# Patient Record
Sex: MALE | Race: WHITE | Employment: OTHER | URBAN - METROPOLITAN AREA
[De-identification: names, ages, dates, MRNs, and addresses within clinical notes are randomized per-mention and may not be internally consistent; named-entity substitution may affect disease eponyms.]

---

## 2017-01-10 ENCOUNTER — TELEPHONE (OUTPATIENT)
Dept: INTERNAL MEDICINE CLINIC | Age: 77
End: 2017-01-10

## 2017-01-10 NOTE — TELEPHONE ENCOUNTER
Spoke with pt who states the xarelto is not agreeing with him. He states it is causing him to have a headache behind the ears that he never had before. He also states the Cialis does not work as well while taking this medication too and the cost for this medication is a $400.00 co-pay. He would like to go back to Warfarin.

## 2017-01-11 ENCOUNTER — TELEPHONE (OUTPATIENT)
Dept: INTERNAL MEDICINE CLINIC | Age: 77
End: 2017-01-11

## 2017-01-11 NOTE — TELEPHONE ENCOUNTER
At last visit he told me medication was affordable. Needs to look on his formulary for alternative meds (Pradaxa, etc). Also need more info on h/a as he did not mention this also at his last visit. I have my reservations about switching back.   He has been very difficult to control his INR while on coumadin which is why the transition to these newer medications

## 2017-01-11 NOTE — TELEPHONE ENCOUNTER
Spoke with pt and asked should he take xarelto or warfarin. He states the xarelto is causing him headaches.

## 2017-01-11 NOTE — TELEPHONE ENCOUNTER
Patient walked in with questions about switching back to Warfarin. Reports he is getting headaches and never did on the Warfarin. Says the Xarelto is now $400, was $35. Advised to schedule appointment with Dr. Frankie Boyer to review and to check his formulary with his insurance company to see what medications are covered. Scheduling appointment now.

## 2017-01-13 RX ORDER — HYDROCHLOROTHIAZIDE 25 MG/1
TABLET ORAL
Qty: 90 TAB | Refills: 3 | Status: SHIPPED | OUTPATIENT
Start: 2017-01-13 | End: 2018-01-05 | Stop reason: SDUPTHER

## 2017-01-16 ENCOUNTER — OFFICE VISIT (OUTPATIENT)
Dept: INTERNAL MEDICINE CLINIC | Age: 77
End: 2017-01-16

## 2017-01-16 VITALS
SYSTOLIC BLOOD PRESSURE: 124 MMHG | BODY MASS INDEX: 32.11 KG/M2 | DIASTOLIC BLOOD PRESSURE: 76 MMHG | HEART RATE: 64 BPM | TEMPERATURE: 98.1 F | RESPIRATION RATE: 16 BRPM | WEIGHT: 216.8 LBS | HEIGHT: 69 IN | OXYGEN SATURATION: 98 %

## 2017-01-16 DIAGNOSIS — I48.20 CHRONIC ATRIAL FIBRILLATION (HCC): Primary | ICD-10-CM

## 2017-01-16 DIAGNOSIS — N52.9 ERECTILE DYSFUNCTION, UNSPECIFIED ERECTILE DYSFUNCTION TYPE: ICD-10-CM

## 2017-01-16 DIAGNOSIS — Z79.01 CHRONIC ANTICOAGULATION: ICD-10-CM

## 2017-01-16 DIAGNOSIS — E66.9 OBESITY (BMI 30.0-34.9): ICD-10-CM

## 2017-01-16 RX ORDER — TADALAFIL 20 MG/1
20 TABLET ORAL AS NEEDED
Qty: 6 TAB | Refills: 5 | Status: SHIPPED | OUTPATIENT
Start: 2017-01-16 | End: 2017-03-03 | Stop reason: ALTCHOICE

## 2017-01-16 NOTE — PROGRESS NOTES
Rosalba Crespo is a 67 y.o. male who was seen in clinic today (1/16/2017). Assessment & Plan:  Shree Argueta was seen today for other. Diagnoses and all orders for this visit:    Chronic atrial fibrillation ([address]- stable, see below    Chronic anticoagulation- reviewed CHADS2 score and rational for treatment. Reviewed options & may concerns about warfarin/coumadin. Reviewed cost of new anticoagulants. Not sure about h/a he is reporting short lived & easily relieved w/ meds. Will monitor for now. If cost becomes an issue will need to have GF RTC w/ patient for repeat vitamin K diet testing as pt is not able to follow this on his own. Erectile dysfunction, unspecified erectile dysfunction type- fluctuating control, not sure why it is worse, could not see any interaction w/ meds, favor mental issue. Reviewed med options, he would like to stick w/ current meds. Reviewed dosing options. -     tadalafil (CIALIS) 20 mg tablet; Take 1 Tab by mouth as needed. Obesity (BMI 30.0-34.9)- worsening, discussed the patient's above normal BMI with him. The follow up plan is as follows: I have counseled this patient on diet and exercise regimens       I spent 15 minutes with the patient and >50% of the time was spent counseling on options for anticoagulation & rational for not using coumadin. We also reviewed his concerns about sex and ED, while reviewing med options & red flags. Follow-up Disposition:  Return if symptoms worsen or fail to improve.       ----------------------------------------------------------------------    Subjective:  Anticoagulation  Patient here for followup of chronic anticoagulation due to Atrial Fibrillation. Duration: Time; 3 months to 1 year: life long. Bleeding Signs/Symptoms: None. Thromboembolic Signs/Symptoms: None. Recent falls:  None. Following a consistent vitamin K diet: no. INR's have been: poorly controlled.   Reviewed his last 4 yrs of INR's, in the last 20 readings only 7 at goal and 4 of the high's have been > 4. Switched to Xarelto at last visit. However, cost went up and having some low grade headache, behind his eyes, once a week. These last for minutes, relieved w/ 2 Tylenol. Urology Review  He is here today because of ED. He reports his symptoms are poorly controlled. His symptoms include: inability to maintain an erection. Prior to the last few weeks was having no issues w/ the medication. He is on the following medications: Cialis. He denies any medication side effects. Prior to Admission medications    Medication Sig Start Date End Date Taking? Authorizing Provider   hydroCHLOROthiazide (HYDRODIURIL) 25 mg tablet TAKE 1 TABLET EVERY DAY 1/13/17  Yes Peace Ribeiro MD   enalapril (VASOTEC) 20 mg tablet TAKE 1 TABLET TWICE DAILY 12/22/16  Yes Peace Ribeiro MD   rivaroxaban (XARELTO) 20 mg tab tablet Take 1 Tab by mouth daily (with dinner). 12/19/16  Yes Peace Ribeiro MD   pravastatin (PRAVACHOL) 10 mg tablet TAKE 1 TABLET EVERY NIGHT 11/28/16  Yes Peace Ribeiro MD   potassium chloride (K-DUR, KLOR-CON) 10 mEq tablet TAKE 1 TABLET THREE TIMES DAILY 10/4/16  Yes Peace Ribeiro MD   amLODIPine (NORVASC) 5 mg tablet Take 1 Tab by mouth daily. 10/3/16  Yes Anthony Beach MD   tadalafil (CIALIS) 20 mg tablet Take 1 Tab by mouth as needed. 10/3/16  Yes Anthony Beach MD   metoprolol succinate (TOPROL-XL) 25 mg XL tablet TAKE 1 TABLET EVERY DAY 8/24/16  Yes Peace Ribeiro MD   terazosin (HYTRIN) 10 mg capsule TAKE 1 CAPSULE EVERY NIGHT 7/14/16  Yes Peace Ribeiro MD   etodolac (LODINE) 400 mg tablet Take 400 mg by mouth two (2) times daily (with meals). 10/19/15  Yes Xochitl Hendrickson MD   acetaminophen (TYLENOL) 325 mg tablet Take  by mouth every four (4) hours as needed for Pain. Yes Historical Provider   ARGININE, L-ARGININE, PO Take 1 tablet by mouth.  L Arginine/L Citrulline 4000mg/1000mg qd   Yes Historical Provider   FOLIC ACID PO Take 286 mcg by mouth daily. Yes Historical Provider   thioctic acid (ALPHA LIPOIC ACID) 50 mg tab Take 1 Tab by mouth daily. Yes Historical Provider   fiber Cap Take 2 Caps by mouth two (2) times a day. Yes Historical Provider   MULTIVITAMIN WITH MINERALS (MULTI-VIT 55 PLUS PO) Take 1 Tab by mouth daily. 7/8/11  Yes Historical Provider          Allergies   Allergen Reactions    Pcn [Penicillins] Unknown (comments)     As a child    Percocet [Oxycodone-Acetaminophen] Swelling     Leg swelling           Review of Systems   HENT: Negative for nosebleeds. Respiratory: Negative for hemoptysis. Gastrointestinal: Negative for blood in stool and melena. Genitourinary: Negative for hematuria. Endo/Heme/Allergies: Does not bruise/bleed easily. Objective:   Physical Exam- deferred      Visit Vitals    /76    Pulse 64    Temp 98.1 °F (36.7 °C) (Oral)    Resp 16    Ht 5' 8.8\" (1.748 m)    Wt 216 lb 12.8 oz (98.3 kg)    SpO2 98%    BMI 32.2 kg/m2         Disclaimer:  Advised him to call back or return to office if symptoms worsen/change/persist.  Discussed expected course/resolution/complications of diagnosis in detail with patient. Medication risks/benefits/costs/interactions/alternatives discussed with patient. He was given an after visit summary which includes diagnoses, current medications, & vitals. He expressed understanding with the diagnosis and plan.         Zachary Tam MD

## 2017-01-16 NOTE — PATIENT INSTRUCTIONS
Ã¯Â»Â¿  Anticoagulants: After Your Visit  Your Care Instructions  Your doctor prescribed an anticoagulant medicine. Anticoagulants, often called blood thinners, prevent new blood clots from forming and keep existing clots from getting larger. They do not actually thin the blood, but they make the blood take longer to clot. This lowers the risk of a blood clot moving to the lungs (pulmonary embolism) or moving to the brain and causing a stroke. Blood thinners come in two forms. Heparin is given by shot, either under your skin or through a needle in your vein, and starts working right away. Warfarin (Coumadin) comes in pill form and takes longer to work. Your doctor may have you begin taking both forms at the same time. As soon as the pills start to work, you will stop the shots but continue to take the pills. If you have a blood clot in your leg, you may need to take warfarin for several months. People who have heart conditions such as atrial fibrillation often need to take it for the rest of their lives. The right dose of this medicine is different for each person. You will need regular blood tests to see if your dose is correct. Follow-up care is a key part of your treatment and safety. Be sure to make and go to all appointments, and call your doctor if you are having problems. Itâs also a good idea to know your test results and keep a list of the medicines you take. How do you take blood thinners? · Take your medicines exactly as prescribed. Call your doctor if you think you are having a problem with your medicine. · Call your doctor if you are not sure what to do if you missed a dose of blood thinner. ¨ Your doctor can tell you exactly what to do so you do not take too much or too little blood thinner. Then you will be as safe as possible. · Some general rules for what to do if you miss a dose:  ¨ If you remember it in the same day, take the missed dose. Then go back to your regular schedule.   ¨ If it is the next day, or almost time to take the next dose, do not take the missed dose. Do not double the dose to make up for the missed one. At your next regularly scheduled time, take your normal dose. ¨ If you miss your dose for 2 or more days, call your doctor. · To help you stay on schedule, use a calendar to remind you when to take your blood thinner. When you take the medicine, note it on the calendar. · If you are going to give yourself shots, your doctor will give you instructions for how to safely inject the medicine. Follow the directions carefully. · Do not take any vitamins, over-the-counter medicines, or herbal products without talking to your doctor first.  · Avoid contact sports and other activities that could lead to injury. Make your home safe and take other measures to reduce your risk of falling. Always wear a seat belt while in a car. · Do not suddenly change your intake of vitamin Kârich foods, such as broccoli, cabbage, asparagus, lettuce, and spinach. This will help blood thinners work evenly from day to day. · Limit alcohol to 2 drinks a day for men and 1 drink a day for women. Alcohol may interfere with blood thinners. It also increases your risk of falls, which can cause bruising and bleeding. · Tell your dentist, pharmacist, and other health professionals that you take blood thinners. Wear medical alert jewelry that says you take blood thinners. You can buy this at most drugstores. When should you call for help? Call 911 anytime you think you may need emergency care. For example, call if:  · You cough up blood. · You vomit blood or what looks like coffee grounds. · You pass maroon or very bloody stools. Call your doctor now or seek immediate medical care if:  · You have new bruises or blood spots under your skin. · You have a nosebleed. · Your gums bleed when you brush your teeth. · You have blood in your urine.   · Your stools are black and tarlike or have streaks of blood.  · You have vaginal bleeding when you are not having your period, or heavy period bleeding. Watch closely for changes in your health, and be sure to contact your doctor if:  · You have questions about your medicine. Where can you learn more? Go to CoWare.be  Enter O883 in the search box to learn more about \"Anticoagulants: After Your Visit. \"   Â© 5235-6666 Healthwise, Incorporated. Care instructions adapted under license by [employer] (which disclaims liability or warranty for this information). This care instruction is for use with your licensed healthcare professional. If you have questions about a medical condition or this instruction, always ask your healthcare professional. [place] [address] any warranty or liability for your use of this information.   Content Version: 6.1.43971; Last Revised: July 1, 2009

## 2017-01-16 NOTE — MR AVS SNAPSHOT
Visit Information Date & Time Provider Department Dept. Phone Encounter #  
 1/16/2017  7:45 AM Tay Ocampo, [address] [facility] Internal Medicine [phone] [id number] Follow-up Instructions Return if symptoms worsen or fail to improve. Your Appointments 2/22/2017  8:30 AM  
SURGERY with MD Prashant Ron [address]057 [address] [facility]) Appt Note: BCC Lt lateral cheek Dr. Blayne Garcia [facility] [address] [place] [place] [address]  
[facility] [address] [place] [address]  
  
    
 3/3/2017  8:30 AM  
PHYSICAL with Tay Ocampo MD  
[facility] Internal Medicine [address]) Appt Note: cpe  
 [address]  [address] [facility] [address]  
[address] [facility]  
  
    
 4/3/2017  8:40 AM  
ESTABLISHED PATIENT with Shalonda Garrido MD  
[facility] ([address]) Appt Note: 6 month follow up  
 [address]  
[address] [facility],[address] [address] Upcoming Health Maintenance Date Due  
 GLAUCOMA SCREENING Q2Y 9/1/2016 COLONOSCOPY 5/10/2017 MEDICARE YEARLY EXAM 1/19/2017 DTaP/Tdap/Td series (2 - Td) 7/14/2025 Allergies as of 1/16/2017  Review Complete On: 1/16/2017 By: Tay Ocampo MD  
  
 Severity Noted Reaction Type Reactions Pcn [Penicillins]  03/27/2011    Unknown (comments) As a child Percocet [Oxycodone-acetaminophen]  03/27/2011    Swelling Leg swelling Current Immunizations  Reviewed on 1/16/2017 Name Date Influenza High Dose Vaccine PF 10/7/2016, 10/12/2015, 10/6/2014 Influenza Vaccine 10/1/2013 Influenza Vaccine Split 10/15/2012, 10/20/2011 Pneumococcal Conjugate (PCV-13) 7/14/2015 Pneumococcal Polysaccharide (PPSV-23) 10/7/2016 Pneumococcal Vaccine (Pcv) 1/1/2005 Tdap 7/14/2015 Zoster Vaccine, Live 1/1/2011 Reviewed by Jordyn Silva RN on 1/16/2017 at  7:51 AM  
You Were Diagnosed With   
  
 Codes Comments Chronic atrial fibrillation (HCC)    -  Primary ICD-10-CM: Q47.5 ICD-9-CM: 427.31 Chronic anticoagulation     ICD-10-CM: Z79.01 
ICD-9-CM: V58.61 Erectile dysfunction, unspecified erectile dysfunction type     ICD-10-CM: N52.9 ICD-9-CM: 607.84 Obesity (BMI 30.0-34.9)     ICD-10-CM: Y52.8 ICD-9-CM: 278.00 Vitals BP Pulse Temp Resp Height(growth percentile) Weight(growth percentile) 124/76 64 98.1 °F (36.7 °C) (Oral) 16 5' 8.8\" (1.748 m) 216 lb 12.8 oz (98.3 kg) SpO2 BMI Smoking Status 98% 32.2 kg/m2 Never Smoker Vitals History BMI and BSA Data Body Mass Index Body Surface Area  
 32.2 kg/m 2 2.18 m 2 Preferred Pharmacy Pharmacy Name Phone [facility] [address] [place] [address], [address] [place] [address] [phone] Your Updated Medication List  
  
   
This list is accurate as of: 1/16/17  8:14 AM.  Always use your most recent med list. amLODIPine 5 mg tablet Commonly known as:  [place]udd[place]eston Andrea Take 1 Tab by mouth daily. ARGININE (L-ARGININE) PO Take 1 tablet by mouth. L Arginine/L Citrulline 4000mg/1000mg qd  
  
 enalapril 20 mg tablet Commonly known as:  VASOTEC  
TAKE 1 TABLET TWICE DAILY  
  
 etodolac 400 mg tablet Commonly known as:  LODINE Take 400 mg by mouth two (2) times daily (with meals). fiber Cap Take 2 Caps by mouth two (2) times a day. FOLIC ACID PO Take 400 mcg by mouth daily. hydroCHLOROthiazide 25 mg tablet Commonly known as:  HYDRODIURIL  
TAKE 1 TABLET EVERY DAY  
  
 metoprolol succinate 25 mg XL tablet Commonly known as:  TOPROL-XL  
TAKE 1 TABLET EVERY DAY  
  
 MULTI-VIT 55 PLUS PO Take 1 Tab by mouth daily. potassium chloride 10 mEq tablet Commonly known as:  K-DUR, KLOR-CON  
TAKE 1 TABLET THREE TIMES DAILY pravastatin 10 mg tablet Commonly known as:  PRAVACHOL  
TAKE 1 TABLET EVERY NIGHT  
  
 rivaroxaban 20 mg Tab tablet Commonly known as:  Isai Capes Take 1 Tab by mouth daily (with dinner). tadalafil 20 mg tablet Commonly known as:  CIALIS Take 1 Tab by mouth as needed. terazosin 10 mg capsule Commonly known as:  HYTRIN  
TAKE 1 CAPSULE EVERY NIGHT  
  
 thioctic acid 50 mg Tab Generic drug:  Alpha Lipoic Acid Take 1 Tab by mouth daily. TYLENOL 325 mg tablet Generic drug:  acetaminophen Take  by mouth every four (4) hours as needed for Pain. Prescriptions Sent to Pharmacy Refills  
 rivaroxaban (XARELTO) 20 mg tab tablet 5 Sig: Take 1 Tab by mouth daily (with dinner). Class: Normal  
 Pharmacy: [facility] [address], [address] Trixie Riedel of [address] Ph #: [phone] Route: Oral  
  
Follow-up Instructions Return if symptoms worsen or fail to improve. Patient Instructions Ã¯Â»Â Anticoagulants: After Your Visit Your Care Instructions Your doctor prescribed an anticoagulant medicine. Anticoagulants, often called blood thinners, prevent new blood clots from forming and keep existing clots from getting larger. They do not actually thin the blood, but they make the blood take longer to clot. This lowers the risk of a blood clot moving to the lungs (pulmonary embolism) or moving to the brain and causing a stroke. Blood thinners come in two forms. Heparin is given by shot, either under your skin or through a needle in your vein, and starts working right away. Warfarin (Coumadin) comes in pill form and takes longer to work. Your doctor may have you begin taking both forms at the same time. As soon as the pills start to work, you will stop the shots but continue to take the pills. If you have a blood clot in your leg, you may need to take warfarin for several months. People who have heart conditions such as atrial fibrillation often need to take it for the rest of their lives. The right dose of this medicine is different for each person. You will need regular blood tests to see if your dose is correct. Follow-up care is a key part of your treatment and safety. Be sure to make and go to all appointments, and call your doctor if you are having problems. Itâs also a good idea to know your test results and keep a list of the medicines you take. How do you take blood thinners? · Take your medicines exactly as prescribed. Call your doctor if you think you are having a problem with your medicine. · Call your doctor if you are not sure what to do if you missed a dose of blood thinner. ¨ Your doctor can tell you exactly what to do so you do not take too much or too little blood thinner. Then you will be as safe as possible. · Some general rules for what to do if you miss a dose: ¨ If you remember it in the same day, take the missed dose. Then go back to your regular schedule. ¨ If it is the next day, or almost time to take the next dose, do not take the missed dose. Do not double the dose to make up for the missed one. At your next regularly scheduled time, take your normal dose. ¨ If you miss your dose for 2 or more days, call your doctor. · To help you stay on schedule, use a calendar to remind you when to take your blood thinner. When you take the medicine, note it on the calendar. · If you are going to give yourself shots, your doctor will give you instructions for how to safely inject the medicine. Follow the directions carefully. · Do not take any vitamins, over-the-counter medicines, or herbal products without talking to your doctor first. 
· Avoid contact sports and other activities that could lead to injury.  Make your home safe and take other measures to reduce your risk of falling. Always wear a seat belt while in a car. · Do not suddenly change your intake of vitamin Kârich foods, such as broccoli, cabbage, asparagus, lettuce, and spinach. This will help blood thinners work evenly from day to day. · Limit alcohol to 2 drinks a day for men and 1 drink a day for women. Alcohol may interfere with blood thinners. It also increases your risk of falls, which can cause bruising and bleeding. · Tell your dentist, pharmacist, and other health professionals that you take blood thinners. Wear medical alert jewelry that says you take blood thinners. You can buy this at most [employer]. When should you call for help? Call 911 anytime you think you may need emergency care. For example, call if: 
· You cough up blood. · You vomit blood or what looks like coffee grounds. · You pass maroon or very bloody stools. Call your doctor now or seek immediate medical care if: 
· You have new bruises or blood spots under your skin. · You have a nosebleed. · Your gums bleed when you brush your teeth. · You have blood in your urine. · Your stools are black and tarlike or have streaks of blood. · You have vaginal bleeding when you are not having your period, or heavy period bleeding. Watch closely for changes in your health, and be sure to contact your doctor if: 
· You have questions about your medicine. Where can you learn more? Go to Noomeo.be Enter L215 in the search box to learn more about \"Anticoagulants: After Your Visit. \" Â© 6981-1525 Healthwise, Incorporated. Care instructions adapted under license by Marisol Higgins (which disclaims liability or warranty for this information). This care instruction is for use with your licensed healthcare professional. If you have questions about a medical condition or this instruction, always ask your healthcare professional. [address] any warranty or liability for your use of this information. Content Version: 8.2.75955; Last Revised: July 1, 2009 Introducing [facility]! Denny Kelsey introduces Mercury Puzzle patient portal. Now you can access parts of your medical record, email your doctor's office, and request medication refills online. 1. In your internet browser, go to [email or web address] 2. Click on the First Time User? Click Here link in the Sign In box. You will see the New Member Sign Up page. 3. Enter your Mercury Puzzle Access Code exactly as it appears below. You will not need to use this code after youve completed the sign-up process. If you do not sign up before the expiration date, you must request a new code. · [employer] Puzzle Access Code: [email or web address] Expires: 4/16/2017  8:14 AM 
 
4. Enter the last four digits of your Social Security Number (xxxx) and Date of Birth (mm/dd/yyyy) as indicated and click Submit. You will be taken to the next sign-up page. 5. Create a Mercury Puzzle ID. This will be your Mercury Puzzle login ID and cannot be changed, so think of one that is secure and easy to remember. 6. Create a [employer] Puzzle password. You can change your password at any time. 7. Enter your Password Reset Question and Answer. This can be used at a later time if you forget your password. 8. Enter your e-mail address. You will receive e-mail notification when new information is available in [address] 9. Click Sign Up. You can now view and download portions of your medical record. 10. Click the Download Summary menu link to download a portable copy of your medical information. If you have questions, please visit the Frequently Asked Questions section of the Mercury Puzzle website. Remember, [employer] Puzzle is NOT to be used for urgent needs. For medical emergencies, dial 911. Now available from your iPhone and Android! Please provide this summary of care documentation to your next provider. Your primary care clinician is listed as Rosa Elena So.  If you have any questions after today's visit, please call [phone].

## 2017-02-01 ENCOUNTER — TELEPHONE (OUTPATIENT)
Dept: DERMATOLOGY | Facility: AMBULATORY SURGERY CENTER | Age: 77
End: 2017-02-01

## 2017-02-01 NOTE — TELEPHONE ENCOUNTER
Patient Appointment Date:   02/22/17  8:30am      Macy Germain, 67 y.o., male  Is calling for their Mohs Pre-Op Assessment    does not have Hepatitis C or HIV   does confirm site   does ID site. Allergies: Allergies   Allergen Reactions    Pcn [Penicillins] Unknown (comments)     As a child    Percocet [Oxycodone-Acetaminophen] Swelling     Leg swelling         does not have a Pacemaker  does have a Defibrillator    does need antibiotics  If yes, antibiotic pt states he is getting it from his PCP  is not taking NSAIDs    is not taking aspirin    is not taking garlic  is not taking ginkgo  is not taking Ginseng  is not taking fish oils  is not taking vit E    does take a blood thinner (Xarelto)    is not taking Coumadin/Warfarin         Pre operative assessment questions asked to patient. Patient has a general understanding of the procedure, and has been versed that there will be local anesthesia used in the procedure and that He will be ok to drive themselves to and from the appointment.

## 2017-02-09 RX ORDER — TERAZOSIN 10 MG/1
CAPSULE ORAL
Qty: 90 CAP | Refills: 1 | Status: SHIPPED | OUTPATIENT
Start: 2017-02-09 | End: 2017-06-27 | Stop reason: SDUPTHER

## 2017-02-21 ENCOUNTER — OFFICE VISIT (OUTPATIENT)
Dept: DERMATOLOGY | Facility: AMBULATORY SURGERY CENTER | Age: 77
End: 2017-02-21

## 2017-02-21 VITALS
HEIGHT: 68 IN | WEIGHT: 190 LBS | HEART RATE: 59 BPM | TEMPERATURE: 97.7 F | OXYGEN SATURATION: 99 % | DIASTOLIC BLOOD PRESSURE: 80 MMHG | SYSTOLIC BLOOD PRESSURE: 136 MMHG | BODY MASS INDEX: 28.79 KG/M2 | RESPIRATION RATE: 18 BRPM

## 2017-02-21 DIAGNOSIS — C44.319 BASAL CELL CARCINOMA OF LEFT LATERAL CHEEK: Primary | ICD-10-CM

## 2017-02-21 RX ORDER — LIDOCAINE HYDROCHLORIDE AND EPINEPHRINE 10; 10 MG/ML; UG/ML
3 INJECTION, SOLUTION INFILTRATION; PERINEURAL ONCE
Qty: 1 VIAL | Refills: 0
Start: 2017-02-21 | End: 2017-02-21

## 2017-02-21 RX ORDER — BUPIVACAINE HYDROCHLORIDE AND EPINEPHRINE 2.5; 5 MG/ML; UG/ML
INJECTION, SOLUTION INFILTRATION; PERINEURAL
Qty: 1.5 ML | Refills: 0
Start: 2017-02-21 | End: 2017-02-24 | Stop reason: ALTCHOICE

## 2017-02-21 NOTE — PROGRESS NOTES
Pre-op: Patient present today for the evaluation of basal cell carcinoma of the left lateral cheek. Procedure explained with full understanding. Vitals:    02/21/17 0828   BP: 136/80   Pulse: (!) 59   Resp: 18   Temp: 97.7 °F (36.5 °C)   TempSrc: Oral   SpO2: 99%   Weight: 86.2 kg (190 lb)   Height: 5' 8\" (1.727 m)     preoperatively, will continue to monitor. Post-op: Written and verbal post-op wound care instructions given to patient with full understanding of care. Surgical wound bandaged with Vaseline, Telfa, 2x2 gauze, and coverall tape. All questions and concerns addressed. Vitals stable postoperatively.

## 2017-02-21 NOTE — MR AVS SNAPSHOT
Visit Information Date & Time Provider Department Dept. Phone Encounter #  
 2/21/2017  9:00 AM MD Prashant Bryant [id number] [phone] [id number] Your Appointments 2/21/2017  9:00 AM  
SURGERY with MD Prashant Bryant 8057 [facility]) Appt Note: BCC Lt lateral cheek Dr. Zakiya Lock; [facility] Lt lateral cheek Dr. Zakiya Lock [facility] [address] [facility] [address]  
[address] [facility] [address] [facility] [facility] [address]  
  
    
 3/3/2017  8:30 AM  
PHYSICAL with Thea Felipe MD  
[facility]) Appt Note: cpe  
 [address] [facility] [address]  
[address] [place]  
  
    
 4/3/2017  8:40 AM  
ESTABLISHED PATIENT with Jaylan Wong MD  
[facility] ([facility]) Appt Note: 6 month follow up  
 [phone] [place]  
[address] [facility] [id number] Upcoming Health Maintenance Date Due  
 GLAUCOMA SCREENING Q2Y 9/1/2016 MEDICARE YEARLY EXAM 1/19/2017 COLONOSCOPY 5/10/2017 DTaP/Tdap/Td series (2 - Td) 7/14/2025 Allergies as of 2/21/2017  Review Complete On: 2/21/2017 By: Kayleigh Clarke LPN Severity Noted Reaction Type Reactions Pcn [Penicillins]  03/27/2011    Unknown (comments) As a child Percocet [Oxycodone-acetaminophen]  03/27/2011    Swelling Leg swelling Current Immunizations  Reviewed on 1/16/2017 Name Date Influenza High Dose Vaccine PF 10/7/2016, 10/12/2015, 10/6/2014 Influenza Vaccine 10/1/2013 Influenza Vaccine Split 10/15/2012, 10/20/2011 Pneumococcal Conjugate (PCV-13) 7/14/2015 Pneumococcal Polysaccharide (PPSV-23) 10/7/2016 Pneumococcal Vaccine (Pcv) 1/1/2005 Tdap 7/14/2015 Zoster Vaccine, Live 1/1/2011 Not reviewed this visit You Were Diagnosed With   
  
 Codes Comments Basal cell carcinoma of left lateral cheek    -  Primary ICD-10-CM: C44.319 ICD-9-CM: 173.31 Vitals BP Pulse Temp Resp Height(growth percentile) Weight(growth percentile) 136/80 (BP 1 Location: Left arm, BP Patient Position: Sitting) (!) 59 97.7 °F (36.5 °C) (Oral) 18 5' 8\" (1.727 m) 190 lb (86.2 kg) SpO2 BMI Smoking Status 99% 28.89 kg/m2 Never Smoker BMI and BSA Data Body Mass Index Body Surface Area  
 28.89 kg/m 2 2.03 m 2 Preferred Pharmacy Pharmacy Name Phone [facility]  [address] [phone] Your Updated Medication List  
  
   
This list is accurate as of: 2/21/17  8:54 AM.  Always use your most recent med list. amLODIPine 5 mg tablet Commonly known as:  Arellano Mullet Take 1 Tab by mouth daily. ARGININE (L-ARGININE) PO Take 1 tablet by mouth. L Arginine/L Citrulline 4000mg/1000mg qd  
  
 enalapril 20 mg tablet Commonly known as:  VASOTEC  
TAKE 1 TABLET TWICE DAILY  
  
 etodolac 400 mg tablet Commonly known as:  LODINE Take 400 mg by mouth two (2) times daily (with meals). fiber Cap Take 2 Caps by mouth two (2) times a day. FOLIC ACID PO Take 400 mcg by mouth daily. hydroCHLOROthiazide 25 mg tablet Commonly known as:  HYDRODIURIL  
TAKE 1 TABLET EVERY DAY  
  
 metoprolol succinate 25 mg XL tablet Commonly known as:  TOPROL-XL  
TAKE 1 TABLET EVERY DAY  
  
 MULTI-VIT 55 PLUS PO Take 1 Tab by mouth daily. potassium chloride 10 mEq tablet Commonly known as:  K-DUR, KLOR-CON  
TAKE 1 TABLET THREE TIMES DAILY pravastatin 10 mg tablet Commonly known as:  PRAVACHOL  
TAKE 1 TABLET EVERY NIGHT  
  
 rivaroxaban 20 mg Tab tablet Commonly known as:  Caralyn Karen Take 1 Tab by mouth daily (with dinner). tadalafil 20 mg tablet Commonly known as:  CIALIS Take 1 Tab by mouth as needed. terazosin 10 mg capsule Commonly known as:  HYTRIN  
TAKE 1 CAPSULE EVERY NIGHT  
  
 thioctic acid 50 mg Tab Generic drug:  Alpha Lipoic Acid Take 1 Tab by mouth daily. TYLENOL 325 mg tablet Generic drug:  acetaminophen Take  by mouth every four (4) hours as needed for Pain. Patient Instructions WOUND CARE INSTRUCTIONS 1. Keep the dressing clean and dry and do not remove for 48 hours. 2. Then change the dressing once a day as follows: 
a. Wash hands before and after each dressing change. b. Remove dressing and wash site gently with mild soap and water, rinse, and pat dry. 
c. Apply an ointment (Bacitracin, Polysporin, Neosporin, Petroleum jelly or Aquaphor). d. Apply a non-stick (Telfa) dressing or Band-Aid to cover the wound. Remove pressure bandage Thursday, then wash gently and apply Vaseline and a band-aid to site daily for 1 week. 3. Watch for: BLEEDING: A small amount of drainage may occur. If bleeding occurs, elevate and rest the surgery site. Apply gauze and steady pressure for 15 minutes. If bleeding continues, call this office. INFECTION: Signs of infection include increased redness, pain, warmth, drainage of pus, and fever. If this occurs, call this office. 4. Special Instructions (follow any that are checked): ·  You have stitches that need to be removed in 0 days ·  Avoid bending at the waist and heavy lifting for two days. ·  Sleep with your head elevated for the next two nights. ·  Rest the surgery site and keep it elevated as much as possible for two days. ·  You may apply an ice-pack for 10-15 minutes every waking hour for the rest of the day. ·  Eat a soft diet and avoid hot food and hot drinks for the rest of the day. ·  Other instructions: Follow up as needed Take Tylenol for pain as needed. Once the site is healed with no remaining bandages or open areas, protect your surgical site and scar from the sun, as this area will be more sensitive. Use a broad spectrum sunscreen SPF 30 or higher daily, and a chemical free product (one containing zinc oxide or titanium dioxide) is a good choice if the area is sensitive. You may begin to gently massage the surgical site in 2-3 weeks, rubbing in a circular motion along the scar. This can help reduce swelling and thickness of a scar. A scar cream may be used beginnning 1 month after the surgery. If you have any questions or concerns, please call our office Monday through Friday at [phone]. Introducing [facility]! Daphne Lopez introduces [employer] patient portal. Now you can access parts of your medical record, email your doctor's office, and request medication refills online. 1. In your internet browser, go to [email or web address] 2. Click on the First Time User? Click Here link in the Sign In box. You will see the New Member Sign Up page. 3. Enter your [employer] Access Code exactly as it appears below. You will not need to use this code after youve completed the sign-up process. If you do not sign up before the expiration date, you must request a new code. · [employer] Access Code: [email or web address] Expires: 4/16/2017  8:14 AM 
 
4. Enter the last four digits of your Social Security Number (xxxx) and Date of Birth (mm/dd/yyyy) as indicated and click Submit. You will be taken to the next sign-up page. 5. Create a [employer] ID. This will be your [employer] login ID and cannot be changed, so think of one that is secure and easy to remember. 6. Create a [employer] password. You can change your password at any time. 7. Enter your Password Reset Question and Answer. This can be used at a later time if you forget your password. 8. Enter your e-mail address.  You will receive e-mail notification when new information is available in [employer]. 9. Click Sign Up. You can now view and download portions of your medical record. 10. Click the Download Summary menu link to download a portable copy of your medical information. If you have questions, please visit the Frequently Asked Questions section of the [employer] website. Remember, [employer] is NOT to be used for urgent needs. For medical emergencies, dial 911. Now available from your iPhone and Android! Please provide this summary of care documentation to your next provider. Your primary care clinician is listed as Chelo Yoon. If you have any questions after today's visit, please call [phone].

## 2017-02-21 NOTE — PATIENT INSTRUCTIONS
WOUND CARE INSTRUCTIONS    1. Keep the dressing clean and dry and do not remove for 48 hours. 2. Then change the dressing once a day as follows:  a. Wash hands before and after each dressing change. b. Remove dressing and wash site gently with mild soap and water, rinse, and pat dry.  c. Apply an ointment (Bacitracin, Polysporin, Neosporin, Petroleum jelly or Aquaphor). d. Apply a non-stick (Telfa) dressing or Band-Aid to cover the wound. Remove pressure bandage Thursday, then wash gently and apply Vaseline and a band-aid to site daily for 1 week. 3. Watch for:  BLEEDING: A small amount of drainage may occur. If bleeding occurs, elevate and rest the surgery site. Apply gauze and steady pressure for 15 minutes. If bleeding continues, call this office. INFECTION: Signs of infection include increased redness, pain, warmth, drainage of pus, and fever. If this occurs, call this office. 4. Special Instructions (follow any that are checked):  · [] You have stitches that need to be removed in 0 days  · [x] Avoid bending at the waist and heavy lifting for two days. · [x] Sleep with your head elevated for the next two nights. · [x] Rest the surgery site and keep it elevated as much as possible for two days. · [x] You may apply an ice-pack for 10-15 minutes every waking hour for the rest of the day. · [] Eat a soft diet and avoid hot food and hot drinks for the rest of the day. · [] Other instructions: Follow up as needed  Take Tylenol for pain as needed. Once the site is healed with no remaining bandages or open areas, protect your surgical site and scar from the sun, as this area will be more sensitive. Use a broad spectrum sunscreen SPF 30 or higher daily, and a chemical free product (one containing zinc oxide or titanium dioxide) is a good choice if the area is sensitive. You may begin to gently massage the surgical site in 2-3 weeks, rubbing in a circular motion along the scar.  This can help reduce swelling and thickness of a scar. A scar cream may be used beginnning 1 month after the surgery. If you have any questions or concerns, please call our office Monday through Friday at [phone].

## 2017-02-21 NOTE — PROGRESS NOTES
This note is written by Altamease Mcardle, as dictated by Ulisses Moore. Dahlia Goldmann, MD.    CC: Basal cell carcinoma on the left lateral cheek    History of present illness:     Dio Barragan is a 67 y.o. male referred by Dr. Patrice Meraz. He has a biopsy-proven basal cell carcinoma on the left lateral cheek. This is a new basal cell carcinoma present for approximately 1 year described as a non-healing patch, non-resolving with Neosporin, no pain, bleeding, or itching and with no prior surgical treatment. Biopsy confirmed the diagnosis of basal cell carcinoma, and I reviewed the written pathology report. He is feeling well and in his usual state of health today. He has no pain, no current illnesses, no other skin concerns. His allergies, medications, medical, and social history are reviewed by me today. He has a history of numerous non-melanoma skin cancers and is familiar with Mohs surgery. He has a defibrillator and is taking Xarelto. Exam:     He is an awake, alert, and oriented 67 y.o. male who appears well and in no distress. There is no preauricular, submandibular, or cervical lymphadenopathy. I examined his face. He has a 12 x 9 mm shiny firm papule on his left lateral cheek. He confirms location. He has other well-healed surgical scars on his face, no evidence of lesion recurrence. Assessment/plan:    1. Basal cell carcinoma, left lateral cheek. I discussed the diagnosis of basal cell carcinoma and summarized the pathology report. Mohs surgery is indicated by site and size. The procedure was discussed, verbal and written consent were obtained. I performed the procedure. One stage was required to reach a tumor free plane. The surgical defect was managed with complex repair. There were no complications. He will follow up as needed as the site heals. Indications, risks, and options were discussed with Rick Patten preoperatively.  Risks including, but not limited to: pain, bleeding, infection, tumor recurrence, scarring and damage to motor and/or sensory nerves, were discussed. Deepak Eddy chose Mohs surgery. Deepak Eddy was an acceptable surgery candidate. Deepak Eddy was placed in the appropriate position on the operating table in the Mohs surgery procedure room. The area was prepped and draped in the standard manner. Gentian violet was used to outline the clinical margins of the tumor. Local anesthesia was then obtained. The grossly visible tumor was then removed, an underlying layer was excised and mapped according to the Mohs technique, and the individual specimens examined microscopically. The process was repeated until microscopic examination of the tissue specimens confirmed a tumor-free plane. Hemostasis was obtained with electrosurgery and pressure. The wound was covered between stages with moist saline gauze. Wound care instructions (written and verbal) and a follow up appointment were given to Deepak Eddy before discharge. Deepak Eddy was discharged in good condition. The wound management options of second intent healing, layered closure, local flap, and/or full thickness skin graft were discussed. Deepak Eddy understands the aims, risks, alternatives, and possible complications and elects to proceed with a complex layered closure. Wound margins were made vertical, edges undermined in the muscular plane, standing cones corrected at both poles followed by layered closure. The wound was closed with buried 5-0 polysorb suture in the muscle and deep subcutis to reduce width of the wound, a second layer in the dermis to reduce tension on the skin edges, and skin edges were approximated with 6-0 fast gut suture. The final closure length was 45 mm. The wound was bandaged with Vaseline, Telfa, gauze and Coverroll. Wound care instructions (written and verbal) and a follow up appointment were given to Deepak Eddy before discharge.  Deepak Swannon was discharged in good condition. 2. History of nonmelanoma skin cancers. I discussed the diagnosis and recommend routine examinations with Dr. Luis Turk for surveillance. The documentation recorded by the scribe accurately reflects the service I personally performed and the decisions made by me. [facility]   OFFICE PROCEDURE PROGRESS NOTE     Chart reviewed for the following:     Smamie Knutson MD, have reviewed the History, Physical and updated the Allergic reactions for [address] performed immediately prior to start of procedure:     Sammie Reynaga. Rhonda Knutson MD, have performed the following reviews on Macy Jury prior to the start of the procedure:     * Patient was identified by name and date of birth   * Agreement on procedure being performed was verified   * Risks and Benefits explained to the patient   * Procedure site verified and marked as necessary   * Patient was positioned for comfort   * Consent was signed and verified     Time: 8:45 AM  Date of procedure: 2/21/2017  Procedure performed by: Latrice Curtis.  Rhonda Knutson MD   Provider assisted by: LPN   Patient assisted by: self   How tolerated by patient: tolerated the procedure well with no complications   Comments: none

## 2017-02-24 RX ORDER — METOPROLOL SUCCINATE 25 MG/1
TABLET, EXTENDED RELEASE ORAL
Qty: 90 TAB | Refills: 1 | Status: SHIPPED | OUTPATIENT
Start: 2017-02-24 | End: 2017-06-30 | Stop reason: SDUPTHER

## 2017-02-28 RX ORDER — PRAVASTATIN SODIUM 10 MG/1
TABLET ORAL
Qty: 90 TAB | Refills: 1 | Status: SHIPPED | OUTPATIENT
Start: 2017-02-28 | End: 2017-11-13 | Stop reason: SDUPTHER

## 2017-03-03 ENCOUNTER — TELEPHONE (OUTPATIENT)
Dept: INTERNAL MEDICINE CLINIC | Age: 77
End: 2017-03-03

## 2017-03-03 ENCOUNTER — OFFICE VISIT (OUTPATIENT)
Dept: INTERNAL MEDICINE CLINIC | Age: 77
End: 2017-03-03

## 2017-03-03 VITALS
RESPIRATION RATE: 16 BRPM | SYSTOLIC BLOOD PRESSURE: 126 MMHG | TEMPERATURE: 97.7 F | OXYGEN SATURATION: 98 % | HEIGHT: 69 IN | DIASTOLIC BLOOD PRESSURE: 78 MMHG | HEART RATE: 66 BPM | WEIGHT: 215 LBS | BODY MASS INDEX: 31.84 KG/M2

## 2017-03-03 DIAGNOSIS — Z13.5 GLAUCOMA SCREENING: ICD-10-CM

## 2017-03-03 DIAGNOSIS — Z79.01 CHRONIC ANTICOAGULATION: ICD-10-CM

## 2017-03-03 DIAGNOSIS — Z13.39 SCREENING FOR ALCOHOLISM: ICD-10-CM

## 2017-03-03 DIAGNOSIS — Z13.31 SCREENING FOR DEPRESSION: ICD-10-CM

## 2017-03-03 DIAGNOSIS — Z71.89 ACP (ADVANCE CARE PLANNING): ICD-10-CM

## 2017-03-03 DIAGNOSIS — L03.032 PARONYCHIA OF GREAT TOE, LEFT: ICD-10-CM

## 2017-03-03 DIAGNOSIS — I48.0 PAROXYSMAL ATRIAL FIBRILLATION (HCC): ICD-10-CM

## 2017-03-03 DIAGNOSIS — I25.10 CORONARY ARTERY DISEASE INVOLVING NATIVE CORONARY ARTERY OF NATIVE HEART WITHOUT ANGINA PECTORIS: ICD-10-CM

## 2017-03-03 DIAGNOSIS — N52.9 ERECTILE DYSFUNCTION, UNSPECIFIED ERECTILE DYSFUNCTION TYPE: ICD-10-CM

## 2017-03-03 DIAGNOSIS — Z00.00 MEDICARE ANNUAL WELLNESS VISIT, SUBSEQUENT: Primary | ICD-10-CM

## 2017-03-03 RX ORDER — CEPHALEXIN 500 MG/1
500 CAPSULE ORAL 3 TIMES DAILY
Qty: 21 CAP | Refills: 0 | Status: SHIPPED | OUTPATIENT
Start: 2017-03-03 | End: 2017-03-10

## 2017-03-03 RX ORDER — SILDENAFIL 100 MG/1
100 TABLET, FILM COATED ORAL AS NEEDED
Qty: 2 TAB | Refills: 0 | Status: SHIPPED | COMMUNITY
Start: 2017-03-03 | End: 2017-03-21 | Stop reason: SDUPTHER

## 2017-03-03 NOTE — TELEPHONE ENCOUNTER
Called pt and notified him that he can take the abx this is something that has been prescribed before. Pt verbalized understanding.

## 2017-03-03 NOTE — TELEPHONE ENCOUNTER
Pt called and stated he was reminded by his pharmacist that his allergies show he is allergic to penicillin. Is it ok for pt to take cephalexin. Pt was asked does he recall the reaction he had to penicillin as a child pt stated he cannot remember.

## 2017-03-03 NOTE — ACP (ADVANCE CARE PLANNING)
Advance Care Planning (ACP) Provider Note - Comprehensive     Date of ACP Conversation: 03/03/17  Persons included in Conversation:  patient    Authorized Decision Maker (if patient is incapable of making informed decisions): This person is:  Healthcare Agent/Medical Power of  under Advance Directive        General ACP for ALL Patients with Decision Making Capacity:  Importance of advance care planning, including choosing a healthcare agent to communicate patient's healthcare decisions if patient lost the ability to make decisions, such as after a sudden illness or accident  Understanding of the healthcare agent role was assessed and information provided  Opportunity offered to explore how cultural, Denominational, spiritual, or personal beliefs would affect decisions for future care     For Serious or Chronic Illness:  His medical problems were reviewed with them. Understanding of medical condition    Understanding of CPR, goals and expected outcomes, benefits and burdens discussed. Information on CPR success rates provided (e.g. for CPR in hospital, survival to d/c at two weeks is 22%, for chronically ill or elderly/frail survival is less than 3%); Individual asked to communicate understanding of information in his/her own words. Interventions Provided:  Reviewed existing Advance Directive   Recommended communicating the plan and making copies for the healthcare agent, personal physician, and others as appropriate (e.g., health system)  Recommended review of completed ACP document annually or upon change in health status      He has an advanced directive - a copy has been provided & still reflects him wishes. Reviewed DNR/DNI and patient is not interested.

## 2017-03-03 NOTE — PATIENT INSTRUCTIONS
Medicare Wellness Visit, Male    The best way to live healthy is to have a healthy lifestyle by eating a well-balanced diet, exercising regularly, limiting alcohol and stopping smoking. Regular physical exams and screening tests are another way to keep healthy. Preventive exams provided by your health care provider can find health problems before they become diseases or illnesses. Preventive services including immunizations, screening tests, monitoring and exams can help you take care of your own health. All people over age 72 should have a pneumovax  and and a prevnar shot to prevent pneumonia. These are once in a lifetime unless you and your provider decide differently. All people over 65 should have a yearly flu shot and a tetanus vaccine every 10 years. Screening for diabetes mellitus with a blood sugar test should be done every year. Glaucoma is a disease of the eye due to increased ocular pressure that can lead to blindness and it should be done every year by an eye professional.    Cardiovascular screening tests that check for elevated lipids (fatty part of blood) which can lead to heart disease and strokes should be done every 5 years. Colorectal screening that evaluates for blood or polyps in your colon should be done yearly as a stool test or every five years as a flexible sigmoidoscope or every 10 years as a colonoscopy up to age 76. Men up to age 76 may need a screening blood test for prostate cancer at certain intervals, depending on their personal and family history. This decision is between the patient and his provider. If you have been a smoker or had family history of abdominal aortic aneurysms, you and your provider may decide to schedule an ultrasound test of your aorta. Hepatitis C screening is also recommended for anyone born between [birth year] through [address]. A shingles vaccine is also recommended once in a lifetime after age 61.     Your Medicare Wellness Exam is recommended annually. Here is a list of your current Health Maintenance items with a due date:  Health Maintenance Due   Topic Date Due    Glaucoma Screening   09/01/2016    Annual Well Visit  01/19/2017            Advance Directives: Care Instructions  Your Care Instructions  An advance directive is a legal way to state your wishes at the end of your life. It tells your family and your doctor what to do if you can no longer say what you want. There are two main types of advance directives. You can change them any time that your wishes change. · A living will tells your family and your doctor your wishes about life support and other treatment. · A medical power of  lets you name a person to make treatment decisions for you when you can't speak for yourself. This person is called a health care agent. If you do not have an advance directive, decisions about your medical care may be made by a doctor or a  who doesn't know you. It may help to think of an advance directive as a gift to the people who care for you. If you have one, they won't have to make tough decisions by themselves. Follow-up care is a key part of your treatment and safety. Be sure to make and go to all appointments, and call your doctor if you are having problems. It's also a good idea to know your test results and keep a list of the medicines you take. How can you care for yourself at home? · Discuss your wishes with your loved ones and your doctor. This way, there are no surprises. · Many states have a unique form. Or you might use a universal form that has been approved by many states. This kind of form can sometimes be completed and stored online. Your electronic copy will then be available wherever you have a connection to the Internet. In most cases, doctors will respect your wishes even if you have a form from a different state. · You don't need a  to do an advance directive.  But you may want to get legal advice. · Think about these questions when you prepare an advance directive:  ¨ Who do you want to make decisions about your medical care if you are not able to? Many people choose a family member, close friend, or doctor. ¨ Do you know enough about life support methods that might be used? If not, talk to your doctor so you understand. ¨ What are you most afraid of that might happen? You might be afraid of having pain, losing your independence, or being kept alive by machines. ¨ Where would you prefer to die? Choices include your home, a hospital, or a nursing home. ¨ Would you like to have information about hospice care to support you and your family? ¨ Do you want to donate organs when you die? ¨ Do you want certain Caodaism practices performed before you die? If so, put your wishes in the advance directive. · Read your advance directive every year, and make changes as needed. When should you call for help? Be sure to contact your doctor if you have any questions. Where can you learn more? Go to http://marquis-thomas.info/. Enter R264 in the search box to learn more about \"Advance Directives: Care Instructions. \"  Current as of: February 24, 2016  Content Version: 11.1  © 2977-6497 [employer]. Care instructions adapted under license by [employer] (which disclaims liability or warranty for this information). If you have questions about a medical condition or this instruction, always ask your healthcare professional. [place] [address] any warranty or liability for your use of this information.

## 2017-03-03 NOTE — PROGRESS NOTES
Angela Kramer is a 67 y.o. male who was seen in clinic today (3/3/2017). Patient was seen with Dr Sanchez Granger (R3 at [facility]). Assessment & Plan:   Royer Markham was seen today for complete physical.  Diagnoses and all orders for this visit:    Medicare annual wellness visit, subsequent    ACP (advance care planning)    Screening for alcoholism    Screening for depression  -     Depression Screen Annual    Body mass index 32.0-32.9, adult- stable, needs improvement, I have reviewed/discussed the above normal BMI with the patient. I have recommended the following interventions: encourage exercise and lifestyle education regarding diet. Paroxysmal atrial fibrillation (HCC)- stable, due to med changes will check labs, tolerating meds at this time, cost is not an issue at this time, will cont, reviewed no diet changes  -     CBC W/O DIFF    Coronary artery disease involving native coronary artery of native heart without angina pectoris- BP is well controlled, lipids are well controlled. Continue taking: current medications pending review of labs. Exclusions: none   -     METABOLIC PANEL, COMPREHENSIVE  -     LIPID PANEL    Erectile dysfunction, unspecified erectile dysfunction type  -     sildenafil citrate (VIAGRA) 100 mg tablet; Take 1 Tab by mouth as needed. Paronychia of great toe, left- this is a new problem, differential dx reviewed with the patient, due to drainage & odor will treat as if infected. Will treat with meds below. To podiatry if no improvement. Red flags were reviewed with the patient to RTC or notify me, expected time course for resolution reviewed. -     cephALEXin (KEFLEX) 500 mg capsule; Take 1 Cap by mouth three (3) times daily for 7 days.     Glaucoma screening  -     REFERRAL TO OPHTHALMOLOGY         Follow-up Disposition:  Return in about 6 months (around 9/3/2017).        ------------------------------------------------------------------------------------------    Subjective: Annual Wellness Visit- Subsequent Visit    End of Life Planning: This was discussed with him today and he has an advanced directive - a copy has been provided. Reviewed DNR/DNI and patient is not interested. Depression Screen:   PHQ 2 / 9, over the last two weeks 3/3/2017   Little interest or pleasure in doing things Not at all   Feeling down, depressed or hopeless Not at all   Total Score PHQ 2 0       Fall Risk:   Fall Risk Assessment, last 12 mths 3/3/2017   Able to walk? Yes   Fall in past 12 months? No       Abuse Screen:  Abuse Screening Questionnaire 3/3/2017   Do you ever feel afraid of your partner? N   Are you in a relationship with someone who physically or mentally threatens you? N   Is it safe for you to go home? Y         Alcohol Risk Factor Screening: On any occasion during the past 3 months, have you had more than 4 drinks containing alcohol? No  Do you average more than 14 drinks per week? No    Hearing Loss:  denies any hearing loss    Activities of Daily Living:  Self-care. Requires assistance with: no ADLs    Adult Nutrition Screen:  No risk factors noted. Health Maintenance  Daily Aspirin: off ASA due to Xarelto  AAA Screening: n/a (IPPE only)  Glaucoma Screening: No: records requested      Immunizations:     Influenza: up to date. Tetanus: up to date. Shingles: up to date. Pneumonia: up to date. Cancer screening:    Prostate: reviewed guidelines, n/a. Colon: up to date.         Patient Care Team:  Ladonna Steen MD as PCP - General (Internal Medicine)  Adrián Arizmendi MD (Orthopedic Surgery)  Trina Murray MD (Dermatology)  Yoselin Hill MD (Urology)  Hayley Barros MD (Neurology)  Missy Mcghee MD as Consulting Provider (Cardiology)  Manasa Hassan MD as Consulting Provider (Orthopedic Surgery)  Cameron Del Toro RN as Nurse Navigator (Internal Medicine)  Nilsa Naylor MD (Cardiology)       The following sections were reviewed & updated as appropriate: PMH, PSH, FH, and SH. 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 Authorizing Provider   pravastatin (PRAVACHOL) 10 mg tablet TAKE 1 TABLET EVERY NIGHT 2/28/17  Yes Tiara Medina MD   metoprolol succinate (TOPROL-XL) 25 mg XL tablet TAKE 1 TABLET EVERY DAY 2/24/17  Yes Tiara Medina MD   terazosin (HYTRIN) 10 mg capsule TAKE 1 CAPSULE EVERY NIGHT 2/9/17  Yes Tiara Medina MD   rivaroxaban (XARELTO) 20 mg tab tablet Take 1 Tab by mouth daily (with dinner). 1/16/17  Yes Tiara Medina MD   tadalafil (CIALIS) 20 mg tablet Take 1 Tab by mouth as needed. 1/16/17  Yes Tiara Medina MD   hydroCHLOROthiazide (HYDRODIURIL) 25 mg tablet TAKE 1 TABLET EVERY DAY 1/13/17  Yes Tiara Medina MD   enalapril (VASOTEC) 20 mg tablet TAKE 1 TABLET TWICE DAILY 12/22/16  Yes Tiara Medina MD   potassium chloride (K-DUR, KLOR-CON) 10 mEq tablet TAKE 1 TABLET THREE TIMES DAILY 10/4/16  Yes Tiara Medina MD   amLODIPine (NORVASC) 5 mg tablet Take 1 Tab by mouth daily. 10/3/16  Yes Rafita Basurto MD   etodolac (LODINE) 400 mg tablet Take 400 mg by mouth two (2) times daily (with meals). 10/19/15  Yes Mirlande Robertson MD   acetaminophen (TYLENOL) 325 mg tablet Take  by mouth every four (4) hours as needed for Pain.    Yes Historical Provider   ARGININE, L-ARGININE, PO Take 1 tablet by mouth. L Arginine/L Citrulline 4000mg/1000mg qd   Yes Historical Provider   FOLIC ACID PO Take 742 mcg by mouth daily. Yes Historical Provider   thioctic acid (ALPHA LIPOIC ACID) 50 mg tab Take 1 Tab by mouth daily. Yes Historical Provider   fiber Cap Take 2 Caps by mouth two (2) times a day. Yes Historical Provider   MULTIVITAMIN WITH MINERALS (MULTI-VIT 55 PLUS PO) Take 1 Tab by mouth daily. 7/8/11  Yes Historical Provider          Allergies   Allergen Reactions    Pcn [Penicillins] Unknown (comments)     As a child    Percocet [Oxycodone-Acetaminophen] Swelling     Leg swelling           Review of Systems   Constitutional: Negative for chills and fever. Respiratory: Negative for cough and shortness of breath. Cardiovascular: Negative for chest pain and palpitations. Gastrointestinal: Negative for abdominal pain, blood in stool, constipation, diarrhea, heartburn, nausea and vomiting. Genitourinary:        He denies: nocturia, urinary hesitancy, urinary frequency, double voiding. Reports trouble getting an erection or maintaining an erection. Reports trouble with AM erections. Musculoskeletal: Negative for joint pain and myalgias. L 1st toe, manicurist trimmed his nail, has been soaking in epson salt, some swelling, + odor, minimal swelling, rare discharge   Skin:        Hair plugs present   Neurological: Negative for tingling, focal weakness and headaches. Endo/Heme/Allergies: Does not bruise/bleed easily. Psychiatric/Behavioral: Negative for depression. The patient is not nervous/anxious and does not have insomnia. Objective:   Physical Exam   Constitutional: He appears well-developed. No distress.    obese   HENT:   Right Ear: Tympanic membrane, external ear and ear canal normal.   Left Ear: Tympanic membrane, external ear and ear canal normal.   Nose: Nose normal.   Mouth/Throat: Uvula is midline, oropharynx is clear and moist and mucous membranes are normal. No posterior oropharyngeal erythema. Eyes: Conjunctivae and lids are normal. No scleral icterus. Neck: Neck supple. Carotid bruit is not present. No thyromegaly present. Cardiovascular: Regular rhythm and normal heart sounds. No murmur heard. Pulses:       Dorsalis pedis pulses are 2+ on the right side, and 2+ on the left side. Posterior tibial pulses are 2+ on the right side, and 2+ on the left side. Pulmonary/Chest: Effort normal and breath sounds normal. He has no wheezes. He has no rales. Abdominal: Soft. Bowel sounds are normal. He exhibits no mass. There is no hepatosplenomegaly. There is no tenderness. Musculoskeletal: He exhibits no edema. Cervical back: Normal.        Thoracic back: He exhibits no bony tenderness. Lumbar back: Normal.   Lymphadenopathy:     He has no cervical adenopathy. Neurological: He has normal strength. No cranial nerve deficit or sensory deficit. Skin: No rash noted. Left 1st toe nail- lateral aspect is edematous, some slight clear drainage, + odor, + pink discoloration around the area, not painful to palpation    Psychiatric: He has a normal mood and affect. His behavior is normal.          Visit Vitals    /78    Pulse 66    Temp 97.7 °F (36.5 °C) (Oral)    Resp 16    Ht 5' 8.5\" (1.74 m)    Wt 215 lb (97.5 kg)    SpO2 98%    BMI 32.22 kg/m2          Advised him to call back or return to office if symptoms worsen/change/persist.  Discussed expected course/resolution/complications of diagnosis in detail with patient. Medication risks/benefits/costs/interactions/alternatives discussed with patient. He was given an after visit summary which includes diagnoses, current medications, & vitals. He expressed understanding with the diagnosis and plan.         Kendal Jimenez MD

## 2017-03-03 NOTE — MR AVS SNAPSHOT
Visit Information Date & Time Provider Department Dept. Phone Encounter #  
 3/3/2017  8:30 AM Quintin Cheney, [facility] [phone] [id number] Follow-up Instructions Return in about 6 months (around 9/3/2017). Your Appointments 4/3/2017  8:40 AM  
ESTABLISHED PATIENT with Thierno Elizabeth MD  
[facility] ([facility]) Appt Note: 6 month follow up  
 [address]  
[address] [address] Upcoming Health Maintenance Date Due  
 GLAUCOMA SCREENING Q2Y 9/1/2016 MEDICARE YEARLY EXAM 1/19/2017 COLONOSCOPY 5/10/2022 DTaP/Tdap/Td series (2 - Td) 7/14/2025 Allergies as of 3/3/2017  Review Complete On: 3/3/2017 By: Quintin Cheney MD  
  
 Severity Noted Reaction Type Reactions Pcn [Penicillins]  03/27/2011    Unknown (comments) As a child Percocet [Oxycodone-acetaminophen]  03/27/2011    Swelling Leg swelling Current Immunizations  Reviewed on 3/3/2017 Name Date Influenza High Dose Vaccine PF 10/7/2016, 10/12/2015, 10/6/2014 Influenza Vaccine 10/1/2013 Influenza Vaccine Split 10/15/2012, 10/20/2011 Pneumococcal Conjugate (PCV-13) 7/14/2015 Pneumococcal Polysaccharide (PPSV-23) 10/7/2016 Pneumococcal Vaccine (Pcv) 1/1/2005 Tdap 7/14/2015 Zoster Vaccine, Live 1/1/2011 Reviewed by Raul Sun RN on 3/3/2017 at  8:11 AM  
You Were Diagnosed With   
  
 Codes Comments Medicare annual wellness visit, subsequent    -  Primary ICD-10-CM: Z00.00 ICD-9-CM: V70.0 ACP (advance care planning)     ICD-10-CM: Z71.89 ICD-9-CM: V65.49 Screening for alcoholism     ICD-10-CM: Z13.89 ICD-9-CM: V79.1 Screening for depression     ICD-10-CM: Z13.89 ICD-9-CM: V79.0  Body mass index 32.0-32.9, adult     ICD-10-CM: Z68.32 
ICD-9-CM: V85.32   
 Chronic anticoagulation     ICD-10-CM: Z79.01 
ICD-9-CM: V58.61 Paroxysmal atrial fibrillation (HCC)     ICD-10-CM: I48.0 ICD-9-CM: 427.31 Coronary artery disease involving native coronary artery of native heart without angina pectoris     ICD-10-CM: I25.10 ICD-9-CM: 414.01 Erectile dysfunction, unspecified erectile dysfunction type     ICD-10-CM: N52.9 ICD-9-CM: 607.84 Paronychia of great toe, left     ICD-10-CM: L16.084 
ICD-9-CM: 681.11 Glaucoma screening     ICD-10-CM: Z13.5 ICD-9-CM: V80.1 Vitals BP  
  
  
  
  
  
 126/78 Vitals History BMI and BSA Data Body Mass Index Body Surface Area  
 32.22 kg/m 2 2.17 m 2 Preferred Pharmacy Pharmacy Name Phone [facility] [address] [place] [address], [address] [place] [address] [phone] Your Updated Medication List  
  
   
This list is accurate as of: 3/3/17  9:08 AM.  Always use your most recent med list. amLODIPine 5 mg tablet Commonly known as:  Jessica Croon Take 1 Tab by mouth daily. ARGININE (L-ARGININE) PO Take 1 tablet by mouth. L Arginine/L Citrulline 4000mg/1000mg qd  
  
 cephALEXin 500 mg capsule Commonly known as:  Baljinder Frohlich Take 1 Cap by mouth three (3) times daily for 7 days. enalapril 20 mg tablet Commonly known as:  VASOTEC  
TAKE 1 TABLET TWICE DAILY  
  
 etodolac 400 mg tablet Commonly known as:  LODINE Take 400 mg by mouth two (2) times daily (with meals). fiber Cap Take 2 Caps by mouth two (2) times a day. FOLIC ACID PO Take 400 mcg by mouth daily. hydroCHLOROthiazide 25 mg tablet Commonly known as:  HYDRODIURIL  
TAKE 1 TABLET EVERY DAY  
  
 metoprolol succinate 25 mg XL tablet Commonly known as:  TOPROL-XL  
TAKE 1 TABLET EVERY DAY  
  
 MULTI-VIT 55 PLUS PO Take 1 Tab by mouth daily. potassium chloride 10 mEq tablet Commonly known as:  K-DUR, KLOR-CON  
 TAKE 1 TABLET THREE TIMES DAILY pravastatin 10 mg tablet Commonly known as:  PRAVACHOL  
TAKE 1 TABLET EVERY NIGHT  
  
 rivaroxaban 20 mg Tab tablet Commonly known as:  Marcine Aliza Take 1 Tab by mouth daily (with dinner). sildenafil citrate 100 mg tablet Commonly known as:  VIAGRA Take 1 Tab by mouth as needed. terazosin 10 mg capsule Commonly known as:  HYTRIN  
TAKE 1 CAPSULE EVERY NIGHT  
  
 thioctic acid 50 mg Tab Generic drug:  Alpha Lipoic Acid Take 1 Tab by mouth daily. TYLENOL 325 mg tablet Generic drug:  acetaminophen Take  by mouth every four (4) hours as needed for Pain. Prescriptions Sent to Pharmacy Refills  
 cephALEXin (KEFLEX) 500 mg capsule 0 Sig: Take 1 Cap by mouth three (3) times daily for 7 days. Class: Normal  
 Pharmacy: [facility], [address] [place] [facility] [address] Ph #: [phone] Route: Oral  
  
We Performed the Following CBC W/O DIFF [89109 CPT(R)] [address] [[id number] HCPCS] LIPID PANEL [84474 CPT(R)] METABOLIC PANEL, COMPREHENSIVE [04134 CPT(R)] REFERRAL TO OPHTHALMOLOGY [REF57 Custom] Comments:  
 Please evaluate patient for eye exam.  
  
Follow-up Instructions Return in about 6 months (around 9/3/2017). Referral Information Referral ID Referred By Referred To  
  
 [id number] DYLAN BOOKER MD   
   [address] [address], [address] Phone: [phone] Fax: [phone] Visits Status Start Date End Date 1 New Request 3/3/17 3/3/18 If your referral has a status of pending review or denied, additional information will be sent to support the outcome of this decision. Patient Instructions Medicare Wellness Visit, Male The best way to live healthy is to have a healthy lifestyle by eating a well-balanced diet, exercising regularly, limiting alcohol and stopping smoking. Regular physical exams and screening tests are another way to keep healthy. Preventive exams provided by your health care provider can find health problems before they become diseases or illnesses. Preventive services including immunizations, screening tests, monitoring and exams can help you take care of your own health. All people over age 72 should have a pneumovax  and and a prevnar shot to prevent pneumonia. These are once in a lifetime unless you and your provider decide differently. All people over 65 should have a yearly flu shot and a tetanus vaccine every 10 years. Screening for diabetes mellitus with a blood sugar test should be done every year. Glaucoma is a disease of the eye due to increased ocular pressure that can lead to blindness and it should be done every year by an eye professional. 
 
Cardiovascular screening tests that check for elevated lipids (fatty part of blood) which can lead to heart disease and strokes should be done every 5 years. Colorectal screening that evaluates for blood or polyps in your colon should be done yearly as a stool test or every five years as a flexible sigmoidoscope or every 10 years as a colonoscopy up to age 76. Men up to age 76 may need a screening blood test for prostate cancer at certain intervals, depending on their personal and family history. This decision is between the patient and his provider. If you have been a smoker or had family history of abdominal aortic aneurysms, you and your provider may decide to schedule an ultrasound test of your aorta. Hepatitis C screening is also recommended for anyone born between [birth year] through [address]. A shingles vaccine is also recommended once in a lifetime after age 61. Your Medicare Wellness Exam is recommended annually. Here is a list of your current Health Maintenance items with a due date: Health Maintenance Due Topic Date Due  Glaucoma Screening   09/01/2016 Ezio Murray Annual Well Visit  01/19/2017 Advance Directives: Care Instructions Your Care Instructions An advance directive is a legal way to state your wishes at the end of your life. It tells your family and your doctor what to do if you can no longer say what you want. There are two main types of advance directives. You can change them any time that your wishes change. · A living will tells your family and your doctor your wishes about life support and other treatment. · A medical power of  lets you name a person to make treatment decisions for you when you can't speak for yourself. This person is called a health care agent. If you do not have an advance directive, decisions about your medical care may be made by a doctor or a  who doesn't know you. It may help to think of an advance directive as a gift to the people who care for you. If you have one, they won't have to make tough decisions by themselves. Follow-up care is a key part of your treatment and safety. Be sure to make and go to all appointments, and call your doctor if you are having problems. It's also a good idea to know your test results and keep a list of the medicines you take. How can you care for yourself at home? · Discuss your wishes with your loved ones and your doctor. This way, there are no surprises. · Many states have a unique form. Or you might use a universal form that has been approved by many states. This kind of form can sometimes be completed and stored online. Your electronic copy will then be available wherever you have a connection to the Internet. In most cases, doctors will respect your wishes even if you have a form from a different state. · You don't need a  to do an advance directive. But you may want to get legal advice. · Think about these questions when you prepare an advance directive: ¨ Who do you want to make decisions about your medical care if you are not able to? Many people choose a family member, close friend, or doctor. ¨ Do you know enough about life support methods that might be used? If not, talk to your doctor so you understand. ¨ What are you most afraid of that might happen? You might be afraid of having pain, losing your independence, or being kept alive by machines. ¨ Where would you prefer to die? Choices include your home, a hospital, or a nursing home. ¨ Would you like to have information about hospice care to support you and your family? ¨ Do you want to donate organs when you die? ¨ Do you want certain Mandaeism practices performed before you die? If so, put your wishes in the advance directive. · Read your advance directive every year, and make changes as needed. When should you call for help? Be sure to contact your doctor if you have any questions. Where can you learn more? Go to http://marquis-thomas.info/. Enter R264 in the search box to learn more about \"Advance Directives: Care Instructions. \" Current as of: February 24, 2016 Content Version: 11.1 © 2927-0924 [email or web address]. Care instructions adapted under license by [employer] (which disclaims liability or warranty for this information). If you have questions about a medical condition or this instruction, always ask your healthcare professional. [address] any warranty or liability for your use of this information. Introducing [facility]! Sunshine Farnsworth introduces [employer] patient portal. Now you can access parts of your medical record, email your doctor's office, and request medication refills online. 1. In your internet browser, go to [email or web address] 2. Click on the First Time User? Click Here link in the Sign In box. You will see the New Member Sign Up page. 3. Enter your ShareDesk Access Code exactly as it appears below. You will not need to use this code after youve completed the sign-up process. If you do not sign up before the expiration date, you must request a new code. · Share[employer]k Access Code: [email or web address] Expires: 4/16/2017  8:14 AM 
 
4. Enter the last four digits of your Social Security Number (xxxx) and Date of Birth (mm/dd/yyyy) as indicated and click Submit. You will be taken to the next sign-up page. 5. Create a ShareDesk ID. This will be your ShareDesk login ID and cannot be changed, so think of one that is secure and easy to remember. 6. Create a ShareDesk password. You can change your password at any time. 7. Enter your Password Reset Question and Answer. This can be used at a later time if you forget your password. 8. Enter your e-mail address. You will receive e-mail notification when new information is available in [address]. 9. Click Sign Up. You can now view and download portions of your medical record. 10. Click the Download Summary menu link to download a portable copy of your medical information. If you have questions, please visit the Frequently Asked Questions section of the [employer]k website. Remember, ShareDesk is NOT to be used for urgent needs. For medical emergencies, dial 911. Now available from your iPhone and Android! Please provide this summary of care documentation to your next provider. Your primary care clinician is listed as Orville Bosworth. If you have any questions after today's visit, please call [phone].

## 2017-03-20 ENCOUNTER — TELEPHONE (OUTPATIENT)
Dept: INTERNAL MEDICINE CLINIC | Age: 77
End: 2017-03-20

## 2017-03-20 NOTE — TELEPHONE ENCOUNTER
Pt stated is having toe pain from an ingrown toe nail pt wanted a refill on an abx. Informed pt he has no recent abx on his medication list. Pt is scheduled to see Dr Audra Swanson for ingrown toenail.

## 2017-03-21 ENCOUNTER — OFFICE VISIT (OUTPATIENT)
Dept: INTERNAL MEDICINE CLINIC | Age: 77
End: 2017-03-21

## 2017-03-21 VITALS
HEART RATE: 60 BPM | BODY MASS INDEX: 32.14 KG/M2 | RESPIRATION RATE: 16 BRPM | TEMPERATURE: 97.9 F | WEIGHT: 217 LBS | HEIGHT: 69 IN | OXYGEN SATURATION: 98 % | DIASTOLIC BLOOD PRESSURE: 82 MMHG | SYSTOLIC BLOOD PRESSURE: 130 MMHG

## 2017-03-21 DIAGNOSIS — L60.0 INGROWN NAIL OF GREAT TOE OF LEFT FOOT: Primary | ICD-10-CM

## 2017-03-21 DIAGNOSIS — N52.9 ERECTILE DYSFUNCTION, UNSPECIFIED ERECTILE DYSFUNCTION TYPE: ICD-10-CM

## 2017-03-21 RX ORDER — SILDENAFIL 100 MG/1
100 TABLET, FILM COATED ORAL AS NEEDED
Qty: 2 TAB | Refills: 0 | Status: SHIPPED | COMMUNITY
Start: 2017-03-21 | End: 2017-09-05

## 2017-03-21 NOTE — PROGRESS NOTES
Dio Barragan is a 67 y.o. male who was seen in clinic today (3/21/2017). Assessment & Plan:  Ruby More was seen today for nail problem. Diagnoses and all orders for this visit:    Ingrown nail of great toe of left foot- improved slightly, no signs of infection only inflammation, will cont w/ soaking, no indication for another round of abx, needs to see podiatry to have nail clipped  -     REFERRAL TO PODIATRY    Erectile dysfunction, unspecified erectile dysfunction type- samples provided  -     sildenafil citrate (VIAGRA) 100 mg tablet; Take 1 Tab by mouth as needed. Follow-up Disposition:  Return if symptoms worsen or fail to improve.       ----------------------------------------------------------------------    Subjective:  Dermatology Review  He is here to talk about nail pain. He was seen 2 weeks ago and reports it is only slightly improved. Location: Left 1st toe. Symptoms include erythema and intermittent pain. Treatment to date has included Epson salts & abx (Keflex). Prior to Admission medications    Medication Sig Start Date End Date Taking? Authorizing Provider   sildenafil citrate (VIAGRA) 100 mg tablet Take 1 Tab by mouth as needed. 3/3/17  Yes Watson Rodriguez MD   pravastatin (PRAVACHOL) 10 mg tablet TAKE 1 TABLET EVERY NIGHT 2/28/17  Yes Watson Rodriguez MD   metoprolol succinate (TOPROL-XL) 25 mg XL tablet TAKE 1 TABLET EVERY DAY 2/24/17  Yes Watson Rodriguez MD   terazosin (HYTRIN) 10 mg capsule TAKE 1 CAPSULE EVERY NIGHT 2/9/17  Yes Watson Rodriguez MD   rivaroxaban (XARELTO) 20 mg tab tablet Take 1 Tab by mouth daily (with dinner).  1/16/17  Yes Watson Rodriguez MD   hydroCHLOROthiazide (HYDRODIURIL) 25 mg tablet TAKE 1 TABLET EVERY DAY 1/13/17  Yes Watson Rodriguez MD   enalapril (VASOTEC) 20 mg tablet TAKE 1 TABLET TWICE DAILY 12/22/16  Yes Watson Rodriguez MD   potassium chloride (K-DUR, KLOR-CON) 10 mEq tablet TAKE 1 TABLET THREE TIMES DAILY 10/4/16  Yes Thea Felipe MD   amLODIPine (NORVASC) 5 mg tablet Take 1 Tab by mouth daily. 10/3/16  Yes Jaylan Wong MD   acetaminophen (TYLENOL) 325 mg tablet Take  by mouth every four (4) hours as needed for Pain. Yes Historical Provider   ARGININE, L-ARGININE, PO Take 1 tablet by mouth. L Arginine/L Citrulline 4000mg/1000mg qd   Yes Historical Provider   FOLIC ACID PO Take 053 mcg by mouth daily. Yes Historical Provider   thioctic acid (ALPHA LIPOIC ACID) 50 mg tab Take 1 Tab by mouth daily. Yes Historical Provider   fiber Cap Take 2 Caps by mouth two (2) times a day. Yes Historical Provider   MULTIVITAMIN WITH MINERALS (MULTI-VIT 55 PLUS PO) Take 1 Tab by mouth daily. 7/8/11  Yes Historical Provider   etodolac (LODINE) 400 mg tablet Take 400 mg by mouth two (2) times daily (with meals). 10/19/15   Carol Nash MD          Allergies   Allergen Reactions    Pcn [Penicillins] Unknown (comments)     As a child    Percocet [Oxycodone-Acetaminophen] Swelling     Leg swelling           Review of Systems   Constitutional: Negative for chills and fever. Respiratory: Negative for cough and shortness of breath. Cardiovascular: Negative for chest pain. Gastrointestinal: Negative for abdominal pain, nausea and vomiting. Objective:   Physical Exam   Constitutional: No distress. Skin:   Left 1st toe nail- lateral aspect is slightly pink, no edema, no odor, no discharge. Hypertrophy skin around the nail edge. Ingrown toe nail. Visit Vitals    /82    Pulse 60    Temp 97.9 °F (36.6 °C) (Oral)    Resp 16    Ht 5' 8.5\" (1.74 m)    Wt 217 lb (98.4 kg)    SpO2 98%    BMI 32.51 kg/m2         Disclaimer:  Advised him to call back or return to office if symptoms worsen/change/persist.  Discussed expected course/resolution/complications of diagnosis in detail with patient. Medication risks/benefits/costs/interactions/alternatives discussed with patient.   He was given an after visit summary which includes diagnoses, current medications, & vitals. He expressed understanding with the diagnosis and plan.         Rosa Elena Taylor MD

## 2017-03-21 NOTE — MR AVS SNAPSHOT
Visit Information Date & Time Provider Department Dept. Phone Encounter #  
 3/21/2017  8:15 AM Bhargav Liriano, [facility] Internal Medicine [phone] [id number] Follow-up Instructions Return if symptoms worsen or fail to improve. Your Appointments 4/3/2017  8:40 AM  
ESTABLISHED PATIENT with Sunday MD Hiral  
[facility] ([facility]) Appt Note: 6 month follow up  
 [address]  
[address] [facility] [id number] Upcoming Health Maintenance Date Due  
 GLAUCOMA SCREENING Q2Y 9/1/2016 MEDICARE YEARLY EXAM 3/4/2018 COLONOSCOPY 5/10/2022 DTaP/Tdap/Td series (2 - Td) 7/14/2025 Allergies as of 3/21/2017  Review Complete On: 3/21/2017 By: Bhargav Liriano MD  
  
 Severity Noted Reaction Type Reactions Pcn [Penicillins]  03/27/2011    Unknown (comments) As a child Percocet [Oxycodone-acetaminophen]  03/27/2011    Swelling Leg swelling Current Immunizations  Reviewed on 3/21/2017 Name Date Influenza High Dose Vaccine PF 10/7/2016, 10/12/2015, 10/6/2014 Influenza Vaccine 10/1/2013 Influenza Vaccine Split 10/15/2012, 10/20/2011 Pneumococcal Conjugate (PCV-13) 7/14/2015 Pneumococcal Polysaccharide (PPSV-23) 10/7/2016 Pneumococcal Vaccine (Pcv) 1/1/2005 Tdap 7/14/2015 Zoster Vaccine, Live 1/1/2011 Reviewed by Ashlyn Main RN on 3/21/2017 at  8:02 AM  
You Were Diagnosed With   
  
 Codes Comments Ingrown nail of great toe of left foot    -  Primary ICD-10-CM: L60.0 ICD-9-CM: 703.0 Vitals BP Pulse Temp Resp Height(growth percentile) Weight(growth percentile) 130/82 60 97.9 °F (36.6 °C) (Oral) 16 5' 8.5\" (1.74 m) 217 lb (98.4 kg) SpO2 BMI Smoking Status 98% 32.51 kg/m2 Never Smoker BMI and BSA Data Body Mass Index Body Surface Area 32.51 kg/m 2 2.18 m 2 Preferred Pharmacy Pharmacy Name Phone [facility], [facility] [phone] Your Updated Medication List  
  
   
This list is accurate as of: 3/21/17  8:21 AM.  Always use your most recent med list. amLODIPine 5 mg tablet Commonly known as:  Emir Salle Take 1 Tab by mouth daily. ARGININE (L-ARGININE) PO Take 1 tablet by mouth. L Arginine/L Citrulline 4000mg/1000mg qd  
  
 enalapril 20 mg tablet Commonly known as:  VASOTEC  
TAKE 1 TABLET TWICE DAILY  
  
 etodolac 400 mg tablet Commonly known as:  LODINE Take 400 mg by mouth two (2) times daily (with meals). fiber Cap Take 2 Caps by mouth two (2) times a day. FOLIC ACID PO Take 400 mcg by mouth daily. hydroCHLOROthiazide 25 mg tablet Commonly known as:  HYDRODIURIL  
TAKE 1 TABLET EVERY DAY  
  
 metoprolol succinate 25 mg XL tablet Commonly known as:  TOPROL-XL  
TAKE 1 TABLET EVERY DAY  
  
 MULTI-VIT 55 PLUS PO Take 1 Tab by mouth daily. potassium chloride 10 mEq tablet Commonly known as:  K-DUR, KLOR-CON  
TAKE 1 TABLET THREE TIMES DAILY pravastatin 10 mg tablet Commonly known as:  PRAVACHOL  
TAKE 1 TABLET EVERY NIGHT  
  
 rivaroxaban 20 mg Tab tablet Commonly known as:  Yo Panda Take 1 Tab by mouth daily (with dinner). sildenafil citrate 100 mg tablet Commonly known as:  VIAGRA Take 1 Tab by mouth as needed. terazosin 10 mg capsule Commonly known as:  HYTRIN  
TAKE 1 CAPSULE EVERY NIGHT  
  
 thioctic acid 50 mg Tab Generic drug:  Alpha Lipoic Acid Take 1 Tab by mouth daily. TYLENOL 325 mg tablet Generic drug:  acetaminophen Take  by mouth every four (4) hours as needed for Pain. We Performed the Following REFERRAL TO PODIATRY [REF90 Custom] Follow-up Instructions Return if symptoms worsen or fail to improve. Referral Information Referral ID Referred By Referred To  
  
 [id number] DAE, [facility]   
   [address] Visits Status Start Date End Date 1 New Request 3/21/17 3/21/18 If your referral has a status of pending review or denied, additional information will be sent to support the outcome of this decision. Introducing [facility]! [facility] introduces [employer]ia patient portal. Now you can access parts of your medical record, email your doctor's office, and request medication refills online. 1. In your internet browser, go to [email or web address] 2. Click on the First Time User? Click Here link in the Sign In box. You will see the New Member Sign Up page. 3. Enter your Cant[employer]ia Access Code exactly as it appears below. You will not need to use this code after youve completed the sign-up process. If you do not sign up before the expiration date, you must request a new code. · Cant[employer]ia Access Code: [email or web address] Expires: 4/16/2017  9:14 AM 
 
4. Enter the last four digits of your Social Security Number (xxxx) and Date of Birth (mm/dd/yyyy) as indicated and click Submit. You will be taken to the next sign-up page. 5. Create a Cantargia ID. This will be your Cant[employer]ia login ID and cannot be changed, so think of one that is secure and easy to remember. 6. Create a Cant[employer]ia password. You can change your password at any time. 7. Enter your Password Reset Question and Answer. This can be used at a later time if you forget your password. 8. Enter your e-mail address. You will receive e-mail notification when new information is available in [address] 9. Click Sign Up. You can now view and download portions of your medical record. 10. Click the Download Summary menu link to download a portable copy of your medical information. If you have questions, please visit the Frequently Asked Questions section of the [employer] Loading[employer] website. Remember, M87 is NOT to be used for urgent needs. For medical emergencies, dial 911. Now available from your iPhone and Android! Please provide this summary of care documentation to your next provider. Your primary care clinician is listed as Kendal Jimenez. If you have any questions after today's visit, please call [phone].

## 2017-03-22 LAB
ALBUMIN SERPL-MCNC: 4 G/DL (ref 3.5–4.8)
ALBUMIN/GLOB SERPL: 1.7 {RATIO} (ref 1.2–2.2)
ALP SERPL-CCNC: 89 IU/L (ref 39–117)
ALT SERPL-CCNC: 10 IU/L (ref 0–44)
AST SERPL-CCNC: 15 IU/L (ref 0–40)
BILIRUB SERPL-MCNC: 0.6 MG/DL (ref 0–1.2)
BUN SERPL-MCNC: 14 MG/DL (ref 8–27)
BUN/CREAT SERPL: 16 (ref 10–22)
CALCIUM SERPL-MCNC: 9 MG/DL (ref 8.6–10.2)
CHLORIDE SERPL-SCNC: 104 MMOL/L (ref 96–106)
CHOLEST SERPL-MCNC: 104 MG/DL (ref 100–199)
CO2 SERPL-SCNC: 25 MMOL/L (ref 18–29)
CREAT SERPL-MCNC: 0.86 MG/DL (ref 0.76–1.27)
ERYTHROCYTE [DISTWIDTH] IN BLOOD BY AUTOMATED COUNT: 14.2 % (ref 12.3–15.4)
GLOBULIN SER CALC-MCNC: 2.4 G/DL (ref 1.5–4.5)
GLUCOSE SERPL-MCNC: 85 MG/DL (ref 65–99)
HCT VFR BLD AUTO: 41.1 % (ref 37.5–51)
HDLC SERPL-MCNC: 28 MG/DL
HGB BLD-MCNC: 13.5 G/DL (ref 12.6–17.7)
LDLC SERPL CALC-MCNC: 54 MG/DL (ref 0–99)
MCH RBC QN AUTO: 32.3 PG (ref 26.6–33)
MCHC RBC AUTO-ENTMCNC: 32.8 G/DL (ref 31.5–35.7)
MCV RBC AUTO: 98 FL (ref 79–97)
PLATELET # BLD AUTO: 164 X10E3/UL (ref 150–379)
POTASSIUM SERPL-SCNC: 3.6 MMOL/L (ref 3.5–5.2)
PROT SERPL-MCNC: 6.4 G/DL (ref 6–8.5)
RBC # BLD AUTO: 4.18 X10E6/UL (ref 4.14–5.8)
SODIUM SERPL-SCNC: 144 MMOL/L (ref 134–144)
TRIGL SERPL-MCNC: 110 MG/DL (ref 0–149)
VLDLC SERPL CALC-MCNC: 22 MG/DL (ref 5–40)
WBC # BLD AUTO: 4.8 X10E3/UL (ref 3.4–10.8)

## 2017-03-23 RX ORDER — POTASSIUM CHLORIDE 750 MG/1
TABLET, EXTENDED RELEASE ORAL
Qty: 270 TAB | Refills: 1 | Status: SHIPPED | OUTPATIENT
Start: 2017-03-23 | End: 2017-09-18 | Stop reason: SDUPTHER

## 2017-04-03 ENCOUNTER — OFFICE VISIT (OUTPATIENT)
Dept: CARDIOLOGY CLINIC | Age: 77
End: 2017-04-03

## 2017-04-03 VITALS
WEIGHT: 219 LBS | HEIGHT: 69 IN | SYSTOLIC BLOOD PRESSURE: 124 MMHG | HEART RATE: 64 BPM | RESPIRATION RATE: 16 BRPM | BODY MASS INDEX: 32.44 KG/M2 | DIASTOLIC BLOOD PRESSURE: 82 MMHG

## 2017-04-03 DIAGNOSIS — Z95.810 S/P ICD (INTERNAL CARDIAC DEFIBRILLATOR) PROCEDURE: ICD-10-CM

## 2017-04-03 DIAGNOSIS — I10 BENIGN ESSENTIAL HTN: ICD-10-CM

## 2017-04-03 DIAGNOSIS — I25.10 CORONARY ARTERY DISEASE INVOLVING NATIVE CORONARY ARTERY OF NATIVE HEART WITHOUT ANGINA PECTORIS: Primary | ICD-10-CM

## 2017-04-03 DIAGNOSIS — I48.0 PAROXYSMAL ATRIAL FIBRILLATION (HCC): ICD-10-CM

## 2017-04-03 DIAGNOSIS — Z79.01 CHRONIC ANTICOAGULATION: ICD-10-CM

## 2017-04-03 RX ORDER — TADALAFIL 5 MG/1
5 TABLET ORAL
COMMUNITY
End: 2017-04-03 | Stop reason: SDUPTHER

## 2017-04-03 RX ORDER — TADALAFIL 5 MG/1
5 TABLET ORAL AS NEEDED
Qty: 30 TAB | Refills: 0 | Status: SHIPPED | COMMUNITY
Start: 2017-04-03 | End: 2017-05-31 | Stop reason: SDUPTHER

## 2017-04-03 RX ORDER — TADALAFIL 20 MG/1
20 TABLET ORAL AS NEEDED
COMMUNITY
End: 2017-11-06 | Stop reason: SDUPTHER

## 2017-04-03 RX ORDER — ASPIRIN 81 MG/1
TABLET ORAL DAILY
COMMUNITY

## 2017-04-03 NOTE — PROGRESS NOTES
[place] [address] [place]: Basic6  [phone]    HPI:   Mr. Regi Wood is a 68 yo M with h/o CAD s/p PCI in 2003, patent stent with no significant CAD on 2008 cath, afib on metoprolol for rate control and coumadin for anticoagulation, apical variant of a hypertrophic cardiomyopathy with deep T wave inversions on ECG, sick sinus with NSVT noted on 11/14/13 holter with multiple > 3 sec pauses s/p Medtronic ICD on 11/15/13 with Dr. Fannie Ramsey, s/p right hip revision on 12/5/13. MARIBEL in 2012 was normal.  7/2011 nuclear stress test was normal.  5/2012 echo noted EF of 50-55% and apical hypertrophy. Since his last visit, he has overall been doing okay cardiac wise. No exertional chest pain, shortness of breath or palpitations. He has had a lot of stress at his job and is going to go ahead and retire and give his notice today. He plans on doing more traveling. He has a girlfriend that he is going to go around with. He usually does walking with his [employer] pug and wants to do more regular exercise and lose weight. He denies any bleeding issues now on Xarelto. He is compensated on exam with clear lungs and no lower extremity edema. Assessment and Plan:   1. Coronary artery disease. Stable and compensated on ACE inhibitor, beta-blocker, statin and aspirin. Will have him follow up in six months. 2. Sick sinus syndrome, status post ICD for nonsustained ventricular tachycardia in 2013. Followed by the device clinic. Will arrange interrogation. No bleeding issues on Xarelto. 3. Atrial fibrillation, stable. No bleeding issues on Xarelto. He was previously on Coumadin. 4. Mixed hyperlipidemia. Tolerating statin. 5. Hypertrophic cardiomyopathy. Stable on beta-blocker. 6. Essential hypertension. Blood pressure is controlled and no changes made. 7. Erectile dysfunction. He takes prn Cialis or Viagra. Cautioned avoiding Nitroglycerin concurrently.         He  has a past medical history of Arthritis; CAD (coronary artery disease); Chronic atrial fibrillation [facility]); ED (erectile dysfunction); Hypertension; Kidney stone on right side (10/30/2011); JAMEY (obstructive sleep apnea) (4/25/2012); Skin cancer; Sun-damaged skin; and TIA (transient ischemic attack). He also has no past medical history of Arsenic suspected exposure; Family history of skin cancer; Radiation exposure; Sunburn, blistering; or Tanning bed exposure. All other systems negative except as above. PE  Vitals:    04/03/17 0850   BP: 124/82   Pulse: 64   Resp: 16   Weight: 219 lb (99.3 kg)   Height: 5' 8.5\" (1.74 m)    Body mass index is 32.81 kg/(m^2). General appearance - alert, well appearing, and in no distress  Mental status - affect appropriate to mood  Neck - supple, no JVD. No bruits present.    Chest - clear to auscultation, no wheezes, rales or rhonchi  Heart - Regular rate, regular rhythm, normal S1, S2, I/VI systolic murmur LUSB  Abdomen - soft, nontender, nondistended, no masses or organomegaly  Extremities - peripheral pulses normal, no pedal edema        Recent Labs:  Lab Results   Component Value Date/Time    Cholesterol, total 104 03/21/2017 08:41 AM    HDL Cholesterol 28 03/21/2017 08:41 AM    LDL, calculated 54 03/21/2017 08:41 AM    Triglyceride 110 03/21/2017 08:41 AM     Lab Results   Component Value Date/Time    Creatinine 0.86 03/21/2017 08:41 AM     Lab Results   Component Value Date/Time    BUN 14 03/21/2017 08:41 AM    BUN (POC) 16 12/04/2013 06:46 AM     Lab Results   Component Value Date/Time    Potassium 3.6 03/21/2017 08:41 AM     Lab Results   Component Value Date/Time    Hemoglobin A1c 5.3 11/19/2013 08:45 AM     Lab Results   Component Value Date/Time    HGB 13.5 03/21/2017 08:41 AM     Lab Results   Component Value Date/Time    PLATELET 199 03/04/9389 08:41 AM       Reviewed:  Past Medical History:   Diagnosis Date    Arthritis     knees    CAD (coronary artery disease)     stent x3 approx 2001    Chronic atrial fibrillation [facility])     ED (erectile dysfunction)     Hypertension     Kidney stone on right side 10/30/2011    JAMEY (obstructive sleep apnea) 4/25/2012    Skin cancer     BCC, s/p Mohs on L flank    Sun-damaged skin     TIA (transient ischemic attack)     6/12 - seen at [facility]     History   Smoking Status    Never Smoker   Smokeless Tobacco    Never Used     History   Alcohol Use No     Allergies   Allergen Reactions    Pcn [Penicillins] Unknown (comments)     As a child    Percocet [Oxycodone-Acetaminophen] Swelling     Leg swelling       Current Outpatient Prescriptions   Medication Sig    aspirin delayed-release 81 mg tablet Take  by mouth daily.  tadalafil (CIALIS) 20 mg tablet Take 20 mg by mouth as needed.  potassium chloride (K-DUR, KLOR-CON) 10 mEq tablet TAKE 1 TABLET THREE TIMES DAILY    pravastatin (PRAVACHOL) 10 mg tablet TAKE 1 TABLET EVERY NIGHT    metoprolol succinate (TOPROL-XL) 25 mg XL tablet TAKE 1 TABLET EVERY DAY    terazosin (HYTRIN) 10 mg capsule TAKE 1 CAPSULE EVERY NIGHT    rivaroxaban (XARELTO) 20 mg tab tablet Take 1 Tab by mouth daily (with dinner).  hydroCHLOROthiazide (HYDRODIURIL) 25 mg tablet TAKE 1 TABLET EVERY DAY    enalapril (VASOTEC) 20 mg tablet TAKE 1 TABLET TWICE DAILY    amLODIPine (NORVASC) 5 mg tablet Take 1 Tab by mouth daily.  acetaminophen (TYLENOL) 325 mg tablet Take  by mouth every four (4) hours as needed for Pain.  ARGININE, L-ARGININE, PO Take 1 tablet by mouth. L Arginine/L Citrulline 9986WI/6623GB qd    FOLIC ACID PO Take 087 mcg by mouth daily.  thioctic acid (ALPHA LIPOIC ACID) 50 mg tab Take 1 Tab by mouth daily.  fiber Cap Take 2 Caps by mouth two (2) times a day.  MULTIVITAMIN WITH MINERALS (MULTI-VIT 55 PLUS PO) Take 1 Tab by mouth daily.  sildenafil citrate (VIAGRA) 100 mg tablet Take 1 Tab by mouth as needed.  etodolac (LODINE) 400 mg tablet Take 400 mg by mouth two (2) times daily (with meals).      No current facility-administered medications for this visit.         Thomas Morris MD  [facility] [place]  [address] [facility]  [address]

## 2017-04-03 NOTE — MR AVS SNAPSHOT
Visit Information Date & Time Provider Department Dept. Phone Encounter #  
 4/3/2017  8:40 AM Ankur Minor MD [facility]aj [phone] [id number] Upcoming Health Maintenance Date Due  
 GLAUCOMA SCREENING Q2Y 9/1/2016 MEDICARE YEARLY EXAM 3/4/2018 COLONOSCOPY 5/10/2022 DTaP/Tdap/Td series (2 - Td) 7/14/2025 Allergies as of 4/3/2017  Review Complete On: 4/3/2017 By: Ankur Minor MD  
  
 Severity Noted Reaction Type Reactions Pcn [Penicillins]  03/27/2011    Unknown (comments) As a child Percocet [Oxycodone-acetaminophen]  03/27/2011    Swelling Leg swelling Current Immunizations  Reviewed on 3/21/2017 Name Date Influenza High Dose Vaccine PF 10/7/2016, 10/12/2015, 10/6/2014 Influenza Vaccine 10/1/2013 Influenza Vaccine Split 10/15/2012, 10/20/2011 Pneumococcal Conjugate (PCV-13) 7/14/2015 Pneumococcal Polysaccharide (PPSV-23) 10/7/2016 Pneumococcal Vaccine (Pcv) 1/1/2005 Tdap 7/14/2015 Zoster Vaccine, Live 1/1/2011 Not reviewed this visit You Were Diagnosed With   
  
 Codes Comments Coronary artery disease involving native coronary artery of native heart without angina pectoris    -  Primary ICD-10-CM: I25.10 ICD-9-CM: 414.01 Paroxysmal atrial fibrillation (HCC)     ICD-10-CM: I48.0 ICD-9-CM: 427.31 Chronic anticoagulation     ICD-10-CM: Z79.01 
ICD-9-CM: V58.61 S/P ICD (internal cardiac defibrillator) procedure     ICD-10-CM: Z95.810 ICD-9-CM: V45.02 Benign essential HTN     ICD-10-CM: I10 
ICD-9-CM: 401.1 Vitals BP Pulse Resp Height(growth percentile) Weight(growth percentile) BMI  
 124/82 (BP 1 Location: Left arm, BP Patient Position: Sitting) 64 16 5' 8.5\" (1.74 m) 219 lb (99.3 kg) 32.81 kg/m2 Smoking Status Never Smoker Vitals History BMI and BSA Data  Body Mass Index Body Surface Area  
 32.81 kg/m 2 2.19 m 2  
  
  
 Preferred Pharmacy Pharmacy Name Phone [facility], [place] - [address] [phone] Your Updated Medication List  
  
   
This list is accurate as of: 4/3/17  9:18 AM.  Always use your most recent med list. amLODIPine 5 mg tablet Commonly known as:  Tequila Fast Take 1 Tab by mouth daily. ARGININE (L-ARGININE) PO Take 1 tablet by mouth. L Arginine/L Citrulline 4000mg/1000mg qd  
  
 aspirin delayed-release 81 mg tablet Take  by mouth daily. * CIALIS 20 mg tablet Generic drug:  tadalafil Take 20 mg by mouth as needed. * tadalafil 5 mg tablet Commonly known as:  CIALIS Take 1 Tab by mouth as needed. enalapril 20 mg tablet Commonly known as:  VASOTEC  
TAKE 1 TABLET TWICE DAILY  
  
 etodolac 400 mg tablet Commonly known as:  LODINE Take 400 mg by mouth two (2) times daily (with meals). fiber Cap Take 2 Caps by mouth two (2) times a day. FOLIC ACID PO Take 400 mcg by mouth daily. hydroCHLOROthiazide 25 mg tablet Commonly known as:  HYDRODIURIL  
TAKE 1 TABLET EVERY DAY  
  
 metoprolol succinate 25 mg XL tablet Commonly known as:  TOPROL-XL  
TAKE 1 TABLET EVERY DAY  
  
 MULTI-VIT 55 PLUS PO Take 1 Tab by mouth daily. potassium chloride 10 mEq tablet Commonly known as:  K-DUR, KLOR-CON  
TAKE 1 TABLET THREE TIMES DAILY pravastatin 10 mg tablet Commonly known as:  PRAVACHOL  
TAKE 1 TABLET EVERY NIGHT  
  
 rivaroxaban 20 mg Tab tablet Commonly known as:  Jamee Better Take 1 Tab by mouth daily (with dinner). sildenafil citrate 100 mg tablet Commonly known as:  VIAGRA Take 1 Tab by mouth as needed. terazosin 10 mg capsule Commonly known as:  HYTRIN  
TAKE 1 CAPSULE EVERY NIGHT  
  
 thioctic acid 50 mg Tab Generic drug:  Alpha Lipoic Acid Take 1 Tab by mouth daily. TYLENOL 325 mg tablet Generic drug:  acetaminophen Take  by mouth every four (4) hours as needed for Pain. * Notice: This list has 2 medication(s) that are the same as other medications prescribed for you. Read the directions carefully, and ask your doctor or other care provider to review them with you. Introducing [facility]! Mary Ching introduces My[employer]Place patient portal. Now you can access parts of your medical record, email your doctor's office, and request medication refills online. 1. In your internet browser, go to [email or web address]. 2. Click on the First Time User? Click Here link in the Sign In box. You will see the New Member Sign Up page. 3. Enter your My[employer]Place Access Code exactly as it appears below. You will not need to use this code after youve completed the sign-up process. If you do not sign up before the expiration date, you must request a new code. · My[employer]Place Access Code: [email or web address] Expires: 4/16/2017  9:14 AM 
 
4. Enter the last four digits of your Social Security Number (xxxx) and Date of Birth (mm/dd/yyyy) as indicated and click Submit. You will be taken to the next sign-up page. 5. Create a MyLifePlace ID. This will be your MyLifePlace login ID and cannot be changed, so think of one that is secure and easy to remember. 6. Create a My[employer]Place password. You can change your password at any time. 7. Enter your Password Reset Question and Answer. This can be used at a later time if you forget your password. 8. Enter your e-mail address. You will receive e-mail notification when new information is available in [address] 9. Click Sign Up. You can now view and download portions of your medical record. 10. Click the Download Summary menu link to download a portable copy of your medical information. If you have questions, please visit the Frequently Asked Questions section of the My[employer]Place website. Remember, MyLifePlace is NOT to be used for urgent needs. For medical emergencies, dial 911. Now available from your iPhone and Android! Please provide this summary of care documentation to your next provider. Your primary care clinician is listed as [place]. If you have any questions after today's visit, please call [phone].

## 2017-04-10 ENCOUNTER — CLINICAL SUPPORT (OUTPATIENT)
Dept: CARDIOLOGY CLINIC | Age: 77
End: 2017-04-10

## 2017-04-10 DIAGNOSIS — Z95.810 PRESENCE OF AUTOMATIC CARDIOVERTER/DEFIBRILLATOR (AICD): Primary | ICD-10-CM

## 2017-05-10 ENCOUNTER — OFFICE VISIT (OUTPATIENT)
Dept: INTERNAL MEDICINE CLINIC | Age: 77
End: 2017-05-10

## 2017-05-10 VITALS
OXYGEN SATURATION: 96 % | WEIGHT: 214.8 LBS | BODY MASS INDEX: 31.81 KG/M2 | HEART RATE: 74 BPM | DIASTOLIC BLOOD PRESSURE: 74 MMHG | SYSTOLIC BLOOD PRESSURE: 124 MMHG | TEMPERATURE: 98 F | HEIGHT: 69 IN | RESPIRATION RATE: 16 BRPM

## 2017-05-10 DIAGNOSIS — L60.0 INGROWN NAIL OF GREAT TOE OF RIGHT FOOT: Primary | ICD-10-CM

## 2017-05-10 NOTE — MR AVS SNAPSHOT
Visit Information Date & Time Provider Department Dept. Phone Encounter #  
 5/10/2017  8:15 AM Sameera Erazo, [address] [facility] Internal Medicine [phone] [id number] Follow-up Instructions Return if symptoms worsen or fail to improve. Your Appointments 7/26/2017  9:00 AM  
[facility] ([facility] SCHEDULING) Appt Note: mdt icd, rc, no CL, b 4/10/17  
 [address] [facility] [address]  
[address] [place] [facility] [address]  
  
    
 10/2/2017  8:40 AM  
ESTABLISHED PATIENT with Julieta Rodriguez MD  
[facility] ([address]) Appt Note: 6 mfu  
 [address] [facility] [address]  
[address] [address] Upcoming Health Maintenance Date Due  
 GLAUCOMA SCREENING Q2Y 9/1/2016 INFLUENZA AGE 9 TO ADULT 8/1/2017 MEDICARE YEARLY EXAM 3/4/2018 COLONOSCOPY 5/10/2022 DTaP/Tdap/Td series (2 - Td) 7/14/2025 Allergies as of 5/10/2017  Review Complete On: 5/10/2017 By: Sameera Erazo MD  
  
 Severity Noted Reaction Type Reactions Pcn [Penicillins]  03/27/2011    Unknown (comments) As a child Percocet [Oxycodone-acetaminophen]  03/27/2011    Swelling Leg swelling Current Immunizations  Reviewed on 5/10/2017 Name Date Influenza High Dose Vaccine PF 10/7/2016, 10/12/2015, 10/6/2014 Influenza Vaccine 10/1/2013 Influenza Vaccine Split 10/15/2012, 10/20/2011 Pneumococcal Conjugate (PCV-13) 7/14/2015 Pneumococcal Polysaccharide (PPSV-23) 10/7/2016 Pneumococcal Vaccine (Pcv) 1/1/2005 Tdap 7/14/2015 Zoster Vaccine, Live 1/1/2011 Reviewed by Soraida Freeman RN on 5/10/2017 at  8:15 AM  
You Were Diagnosed With   
  
 Codes Comments Ingrown nail of great toe of right foot    -  Primary ICD-10-CM: L60.0 ICD-9-CM: 703.0 Vitals BP Pulse Temp Resp Height(growth percentile) Weight(growth percentile) 124/74 74 98 °F (36.7 °C) (Oral) 16 5' 8.5\" (1.74 m) 214 lb 12.8 oz (97.4 kg) SpO2 BMI Smoking Status 96% 32.19 kg/m2 Never Smoker BMI and BSA Data Body Mass Index Body Surface Area  
 32.19 kg/m 2 2.17 m 2 Preferred Pharmacy Pharmacy Name Phone [phone] [place], [address] AT [place] [address] [phone] Your Updated Medication List  
  
   
This list is accurate as of: 5/10/17  8:39 AM.  Always use your most recent med list. amLODIPine 5 mg tablet Commonly known as:  Cher Martinez Take 1 Tab by mouth daily. ARGININE (L-ARGININE) PO Take 1 tablet by mouth. L Arginine/L Citrulline 4000mg/1000mg qd  
  
 aspirin delayed-release 81 mg tablet Take  by mouth daily. * CIALIS 20 mg tablet Generic drug:  tadalafil Take 20 mg by mouth as needed. * tadalafil 5 mg tablet Commonly known as:  CIALIS Take 1 Tab by mouth as needed. enalapril 20 mg tablet Commonly known as:  VASOTEC  
TAKE 1 TABLET TWICE DAILY  
  
 etodolac 400 mg tablet Commonly known as:  LODINE Take 400 mg by mouth two (2) times daily (with meals). fiber Cap Take 2 Caps by mouth two (2) times a day. FOLIC ACID PO Take 400 mcg by mouth daily. hydroCHLOROthiazide 25 mg tablet Commonly known as:  HYDRODIURIL  
TAKE 1 TABLET EVERY DAY  
  
 metoprolol succinate 25 mg XL tablet Commonly known as:  TOPROL-XL  
TAKE 1 TABLET EVERY DAY  
  
 MULTI-VIT 55 PLUS PO Take 1 Tab by mouth daily. potassium chloride 10 mEq tablet Commonly known as:  KLOR-CON  
TAKE 1 TABLET THREE TIMES DAILY pravastatin 10 mg tablet Commonly known as:  PRAVACHOL  
TAKE 1 TABLET EVERY NIGHT  
  
 rivaroxaban 20 mg Tab tablet Commonly known as:  Marlene Gamino Take 1 Tab by mouth daily (with dinner). sildenafil citrate 100 mg tablet Commonly known as:  VIAGRA Take 1 Tab by mouth as needed. terazosin 10 mg capsule Commonly known as:  HYTRIN  
TAKE 1 CAPSULE EVERY NIGHT  
  
 thioctic acid 50 mg Tab Generic drug:  Alpha Lipoic Acid Take 1 Tab by mouth daily. TYLENOL 325 mg tablet Generic drug:  acetaminophen Take  by mouth every four (4) hours as needed for Pain. * Notice: This list has 2 medication(s) that are the same as other medications prescribed for you. Read the directions carefully, and ask your doctor or other care provider to review them with you. We Performed the Following REFERRAL TO PODIATRY [REF90 Custom] Follow-up Instructions Return if symptoms worsen or fail to improve. Referral Information Referral ID Referred By Referred To  
  
 [id number] [facility], [address] [place] [address], P.C[facility]   
   [address] Visits Status Start Date End Date 1 New Request 5/10/17 5/10/18 If your referral has a status of pending review or denied, additional information will be sent to support the outcome of this decision. Patient Instructions Ingrown Toenail: Care Instructions Your Care Instructions An ingrown toenail often occurs because a nail is not trimmed correctly or because shoes are too tight. An ingrown nail can cause an infection. If your toe is infected, your doctor may prescribe antibiotics. Most ingrown toenails can be treated at home. You should trim toenails straight across, so the ends of the nail grow over the skin and not into it. Good nail care can prevent ingrown toenails. Follow-up care is a key part of your treatment and safety. Be sure to make and go to all appointments, and call your doctor if you are having problems.  It's also a good idea to know your test results and keep a list of the medicines you take. How can you care for yourself at home? · Trim the nails straight across. Leave the corners a little longer so they do not cut into the skin. To do this when you have an ingrown nail: 
¨ Soak your foot in warm water for about 15 minutes to soften the nail. ¨ Wedge a small piece of wet cotton under the corner of the nail to cushion the nail and lift it slightly. This keeps it from cutting the skin. ¨ Repeat daily until the nail has grown out and can be trimmed. · Do not use manicure scissors to dig under the ingrown nail. You might stab your toe, which could get infected. · Do not trim your toenails too short. · Check with your doctor before trimming your own toenails if you have been diagnosed with diabetes or peripheral arterial disease. These conditions increase the risk of an infection, because you may have decreased sensation in your toes and cut yourself without knowing it. · Wear roomy, comfortable shoes. · If your doctor prescribed antibiotics, take them as directed. Do not stop taking them just because you feel better. You need to take the full course of antibiotics. When should you call for help? Call your doctor now or seek immediate medical care if: 
· You have signs of infection, such as: 
¨ Increased pain, swelling, warmth, or redness. ¨ Red streaks leading from the toe. ¨ Pus draining from the toe. ¨ A fever. Watch closely for changes in your health, and be sure to contact your doctor if: 
· You have problems trimming your nails. · You do not get better as expected. Where can you learn more? Go to http://marquis-thomas.info/. Enter R135 in the search box to learn more about \"Ingrown Toenail: Care Instructions. \" Current as of: October 13, 2016 Content Version: 11.2 © 4296-0189 [employer].  Care instructions adapted under license by [employer] (which disclaims liability or warranty for this information). If you have questions about a medical condition or this instruction, always ask your healthcare professional. [address] any warranty or liability for your use of this information. Introducing [facility]! Luisana Day introduces PaperShare patient portal. Now you can access parts of your medical record, email your doctor's office, and request medication refills online. 1. In your internet browser, go to [email or web address] 2. Click on the First Time User? Click Here link in the Sign In box. You will see the New Member Sign Up page. 3. Enter your PaperShare Access Code exactly as it appears below. You will not need to use this code after youve completed the sign-up process. If you do not sign up before the expiration date, you must request a new code. · PaperShare Access Code: [email or web address] Expires: 8/8/2017  8:39 AM 
 
4. Enter the last four digits of your Social Security Number (xxxx) and Date of Birth (mm/dd/yyyy) as indicated and click Submit. You will be taken to the next sign-up page. 5. Create a PaperShare ID. This will be your PaperShare login ID and cannot be changed, so think of one that is secure and easy to remember. 6. Create a PaperShare password. You can change your password at any time. 7. Enter your Password Reset Question and Answer. This can be used at a later time if you forget your password. 8. Enter your e-mail address. You will receive e-mail notification when new information is available in [address] 9. Click Sign Up. You can now view and download portions of your medical record. 10. Click the Download Summary menu link to download a portable copy of your medical information. If you have questions, please visit the Frequently Asked Questions section of the PaperShare website. Remember, PaperShare is NOT to be used for urgent needs. For medical emergencies, dial 911. Now available from your iPhone and Android! Please provide this summary of care documentation to your next provider. Your primary care clinician is listed as Alisa Krueger. If you have any questions after today's visit, please call [phone].

## 2017-05-10 NOTE — PATIENT INSTRUCTIONS
Ingrown Toenail: Care Instructions  Your Care Instructions    An ingrown toenail often occurs because a nail is not trimmed correctly or because shoes are too tight. An ingrown nail can cause an infection. If your toe is infected, your doctor may prescribe antibiotics. Most ingrown toenails can be treated at home. You should trim toenails straight across, so the ends of the nail grow over the skin and not into it. Good nail care can prevent ingrown toenails. Follow-up care is a key part of your treatment and safety. Be sure to make and go to all appointments, and call your doctor if you are having problems. It's also a good idea to know your test results and keep a list of the medicines you take. How can you care for yourself at home? · Trim the nails straight across. Leave the corners a little longer so they do not cut into the skin. To do this when you have an ingrown nail:  ¨ Soak your foot in warm water for about 15 minutes to soften the nail. ¨ Wedge a small piece of wet cotton under the corner of the nail to cushion the nail and lift it slightly. This keeps it from cutting the skin. ¨ Repeat daily until the nail has grown out and can be trimmed. · Do not use manicure scissors to dig under the ingrown nail. You might stab your toe, which could get infected. · Do not trim your toenails too short. · Check with your doctor before trimming your own toenails if you have been diagnosed with diabetes or peripheral arterial disease. These conditions increase the risk of an infection, because you may have decreased sensation in your toes and cut yourself without knowing it. · Wear roomy, comfortable shoes. · If your doctor prescribed antibiotics, take them as directed. Do not stop taking them just because you feel better. You need to take the full course of antibiotics. When should you call for help?   Call your doctor now or seek immediate medical care if:  · You have signs of infection, such as:  ¨ Increased pain, swelling, warmth, or redness. ¨ Red streaks leading from the toe. ¨ Pus draining from the toe. ¨ A fever. Watch closely for changes in your health, and be sure to contact your doctor if:  · You have problems trimming your nails. · You do not get better as expected. Where can you learn more? Go to http://marquis-thomas.info/. Enter R135 in the search box to learn more about \"Ingrown Toenail: Care Instructions. \"  Current as of: October 13, 2016  Content Version: 11.2  © 1440-7055 [employer]. Care instructions adapted under license by [employer] (which disclaims liability or warranty for this information). If you have questions about a medical condition or this instruction, always ask your healthcare professional. [address] any warranty or liability for your use of this information.

## 2017-05-10 NOTE — PROGRESS NOTES
Lorane Hodgkin is a 67 y.o. male who was seen in clinic today (5/10/2017). Assessment & Plan:  Chan Mobley was seen today for toe pain. Diagnoses and all orders for this visit:    Ingrown nail of great toe of right foot- this is a new problem on this foot, recurrent issue overall though. Will have him see podiatry. No signs of infection so will not soak or need abx.    -     REFERRAL TO PODIATRY         Follow-up Disposition:  Return if symptoms worsen or fail to improve.       ----------------------------------------------------------------------    Subjective:  He was seen 6 wks ago for issues with the L toe nail. He did have to see podiatry and had the toe nail removed. He can't remember his name (but address was [address] [place] [address], [place]). He is here to talk about nail pain on the right foot. He tried to schedule an appointment with his new podiatrist but told he needed to get a referral.  This has been present for the last few weeks and it is getting worse. Location: right 1st toe. Symptoms include intermittent pain. Treatment to date has included Epson salt bath.          Prior to Admission medications    Medication Sig Start Date End Date Taking? Authorizing Provider   aspirin delayed-release 81 mg tablet Take  by mouth daily. Yes Historical Provider   tadalafil (CIALIS) 20 mg tablet Take 20 mg by mouth as needed. Yes Historical Provider   tadalafil (CIALIS) 5 mg tablet Take 1 Tab by mouth as needed. 4/3/17  Yes Lauren Nevarez MD   potassium chloride (K-DUR, KLOR-CON) 10 mEq tablet TAKE 1 TABLET THREE TIMES DAILY 3/23/17  Yes Vonnie Farah MD   sildenafil citrate (VIAGRA) 100 mg tablet Take 1 Tab by mouth as needed.  3/21/17  Yes Vonnie Farah MD   pravastatin (PRAVACHOL) 10 mg tablet TAKE 1 TABLET EVERY NIGHT 2/28/17  Yes Vonnie Farah MD   metoprolol succinate (TOPROL-XL) 25 mg XL tablet TAKE 1 TABLET EVERY DAY 2/24/17  Yes Vonnie Farah MD   terazosin (HYTRIN) 10 mg capsule TAKE 1 CAPSULE EVERY NIGHT 2/9/17  Yes Salomon Garcia MD   rivaroxaban (XARELTO) 20 mg tab tablet Take 1 Tab by mouth daily (with dinner). 1/16/17  Yes Salomon Garcia MD   hydroCHLOROthiazide (HYDRODIURIL) 25 mg tablet TAKE 1 TABLET EVERY DAY 1/13/17  Yes Salomon Garcia MD   enalapril (VASOTEC) 20 mg tablet TAKE 1 TABLET TWICE DAILY 12/22/16  Yes Salomon Garcia MD   amLODIPine (NORVASC) 5 mg tablet Take 1 Tab by mouth daily. 10/3/16  Yes Shamir Samano MD   etodolac (LODINE) 400 mg tablet Take 400 mg by mouth two (2) times daily (with meals). 10/19/15  Yes Santo Chilel MD   acetaminophen (TYLENOL) 325 mg tablet Take  by mouth every four (4) hours as needed for Pain. Yes Historical Provider   ARGININE, L-ARGININE, PO Take 1 tablet by mouth. L Arginine/L Citrulline 4000mg/1000mg qd   Yes Historical Provider   FOLIC ACID PO Take 480 mcg by mouth daily. Yes Historical Provider   thioctic acid (ALPHA LIPOIC ACID) 50 mg tab Take 1 Tab by mouth daily. Yes Historical Provider   fiber Cap Take 2 Caps by mouth two (2) times a day. Yes Historical Provider   MULTIVITAMIN WITH MINERALS (MULTI-VIT 55 PLUS PO) Take 1 Tab by mouth daily. 7/8/11  Yes Historical Provider          Allergies   Allergen Reactions    Pcn [Penicillins] Unknown (comments)     As a child    Percocet [Oxycodone-Acetaminophen] Swelling     Leg swelling           Review of Systems   Constitutional: Negative for chills and fever. Objective:   Physical Exam   Musculoskeletal:   R 1st toe- toe nail is ingrown on both sides, no erythema or signs of abscess/infection.   Nail is hypertrophied         Visit Vitals    /74    Pulse 74    Temp 98 °F (36.7 °C) (Oral)    Resp 16    Ht 5' 8.5\" (1.74 m)    Wt 214 lb 12.8 oz (97.4 kg)    SpO2 96%    BMI 32.19 kg/m2         Disclaimer:  Advised him to call back or return to office if symptoms worsen/change/persist.  Discussed expected course/resolution/complications of diagnosis in detail with patient. Medication risks/benefits/costs/interactions/alternatives discussed with patient. He was given an after visit summary which includes diagnoses, current medications, & vitals. He expressed understanding with the diagnosis and plan.         Jazlyn Myers MD

## 2017-05-10 NOTE — Clinical Note
Please call Dr Keesha Lee office, need to get records. He was seen in [place] office. Saw either Dr Amelie Portillo or Sinai Saldivar.

## 2017-05-31 ENCOUNTER — OFFICE VISIT (OUTPATIENT)
Dept: INTERNAL MEDICINE CLINIC | Age: 77
End: 2017-05-31

## 2017-05-31 VITALS
TEMPERATURE: 97.8 F | BODY MASS INDEX: 31.7 KG/M2 | DIASTOLIC BLOOD PRESSURE: 84 MMHG | HEART RATE: 60 BPM | WEIGHT: 214 LBS | RESPIRATION RATE: 18 BRPM | SYSTOLIC BLOOD PRESSURE: 128 MMHG | HEIGHT: 69 IN | OXYGEN SATURATION: 99 %

## 2017-05-31 DIAGNOSIS — N52.9 ERECTILE DYSFUNCTION, UNSPECIFIED ERECTILE DYSFUNCTION TYPE: ICD-10-CM

## 2017-05-31 DIAGNOSIS — K40.90 NON-RECURRENT UNILATERAL INGUINAL HERNIA WITHOUT OBSTRUCTION OR GANGRENE: Primary | ICD-10-CM

## 2017-05-31 RX ORDER — TADALAFIL 5 MG/1
TABLET ORAL
Qty: 30 TAB | Refills: 0 | Status: SHIPPED | COMMUNITY
Start: 2017-05-31 | End: 2017-06-14

## 2017-05-31 NOTE — PATIENT INSTRUCTIONS
Inguinal Hernia: Care Instructions  Your Care Instructions    An inguinal hernia occurs when tissue bulges through a weak spot in your groin area. You may see or feel a tender bulge in the groin or scrotum. You may also have pain, pressure or burning, or a feeling that something has \"given way. \"  Hernias are caused by a weakness in the belly wall. The bulge or discomfort may occur after heavy lifting, straining, or coughing. Hernias do not heal on their own, and they tend to get worse over time. If your hernia does not bother you, you most likely can wait to have surgery. Your hernia may get worse, but it may not. In some cases, hernias that are small and painless may never need to be repaired. Follow-up care is a key part of your treatment and safety. Be sure to make and go to all appointments, and call your doctor if you are having problems. It's also a good idea to know your test results and keep a list of the medicines you take. How can you care for yourself at home? · Take pain medicines exactly as directed. ¨ If the doctor gave you a prescription medicine for pain, take it as prescribed. ¨ If you are not taking a prescription pain medicine, ask your doctor if you can take an over-the-counter medicine. · Use proper lifting techniques, and avoid heavy lifting if you can. To lift things more safely, bend your knees and let your arms and legs do the work. Keep your back straight, and do not bend over at the waist. Keep the load as close to your body as you can. Move your feet instead of turning or twisting your body. · Lose weight if you are overweight. · Include fruits, vegetables, legumes, and whole grains in your diet each day. These foods are high in fiber and will make it easier to avoid straining during bowel movements. · Do not smoke. Smoking can cause coughing, which can cause your hernia to bulge. If you need help quitting, talk to your doctor about stop-smoking programs and medicines. These can increase your chances of quitting for good. When should you call for help? Call your doctor now or seek immediate medical care if:  · You have sudden, severe pain in the hernia area. · You have nausea or vomiting. · You have belly pain and are not passing gas or stool. · You cannot push your hernia back into place with gentle pressure when you are lying down. · The skin over the hernia turns red or becomes tender. Watch closely for changes in your health, and be sure to contact your doctor if:  · You have new or increased pain. · You do not get better as expected. Where can you learn more? Go to http://marquis-thomas.info/. Enter A866 in the search box to learn more about \"Inguinal Hernia: Care Instructions. \"  Current as of: August 9, 2016  Content Version: 11.2  © 2930-6019 [employer]. Care instructions adapted under license by [employer] (which disclaims liability or warranty for this information). If you have questions about a medical condition or this instruction, always ask your healthcare professional. [address] any warranty or liability for your use of this information.

## 2017-05-31 NOTE — PROGRESS NOTES
Kayla Adams is a 67 y.o. male who was seen in clinic today (5/31/2017). Assessment & Plan:  Wesley Payan was seen today for groin pain. Diagnoses and all orders for this visit:    Non-recurrent unilateral inguinal hernia without obstruction or gangrene- new dx, as far as new symptoms, but has been present for years, no distinct hernia on exam but sounds classic. Reviewed options- will work on life style modifications. Red flags and expectations were reviewed & discussed with the him. He verbalized understanding and will have him see surgeon if any changes. Erectile dysfunction, unspecified erectile dysfunction type- samples provided  -     tadalafil (CIALIS) 5 mg tablet; Take 1-4 tabs as directed. Follow-up Disposition:  Return if symptoms worsen or fail to improve.       ----------------------------------------------------------------------    Subjective:  Abdominal Pain  Kayla Adams is here to talk about abdominal/groin pain. This is a new problem and symptoms began 2-3 months ago and have not changed. Pain is in his left groin in location and described as cramping. He also reports the following symptoms: nothing. He symptoms are aggravated by lifting. It is not aggravated by doing machine weights at the gym. He has tried Tylenol & ice. These have been: effective. CT scan from 10/11/13 reviewed (L inguinal hernia containing fat). Prior to Admission medications    Medication Sig Start Date End Date Taking? Authorizing Provider   aspirin delayed-release 81 mg tablet Take  by mouth daily. Yes Historical Provider   tadalafil (CIALIS) 20 mg tablet Take 20 mg by mouth as needed. Yes Historical Provider   tadalafil (CIALIS) 5 mg tablet Take 1 Tab by mouth as needed.  4/3/17  Yes Esther Colon MD   potassium chloride (K-DUR, KLOR-CON) 10 mEq tablet TAKE 1 TABLET THREE TIMES DAILY 3/23/17  Yes Erwin Rueda MD   sildenafil citrate (VIAGRA) 100 mg tablet Take 1 Tab by mouth as needed. 3/21/17  Yes Radha Dotson MD   pravastatin (PRAVACHOL) 10 mg tablet TAKE 1 TABLET EVERY NIGHT 2/28/17  Yes Radha Dotson MD   metoprolol succinate (TOPROL-XL) 25 mg XL tablet TAKE 1 TABLET EVERY DAY 2/24/17  Yes Radha Dotson MD   terazosin (HYTRIN) 10 mg capsule TAKE 1 CAPSULE EVERY NIGHT 2/9/17  Yes Radha Dotson MD   rivaroxaban (XARELTO) 20 mg tab tablet Take 1 Tab by mouth daily (with dinner). 1/16/17  Yes Radha Dotson MD   hydroCHLOROthiazide (HYDRODIURIL) 25 mg tablet TAKE 1 TABLET EVERY DAY 1/13/17  Yes Radha Dotson MD   enalapril (VASOTEC) 20 mg tablet TAKE 1 TABLET TWICE DAILY 12/22/16  Yes Radha Dotson MD   amLODIPine (NORVASC) 5 mg tablet Take 1 Tab by mouth daily. 10/3/16  Yes Lisandro Urena MD   etodolac (LODINE) 400 mg tablet Take 400 mg by mouth two (2) times daily (with meals). 10/19/15  Yes Esthela Thompson MD   acetaminophen (TYLENOL) 325 mg tablet Take  by mouth every four (4) hours as needed for Pain. Yes Historical Provider   ARGININE, L-ARGININE, PO Take 1 tablet by mouth. L Arginine/L Citrulline 4000mg/1000mg qd   Yes Historical Provider   FOLIC ACID PO Take 090 mcg by mouth daily. Yes Historical Provider   thioctic acid (ALPHA LIPOIC ACID) 50 mg tab Take 1 Tab by mouth daily. Yes Historical Provider   fiber Cap Take 2 Caps by mouth two (2) times a day. Yes Historical Provider   MULTIVITAMIN WITH MINERALS (MULTI-VIT 55 PLUS PO) Take 1 Tab by mouth daily. 7/8/11  Yes Historical Provider          Allergies   Allergen Reactions    Pcn [Penicillins] Unknown (comments)     As a child    Percocet [Oxycodone-Acetaminophen] Swelling     Leg swelling           Review of Systems   Respiratory: Negative for cough and shortness of breath. Cardiovascular: Negative for chest pain and palpitations. Gastrointestinal: Negative for abdominal pain, constipation, diarrhea, nausea and vomiting.          Objective:   Physical Exam   Abdominal: A hernia is present. Hernia confirmed positive in the left inguinal area (no distinct mass but swelling compared to R side). Hernia confirmed negative in the right inguinal area. Genitourinary: Right testis shows mass. Left testis shows mass. Left testis shows no swelling and no tenderness. Lymphadenopathy:        Right: No inguinal adenopathy present. Left: No inguinal adenopathy present. Visit Vitals    /84    Pulse 60    Temp 97.8 °F (36.6 °C) (Oral)    Resp 18    Ht 5' 8.5\" (1.74 m)    Wt 214 lb (97.1 kg)    SpO2 99%    BMI 32.07 kg/m2         Disclaimer:  Advised him to call back or return to office if symptoms worsen/change/persist.  Discussed expected course/resolution/complications of diagnosis in detail with patient. Medication risks/benefits/costs/interactions/alternatives discussed with patient. He was given an after visit summary which includes diagnoses, current medications, & vitals. He expressed understanding with the diagnosis and plan.         Shaquille Sanchez MD

## 2017-06-27 RX ORDER — ENALAPRIL MALEATE 20 MG/1
TABLET ORAL
Qty: 180 TAB | Refills: 1 | Status: SHIPPED | OUTPATIENT
Start: 2017-06-27 | End: 2017-12-22 | Stop reason: SDUPTHER

## 2017-06-27 RX ORDER — TERAZOSIN 10 MG/1
CAPSULE ORAL
Qty: 90 CAP | Refills: 1 | Status: SHIPPED | OUTPATIENT
Start: 2017-06-27 | End: 2018-01-05 | Stop reason: SDUPTHER

## 2017-06-30 RX ORDER — METOPROLOL SUCCINATE 25 MG/1
TABLET, EXTENDED RELEASE ORAL
Qty: 90 TAB | Refills: 1 | Status: SHIPPED | OUTPATIENT
Start: 2017-06-30 | End: 2018-01-05 | Stop reason: SDUPTHER

## 2017-07-01 RX ORDER — AMLODIPINE BESYLATE 5 MG/1
TABLET ORAL
Qty: 90 TAB | Refills: 3 | Status: SHIPPED | OUTPATIENT
Start: 2017-07-01 | End: 2017-09-05

## 2017-07-17 RX ORDER — RIVAROXABAN 20 MG/1
TABLET, FILM COATED ORAL
Qty: 30 TAB | Refills: 5 | Status: SHIPPED | OUTPATIENT
Start: 2017-07-17 | End: 2017-12-19

## 2017-07-26 ENCOUNTER — CLINICAL SUPPORT (OUTPATIENT)
Dept: CARDIOLOGY CLINIC | Age: 77
End: 2017-07-26

## 2017-07-26 DIAGNOSIS — Z95.810 PRESENCE OF AUTOMATIC CARDIOVERTER/DEFIBRILLATOR (AICD): Primary | ICD-10-CM

## 2017-07-31 ENCOUNTER — TELEPHONE (OUTPATIENT)
Dept: CARDIOLOGY CLINIC | Age: 77
End: 2017-07-31

## 2017-07-31 NOTE — TELEPHONE ENCOUNTER
Patient had questions regarding taking ASA and Xarelto. The following message given to patient per Dr. Patsy Alvares:    Aspirin is for his CAD, xarelto for his afib so ok to take both. Patient will continue both medications.    2 pt identifiers used

## 2017-08-17 ENCOUNTER — TELEPHONE (OUTPATIENT)
Dept: INTERNAL MEDICINE CLINIC | Age: 77
End: 2017-08-17

## 2017-08-18 ENCOUNTER — OFFICE VISIT (OUTPATIENT)
Dept: SURGERY | Age: 77
End: 2017-08-18

## 2017-08-18 VITALS
HEART RATE: 59 BPM | SYSTOLIC BLOOD PRESSURE: 120 MMHG | WEIGHT: 214.5 LBS | OXYGEN SATURATION: 98 % | DIASTOLIC BLOOD PRESSURE: 80 MMHG | TEMPERATURE: 97.7 F | BODY MASS INDEX: 31.77 KG/M2 | RESPIRATION RATE: 20 BRPM | HEIGHT: 69 IN

## 2017-08-18 DIAGNOSIS — Z79.01 CHRONIC ANTICOAGULATION: ICD-10-CM

## 2017-08-18 DIAGNOSIS — K40.91 UNILATERAL RECURRENT INGUINAL HERNIA WITHOUT OBSTRUCTION OR GANGRENE: Primary | ICD-10-CM

## 2017-08-18 NOTE — PROGRESS NOTES
1. Have you been to the ER, urgent care clinic since your last visit? Hospitalized since your last visit? [facility] for hernia    2. Have you seen or consulted any other health care providers outside of the [address] [place] [address] since your last visit? Include any pap smears or colon screening.  [facility] For [facility]

## 2017-08-18 NOTE — PROGRESS NOTES
Lang Horne is a 67 y.o. male was referred by Dr Zabrina Reilly for evaluation of left inguinal hernia. Symptoms were first noted 2 months ago. Pain is intermittent, radiating to groin. The mass is  reducible. He does not have a history of incarceration or obstruction. Contributing symptoms - He does not have chronic constipation, chronic cough, difficulty urinating. He has a history of  previous hernia surgery. Mr. Steve Wang has a significant cardiac history under the care of Dr Renée Johnson. He is a 66 yo M with h/o CAD s/p PCI in 2003, patent stent with no significant CAD on 2008 cath, afib on metoprolol for rate control and xarelto for anticoagulation, apical variant of a hypertrophic cardiomyopathy with deep T wave inversions on ECG, sick sinus with NSVT noted on 11/14/13 holter with multiple > 3 sec pauses s/p Medtronic ICD on 11/15/13 with Dr. Iraj Calvo, s/p right hip revision on 12/5/13. MARIBEL in 2012 was normal.  7/2011 nuclear stress test was normal.  5/2012 echo noted EF of 50-55% and apical hypertrophy.      Past Medical History:   Diagnosis Date    Arthritis     knees    CAD (coronary artery disease)     stent x3 approx 2001    Chronic atrial fibrillation ([address]     ED (erectile dysfunction)     Hypertension     Kidney stone on right side 10/30/2011    JAMEY (obstructive sleep apnea) 4/25/2012    Skin cancer     BCC, s/p Mohs on L flank    Sun-damaged skin     TIA (transient ischemic attack)     6/12 - seen at [facility]       Past Surgical History:   Procedure Laterality Date    CARDIAC SURG PROCEDURE UNLIST  2006    stents x 3     HX APPENDECTOMY  age 15    HX CATARACT REMOVAL Bilateral     HX COLONOSCOPY  05/10/2012    hyperplastic    HX CYSTOSTOMY  1/23/14    diffuse erythema    HX HERNIA REPAIR Bilateral     inguinal hernia    HX HIP REPLACEMENT Bilateral     HX HIP REPLACEMENT Right 12/4/13    REVISION     HX IMPLANTABLE CARDIOVERTER DEFIBRILLATOR  11/15/13    + PM    HX KNEE REPLACEMENT Right 7/2011  HX LUMBAR DISKECTOMY  1970's    HX MOHS PROCEDURES  02/21/2017    [facility] left lateral cheek by Dr. Jesse Vu HX [id number]  01/05/2015    [place], [facility]    HX PACEMAKER  11/15/2013    AICD    INS PPM/ICD LED SING ONLY  11/15/2013            Current Outpatient Prescriptions   Medication Sig Dispense Refill    XARELTO 20 mg tab tablet TAKE 1 TABLET BY MOUTH DAILY WITH DINNER 30 Tab 5    amLODIPine (NORVASC) 5 mg tablet TAKE 1 TABLET EVERY DAY 90 Tab 3    metoprolol succinate (TOPROL-XL) 25 mg XL tablet TAKE 1 TABLET EVERY DAY 90 Tab 1    terazosin (HYTRIN) 10 mg capsule TAKE 1 CAPSULE EVERY NIGHT 90 Cap 1    aspirin delayed-release 81 mg tablet Take  by mouth daily.  tadalafil (CIALIS) 20 mg tablet Take 20 mg by mouth as needed.  potassium chloride (K-DUR, KLOR-CON) 10 mEq tablet TAKE 1 TABLET THREE TIMES DAILY 270 Tab 1    sildenafil citrate (VIAGRA) 100 mg tablet Take 1 Tab by mouth as needed. 2 Tab 0    pravastatin (PRAVACHOL) 10 mg tablet TAKE 1 TABLET EVERY NIGHT 90 Tab 1    hydroCHLOROthiazide (HYDRODIURIL) 25 mg tablet TAKE 1 TABLET EVERY DAY 90 Tab 3    acetaminophen (TYLENOL) 325 mg tablet Take  by mouth every four (4) hours as needed for Pain.  ARGININE, L-ARGININE, PO Take 1 tablet by mouth. L Arginine/L Citrulline 5362OA/6120CZ qd      FOLIC ACID PO Take 809 mcg by mouth daily.  thioctic acid (ALPHA LIPOIC ACID) 50 mg tab Take 1 Tab by mouth daily.  fiber Cap Take 2 Caps by mouth two (2) times a day.  MULTIVITAMIN WITH MINERALS (MULTI-VIT 55 PLUS PO) Take 1 Tab by mouth daily.  enalapril (VASOTEC) 20 mg tablet TAKE 1 TABLET TWICE DAILY 180 Tab 1    etodolac (LODINE) 400 mg tablet Take 400 mg by mouth two (2) times daily (with meals).  20 Tab 0       Allergies   Allergen Reactions    Pcn [Penicillins] Unknown (comments)     As a child    Percocet [Oxycodone-Acetaminophen] Swelling     Leg swelling       Family History   Problem Relation Age of Onset  Stroke Father     Heart Attack Father     Heart Disease Father     Cancer Father 80     colon cancer    Heart Disease Mother     Arthritis-osteo Sister      s/p bilateral knee replacements    No Known Problems Daughter        Social History   Substance Use Topics    Smoking status: Never Smoker    Smokeless tobacco: Never Used    Alcohol use No          ROS, positive where in bold:    General: fevers, chills, night sweats, fatigue, weight loss, weight gain    GI: abdominal  In left groin pain, nausea, vomiting, change in bowel habits, hematochezia, melena, GERD    Integumentary: dermatitis or abnormal moles    HEENT:  visual changes, vertigo, epistaxis, dysphagia, hoarseness    Cardiac: chest pain, palpitations, hypertension, edema,  varicosities    Resp:  cough, shortness of breath, wheezing, hemoptysis, snoring,  reactive airway disease    : hematuria, dysuria, frequency, urgency, nocturia, stress urinary incontinence, ED    MSK: weakness, joint pain, arthritis    Endocrine: diabetes, thyroid disease, polyuria, polydipsia, polyphagia, poor wound healing, heat intolerance,cold intolerance    Lymph/Heme: anemia, bruising,  history of blood transfusions    Neuro: dizziness, headache, fainting, seizures, stroke    Psychiatry:  Anxiety, depression, psychosis       Objective:     Physical Exam:  Visit Vitals    /80 (BP 1 Location: Left arm, BP Patient Position: Sitting)    Pulse (!) 59    Temp 97.7 °F (36.5 °C) (Oral)    Resp 20    Ht 5' 8.5\" (1.74 m)    Wt 214 lb 8 oz (97.3 kg)    SpO2 98%    BMI 32.14 kg/m2       General: Well developed, well nourished 67 y.o. male in no acute distress  Skin: warm, smooth, dry and well perfused  Respiratory: excursions normal and symmetrical  Cardiovascular: Regular rate and rhythm  Gastrointestinal: soft, nontender, nondistended, normoactive bowel sounds, large reducible tender left indirect/direct inguinal hernia, no right inguinal recurrence appreciated, no hepatosplenomegaly  Genitourinary: both testes descended bilaterally  Musculoskeletal: warm, well-perfused, no tenderness or swelling, normal gait/station  Neuro:  grossly intact and symmetrical  Psych: alert and oriented to person, place and time          Assessment:   Left inguinal hernia, easily reducible. , tender to palpation. Recurrent after previous repair 30-40 years ago, doubt mesh was used. Plan:     1. Discussed risks, benefits and likely outcomes of conservative as well as operative management. Risks quoted regarding expectant management include possibility of incarceration, strangulation, enlargement in size over time, and the risk of  emergency surgery in the face of strangulation. Open mesh vs. Laparoscopic mesh discussed. I favor laparoscopic as this hernia is large enough that I do not feel it would be easily done via local anesthesia and he feels strongly about desiring general anesthesia if possible. Also discussed the risks of surgery including bleeding, infection, need for reoperation, recurrence which can be up to 50% in the case of incisional or complex hernias, use of prosthetic materials (mesh) and the increased risk of infection, postoperative infection and the possible need for reoperation and removal of mesh if used, possibility of postoperative small bowel obstruction or ileus, and the risks of general anesthetic. In addition, discussed risks of injury to small bowel, bladder or other intraabdominal structures. In the case of inguinal hernia, discussed the risks of testicular ischemia and a greater than one percent chance of requiring orchiectomy given his previous repair. Lang Horne understands the risks; any and all questions were answered to his satisfaction. 2. Patient has elected to proceed with surgical treatment.  Procedure will be scheduled once he  Has seen his Cardiologist Dr Renée Johnson for clearance and advice regarding his cardiac management which includes ICD management and management of xarelto dione op.   Once he is cleared will have him return for pre op visit and to sign consents etc.

## 2017-08-18 NOTE — MR AVS SNAPSHOT
Visit Information Date & Time Provider Department Dept. Phone Encounter #  
 8/18/2017  9:30 AM MD Valente Hillman [phone] [id number] Your Appointments 10/2/2017  8:40 AM  
ESTABLISHED PATIENT with Vivian Rosenberg MD  
[facility] ([address]) Appt Note: 6 mfu  
 [address] [facility] [address] [place]  
[address] [place] [address]  
  
    
 10/31/2017  9:15 AM  
PACEMAKER with Ricardo Pinto [facility] ([facility] SCHEDULING) Appt Note: med ICD/rc b 7-26-17  no [address] [place]  
[address] [facility] [facility] [address] [facility] [address] [id number] Upcoming Health Maintenance Date Due  
 GLAUCOMA SCREENING Q2Y 9/1/2016 INFLUENZA AGE 9 TO ADULT 8/1/2017 MEDICARE YEARLY EXAM 3/4/2018 COLONOSCOPY 5/10/2022 DTaP/Tdap/Td series (2 - Td) 7/14/2025 Allergies as of 8/18/2017  Review Complete On: 8/18/2017 By: Garry Brooks Severity Noted Reaction Type Reactions Pcn [Penicillins]  03/27/2011    Unknown (comments) As a child Percocet [Oxycodone-acetaminophen]  03/27/2011    Swelling Leg swelling Current Immunizations  Reviewed on 5/31/2017 Name Date Influenza High Dose Vaccine PF 10/7/2016, 10/12/2015, 10/6/2014 Influenza Vaccine 10/1/2013 Influenza Vaccine Split 10/15/2012, 10/20/2011 Pneumococcal Conjugate (PCV-13) 7/14/2015 Pneumococcal Polysaccharide (PPSV-23) 10/7/2016 Pneumococcal Vaccine (Pcv) 1/1/2005 Tdap 7/14/2015 Zoster Vaccine, Live 1/1/2011 Not reviewed this visit Vitals BP Pulse Temp Resp Height(growth percentile) Weight(growth percentile) 120/80 (BP 1 Location: Left arm, BP Patient Position: Sitting) (!) 59 97.7 °F (36.5 °C) (Oral) 20 5' 8.5\" (1.74 m) 214 lb 8 oz (97.3 kg) SpO2 BMI Smoking Status 98% 32.14 kg/m2 Never Smoker Vitals History BMI and BSA Data Body Mass Index Body Surface Area  
 32.14 kg/m 2 2.17 m 2 Preferred Pharmacy Pharmacy Name Phone [facility] [phone] [place], [address] AT [place] [address] [phone] Your Updated Medication List  
  
   
This list is accurate as of: 8/18/17  9:56 AM.  Always use your most recent med list. amLODIPine 5 mg tablet Commonly known as:  Roshan Railing TAKE 1 TABLET EVERY DAY  
  
 ARGININE (L-ARGININE) PO Take 1 tablet by mouth. L Arginine/L Citrulline 4000mg/1000mg qd  
  
 aspirin delayed-release 81 mg tablet Take  by mouth daily. CIALIS 20 mg tablet Generic drug:  tadalafil Take 20 mg by mouth as needed. enalapril 20 mg tablet Commonly known as:  VASOTEC  
TAKE 1 TABLET TWICE DAILY  
  
 etodolac 400 mg tablet Commonly known as:  LODINE Take 400 mg by mouth two (2) times daily (with meals). fiber Cap Take 2 Caps by mouth two (2) times a day. FOLIC ACID PO Take 400 mcg by mouth daily. hydroCHLOROthiazide 25 mg tablet Commonly known as:  HYDRODIURIL  
TAKE 1 TABLET EVERY DAY  
  
 metoprolol succinate 25 mg XL tablet Commonly known as:  TOPROL-XL  
TAKE 1 TABLET EVERY DAY  
  
 MULTI-VIT 55 PLUS PO Take 1 Tab by mouth daily. potassium chloride 10 mEq tablet Commonly known as:  KLOR-CON  
TAKE 1 TABLET THREE TIMES DAILY pravastatin 10 mg tablet Commonly known as:  PRAVACHOL  
TAKE 1 TABLET EVERY NIGHT  
  
 sildenafil citrate 100 mg tablet Commonly known as:  VIAGRA Take 1 Tab by mouth as needed. terazosin 10 mg capsule Commonly known as:  HYTRIN  
TAKE 1 CAPSULE EVERY NIGHT  
  
 thioctic acid 50 mg Tab Generic drug:  Alpha Lipoic Acid Take 1 Tab by mouth daily. TYLENOL 325 mg tablet Generic drug:  acetaminophen Take  by mouth every four (4) hours as needed for Pain. XARELTO 20 mg Tab tablet Generic drug:  rivaroxaban TAKE 1 TABLET BY MOUTH DAILY WITH DINNER Introducing [facility]! [address] introduces [employer] patient portal. Now you can access parts of your medical record, email your doctor's office, and request medication refills online. 1. In your internet browser, go to [email or web address] 2. Click on the First Time User? Click Here link in the Sign In box. You will see the New Member Sign Up page. 3. Enter your [employer] Access Code exactly as it appears below. You will not need to use this code after youve completed the sign-up process. If you do not sign up before the expiration date, you must request a new code. · [employer] Access Code: [email or web address] Expires: 11/16/2017  9:56 AM 
 
4. Enter the last four digits of your Social Security Number (xxxx) and Date of Birth (mm/dd/yyyy) as indicated and click Submit. You will be taken to the next sign-up page. 5. Create a [employer] ID. This will be your [employer] login ID and cannot be changed, so think of one that is secure and easy to remember. 6. Create a [employer] password. You can change your password at any time. 7. Enter your Password Reset Question and Answer. This can be used at a later time if you forget your password. 8. Enter your e-mail address. You will receive e-mail notification when new information is available in [address] 9. Click Sign Up. You can now view and download portions of your medical record. 10. Click the Download Summary menu link to download a portable copy of your medical information. If you have questions, please visit the Frequently Asked Questions section of the [employer] website. Remember, [employer] is NOT to be used for urgent needs. For medical emergencies, dial 911. Now available from your iPhone and Android! Please provide this summary of care documentation to your next provider. Your primary care clinician is listed as [place] [facility]. If you have any questions after today's visit, please call [phone].

## 2017-08-24 ENCOUNTER — OFFICE VISIT (OUTPATIENT)
Dept: CARDIOLOGY CLINIC | Age: 77
End: 2017-08-24

## 2017-08-24 VITALS
HEIGHT: 69 IN | WEIGHT: 212 LBS | RESPIRATION RATE: 16 BRPM | SYSTOLIC BLOOD PRESSURE: 120 MMHG | HEART RATE: 60 BPM | DIASTOLIC BLOOD PRESSURE: 80 MMHG | BODY MASS INDEX: 31.4 KG/M2

## 2017-08-24 DIAGNOSIS — I25.10 CORONARY ARTERY DISEASE INVOLVING NATIVE CORONARY ARTERY OF NATIVE HEART WITHOUT ANGINA PECTORIS: Primary | ICD-10-CM

## 2017-08-24 DIAGNOSIS — E78.2 MIXED HYPERLIPIDEMIA: ICD-10-CM

## 2017-08-24 DIAGNOSIS — I48.0 PAROXYSMAL ATRIAL FIBRILLATION (HCC): ICD-10-CM

## 2017-08-24 DIAGNOSIS — I10 BENIGN ESSENTIAL HTN: ICD-10-CM

## 2017-08-24 DIAGNOSIS — Z01.810 PREOP CARDIOVASCULAR EXAM: ICD-10-CM

## 2017-08-24 DIAGNOSIS — Z95.810 S/P ICD (INTERNAL CARDIAC DEFIBRILLATOR) PROCEDURE: ICD-10-CM

## 2017-08-24 NOTE — LETTER
8/25/2017 4:20 PM 
Patient:  Nat Mckeon YOB: 1940 Date of Visit: 8/24/2017 Dear Katiana Hernadez MD 
[address] [facility] Internal Medicine [place] [id number] VIA In Basket Misael Titus MD 
[address] [place] [id number] VIA In Basket 
 : 
Mr. Britta Valencia is a 66 yo M with h/o CAD s/p PCI in 2003, patent stent with no significant CAD on 2008 cath, afib on metoprolol for rate control and coumadin for anticoagulation, apical variant of a hypertrophic cardiomyopathy with deep T wave inversions on ECG, sick sinus with NSVT noted on 11/14/13 holter with multiple > 3 sec pauses s/p Medtronic ICD on 11/15/13 with Dr. Garry Nunez, s/p right hip revision on 12/5/13. MARIBEL in 2012 was normal.  7/2011 nuclear stress test was normal.  5/2012 echo noted EF of 50-55% and apical hypertrophy. He is here for preop clearance prior to left inguinal hernia repair. Since his last visit, he continues to do well. No exertional chest pain, shortness of breath or palpitations. He denies any bleeding issues on Xarelto. He is compensated on exam with clear lungs and no lower extremity edema. His weight is down from 219 to 212 pounds. Assessment and Plan: 1. Preop cardiac evaluation. He is stable and optimized from a cardiac standpoint and can proceed with this procedure with low to moderate risk for cardiac complications. No additional cardiac evaluation is indicated at this time. He is on Xarelto for atrial fibrillation and this can safely held for 48 hours prior to surgery/procedures. It was requested to hold 7 days prior that is okay. Recommend resuming Xarelto postop per surgeon. 2. Coronary artery disease. Continue ACE inhibitor, beta-blocker, statin and aspirin. Will have him follow up in six months. 3. Sick sinus, status post ICD for nonsustained ventricular tachycardia in 2013. Followed by the device clinic. 4. Atrial fibrillation. No bleeding issues on Xarelto. Was previously on Coumadin. 5. Mixed hyperlipidemia. Tolerating statin. 6. Hypertrophic cardiomyopathy. Stable on beta-blocker. 7. Essential hypertension. Blood pressure is controlled and no changes made. 8. Erectile dysfunction. Takes Cialis or Viagra prn. Cautioned in the past about avoiding Nitroglycerin concurrently. If you have questions, please do not hesitate to call me. Sincerely, Nayely Wilder MD

## 2017-08-24 NOTE — PROGRESS NOTES
[facility]: Jennifer Koch  [phone]    HPI:   Mr. Zeeshan Jenkins is a 68 yo M with h/o CAD s/p PCI in 2003, patent stent with no significant CAD on 2008 cath, afib on metoprolol for rate control and coumadin for anticoagulation, apical variant of a hypertrophic cardiomyopathy with deep T wave inversions on ECG, sick sinus with NSVT noted on 11/14/13 holter with multiple > 3 sec pauses s/p [employer] ICD on 11/15/13 with Dr. Vic Kim, s/p right hip revision on 12/5/13. MARIBEL in 2012 was normal.  7/2011 nuclear stress test was normal.  5/2012 echo noted EF of 50-55% and apical hypertrophy. He is here for preop clearance prior to left inguinal hernia repair. Since his last visit, he continues to do well. No exertional chest pain, shortness of breath or palpitations. He denies any bleeding issues on Xarelto. He is compensated on exam with clear lungs and no lower extremity edema. His weight is down from 219 to 212 pounds. Assessment and Plan:   1. Preop cardiac evaluation. He is stable and optimized from a cardiac standpoint and can proceed with this procedure with low to moderate risk for cardiac complications. No additional cardiac evaluation is indicated at this time. He is on Xarelto for atrial fibrillation and this can safely held for 48 hours prior to surgery/procedures. It was requested to hold 7 days prior that is okay. Recommend resuming Xarelto postop per surgeon. 2. Coronary artery disease. Continue ACE inhibitor, beta-blocker, statin and aspirin. Will have him follow up in six months. 3. Sick sinus, status post ICD for nonsustained ventricular tachycardia in 2013. Followed by the device clinic. 4. Atrial fibrillation. No bleeding issues on Xarelto. Was previously on Coumadin. 5. Mixed hyperlipidemia. Tolerating statin. 6. Hypertrophic cardiomyopathy. Stable on beta-blocker. 7. Essential hypertension. Blood pressure is controlled and no changes made.    8. Erectile dysfunction. Takes Cialis or Viagra prn. Cautioned in the past about avoiding Nitroglycerin concurrently. He  has a past medical history of Arthritis; CAD (coronary artery disease); Chronic atrial fibrillation [facility]); ED (erectile dysfunction); Hypertension; Kidney stone on right side (10/30/2011); JAMEY (obstructive sleep apnea) (4/25/2012); Skin cancer; Sun-damaged skin; and TIA (transient ischemic attack). He also has no past medical history of Arsenic suspected exposure; Family history of skin cancer; Radiation exposure; Sunburn, blistering; or Tanning bed exposure. All other systems negative except as above. PE  Vitals:    08/24/17 0905   BP: 120/80   Pulse: 60   Resp: 16   Weight: 212 lb (96.2 kg)   Height: 5' 8.5\" (1.74 m)    Body mass index is 31.77 kg/(m^2). General appearance - alert, well appearing, and in no distress  Mental status - affect appropriate to mood  Neck - supple, no JVD. No bruits present.    Chest - clear to auscultation, no wheezes, rales or rhonchi  Heart - Regular rate, regular rhythm, normal S1, S2, I/VI systolic murmur LUSB  Abdomen - soft, nontender, nondistended, no masses or organomegaly  Extremities - peripheral pulses normal, no pedal edema        Recent Labs:  Lab Results   Component Value Date/Time    Cholesterol, total 104 03/21/2017 08:41 AM    HDL Cholesterol 28 03/21/2017 08:41 AM    LDL, calculated 54 03/21/2017 08:41 AM    Triglyceride 110 03/21/2017 08:41 AM     Lab Results   Component Value Date/Time    Creatinine 0.86 03/21/2017 08:41 AM     Lab Results   Component Value Date/Time    BUN 14 03/21/2017 08:41 AM    BUN (POC) 16 12/04/2013 06:46 AM     Lab Results   Component Value Date/Time    Potassium 3.6 03/21/2017 08:41 AM     Lab Results   Component Value Date/Time    Hemoglobin A1c 5.3 11/19/2013 08:45 AM     Lab Results   Component Value Date/Time    HGB 13.5 03/21/2017 08:41 AM     Lab Results   Component Value Date/Time    PLATELET 591 57/70/5145 08:41 AM       Reviewed:  Past Medical History:   Diagnosis Date    Arthritis     knees    CAD (coronary artery disease)     stent x3 approx 2001    Chronic atrial fibrillation ([facility] [address]     ED (erectile dysfunction)     Hypertension     Kidney stone on right side 10/30/2011    JAMEY (obstructive sleep apnea) 4/25/2012    Skin cancer     BCC, s/p Mohs on L flank    Sun-damaged skin     TIA (transient ischemic attack)     6/12 - seen at [facility]     History   Smoking Status    Never Smoker   Smokeless Tobacco    Never Used     History   Alcohol Use No     Allergies   Allergen Reactions    Pcn [Penicillins] Unknown (comments)     As a child    Percocet [Oxycodone-Acetaminophen] Swelling     Leg swelling       Current Outpatient Prescriptions   Medication Sig    XARELTO 20 mg tab tablet TAKE 1 TABLET BY MOUTH DAILY WITH DINNER    amLODIPine (NORVASC) 5 mg tablet TAKE 1 TABLET EVERY DAY    metoprolol succinate (TOPROL-XL) 25 mg XL tablet TAKE 1 TABLET EVERY DAY    terazosin (HYTRIN) 10 mg capsule TAKE 1 CAPSULE EVERY NIGHT    enalapril (VASOTEC) 20 mg tablet TAKE 1 TABLET TWICE DAILY    aspirin delayed-release 81 mg tablet Take  by mouth daily.  tadalafil (CIALIS) 20 mg tablet Take 20 mg by mouth as needed.  potassium chloride (K-DUR, KLOR-CON) 10 mEq tablet TAKE 1 TABLET THREE TIMES DAILY    sildenafil citrate (VIAGRA) 100 mg tablet Take 1 Tab by mouth as needed.  pravastatin (PRAVACHOL) 10 mg tablet TAKE 1 TABLET EVERY NIGHT    hydroCHLOROthiazide (HYDRODIURIL) 25 mg tablet TAKE 1 TABLET EVERY DAY    etodolac (LODINE) 400 mg tablet Take 400 mg by mouth two (2) times daily (with meals).  acetaminophen (TYLENOL) 325 mg tablet Take  by mouth every four (4) hours as needed for Pain.  ARGININE, L-ARGININE, PO Take 1 tablet by mouth. L Arginine/L Citrulline 1119QT/0851KV qd    FOLIC ACID PO Take 564 mcg by mouth daily.  thioctic acid (ALPHA LIPOIC ACID) 50 mg tab Take 1 Tab by mouth daily.     fiber Cap Take 2 Caps by mouth two (2) times a day.  MULTIVITAMIN WITH MINERALS (MULTI-VIT 55 PLUS PO) Take 1 Tab by mouth daily. No current facility-administered medications for this visit.         Jordy Greene MD  [facility] [place]  [address] [facility] [address]  [address]

## 2017-08-24 NOTE — MR AVS SNAPSHOT
Visit Information Date & Time Provider Department Dept. Phone Encounter #  
 8/24/2017  8:40 AM Roxanne Remy MD [facility] [phone] [id number] Your Appointments 10/2/2017  8:40 AM  
ESTABLISHED PATIENT with Roxanne Remy MD  
[facility] ([facility]) Appt Note: 6 mfu  
 [address] [facility] [address] [place] [address]  
[address] [place] [address]  
  
    
 10/31/2017  9:15 AM  
PACEMAKER with Kellie Ontiveros [facility] ([facility] SCHEDULING) Appt Note: med ICD/rc b 7-26-17  no [address] [facility] [address]  
[address] [facility]  
  
    
 2/22/2018  8:40 AM  
ESTABLISHED PATIENT with Roxanne Remy MD  
[facility] ([facility]) Appt Note: 6 month follow up  
 [address] [place] [address]  
[phone] Upcoming Health Maintenance Date Due  
 GLAUCOMA SCREENING Q2Y 9/1/2016 INFLUENZA AGE 9 TO ADULT 8/1/2017 MEDICARE YEARLY EXAM 3/4/2018 COLONOSCOPY 5/10/2022 DTaP/Tdap/Td series (2 - Td) 7/14/2025 Allergies as of 8/24/2017  Review Complete On: 8/24/2017 By: Tommy Helms Severity Noted Reaction Type Reactions Pcn [Penicillins]  03/27/2011    Unknown (comments) As a child Percocet [Oxycodone-acetaminophen]  03/27/2011    Swelling Leg swelling Current Immunizations  Reviewed on 5/31/2017 Name Date Influenza High Dose Vaccine PF 10/7/2016, 10/12/2015, 10/6/2014 Influenza Vaccine 10/1/2013 Influenza Vaccine Split 10/15/2012, 10/20/2011 Pneumococcal Conjugate (PCV-13) 7/14/2015 Pneumococcal Polysaccharide (PPSV-23) 10/7/2016 Pneumococcal Vaccine (Pcv) 1/1/2005 Tdap 7/14/2015 Zoster Vaccine, Live 1/1/2011 Not reviewed this visit Vitals BP Pulse Resp Height(growth percentile) Weight(growth percentile) BMI  
 120/80 (BP 1 Location: Left arm, BP Patient Position: Sitting) 60 16 5' 8.5\" (1.74 m) 212 lb (96.2 kg) 31.77 kg/m2 Smoking Status Never Smoker Vitals History BMI and BSA Data Body Mass Index Body Surface Area 31.77 kg/m 2 2.16 m 2 Preferred Pharmacy Pharmacy Name Phone [phone] - [address], [address] AT [place] [address] [phone] Your Updated Medication List  
  
   
This list is accurate as of: 8/24/17  9:16 AM.  Always use your most recent med list. amLODIPine 5 mg tablet Commonly known as:  Kyra Santoro TAKE 1 TABLET EVERY DAY  
  
 ARGININE (L-ARGININE) PO Take 1 tablet by mouth. L Arginine/L Citrulline 4000mg/1000mg qd  
  
 aspirin delayed-release 81 mg tablet Take  by mouth daily. CIALIS 20 mg tablet Generic drug:  tadalafil Take 20 mg by mouth as needed. enalapril 20 mg tablet Commonly known as:  VASOTEC  
TAKE 1 TABLET TWICE DAILY  
  
 etodolac 400 mg tablet Commonly known as:  LODINE Take 400 mg by mouth two (2) times daily (with meals). fiber Cap Take 2 Caps by mouth two (2) times a day. FOLIC ACID PO Take 400 mcg by mouth daily. hydroCHLOROthiazide 25 mg tablet Commonly known as:  HYDRODIURIL  
TAKE 1 TABLET EVERY DAY  
  
 metoprolol succinate 25 mg XL tablet Commonly known as:  TOPROL-XL  
TAKE 1 TABLET EVERY DAY  
  
 MULTI-VIT 55 PLUS PO Take 1 Tab by mouth daily. potassium chloride 10 mEq tablet Commonly known as:  KLOR-CON  
TAKE 1 TABLET THREE TIMES DAILY pravastatin 10 mg tablet Commonly known as:  PRAVACHOL  
TAKE 1 TABLET EVERY NIGHT  
  
 sildenafil citrate 100 mg tablet Commonly known as:  VIAGRA Take 1 Tab by mouth as needed. terazosin 10 mg capsule Commonly known as:  HYTRIN  
 TAKE 1 CAPSULE EVERY NIGHT  
  
 thioctic acid 50 mg Tab Generic drug:  Alpha Lipoic Acid Take 1 Tab by mouth daily. TYLENOL 325 mg tablet Generic drug:  acetaminophen Take  by mouth every four (4) hours as needed for Pain. XARELTO 20 mg Tab tablet Generic drug:  rivaroxaban TAKE 1 TABLET BY MOUTH DAILY WITH DINNER Introducing [facility]! Kim Howell introduces SinglePlatform patient portal. Now you can access parts of your medical record, email your doctor's office, and request medication refills online. 1. In your internet browser, go to [email or web address] 2. Click on the First Time User? Click Here link in the Sign In box. You will see the New Member Sign Up page. 3. Enter your SinglePlatform Access Code exactly as it appears below. You will not need to use this code after youve completed the sign-up process. If you do not sign up before the expiration date, you must request a new code. · SinglePlatform Access Code: [email or web address] Expires: 11/16/2017  9:56 AM 
 
4. Enter the last four digits of your Social Security Number (xxxx) and Date of Birth (mm/dd/yyyy) as indicated and click Submit. You will be taken to the next sign-up page. 5. Create a SinglePlatform ID. This will be your SinglePlatform login ID and cannot be changed, so think of one that is secure and easy to remember. 6. Create a SinglePlatform password. You can change your password at any time. 7. Enter your Password Reset Question and Answer. This can be used at a later time if you forget your password. 8. Enter your e-mail address. You will receive e-mail notification when new information is available in [address] 9. Click Sign Up. You can now view and download portions of your medical record. 10. Click the Download Summary menu link to download a portable copy of your medical information.  
 
If you have questions, please visit the Frequently Asked Questions section of the [employer]. Remember, [employer] is NOT to be used for urgent needs. For medical emergencies, dial 911. Now available from your iPhone and Android! Please provide this summary of care documentation to your next provider. Your primary care clinician is listed as Carol Ramirez. If you have any questions after today's visit, please call [phone].

## 2017-08-28 ENCOUNTER — OFFICE VISIT (OUTPATIENT)
Dept: SURGERY | Age: 77
End: 2017-08-28

## 2017-08-28 VITALS
HEIGHT: 69 IN | RESPIRATION RATE: 20 BRPM | BODY MASS INDEX: 31.47 KG/M2 | WEIGHT: 212.5 LBS | DIASTOLIC BLOOD PRESSURE: 90 MMHG | HEART RATE: 58 BPM | TEMPERATURE: 98.3 F | SYSTOLIC BLOOD PRESSURE: 140 MMHG

## 2017-08-28 DIAGNOSIS — Z79.01 CHRONIC ANTICOAGULATION: ICD-10-CM

## 2017-08-28 DIAGNOSIS — K40.91 UNILATERAL RECURRENT INGUINAL HERNIA WITHOUT OBSTRUCTION OR GANGRENE: Primary | ICD-10-CM

## 2017-08-28 NOTE — PROGRESS NOTES
Here to review plans for surgery scheduled 9/12  He has seen Dr Matt Nina, cardiologist and I have communicated with Dr Matt Nina re xarelto management  Plan is for Lap mesh repair of Left Inguinal hernia (recurrent)    Active Ambulatory Problems     Diagnosis Date Noted    DJD (degenerative joint disease) of knee 07/19/2011    ED (erectile dysfunction)     Hypertension     CAD (coronary artery disease)     Paroxysmal atrial fibrillation ([address]     JAMEY (obstructive sleep apnea) 04/25/2012    Colon polyp 05/10/2012    TIA (transient ischemic attack)     BCC (basal cell carcinoma), face 07/09/2012    S/P ICD (internal cardiac defibrillator) procedure 11/15/2013    Recurrent UTI 03/25/2014    ACP (advance care planning) 01/19/2016    Chronic anticoagulation 01/16/2017    Coronary artery disease involving native coronary artery of native heart without angina pectoris 08/24/2017     Resolved Ambulatory Problems     Diagnosis Date Noted    Kidney stone on right side 10/30/2011    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    Coronary artery disease involving native coronary artery without angina pectoris 09/28/2015     Past Medical History:   Diagnosis Date    Arthritis     CAD (coronary artery disease)     Chronic atrial fibrillation ([address]     ED (erectile dysfunction)     Hypertension     Kidney stone on right side 10/30/2011    JAMEY (obstructive sleep apnea) 4/25/2012    Skin cancer     Sun-damaged skin     TIA (transient ischemic attack)      Current Outpatient Prescriptions on File Prior to Visit   Medication Sig Dispense Refill    XARELTO 20 mg tab tablet TAKE 1 TABLET BY MOUTH DAILY WITH DINNER 30 Tab 5    amLODIPine (NORVASC) 5 mg tablet TAKE 1 TABLET EVERY DAY 90 Tab 3    metoprolol succinate (TOPROL-XL) 25 mg XL tablet TAKE 1 TABLET EVERY DAY 90 Tab 1    terazosin (HYTRIN) 10 mg capsule TAKE 1 CAPSULE EVERY NIGHT 90 Cap 1    enalapril (VASOTEC) 20 mg tablet TAKE 1 TABLET TWICE DAILY 180 Tab 1    aspirin delayed-release 81 mg tablet Take  by mouth daily.  potassium chloride (K-DUR, KLOR-CON) 10 mEq tablet TAKE 1 TABLET THREE TIMES DAILY 270 Tab 1    pravastatin (PRAVACHOL) 10 mg tablet TAKE 1 TABLET EVERY NIGHT 90 Tab 1    hydroCHLOROthiazide (HYDRODIURIL) 25 mg tablet TAKE 1 TABLET EVERY DAY 90 Tab 3    etodolac (LODINE) 400 mg tablet Take 400 mg by mouth two (2) times daily (with meals). 20 Tab 0    ARGININE, L-ARGININE, PO Take 1 tablet by mouth. L Arginine/L Citrulline 7021KS/6237LK qd      FOLIC ACID PO Take 952 mcg by mouth daily.  thioctic acid (ALPHA LIPOIC ACID) 50 mg tab Take 1 Tab by mouth daily.  fiber Cap Take 2 Caps by mouth two (2) times a day.  MULTIVITAMIN WITH MINERALS (MULTI-VIT 55 PLUS PO) Take 1 Tab by mouth daily.  tadalafil (CIALIS) 20 mg tablet Take 20 mg by mouth as needed.  sildenafil citrate (VIAGRA) 100 mg tablet Take 1 Tab by mouth as needed. 2 Tab 0    acetaminophen (TYLENOL) 325 mg tablet Take  by mouth every four (4) hours as needed for Pain. No current facility-administered medications on file prior to visit.       /90 (BP 1 Location: Left arm, BP Patient Position: At rest)  Pulse (!) 58  Temp 98.3 °F (36.8 °C)  Resp 20  Ht 5' 8.5\" (1.74 m)  Wt 212 lb 8 oz (96.4 kg)  BMI 31.84 kg/m2  ENMT: normocephalic, atraumatic   Respiratory: excursions normal and symmetrical  Cardiovascular: Regular rate and rhythm  Abdomen:  Soft, no guarding, no distension, reducible left inguinal hernia  Musculoskeletal: normal gait/station  Neuro: symmetrical  Psych: alert and oriented to person, place and time    Plan  General anesthesia for lap mesh LIH repair- recurrent hernia  Patient given written instructions to not take Xarelto after Thurs 9/7 for surgery on 9/12  Plan to have him off xarelot until Tues 9/19  He understands to be npo after midnight on day of surgery  He is scheduled for PAT on 9/5  All questions answered from him and girlfriend

## 2017-08-28 NOTE — LETTER
NOTIFICATION OF INSTRUCTIONS 
 
8/28/2017 Mr. [address] [facility] 
[address] [facility]  is to stop taking his Xarelto (blood thinner) before surgery which is scheduled on 9/12 He should not take this medication after Thursday 9/7 He will be off this medication after surgery until Tues 9/19 If there are questions or concerns please have the patient contact our office. Sincerely, Felicia Ozuna MD

## 2017-08-28 NOTE — PROGRESS NOTES
1. Have you been to the ER, urgent care clinic since your last visit? Hospitalized since your last visit? ED in [place] for his hernia    2. Have you seen or consulted any other health care providers outside of the [address] [place] [address] since your last visit? Include any pap smears or colon screening.  No

## 2017-09-05 ENCOUNTER — HOSPITAL ENCOUNTER (OUTPATIENT)
Dept: PREADMISSION TESTING | Age: 77
Discharge: HOME | End: 2017-09-05
Payer: MEDICARE

## 2017-09-05 VITALS
HEIGHT: 70 IN | SYSTOLIC BLOOD PRESSURE: 121 MMHG | HEART RATE: 60 BPM | WEIGHT: 207 LBS | TEMPERATURE: 97.9 F | BODY MASS INDEX: 29.63 KG/M2 | DIASTOLIC BLOOD PRESSURE: 73 MMHG

## 2017-09-05 LAB
ANION GAP SERPL CALC-SCNC: 7 MMOL/L (ref 5–15)
BASOPHILS # BLD: 0 K/UL (ref 0–0.1)
BASOPHILS NFR BLD: 0 % (ref 0–1)
BUN SERPL-MCNC: 16 MG/DL (ref 6–20)
BUN/CREAT SERPL: 16 (ref 12–20)
CALCIUM SERPL-MCNC: 8.6 MG/DL (ref 8.5–10.1)
CHLORIDE SERPL-SCNC: 108 MMOL/L (ref 97–108)
CO2 SERPL-SCNC: 28 MMOL/L (ref 21–32)
CREAT SERPL-MCNC: 0.98 MG/DL (ref 0.7–1.3)
EOSINOPHIL # BLD: 0.2 K/UL (ref 0–0.4)
EOSINOPHIL NFR BLD: 4 % (ref 0–7)
ERYTHROCYTE [DISTWIDTH] IN BLOOD BY AUTOMATED COUNT: 13.4 % (ref 11.5–14.5)
GLUCOSE SERPL-MCNC: 103 MG/DL (ref 65–100)
HCT VFR BLD AUTO: 41.9 % (ref 36.6–50.3)
HGB BLD-MCNC: 13.8 G/DL (ref 12.1–17)
LYMPHOCYTES # BLD: 1.3 K/UL (ref 0.8–3.5)
LYMPHOCYTES NFR BLD: 23 % (ref 12–49)
MCH RBC QN AUTO: 32.1 PG (ref 26–34)
MCHC RBC AUTO-ENTMCNC: 32.9 G/DL (ref 30–36.5)
MCV RBC AUTO: 97.4 FL (ref 80–99)
MONOCYTES # BLD: 0.5 K/UL (ref 0–1)
MONOCYTES NFR BLD: 9 % (ref 5–13)
NEUTS SEG # BLD: 3.5 K/UL (ref 1.8–8)
NEUTS SEG NFR BLD: 64 % (ref 32–75)
PLATELET # BLD AUTO: 162 K/UL (ref 150–400)
POTASSIUM SERPL-SCNC: 3.6 MMOL/L (ref 3.5–5.1)
RBC # BLD AUTO: 4.3 M/UL (ref 4.1–5.7)
SODIUM SERPL-SCNC: 143 MMOL/L (ref 136–145)
WBC # BLD AUTO: 5.5 K/UL (ref 4.1–11.1)

## 2017-09-05 PROCEDURE — 80048 BASIC METABOLIC PNL TOTAL CA: CPT | Performed by: SURGERY

## 2017-09-05 PROCEDURE — 85025 COMPLETE CBC W/AUTO DIFF WBC: CPT | Performed by: SURGERY

## 2017-09-05 RX ORDER — AMLODIPINE BESYLATE 5 MG/1
5 TABLET ORAL DAILY
COMMUNITY
End: 2018-07-05 | Stop reason: SDUPTHER

## 2017-09-11 ENCOUNTER — ANESTHESIA EVENT (OUTPATIENT)
Dept: SURGERY | Age: 77
End: 2017-09-11
Payer: MEDICARE

## 2017-09-12 ENCOUNTER — ANESTHESIA (OUTPATIENT)
Dept: SURGERY | Age: 77
End: 2017-09-12
Payer: MEDICARE

## 2017-09-12 ENCOUNTER — HOSPITAL ENCOUNTER (OUTPATIENT)
Age: 77
Setting detail: OUTPATIENT SURGERY
Discharge: HOME | End: 2017-09-12
Attending: SURGERY | Admitting: SURGERY
Payer: MEDICARE

## 2017-09-12 VITALS
HEART RATE: 60 BPM | WEIGHT: 207 LBS | HEIGHT: 70 IN | TEMPERATURE: 98.2 F | OXYGEN SATURATION: 99 % | BODY MASS INDEX: 29.63 KG/M2 | RESPIRATION RATE: 14 BRPM | DIASTOLIC BLOOD PRESSURE: 65 MMHG | SYSTOLIC BLOOD PRESSURE: 122 MMHG

## 2017-09-12 PROCEDURE — 77030032490 HC SLV COMPR SCD KNE COVD -B: Performed by: SURGERY

## 2017-09-12 PROCEDURE — 74011250636 HC RX REV CODE- 250/636: Performed by: ANESTHESIOLOGY

## 2017-09-12 PROCEDURE — C1781 MESH (IMPLANTABLE): HCPCS | Performed by: SURGERY

## 2017-09-12 PROCEDURE — 77030020782 HC GWN BAIR PAWS FLX 3M -B

## 2017-09-12 PROCEDURE — 74011250636 HC RX REV CODE- 250/636: Performed by: SURGERY

## 2017-09-12 PROCEDURE — 74011250636 HC RX REV CODE- 250/636

## 2017-09-12 PROCEDURE — 76060000033 HC ANESTHESIA 1 TO 1.5 HR: Performed by: SURGERY

## 2017-09-12 PROCEDURE — 77030011640 HC PAD GRND REM COVD -A: Performed by: SURGERY

## 2017-09-12 PROCEDURE — 74011000250 HC RX REV CODE- 250

## 2017-09-12 PROCEDURE — 77030018836 HC SOL IRR NACL ICUM -A: Performed by: SURGERY

## 2017-09-12 PROCEDURE — 74011000250 HC RX REV CODE- 250: Performed by: SURGERY

## 2017-09-12 PROCEDURE — 77030020747 HC TU INSUF ENDOSC TELE -A: Performed by: SURGERY

## 2017-09-12 PROCEDURE — 77030008684 HC TU ET CUF COVD -B: Performed by: ANESTHESIOLOGY

## 2017-09-12 PROCEDURE — 77030005538 HC CATH URETH FOL44 BARD -B: Performed by: SURGERY

## 2017-09-12 PROCEDURE — 77030037032 HC INSRT SCIS CLICKLLINE DISP STOR -B: Performed by: SURGERY

## 2017-09-12 PROCEDURE — 76210000021 HC REC RM PH II 0.5 TO 1 HR: Performed by: SURGERY

## 2017-09-12 PROCEDURE — 76010000149 HC OR TIME 1 TO 1.5 HR: Performed by: SURGERY

## 2017-09-12 PROCEDURE — 76210000006 HC OR PH I REC 0.5 TO 1 HR: Performed by: SURGERY

## 2017-09-12 PROCEDURE — 77030031139 HC SUT VCRL2 J&J -A: Performed by: SURGERY

## 2017-09-12 PROCEDURE — 77030002933 HC SUT MCRYL J&J -A: Performed by: SURGERY

## 2017-09-12 PROCEDURE — C1727 CATH, BAL TIS DIS, NON-VAS: HCPCS | Performed by: SURGERY

## 2017-09-12 PROCEDURE — 77030010507 HC ADH SKN DERMBND J&J -B: Performed by: SURGERY

## 2017-09-12 PROCEDURE — 77030026438 HC STYL ET INTUB CARD -A: Performed by: ANESTHESIOLOGY

## 2017-09-12 PROCEDURE — 77030035048 HC TRCR ENDOSC OPTCL COVD -B: Performed by: SURGERY

## 2017-09-12 PROCEDURE — 77030025927 HC STPLR FIX SECURSTRP J&J -F: Performed by: SURGERY

## 2017-09-12 DEVICE — MESH SURG W8X15CM DIA5MM POLYPR RECT FLAT FOR SFT TISS REP: Type: IMPLANTABLE DEVICE | Site: GROIN | Status: FUNCTIONAL

## 2017-09-12 RX ORDER — PROPOFOL 10 MG/ML
INJECTION, EMULSION INTRAVENOUS AS NEEDED
Status: DISCONTINUED | OUTPATIENT
Start: 2017-09-12 | End: 2017-09-12 | Stop reason: HOSPADM

## 2017-09-12 RX ORDER — SODIUM CHLORIDE 0.9 % (FLUSH) 0.9 %
5-10 SYRINGE (ML) INJECTION EVERY 8 HOURS
Status: DISCONTINUED | OUTPATIENT
Start: 2017-09-12 | End: 2017-09-12 | Stop reason: HOSPADM

## 2017-09-12 RX ORDER — MIDAZOLAM HYDROCHLORIDE 1 MG/ML
INJECTION, SOLUTION INTRAMUSCULAR; INTRAVENOUS AS NEEDED
Status: DISCONTINUED | OUTPATIENT
Start: 2017-09-12 | End: 2017-09-12 | Stop reason: HOSPADM

## 2017-09-12 RX ORDER — FENTANYL CITRATE 50 UG/ML
25 INJECTION, SOLUTION INTRAMUSCULAR; INTRAVENOUS
Status: DISCONTINUED | OUTPATIENT
Start: 2017-09-12 | End: 2017-09-12 | Stop reason: HOSPADM

## 2017-09-12 RX ORDER — MIDAZOLAM HYDROCHLORIDE 1 MG/ML
0.5 INJECTION, SOLUTION INTRAMUSCULAR; INTRAVENOUS
Status: DISCONTINUED | OUTPATIENT
Start: 2017-09-12 | End: 2017-09-12 | Stop reason: HOSPADM

## 2017-09-12 RX ORDER — FENTANYL CITRATE 50 UG/ML
50 INJECTION, SOLUTION INTRAMUSCULAR; INTRAVENOUS AS NEEDED
Status: DISCONTINUED | OUTPATIENT
Start: 2017-09-12 | End: 2017-09-12 | Stop reason: HOSPADM

## 2017-09-12 RX ORDER — ACETAMINOPHEN 10 MG/ML
INJECTION, SOLUTION INTRAVENOUS AS NEEDED
Status: DISCONTINUED | OUTPATIENT
Start: 2017-09-12 | End: 2017-09-12 | Stop reason: HOSPADM

## 2017-09-12 RX ORDER — SODIUM CHLORIDE, SODIUM LACTATE, POTASSIUM CHLORIDE, CALCIUM CHLORIDE 600; 310; 30; 20 MG/100ML; MG/100ML; MG/100ML; MG/100ML
100 INJECTION, SOLUTION INTRAVENOUS CONTINUOUS
Status: DISCONTINUED | OUTPATIENT
Start: 2017-09-12 | End: 2017-09-12 | Stop reason: HOSPADM

## 2017-09-12 RX ORDER — MIDAZOLAM HYDROCHLORIDE 1 MG/ML
1 INJECTION, SOLUTION INTRAMUSCULAR; INTRAVENOUS AS NEEDED
Status: DISCONTINUED | OUTPATIENT
Start: 2017-09-12 | End: 2017-09-12 | Stop reason: HOSPADM

## 2017-09-12 RX ORDER — ROPIVACAINE HYDROCHLORIDE 5 MG/ML
150 INJECTION, SOLUTION EPIDURAL; INFILTRATION; PERINEURAL AS NEEDED
Status: DISCONTINUED | OUTPATIENT
Start: 2017-09-12 | End: 2017-09-12 | Stop reason: HOSPADM

## 2017-09-12 RX ORDER — DEXAMETHASONE SODIUM PHOSPHATE 4 MG/ML
INJECTION, SOLUTION INTRA-ARTICULAR; INTRALESIONAL; INTRAMUSCULAR; INTRAVENOUS; SOFT TISSUE AS NEEDED
Status: DISCONTINUED | OUTPATIENT
Start: 2017-09-12 | End: 2017-09-12 | Stop reason: HOSPADM

## 2017-09-12 RX ORDER — LIDOCAINE HYDROCHLORIDE 10 MG/ML
0.1 INJECTION, SOLUTION EPIDURAL; INFILTRATION; INTRACAUDAL; PERINEURAL AS NEEDED
Status: DISCONTINUED | OUTPATIENT
Start: 2017-09-12 | End: 2017-09-12 | Stop reason: HOSPADM

## 2017-09-12 RX ORDER — HYDROMORPHONE HYDROCHLORIDE 2 MG/1
2 TABLET ORAL
Qty: 50 TAB | Refills: 0 | Status: SHIPPED | OUTPATIENT
Start: 2017-09-12 | End: 2017-12-18

## 2017-09-12 RX ORDER — SUCCINYLCHOLINE CHLORIDE 20 MG/ML
INJECTION INTRAMUSCULAR; INTRAVENOUS AS NEEDED
Status: DISCONTINUED | OUTPATIENT
Start: 2017-09-12 | End: 2017-09-12 | Stop reason: HOSPADM

## 2017-09-12 RX ORDER — KETOROLAC TROMETHAMINE 30 MG/ML
INJECTION, SOLUTION INTRAMUSCULAR; INTRAVENOUS AS NEEDED
Status: DISCONTINUED | OUTPATIENT
Start: 2017-09-12 | End: 2017-09-12 | Stop reason: HOSPADM

## 2017-09-12 RX ORDER — ROCURONIUM BROMIDE 10 MG/ML
INJECTION, SOLUTION INTRAVENOUS AS NEEDED
Status: DISCONTINUED | OUTPATIENT
Start: 2017-09-12 | End: 2017-09-12 | Stop reason: HOSPADM

## 2017-09-12 RX ORDER — ONDANSETRON 4 MG/1
4 TABLET, ORALLY DISINTEGRATING ORAL
Qty: 30 TAB | Refills: 2 | Status: SHIPPED | OUTPATIENT
Start: 2017-09-12 | End: 2018-05-11

## 2017-09-12 RX ORDER — FENTANYL CITRATE 50 UG/ML
INJECTION, SOLUTION INTRAMUSCULAR; INTRAVENOUS AS NEEDED
Status: DISCONTINUED | OUTPATIENT
Start: 2017-09-12 | End: 2017-09-12 | Stop reason: HOSPADM

## 2017-09-12 RX ORDER — BUPIVACAINE HYDROCHLORIDE AND EPINEPHRINE 2.5; 5 MG/ML; UG/ML
INJECTION, SOLUTION EPIDURAL; INFILTRATION; INTRACAUDAL; PERINEURAL AS NEEDED
Status: DISCONTINUED | OUTPATIENT
Start: 2017-09-12 | End: 2017-09-12 | Stop reason: HOSPADM

## 2017-09-12 RX ORDER — CEFAZOLIN SODIUM IN 0.9 % NACL 2 G/50 ML
2 INTRAVENOUS SOLUTION, PIGGYBACK (ML) INTRAVENOUS
Status: COMPLETED | OUTPATIENT
Start: 2017-09-12 | End: 2017-09-12

## 2017-09-12 RX ORDER — DIPHENHYDRAMINE HYDROCHLORIDE 50 MG/ML
12.5 INJECTION, SOLUTION INTRAMUSCULAR; INTRAVENOUS AS NEEDED
Status: DISCONTINUED | OUTPATIENT
Start: 2017-09-12 | End: 2017-09-12 | Stop reason: HOSPADM

## 2017-09-12 RX ORDER — LIDOCAINE HYDROCHLORIDE 20 MG/ML
INJECTION, SOLUTION EPIDURAL; INFILTRATION; INTRACAUDAL; PERINEURAL AS NEEDED
Status: DISCONTINUED | OUTPATIENT
Start: 2017-09-12 | End: 2017-09-12 | Stop reason: HOSPADM

## 2017-09-12 RX ORDER — HYDROMORPHONE HYDROCHLORIDE 1 MG/ML
0.2 INJECTION, SOLUTION INTRAMUSCULAR; INTRAVENOUS; SUBCUTANEOUS
Status: DISCONTINUED | OUTPATIENT
Start: 2017-09-12 | End: 2017-09-12 | Stop reason: HOSPADM

## 2017-09-12 RX ORDER — SODIUM CHLORIDE 0.9 % (FLUSH) 0.9 %
5-10 SYRINGE (ML) INJECTION AS NEEDED
Status: DISCONTINUED | OUTPATIENT
Start: 2017-09-12 | End: 2017-09-12 | Stop reason: HOSPADM

## 2017-09-12 RX ORDER — ONDANSETRON 2 MG/ML
INJECTION INTRAMUSCULAR; INTRAVENOUS AS NEEDED
Status: DISCONTINUED | OUTPATIENT
Start: 2017-09-12 | End: 2017-09-12 | Stop reason: HOSPADM

## 2017-09-12 RX ADMIN — ACETAMINOPHEN 1000 MG: 10 INJECTION, SOLUTION INTRAVENOUS at 13:48

## 2017-09-12 RX ADMIN — ROCURONIUM BROMIDE 45 MG: 10 INJECTION, SOLUTION INTRAVENOUS at 13:19

## 2017-09-12 RX ADMIN — FENTANYL CITRATE 50 MCG: 50 INJECTION, SOLUTION INTRAMUSCULAR; INTRAVENOUS at 14:18

## 2017-09-12 RX ADMIN — SUCCINYLCHOLINE CHLORIDE 160 MG: 20 INJECTION INTRAMUSCULAR; INTRAVENOUS at 13:07

## 2017-09-12 RX ADMIN — SODIUM CHLORIDE, SODIUM LACTATE, POTASSIUM CHLORIDE, AND CALCIUM CHLORIDE: 600; 310; 30; 20 INJECTION, SOLUTION INTRAVENOUS at 14:18

## 2017-09-12 RX ADMIN — ROCURONIUM BROMIDE 5 MG: 10 INJECTION, SOLUTION INTRAVENOUS at 13:07

## 2017-09-12 RX ADMIN — DEXAMETHASONE SODIUM PHOSPHATE 8 MG: 4 INJECTION, SOLUTION INTRA-ARTICULAR; INTRALESIONAL; INTRAMUSCULAR; INTRAVENOUS; SOFT TISSUE at 13:26

## 2017-09-12 RX ADMIN — FENTANYL CITRATE 25 MCG: 50 INJECTION, SOLUTION INTRAMUSCULAR; INTRAVENOUS at 14:33

## 2017-09-12 RX ADMIN — CEFAZOLIN 2 G: 1 INJECTION, POWDER, FOR SOLUTION INTRAMUSCULAR; INTRAVENOUS; PARENTERAL at 13:09

## 2017-09-12 RX ADMIN — FENTANYL CITRATE 50 MCG: 50 INJECTION, SOLUTION INTRAMUSCULAR; INTRAVENOUS at 14:19

## 2017-09-12 RX ADMIN — PROPOFOL 150 MG: 10 INJECTION, EMULSION INTRAVENOUS at 13:07

## 2017-09-12 RX ADMIN — LIDOCAINE HYDROCHLORIDE 100 MG: 20 INJECTION, SOLUTION EPIDURAL; INFILTRATION; INTRACAUDAL; PERINEURAL at 13:07

## 2017-09-12 RX ADMIN — ONDANSETRON 4 MG: 2 INJECTION INTRAMUSCULAR; INTRAVENOUS at 13:57

## 2017-09-12 RX ADMIN — SODIUM CHLORIDE, SODIUM LACTATE, POTASSIUM CHLORIDE, AND CALCIUM CHLORIDE 100 ML/HR: 600; 310; 30; 20 INJECTION, SOLUTION INTRAVENOUS at 11:55

## 2017-09-12 RX ADMIN — KETOROLAC TROMETHAMINE 15 MG: 30 INJECTION, SOLUTION INTRAMUSCULAR; INTRAVENOUS at 14:19

## 2017-09-12 RX ADMIN — MIDAZOLAM HYDROCHLORIDE 1 MG: 1 INJECTION, SOLUTION INTRAMUSCULAR; INTRAVENOUS at 12:58

## 2017-09-12 RX ADMIN — FENTANYL CITRATE 50 MCG: 50 INJECTION, SOLUTION INTRAMUSCULAR; INTRAVENOUS at 13:07

## 2017-09-12 RX ADMIN — FENTANYL CITRATE 25 MCG: 50 INJECTION, SOLUTION INTRAMUSCULAR; INTRAVENOUS at 14:40

## 2017-09-12 RX ADMIN — MEPERIDINE HYDROCHLORIDE 12.5 MG: 25 INJECTION INTRAMUSCULAR; INTRAVENOUS; SUBCUTANEOUS at 14:33

## 2017-09-12 NOTE — DISCHARGE INSTRUCTIONS
Inguinal Hernia Repair: What to Expect at Home  Your Recovery    After surgery to repair a hernia, you're likely to have pain for a few days. You may also feel like you have the flu. And you may have a low fever and feel tired and nauseated. This is common. You should start to feel better after a few days. And you'll probably feel much better in 7 days. For a few weeks you may feel twinges or pulling in the groin area when you move. Men may have some bruising on the scrotum and along the penis. This is normal.  This care sheet gives you a general idea about how long it will take for you to recover. But each person recovers at a different pace. Follow the steps below to get better as quickly as possible. How can you care for yourself at home? Activity  · Rest when you feel tired. · You may shower 24 to 48 hours after surgery, if your doctor okays it. Pat the incision dry. Do not take a bath for the first 2 weeks, or until your doctor tells you it is okay. · Allow the area to heal. Don't move quickly or lift anything heavy until you are feeling better. · Be active. Walking is a good choice. · You most likely can return to light activity after 1 to 3 weeks, depending on the type of surgery you had. Diet  · You can eat your normal diet. If your stomach is upset, try bland, low-fat foods like plain rice, broiled chicken, toast, and yogurt. · If your bowel movements are not regular right after surgery, try to avoid constipation and straining. Drink plenty of water. Your doctor may suggest fiber, a stool softener, or a mild laxative. Medicines  · Be safe with medicines. Read and follow all instructions on the label. ¨ If the doctor gave you a prescription medicine for pain, take it as prescribed. ¨ If you are not taking a prescription pain medicine, ask your doctor if you can take an over-the-counter medicine. · Your doctor will tell you if and when you can restart your medicines.  He or she will also give you instructions about taking any new medicines. Incision care  · You will have a dressing over the cut (incision). A dressing helps the cut heal and protects it. Your doctor will tell you how to take care of this. · If you have skin adhesive on the cut, leave it on until it falls off. Skin adhesive is also called liquid stitches or glue. · If you have strips of tape on the cut, leave the tape on for a week or until it falls off. · If you had stitches, your doctor will tell you when to come back to have them removed. · Wash the area daily with warm, soapy water, and pat it dry. Don't use hydrogen peroxide or alcohol. They can slow healing. Ice  · Put ice or a cold pack on the area for 10 to 20 minutes at a time. Try to do this every 1 to 2 hours for the next 3 days (when you are awake) or until the swelling goes down. Put a thin cloth between the ice and your skin. Follow-up care is a key part of your treatment and safety. Be sure to make and go to all appointments, and call your doctor if you are having problems. It's also a good idea to know your test results and keep a list of the medicines you take.  ______________________________________________________________________    Anesthesia Discharge Instructions    After general anesthesia or intervenous sedation, for 24 hours or while taking prescription Narcotics:  · Limit your activities  · Do not drive or operate hazardous machinery  · If you have not urinated within 8 hours after discharge, please contact your surgeon on call.   · Do not make important personal or business decisions  · Do not drink alcoholic beverages    Report the following to your surgeon:  · Excessive pain, swelling, redness or odor of or around the surgical area  · Temperature over 100.5 degrees  · Nausea and vomiting lasting longer than 4 hours or if unable to take medication  · Any signs of decreased circulation or nerve impairment to extremity:  Change in color, persistent numbness, tingling, coldness or increased pain.   · Any questions

## 2017-09-12 NOTE — ANESTHESIA PREPROCEDURE EVALUATION
Anesthetic History   No history of anesthetic complications            Review of Systems / Medical History  Patient summary reviewed, nursing notes reviewed and pertinent labs reviewed    Pulmonary  Within defined limits      Sleep apnea           Neuro/Psych   Within defined limits    CVA  TIA     Cardiovascular  Within defined limits  Hypertension        Dysrhythmias   CAD         GI/Hepatic/Renal  Within defined limits              Endo/Other  Within defined limits      Arthritis     Other Findings              Physical Exam    Airway  Mallampati: II  TM Distance: > 6 cm  Neck ROM: normal range of motion   Mouth opening: Normal     Cardiovascular  Regular rate and rhythm,  S1 and S2 normal,  no murmur, click, rub, or gallop             Dental  No notable dental hx       Pulmonary  Breath sounds clear to auscultation               Abdominal  GI exam deferred       Other Findings            Anesthetic Plan    ASA: 3  Anesthesia type: general          Induction: Intravenous  Anesthetic plan and risks discussed with: Patient

## 2017-09-12 NOTE — IP AVS SNAPSHOT
[address] [facility] Shalom Shepard  
[phone] Patient: Parul Castano MRN: [id number] KIW:[birth year] You are allergic to the following Allergen Reactions Pcn (Penicillins) Unknown (comments) As a child Percocet (Oxycodone-Acetaminophen) Swelling Leg swelling Recent Documentation Height Weight BMI Smoking Status 1.778 m 93.9 kg 29.7 kg/m2 Never Smoker Emergency Contacts Name Discharge Info Relation Home Work Mobile [id number] Desmond Lopez CAREGIVER [3] Life Partner [7] [phone] About your hospitalization You were admitted on:  September 12, 2017 You last received care in the:  [facility] PACU You were discharged on:  September 12, 2017 Unit phone number:  [phone] Why you were hospitalized Your primary diagnosis was:  Not on File Providers Seen During Your Hospitalizations Provider Role Specialty Primary office phone Sarah Burleson MD Attending Provider Surgery [phone] Your Primary Care Physician (PCP) Primary Care Physician Office Phone Office Fax Giselle Gallardo [phone] Follow-up Information Follow up With Details Comments Contact Info Smooth Ovalles MD   [place] [address] [address] [facility] Internal Medicine [place] [address] 
[phone] Your Appointments Monday September 25, 2017 10:30 AM EDT  
POST OP 10 MIN with MD Robinson [phone] ([address] [place] [address] Road) [address] [place] [id number] [phone] Current Discharge Medication List  
  
START taking these medications Dose & Instructions Dispensing Information Comments Morning Noon Evening Bedtime HYDROmorphone 2 mg tablet Commonly known as:  DILAUDID Your last dose was: Your next dose is: Dose:  2 mg Take 1 Tab by mouth every six (6) hours as needed for Pain. Max Daily Amount: 8 mg. Quantity:  50 Tab Refills:  0  
     
   
   
   
  
 ondansetron 4 mg disintegrating tablet Commonly known as:  ZOFRAN ODT Your last dose was: Your next dose is:    
   
   
 Dose:  4 mg Take 1 Tab by mouth every eight (8) hours as needed for Nausea. Quantity:  30 Tab Refills:  2 CONTINUE these medications which have NOT CHANGED Dose & Instructions Dispensing Information Comments Morning Noon Evening Bedtime  
 amLODIPine 5 mg tablet Commonly known as:  Marcos Leonardo Your last dose was: Your next dose is:    
   
   
 Dose:  5 mg Take 5 mg by mouth daily. Refills:  0  
     
   
   
   
  
 ARGININE (L-ARGININE) PO Your last dose was: Your next dose is:    
   
   
 Dose:  1 Tab Take 1 Tab by mouth two (2) times a day. L Arginine/L Citrulline 4000mg/1000mg qd Refills:  0  
     
   
   
   
  
 aspirin delayed-release 81 mg tablet Your last dose was: Your next dose is: Take  by mouth daily. Refills:  0 CIALIS 20 mg tablet Generic drug:  tadalafil Your last dose was: Your next dose is:    
   
   
 Dose:  20 mg Take 20 mg by mouth as needed. Refills:  0  
     
   
   
   
  
 enalapril 20 mg tablet Commonly known as:  Cara Cuenca Your last dose was: Your next dose is: TAKE 1 TABLET TWICE DAILY Quantity:  180 Tab Refills:  1  
     
   
   
   
  
 fiber Cap Your last dose was: Your next dose is:    
   
   
 Dose:  2 Cap Take 2 Caps by mouth two (2) times a day. Refills:  0  
     
   
   
   
  
 FOLIC ACID PO Your last dose was: Your next dose is:    
   
   
 Dose:  400 mcg Take 400 mcg by mouth daily. Refills:  0  
     
   
   
   
  
 hydroCHLOROthiazide 25 mg tablet Commonly known as:  HYDRODIURIL Your last dose was: Your next dose is: TAKE 1 TABLET EVERY DAY Quantity:  90 Tab Refills:  3  
     
   
   
   
  
 metoprolol succinate 25 mg XL tablet Commonly known as:  TOPROL-XL Your last dose was: Your next dose is: TAKE 1 TABLET EVERY DAY Quantity:  90 Tab Refills:  1 MULTI-VIT 55 PLUS PO Your last dose was: Your next dose is:    
   
   
 Dose:  1 Tab Take 1 Tab by mouth daily. Refills:  0  
     
   
   
   
  
 potassium chloride 10 mEq tablet Commonly known as:  KLOR-CON Your last dose was: Your next dose is: TAKE 1 TABLET THREE TIMES DAILY Quantity:  270 Tab Refills:  1  
     
   
   
   
  
 pravastatin 10 mg tablet Commonly known as:  PRAVACHOL Your last dose was: Your next dose is: TAKE 1 TABLET EVERY NIGHT Quantity:  90 Tab Refills:  1  
     
   
   
   
  
 terazosin 10 mg capsule Commonly known as:  HYTRIN Your last dose was: Your next dose is: TAKE 1 CAPSULE EVERY NIGHT Quantity:  90 Cap Refills:  1  
     
   
   
   
  
 thioctic acid 50 mg Tab Generic drug:  Alpha Lipoic Acid Your last dose was: Your next dose is:    
   
   
 Dose:  1 Tab Take 1 Tab by mouth daily. Refills:  0  
     
   
   
   
  
 TYLENOL 325 mg tablet Generic drug:  acetaminophen Your last dose was: Your next dose is: Take  by mouth every four (4) hours as needed for Pain. Refills:  0 XARELTO 20 mg Tab tablet Generic drug:  rivaroxaban Your last dose was: Your next dose is: TAKE 1 TABLET BY MOUTH DAILY WITH DINNER Quantity:  30 Tab Refills:  5 Where to Get Your Medications Information on where to get these meds will be given to you by the nurse or doctor. ! Ask your nurse or doctor about these medications HYDROmorphone 2 mg tablet  
 ondansetron 4 mg disintegrating tablet Discharge Instructions Inguinal Hernia Repair: What to Expect at Home Your Recovery After surgery to repair a hernia, you're likely to have pain for a few days. You may also feel like you have the flu. And you may have a low fever and feel tired and nauseated. This is common. You should start to feel better after a few days. And you'll probably feel much better in 7 days. For a few weeks you may feel twinges or pulling in the groin area when you move. Men may have some bruising on the scrotum and along the penis. This is normal. 
This care sheet gives you a general idea about how long it will take for you to recover. But each person recovers at a different pace. Follow the steps below to get better as quickly as possible. How can you care for yourself at home? Activity · Rest when you feel tired. · You may shower 24 to 48 hours after surgery, if your doctor okays it. Pat the incision dry. Do not take a bath for the first 2 weeks, or until your doctor tells you it is okay. · Allow the area to heal. Don't move quickly or lift anything heavy until you are feeling better. · Be active. Walking is a good choice. · You most likely can return to light activity after 1 to 3 weeks, depending on the type of surgery you had. Diet · You can eat your normal diet. If your stomach is upset, try bland, low-fat foods like plain rice, broiled chicken, toast, and yogurt. · If your bowel movements are not regular right after surgery, try to avoid constipation and straining. Drink plenty of water. Your doctor may suggest fiber, a stool softener, or a mild laxative. Medicines · Be safe with medicines. Read and follow all instructions on the label. ¨ If the doctor gave you a prescription medicine for pain, take it as prescribed. ¨ If you are not taking a prescription pain medicine, ask your doctor if you can take an over-the-counter medicine. · Your doctor will tell you if and when you can restart your medicines. He or she will also give you instructions about taking any new medicines. Incision care · You will have a dressing over the cut (incision). A dressing helps the cut heal and protects it. Your doctor will tell you how to take care of this. · If you have skin adhesive on the cut, leave it on until it falls off. Skin adhesive is also called liquid stitches or glue. · If you have strips of tape on the cut, leave the tape on for a week or until it falls off. · If you had stitches, your doctor will tell you when to come back to have them removed. · Wash the area daily with warm, soapy water, and pat it dry. Don't use hydrogen peroxide or alcohol. They can slow healing. Ice · Put ice or a cold pack on the area for 10 to 20 minutes at a time. Try to do this every 1 to 2 hours for the next 3 days (when you are awake) or until the swelling goes down. Put a thin cloth between the ice and your skin. Follow-up care is a key part of your treatment and safety. Be sure to make and go to all appointments, and call your doctor if you are having problems. It's also a good idea to know your test results and keep a list of the medicines you take. 
______________________________________________________________________ Anesthesia Discharge Instructions After general anesthesia or intervenous sedation, for 24 hours or while taking prescription Narcotics: · Limit your activities · Do not drive or operate hazardous machinery · If you have not urinated within 8 hours after discharge, please contact your surgeon on call. · Do not make important personal or business decisions · Do not drink alcoholic beverages Report the following to your surgeon: · Excessive pain, swelling, redness or odor of or around the surgical area · Temperature over 100.5 degrees · Nausea and vomiting lasting longer than 4 hours or if unable to take medication · Any signs of decreased circulation or nerve impairment to extremity:  Change in color, persistent numbness, tingling, coldness or increased pain. · Any questions Discharge Instructions Attachments/References HYDROMORPHONE (BY MOUTH) (ENGLISH) MEFS - ONDANSETRON (ZOFRAN, ZOFRAN ODT, ZUPLENZ) - (BY MOUTH, INTO THE MOUTH) (ENGLISH) Discharge Orders None Introducing [facility]! Steffany Orona introduces [employer] patient portal. Now you can access parts of your medical record, email your doctor's office, and request medication refills online. 1. In your internet browser, go to [email or web address] 2. Click on the First Time User? Click Here link in the Sign In box. You will see the New Member Sign Up page. 3. Enter your [employer] Access Code exactly as it appears below. You will not need to use this code after youve completed the sign-up process. If you do not sign up before the expiration date, you must request a new code. · [employer] Access Code: [email or web address] Expires: 11/16/2017  9:56 AM 
 
4. Enter the last four digits of your Social Security Number (xxxx) and Date of Birth (mm/dd/yyyy) as indicated and click Submit. You will be taken to the next sign-up page. 5. Create a [employer] ID. This will be your [employer] login ID and cannot be changed, so think of one that is secure and easy to remember. 6. Create a [employer] password. You can change your password at any time. 7. Enter your Password Reset Question and Answer. This can be used at a later time if you forget your password. 8. Enter your e-mail address. You will receive e-mail notification when new information is available in [address]. 9. Click Sign Up.  You can now view and download portions of your medical record. 10. Click the Download Summary menu link to download a portable copy of your medical information. If you have questions, please visit the Frequently Asked Questions section of the [employer] website. Remember, [employer] is NOT to be used for urgent needs. For medical emergencies, dial 911. Now available from your iPhone and Android! General Information Please provide this summary of care documentation to your next provider. Patient Signature:  ____________________________________________________________ Date:  ____________________________________________________________  
  
Marcos Finger Provider Signature:  ____________________________________________________________ Date:  ____________________________________________________________ More Information Hydromorphone (By mouth) Hydromorphone ([id number]-[id number]-KEU-fone) Treats moderate to severe pain. This medicine is a narcotic pain reliever. Brand Name(s): Dilaudid, Exalgo There may be other brand names for this medicine. When This Medicine Should Not Be Used: This medicine is not right for everyone. Do not use it if you had an allergic reaction to hydromorphone or sulfites, or if you have severe lung or breathing problems, or stomach or bowel blockage (including paralytic ileus). How to Use This Medicine:  
Long Acting Capsule, Liquid, Tablet, Long Acting Tablet · Take your medicine as directed. Your dose may need to be changed several times to find what works best for you. An overdose can be dangerous. Follow directions carefully so you do not get too much medicine at one time. · Oral liquid: Measure the oral liquid medicine with a marked measuring spoon, oral syringe, or medicine cup. If you get any of the oral liquid on your skin, rinse it with cool water right away. · Swallow the extended-release capsule whole. Do not crush, break, or chew it. · Swallow the extended-release tablet whole. Do not crush, break, or chew it. · If you take the extended-release tablet, part of the tablet may pass into your stools. This is normal and is nothing to worry about. · Hydromorphone extended-release capsules or tablets work differently than regular hydromorphone tablets, even at the same dose. Do not switch from one form to another unless your doctor tells you to. · This medicine should come with a Medication Guide. Ask your pharmacist for a copy if you do not have one. · Missed dose:  
¨ Extended-release capsules or tablets: If you miss a dose, skip the missed dose and take your next dose at the usual time the next day. Do not double doses. ¨ Oral liquid: Take a dose as soon as you remember. If it is almost time for your next dose, wait until then and take a regular dose. Do not take extra medicine to make up for a missed dose. · Store the medicine in a closed container at room temperature, away from heat, moisture, and direct light. Store the medicine in a safe and secure place. Do not throw unused medicine in the trash. Ask your pharmacist about the best way to dispose of medicine you do not use. Drugs and Foods to Avoid: Ask your doctor or pharmacist before using any other medicine, including over-the-counter medicines, vitamins, and herbal products. · Do not use this medicine if you are using or have used an MAO inhibitor within the past 14 days. · Some medicines can affect how hydromorphone works. Tell your doctor if you are using any of the following: ¨ Blood pressure medicine ¨ Diuretic (water pill) ¨ Medicine to treat depression ¨ Phenothiazine medicine · Do not drink alcohol while you are using this medicine. · Tell your doctor if you use anything else that makes you sleepy. Some examples are allergy medicine, narcotic pain medicine, and alcohol.  Tell your doctor if you are also using buprenorphine, butorphanol, nalbuphine, pentazocine, or a muscle relaxer. Warnings While Using This Medicine: · Tell your doctor if you are pregnant or breastfeeding, or if you have kidney disease, liver disease, lung disease or breathing problems (such as asthma or COPD), an underactive thyroid, adrenal problems, cystic fibrosis, pancreas problems, gallbladder problems, an enlarged prostate, trouble urinating, or stomach or bowel problems. Tell your doctor if you have a history of head injury, brain tumor, seizures, depression, or alcohol or drug abuse. · This medicine may cause the following problems: 
¨ High risk of overdose, which can lead to death ¨ Respiratory depression (serious breathing problem that can be life-threatening) ¨ Serotonin syndrome, when used with certain medicines · This medicine can be habit-forming. Do not use more than your prescribed dose. Call your doctor if you think your medicine is not working. · Do not stop using this medicine suddenly. Your doctor will need to slowly decrease your dose before you stop it completely. · This medicine may make you dizzy, drowsy, or lightheaded. Do not drive or do anything else that could be dangerous until you know how this medicine affects you. Sit or lie down if you feel dizzy. Stand up carefully. · This medicine could cause infertility. Talk with your doctor before using this medicine if you plan to have children. · This medicine may cause constipation, especially with long-term use. Ask your doctor if you should use a laxative to prevent and treat constipation. · Keep all medicine out of the reach of children. Never share your medicine with anyone. Possible Side Effects While Using This Medicine:  
Call your doctor right away if you notice any of these side effects: · Allergic reaction: Itching or hives, swelling in your face or hands, swelling or tingling in your mouth or throat, chest tightness, trouble breathing · Anxiety, restlessness, fast heartbeat, fever, sweating, muscle spasms, twitching, nausea, vomiting, diarrhea, seeing or hearing things that are not there · Blue lips, fingernails, or skin · Extreme dizziness or weakness, shallow breathing, slow or uneven heartbeat, sweating, cold or clammy skin, seizures · Severe confusion, lightheadedness, dizziness, fainting · Severe constipation, stomach pain, or vomiting · Trouble breathing or slow breathing If you notice these less serious side effects, talk with your doctor: · Mild constipation, nausea, or vomiting · Mild sleepiness or tiredness If you notice other side effects that you think are caused by this medicine, tell your doctor. Call your doctor for medical advice about side effects. You may report side effects to FDA at 5-087-ILF-0790 © 2017 [employer] Information is for End User's use only and may not be sold, redistributed or otherwise used for commercial purposes. The above information is an  only. It is not intended as medical advice for individual conditions or treatments. Talk to your doctor, nurse or pharmacist before following any medical regimen to see if it is safe and effective for you. Ondansetron (Zofran, Zofran ODT, Zuplenz) - (By mouth, Into the mouth) Why this medicine is used:  
Prevents nausea and vomiting. Contact a nurse or doctor right away if you have: 
· Fast, pounding, or uneven heartbeat · Lightheadedness or fainting · Trouble breathing Common side effects: 
· Headache, tiredness · Constipation, diarrhea © 2017 [employer] Information is for End User's use only and may not be sold, redistributed or otherwise used for commercial purposes.

## 2017-09-12 NOTE — INTERVAL H&P NOTE
H&P Update:  Neita Spatz was seen and examined. Confirms he stopped anti-coag Rx last Thurs. History and physical has been reviewed. The patient has been examined.  There have been no significant clinical changes since the completion of the originally dated History and Physical.    Signed By: Zan Thomas MD     September 12, 2017 11:56 AM

## 2017-09-12 NOTE — ANESTHESIA POSTPROCEDURE EVALUATION
Post-Anesthesia Evaluation and Assessment    Patient: Lang Horne MRN: [id number]  SSN: [id number]    YOB: 1940  Age: 65 y.o. Sex: male       Cardiovascular Function/Vital Signs  Visit Vitals    /62    Pulse 72    Temp 36.8 °C (98.2 °F)    Resp 16    Ht 5' 10\" (1.778 m)    Wt 93.9 kg (207 lb)    SpO2 96%    BMI 29.7 kg/m2       Patient is status post general anesthesia for Procedure(s):  LEFT LAPOROSCOPIC INGUINAL HERNIA REPAIR WITH MESH. Nausea/Vomiting: None    Postoperative hydration reviewed and adequate. Pain:  Pain Scale 1: Adult Nonverbal Pain Scale (09/12/17 1424)  Pain Intensity 1: 0 (09/12/17 1424)   Managed    Neurological Status:   Neuro (WDL): Within Defined Limits (09/12/17 1424)   At baseline    Mental Status and Level of Consciousness: Arousable    Pulmonary Status:   O2 Device: CO2 nasal cannula (09/12/17 1424)   Adequate oxygenation and airway patent    Complications related to anesthesia: None    Post-anesthesia assessment completed.  No concerns    Signed By: Angela Patten MD     September 12, 2017

## 2017-09-12 NOTE — H&P (VIEW-ONLY)
Here to review plans for surgery scheduled 9/12  He has seen Dr Heather Daly, cardiologist and I have communicated with Dr Heather Daly re xarelto management  Plan is for Lap mesh repair of Left Inguinal hernia (recurrent)    Active Ambulatory Problems     Diagnosis Date Noted    DJD (degenerative joint disease) of knee 07/19/2011    ED (erectile dysfunction)     Hypertension     CAD (coronary artery disease)     Paroxysmal atrial fibrillation ([address]     JAMEY (obstructive sleep apnea) 04/25/2012    Colon polyp 05/10/2012    TIA (transient ischemic attack)     BCC (basal cell carcinoma), face 07/09/2012    S/P ICD (internal cardiac defibrillator) procedure 11/15/2013    Recurrent UTI 03/25/2014    ACP (advance care planning) 01/19/2016    Chronic anticoagulation 01/16/2017    Coronary artery disease involving native coronary artery of native heart without angina pectoris 08/24/2017     Resolved Ambulatory Problems     Diagnosis Date Noted    Kidney stone on right side 10/30/2011    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    Coronary artery disease involving native coronary artery without angina pectoris 09/28/2015     Past Medical History:   Diagnosis Date    Arthritis     CAD (coronary artery disease)     Chronic atrial fibrillation ([address]     ED (erectile dysfunction)     Hypertension     Kidney stone on right side 10/30/2011    JAMEY (obstructive sleep apnea) 4/25/2012    Skin cancer     Sun-damaged skin     TIA (transient ischemic attack)      Current Outpatient Prescriptions on File Prior to Visit   Medication Sig Dispense Refill    XARELTO 20 mg tab tablet TAKE 1 TABLET BY MOUTH DAILY WITH DINNER 30 Tab 5    amLODIPine (NORVASC) 5 mg tablet TAKE 1 TABLET EVERY DAY 90 Tab 3    metoprolol succinate (TOPROL-XL) 25 mg XL tablet TAKE 1 TABLET EVERY DAY 90 Tab 1    terazosin (HYTRIN) 10 mg capsule TAKE 1 CAPSULE EVERY NIGHT 90 Cap 1    enalapril (VASOTEC) 20 mg tablet TAKE 1 TABLET TWICE DAILY 180 Tab 1    aspirin delayed-release 81 mg tablet Take  by mouth daily.  potassium chloride (K-DUR, KLOR-CON) 10 mEq tablet TAKE 1 TABLET THREE TIMES DAILY 270 Tab 1    pravastatin (PRAVACHOL) 10 mg tablet TAKE 1 TABLET EVERY NIGHT 90 Tab 1    hydroCHLOROthiazide (HYDRODIURIL) 25 mg tablet TAKE 1 TABLET EVERY DAY 90 Tab 3    etodolac (LODINE) 400 mg tablet Take 400 mg by mouth two (2) times daily (with meals). 20 Tab 0    ARGININE, L-ARGININE, PO Take 1 tablet by mouth. L Arginine/L Citrulline [id number]/[id number] qd      FOLIC ACID PO Take 865 mcg by mouth daily.  thioctic acid (ALPHA LIPOIC ACID) 50 mg tab Take 1 Tab by mouth daily.  fiber Cap Take 2 Caps by mouth two (2) times a day.  MULTIVITAMIN WITH MINERALS (MULTI-VIT 55 PLUS PO) Take 1 Tab by mouth daily.  tadalafil (CIALIS) 20 mg tablet Take 20 mg by mouth as needed.  sildenafil citrate (VIAGRA) 100 mg tablet Take 1 Tab by mouth as needed. 2 Tab 0    acetaminophen (TYLENOL) 325 mg tablet Take  by mouth every four (4) hours as needed for Pain. No current facility-administered medications on file prior to visit.       /90 (BP 1 Location: Left arm, BP Patient Position: At rest)  Pulse (!) 58  Temp 98.3 °F (36.8 °C)  Resp 20  Ht 5' 8.5\" (1.74 m)  Wt 212 lb 8 oz (96.4 kg)  BMI 31.84 kg/m2  ENMT: normocephalic, atraumatic   Respiratory: excursions normal and symmetrical  Cardiovascular: Regular rate and rhythm  Abdomen:  Soft, no guarding, no distension, reducible left inguinal hernia  Musculoskeletal: normal gait/station  Neuro: symmetrical  Psych: alert and oriented to person, place and time    Plan  General anesthesia for lap mesh LIH repair- recurrent hernia  Patient given written instructions to not take Xarelto after Thurs 9/7 for surgery on 9/12  Plan to have him off xarelot until Tues 9/19  He understands to be npo after midnight on day of surgery  He is scheduled for PAT on 9/5  All questions answered from him and girlfriend

## 2017-09-12 NOTE — OP NOTES
Date of Procedure Sept 12, 2017  Preop Dx Recurrent Left inguinal hernia with pain  Postop Dx same  Surgeon Luciana Son. Cayla Hernandez MD  Assistant SA  Procedure performed: Laparoscopic left total extraperitoneal hernia repair with mesh  EBL minimal    Significant findings  Moderate indirect recurrence left inguinal hernia  Detailed description of procedure: The patient was educated about the proposed procedure and wished to proceed. Detailed informed consent was obtained in the office discussions with patient. The patient was brought to the OR and placed under general anesthesia. Maya was placed. After the abdomen was prepped and draped in the usual fashion, an umbilical incision was made and the cautery used to open the anterior rectus fascia on the left side. A retro-rectus plane was developed and the [employer] balloon trocar used to create the space. The structural balloon trocar was then inserted and inflated followed by CO2 insufflation. Good hemostasis was evident and the anatomy was well exposed by balloon inflation. A single midline 10 mm trocar was placed between umbilicus and pubis under direct vision. All trocar insertions were accomplished after local anesthetic injection. The epigastrics were easily seen bilaterally and we began dissection laterally on the left to enlarge the pre-peritoneal compartment. The cord was then mobilized and retracted. The internal ring on the left  was visualized and found to be very dilated. There was no femoral hernia. There was fat and sac herniated into the ring which was reduced. The cord was skeletonized in the usual fashion. The peritoneal membrane was easily seen and retracted back from its position overlying the cord. The cord was mobilized and the peritoneum was already retracted back appropriately. We visualized the right side but saw and felt no obvious hernia therefore only the left side was repaired.      We proceeded with a keyhole mesh repair using 3x6 inch prolene with a slit cut long-term across the mesh one-third of the way from the lateral border at a 45 degree angle. The mesh was secured with a suture and inserted into the 10mm trocar and positioned under the cord using direct vision. The suture was cut and retrieved and the mesh flaps carried anteriorly into position for tacking with the absorbable secure strap device. A appropriate passage through the mesh was maintained for the cord structures. Few absorbable tacks were used to secure the mesh including anterior and posteromedial along but not directly into Oneal's ligament. There was good hemostasis and good repair evident. Local was placed in the pre-peritoneal space. The suture passer was used to close the single midline trocar site using heavy vicryl suture without difficulty. All counts were reported correct and the wounds closed with 4-0 monocryl subcuticular suture and dermabond glue. The patient was transferred to the PACU in good condition. Alfredo Harris.  Gissel Burns MD

## 2017-09-12 NOTE — ROUTINE PROCESS
Patient: Nat Mckeon MRN: [id number]  SSN: [id number]   YOB: 1940  Age: 65 y.o. Sex: male     Patient is status post Procedure(s):  LEFT LAPOROSCOPIC INGUINAL HERNIA REPAIR WITH MESH.     Surgeon(s) and Role:     * Misael Titus MD - Primary    Local/Dose/Irrigation:  Marcaine 0.25% with Epi- 60 ml                  Peripheral IV 09/12/17 Right Wrist (Active)   Site Assessment Clean, dry, & intact 9/12/2017 11:53 AM   Phlebitis Assessment 0 9/12/2017 11:53 AM   Infiltration Assessment 0 9/12/2017 11:53 AM   Dressing Status Clean, dry, & intact 9/12/2017 11:53 AM            Airway - Endotracheal Tube 09/12/17 Oral (Active)                   Dressing/Packing:  Wound Abdomen Anterior-DRESSING TYPE: Topical skin adhesive/glue (09/12/17 4612)  Splint/Cast:  ]    Other:  Trocar sites x 2

## 2017-09-14 ENCOUNTER — TELEPHONE (OUTPATIENT)
Dept: SURGERY | Age: 77
End: 2017-09-14

## 2017-09-14 NOTE — TELEPHONE ENCOUNTER
Spoke with patient he said there is no redness. His testicles are swollen I advised him that is normal. He stated he has not had a bowel movement, told patient to get milk of magnesia and if no relief call the office back.

## 2017-09-18 RX ORDER — POTASSIUM CHLORIDE 750 MG/1
TABLET, EXTENDED RELEASE ORAL
Qty: 270 TAB | Refills: 1 | Status: SHIPPED | OUTPATIENT
Start: 2017-09-18 | End: 2018-01-05 | Stop reason: SDUPTHER

## 2017-09-25 ENCOUNTER — OFFICE VISIT (OUTPATIENT)
Dept: SURGERY | Age: 77
End: 2017-09-25

## 2017-09-25 VITALS
DIASTOLIC BLOOD PRESSURE: 84 MMHG | WEIGHT: 207 LBS | HEART RATE: 74 BPM | BODY MASS INDEX: 29.63 KG/M2 | OXYGEN SATURATION: 98 % | RESPIRATION RATE: 20 BRPM | SYSTOLIC BLOOD PRESSURE: 132 MMHG | TEMPERATURE: 98.2 F | HEIGHT: 70 IN

## 2017-09-25 DIAGNOSIS — K40.91 UNILATERAL RECURRENT INGUINAL HERNIA WITHOUT OBSTRUCTION OR GANGRENE: Primary | ICD-10-CM

## 2017-09-25 NOTE — PROGRESS NOTES
Doing well   Not taking pain meds except tylenol- never filled percocet  Swelling R scrotum has decreased 'alot' since early post op  PE  /84 (BP 1 Location: Left arm, BP Patient Position: Sitting)  Pulse 74  Temp 98.2 °F (36.8 °C) (Oral)   Resp 20  Ht 5' 10\" (1.778 m)  Wt 207 lb (93.9 kg)  SpO2 98%  BMI 29.7 kg/m2  Wounds well healed without infection  R scrotum with edema, testicle not tender, no ecchymosis    Doing well  Elevate scrotum when in chair to reduce edema  Ok to continue walking each day- concerned he has gained 10 lbs  Tylenol for pain is fine  RTC in 3 weeks to re check progress

## 2017-09-25 NOTE — MR AVS SNAPSHOT
Visit Information Date & Time Provider Department Dept. Phone Encounter #  
 9/25/2017 10:30 AM Suellen Triplett MD [address] [id number] 1000 [id number] Your Appointments 10/16/2017  9:00 AM  
POST OP 10 MIN with Richard Stockton NP  
[facility] [address] ([address]) Appt Note: po/ 3 week f/u  
 [address] [facility] [address]  
[address] [phone]  
  
    
 10/31/2017  9:15 AM  
PACEMAKER with Melony Otero [facility] ([facility]) Appt Note: med ICD/rc b 7-26-17  no [id number] [facility] 200 [facility] [address]  
[address] 1000 [facility]  
  
    
 2/22/2018  8:40 AM  
ESTABLISHED PATIENT with Jordy Greene MD  
[facility] ([address]) Appt Note: 6 month follow up  
 Shabbir Davis [address]  
[address] [facility] [id number] Upcoming Health Maintenance Date Due  
 GLAUCOMA SCREENING Q2Y 9/1/2016 INFLUENZA AGE 9 TO ADULT 8/1/2017 MEDICARE YEARLY EXAM 3/4/2018 COLONOSCOPY 5/10/2022 DTaP/Tdap/Td series (2 - Td) 7/14/2025 Allergies as of 9/25/2017  Review Complete On: 9/12/2017 By: Ting Maharaj RN Severity Noted Reaction Type Reactions Pcn [Penicillins]  03/27/2011    Unknown (comments) As a child Percocet [Oxycodone-acetaminophen]  03/27/2011    Swelling Leg swelling Current Immunizations  Reviewed on 5/31/2017 Name Date Influenza High Dose Vaccine PF 10/7/2016, 10/12/2015, 10/6/2014 Influenza Vaccine 10/1/2013 Influenza Vaccine Split 10/15/2012, 10/20/2011 Pneumococcal Conjugate (PCV-13) 7/14/2015 Pneumococcal Polysaccharide (PPSV-23) 10/7/2016 Pneumococcal Vaccine (Pcv) 1/1/2005 Tdap 7/14/2015 Zoster Vaccine, Live 1/1/2011 Not reviewed this visit You Were Diagnosed With   
  
 Codes Comments Unilateral recurrent inguinal hernia without obstruction or gangrene    -  Primary ICD-10-CM: K40.91 
ICD-9-CM: 550.91 Vitals BP Pulse Temp Resp Height(growth percentile) Weight(growth percentile) 132/84 (BP 1 Location: Left arm, BP Patient Position: Sitting) 74 98.2 °F (36.8 °C) (Oral) 20 5' 10\" (1.778 m) 207 lb (93.9 kg) SpO2 BMI Smoking Status 98% 29.7 kg/m2 Never Smoker BMI and BSA Data Body Mass Index Body Surface Area  
 29.7 kg/m 2 2.15 m 2 [facility] [facility] [address] [phone] Your Updated Medication List  
  
   
This list is accurate as of: 9/25/17  1:12 PM.  Always use your most recent med list. amLODIPine 5 mg tablet Commonly known as:  Yaakov Estephania Take 5 mg by mouth daily. ARGININE (L-ARGININE) PO Take 1 Tab by mouth two (2) times a day. L Arginine/L Citrulline 4000mg/1000mg qd  
  
 aspirin delayed-release 81 mg tablet Take  by mouth daily. CIALIS 20 mg tablet Generic drug:  tadalafil Take 20 mg by mouth as needed. enalapril 20 mg tablet Commonly known as:  VASOTEC  
TAKE 1 TABLET TWICE DAILY  
  
 fiber Cap Take 2 Caps by mouth two (2) times a day. FOLIC ACID PO Take 400 mcg by mouth daily. hydroCHLOROthiazide 25 mg tablet Commonly known as:  HYDRODIURIL  
TAKE 1 TABLET EVERY DAY  
  
 HYDROmorphone 2 mg tablet Commonly known as:  DILAUDID Take 1 Tab by mouth every six (6) hours as needed for Pain. Max Daily Amount: 8 mg.  
  
 metoprolol succinate 25 mg XL tablet Commonly known as:  TOPROL-XL  
TAKE 1 TABLET EVERY DAY  
  
 MULTI-VIT 55 PLUS PO Take 1 Tab by mouth daily. ondansetron 4 mg disintegrating tablet Commonly known as:  ZOFRAN ODT  
 Take 1 Tab by mouth every eight (8) hours as needed for Nausea. potassium chloride 10 mEq tablet Commonly known as:  KLOR-CON  
TAKE 1 TABLET THREE TIMES DAILY pravastatin 10 mg tablet Commonly known as:  PRAVACHOL  
TAKE 1 TABLET EVERY NIGHT  
  
 terazosin 10 mg capsule Commonly known as:  HYTRIN  
TAKE 1 CAPSULE EVERY NIGHT  
  
 thioctic acid 50 mg Tab Generic drug:  Alpha Lipoic Acid Take 1 Tab by mouth daily. TYLENOL 325 mg tablet Generic drug:  acetaminophen Take  by mouth every four (4) hours as needed for Pain. XARELTO 20 mg Tab tablet Generic drug:  rivaroxaban TAKE 1 TABLET BY MOUTH DAILY WITH DINNER Introducing [facility]! Grey [employer] introduces [employer] patient portal. Now you can access parts of your medical record, email your doctor's office, and request medication refills online. 1. In your internet browser, go to [email or web address] 2. Click on the First Time User? Click Here link in the Sign In box. You will see the New Member Sign Up page. 3. Enter your [employer] Access Code exactly as it appears below. You will not need to use this code after youve completed the sign-up process. If you do not sign up before the expiration date, you must request a new code. · [employer] Access Code: [email or web address] Expires: 11/16/2017  9:56 AM 
 
4. Enter the last four digits of your Social Security Number (xxxx) and Date of Birth (mm/dd/yyyy) as indicated and click Submit. You will be taken to the next sign-up page. 5. Create a [employer] ID. This will be your [employer] login ID and cannot be changed, so think of one that is secure and easy to remember. 6. Create a [employer] password. You can change your password at any time. 7. Enter your Password Reset Question and Answer. This can be used at a later time if you forget your password. 8. Enter your e-mail address.  You will receive e-mail notification when new information is available in [employer]. 9. Click Sign Up. You can now view and download portions of your medical record. 10. Click the Download Summary menu link to download a portable copy of your medical information. If you have questions, please visit the Frequently Asked Questions section of the [employer] website. Remember, [employer] is NOT to be used for urgent needs. For medical emergencies, dial 911. Now available from your iPhone and Android! Please provide this summary of care documentation to your next provider. Your primary care clinician is listed as Tobar Spangler. If you have any questions after today's visit, please call [phone].

## 2017-09-25 NOTE — PROGRESS NOTES
1. Have you been to the ER, urgent care clinic since your last visit? Hospitalized since your last visit? No    2. Have you seen or consulted any other health care providers outside of the [address] [place] [address] since your last visit? Include any pap smears or colon screening.  No

## 2017-10-16 ENCOUNTER — OFFICE VISIT (OUTPATIENT)
Dept: SURGERY | Age: 77
End: 2017-10-16

## 2017-10-16 VITALS
BODY MASS INDEX: 29.2 KG/M2 | OXYGEN SATURATION: 98 % | HEART RATE: 71 BPM | TEMPERATURE: 98.5 F | HEIGHT: 70 IN | WEIGHT: 204 LBS | DIASTOLIC BLOOD PRESSURE: 76 MMHG | SYSTOLIC BLOOD PRESSURE: 126 MMHG | RESPIRATION RATE: 18 BRPM

## 2017-10-16 DIAGNOSIS — Z09 POSTOPERATIVE EXAMINATION: Primary | ICD-10-CM

## 2017-10-16 NOTE — PATIENT INSTRUCTIONS
Hernia Repair: What to Expect at [address] are likely to have pain for the next few days. You may also feel like you have the flu, and you may have a low fever and feel tired and nauseated. This is common. You should feel better after a few days and will probably feel much better in 7 days. For several weeks you may feel twinges or pulling in the hernia repair when you move. You may have some bruising on the scrotum and along the penis. This is normal. Men will need to wear a jockstrap or briefs, not boxers, for scrotal support for several days after a groin (inguinal) hernia repair. [employer] bicycle shorts may provide good support. This care sheet gives you a general idea about how long it will take for you to recover. But each person recovers at a different pace. Follow the steps below to get better as quickly as possible. How can you care for yourself at home? Activity  · Rest when you feel tired. Getting enough sleep will help you recover. · Try to walk each day. Start by walking a little more than you did the day before. Bit by bit, increase the amount you walk. Walking boosts blood flow and helps prevent pneumonia and constipation. · Avoid strenuous activities, such as biking, jogging, weight lifting, or aerobic exercise, until your doctor says it is okay. · Avoid lifting anything that would make you strain. This may include heavy grocery bags and milk containers, a heavy briefcase or backpack, cat litter or dog food bags, a vacuum , or a child. · You may drive when you are no longer taking pain medicine and can quickly move your foot from the gas pedal to the brake. You must also be able to sit comfortably for a long period of time, even if you do not plan to go far. You might get caught in traffic. · Most people are able to return to work within 1 to 2 weeks after surgery. · You may shower 24 to 48 hours after surgery, if your doctor okays it. Pat the cut (incision) dry. Do not take a bath for the first 2 weeks, or until your doctor tells you it is okay. · Your doctor will tell you when you can have sex again. Diet  · You can eat your normal diet. If your stomach is upset, try bland, low-fat foods like plain rice, broiled chicken, toast, and yogurt. · Drink plenty of fluids (unless your doctor tells you not to). · You may notice that your bowel movements are not regular right after your surgery. This is common. Avoid constipation and straining with bowel movements. You may want to take a fiber supplement every day. If you have not had a bowel movement after a couple of days, ask your doctor about taking a mild laxative. Medicines  · Your doctor will tell you if and when you can restart your medicines. He or she will also give you instructions about taking any new medicines. · If you take blood thinners, such as warfarin (Coumadin), clopidogrel (Plavix), or aspirin, be sure to talk to your doctor. He or she will tell you if and when to start taking those medicines again. Make sure that you understand exactly what your doctor wants you to do. · Be safe with medicines. Take pain medicines exactly as directed. ¨ If the doctor gave you a prescription medicine for pain, take it as prescribed. ¨ If you are not taking a prescription pain medicine, take an over-the-counter medicine such as acetaminophen (Tylenol), ibuprofen (Advil, Motrin), or naproxen (Aleve). Read and follow all instructions on the label. ¨ Do not take two or more pain medicines at the same time unless the doctor told you to. Many pain medicines have acetaminophen, which is Tylenol. Too much acetaminophen (Tylenol) can be harmful. · If your doctor prescribed antibiotics, take them as directed. Do not stop taking them just because you feel better. You need to take the full course of antibiotics.   · If you think your pain medicine is making you sick to your stomach:  ¨ Take your medicine after meals (unless your doctor has told you not to). ¨ Ask your doctor for a different pain medicine. Incision care  · If you have strips of tape on the cut (incision) the doctor made, leave the tape on for a week or until it falls off. · If you have staples closing the cut, you will need to visit your doctor in 1 to 2 weeks to have them removed. · Wash the area daily with warm, soapy water and pat it dry. Follow-up care is a key part of your treatment and safety. Be sure to make and go to all appointments, and call your doctor if you are having problems. It's also a good idea to know your test results and keep a list of the medicines you take. When should you call for help? Call 911 anytime you think you may need emergency care. For example, call if:  · You passed out (lost consciousness). · You have sudden chest pain and shortness of breath, or you cough up blood. · You have severe pain in your belly. Call your doctor now or seek immediate medical care if:  · You are sick to your stomach and cannot keep fluids down. · You have signs of a blood clot, such as:  ¨ Pain in your calf, back of the knee, thigh, or groin. ¨ Redness and swelling in your leg or groin. · You have trouble passing urine or stool, especially if you have mild pain or swelling in your lower belly. · Bright red blood has soaked through the bandage over your incision. Watch closely for any changes in your health, and be sure to contact your doctor if:  · Your swelling is getting worse. · Your swelling is not going down. Where can you learn more? Go to http://marquis-thomas.info/. Enter V323 in the search box to learn more about \"Hernia Repair: What to Expect at Home. \"  Current as of: August 9, 2016  Content Version: 11.3  © 5351-2882 [employer]. Care instructions adapted under license by [employer] (which disclaims liability or warranty for this information).  If you have questions about a medical condition or this instruction, always ask your healthcare professional. [address] [address] any warranty or liability for your use of this information.

## 2017-10-16 NOTE — MR AVS SNAPSHOT
Visit Information Date & Time Provider Department Dept. Phone Encounter #  
 10/16/2017  9:00 AM Aron Rainey NP [facility] 22 066 [phone] [id number] Your Appointments 10/31/2017  9:15 AM  
PACEMAKER with [id number] ([facility] SCHEDULING) Appt Note: med ICD/rc b 7-26-17  no [address] [facility] [address]  
[address] [facility]  
  
    
 2/22/2018  8:40 AM  
ESTABLISHED PATIENT with Svitlana Bolanos MD  
[facility] ([facility]) Appt Note: 6 month follow up  
 [address]  
[address] [id number] Upcoming Health Maintenance Date Due  
 GLAUCOMA SCREENING Q2Y 9/1/2016 INFLUENZA AGE 9 TO ADULT 8/1/2017 MEDICARE YEARLY EXAM 3/4/2018 COLONOSCOPY 5/10/2022 DTaP/Tdap/Td series (2 - Td) 7/14/2025 Allergies as of 10/16/2017  Review Complete On: 10/16/2017 By: Aron Rainey NP Severity Noted Reaction Type Reactions Pcn [Penicillins]  03/27/2011    Unknown (comments) As a child Percocet [Oxycodone-acetaminophen]  03/27/2011    Swelling Leg swelling Current Immunizations  Reviewed on 5/31/2017 Name Date Influenza High Dose Vaccine PF 10/7/2016, 10/12/2015, 10/6/2014 Influenza Vaccine 10/1/2013 Influenza Vaccine Split 10/15/2012, 10/20/2011 Pneumococcal Conjugate (PCV-13) 7/14/2015 Pneumococcal Polysaccharide (PPSV-23) 10/7/2016 Pneumococcal Vaccine (Pcv) 1/1/2005 Tdap 7/14/2015 Zoster Vaccine, Live 1/1/2011 Not reviewed this visit You Were Diagnosed With   
  
 Codes Comments Postoperative examination    -  Primary ICD-10-CM: C78 ICD-9-CM: V67.00 Vitals BP Pulse Temp Resp Height(growth percentile) Weight(growth percentile) 126/76 (BP 1 Location: Left arm, BP Patient Position: Sitting) 71 98.5 °F (36.9 °C) (Oral) 18 5' 10\" (1.778 m) 204 lb (92.5 kg) SpO2 BMI Smoking Status 98% 29.27 kg/m2 Never Smoker Vitals History BMI and BSA Data Body Mass Index Body Surface Area  
 29.27 kg/m 2 2.14 m 2 Preferred Pharmacy Pharmacy Name Phone Axel Moncada [address] [place], [place] [id number] [facility] [address] [place] [address] [phone] Your Updated Medication List  
  
   
This list is accurate as of: 10/16/17 11:49 AM.  Always use your most recent med list. amLODIPine 5 mg tablet Commonly known as:  Yoanna Harringtoner Take 5 mg by mouth daily. ARGININE (L-ARGININE) PO Take 1 Tab by mouth two (2) times a day. L Arginine/L Citrulline 4000mg/1000mg qd  
  
 aspirin delayed-release 81 mg tablet Take  by mouth daily. CIALIS 20 mg tablet Generic drug:  tadalafil Take 20 mg by mouth as needed. enalapril 20 mg tablet Commonly known as:  VASOTEC  
TAKE 1 TABLET TWICE DAILY  
  
 fiber Cap Take 2 Caps by mouth two (2) times a day. FOLIC ACID PO Take 400 mcg by mouth daily. hydroCHLOROthiazide 25 mg tablet Commonly known as:  HYDRODIURIL  
TAKE 1 TABLET EVERY DAY  
  
 HYDROmorphone 2 mg tablet Commonly known as:  DILAUDID Take 1 Tab by mouth every six (6) hours as needed for Pain. Max Daily Amount: 8 mg.  
  
 metoprolol succinate 25 mg XL tablet Commonly known as:  TOPROL-XL  
TAKE 1 TABLET EVERY DAY  
  
 MULTI-VIT 55 PLUS PO Take 1 Tab by mouth daily. ondansetron 4 mg disintegrating tablet Commonly known as:  ZOFRAN ODT Take 1 Tab by mouth every eight (8) hours as needed for Nausea. potassium chloride 10 mEq tablet Commonly known as:  KLOR-CON  
TAKE 1 TABLET THREE TIMES DAILY pravastatin 10 mg tablet Commonly known as:  PRAVACHOL  
TAKE 1 TABLET EVERY NIGHT  
  
 terazosin 10 mg capsule Commonly known as:  HYTRIN  
 TAKE 1 CAPSULE EVERY NIGHT  
  
 thioctic acid 50 mg Tab Generic drug:  Alpha Lipoic Acid Take 1 Tab by mouth daily. TYLENOL 325 mg tablet Generic drug:  acetaminophen Take  by mouth every four (4) hours as needed for Pain. XARELTO 20 mg Tab tablet Generic drug:  rivaroxaban TAKE 1 TABLET BY MOUTH DAILY WITH DINNER Patient Instructions Hernia Repair: What to Expect at Home Your Recovery You are likely to have pain for the next few days. You may also feel like you have the flu, and you may have a low fever and feel tired and nauseated. This is common. You should feel better after a few days and will probably feel much better in 7 days. For several weeks you may feel twinges or pulling in the hernia repair when you move. You may have some bruising on the scrotum and along the penis. This is normal. Men will need to wear a jockstrap or briefs, not boxers, for scrotal support for several days after a groin (inguinal) hernia repair. Bling Nation[employer] bicycle shorts may provide good support. This care sheet gives you a general idea about how long it will take for you to recover. But each person recovers at a different pace. Follow the steps below to get better as quickly as possible. How can you care for yourself at home? Activity · Rest when you feel tired. Getting enough sleep will help you recover. · Try to walk each day. Start by walking a little more than you did the day before. Bit by bit, increase the amount you walk. Walking boosts blood flow and helps prevent pneumonia and constipation. · Avoid strenuous activities, such as biking, jogging, weight lifting, or aerobic exercise, until your doctor says it is okay. · Avoid lifting anything that would make you strain. This may include heavy grocery bags and milk containers, a heavy briefcase or backpack, cat litter or dog food bags, a vacuum , or a child. · You may drive when you are no longer taking pain medicine and can quickly move your foot from the gas pedal to the brake. You must also be able to sit comfortably for a long period of time, even if you do not plan to go far. You might get caught in traffic. · Most people are able to return to work within 1 to 2 weeks after surgery. · You may shower 24 to 48 hours after surgery, if your doctor okays it. Pat the cut (incision) dry. Do not take a bath for the first 2 weeks, or until your doctor tells you it is okay. · Your doctor will tell you when you can have sex again. Diet · You can eat your normal diet. If your stomach is upset, try bland, low-fat foods like plain rice, broiled chicken, toast, and yogurt. · Drink plenty of fluids (unless your doctor tells you not to). · You may notice that your bowel movements are not regular right after your surgery. This is common. Avoid constipation and straining with bowel movements. You may want to take a fiber supplement every day. If you have not had a bowel movement after a couple of days, ask your doctor about taking a mild laxative. Medicines · Your doctor will tell you if and when you can restart your medicines. He or she will also give you instructions about taking any new medicines. · If you take blood thinners, such as warfarin (Coumadin), clopidogrel (Plavix), or aspirin, be sure to talk to your doctor. He or she will tell you if and when to start taking those medicines again. Make sure that you understand exactly what your doctor wants you to do. · Be safe with medicines. Take pain medicines exactly as directed. ¨ If the doctor gave you a prescription medicine for pain, take it as prescribed. ¨ If you are not taking a prescription pain medicine, take an over-the-counter medicine such as acetaminophen (Tylenol), ibuprofen (Advil, Motrin), or naproxen (Aleve). Read and follow all instructions on the label. ¨ Do not take two or more pain medicines at the same time unless the doctor told you to. Many pain medicines have acetaminophen, which is Tylenol. Too much acetaminophen (Tylenol) can be harmful. · If your doctor prescribed antibiotics, take them as directed. Do not stop taking them just because you feel better. You need to take the full course of antibiotics. · If you think your pain medicine is making you sick to your stomach: 
¨ Take your medicine after meals (unless your doctor has told you not to). ¨ Ask your doctor for a different pain medicine. Incision care · If you have strips of tape on the cut (incision) the doctor made, leave the tape on for a week or until it falls off. · If you have staples closing the cut, you will need to visit your doctor in 1 to 2 weeks to have them removed. · Wash the area daily with warm, soapy water and pat it dry. Follow-up care is a key part of your treatment and safety. Be sure to make and go to all appointments, and call your doctor if you are having problems. It's also a good idea to know your test results and keep a list of the medicines you take. When should you call for help? Call 911 anytime you think you may need emergency care. For example, call if: 
· You passed out (lost consciousness). · You have sudden chest pain and shortness of breath, or you cough up blood. · You have severe pain in your belly. Call your doctor now or seek immediate medical care if: 
· You are sick to your stomach and cannot keep fluids down. · You have signs of a blood clot, such as: 
¨ Pain in your calf, back of the knee, thigh, or groin. ¨ Redness and swelling in your leg or groin. · You have trouble passing urine or stool, especially if you have mild pain or swelling in your lower belly. · Bright red blood has soaked through the bandage over your incision. Watch closely for any changes in your health, and be sure to contact your doctor if: · Your swelling is getting worse. · Your swelling is not going down. Where can you learn more? Go to http://marquis-thomas.info/. Enter D650 in the search box to learn more about \"Hernia Repair: What to Expect at Home. \" Current as of: August 9, 2016 Content Version: 11.3 © 0692-5418 [employer]. Care instructions adapted under license by [employer] (which disclaims liability or warranty for this information). If you have questions about a medical condition or this instruction, always ask your healthcare professional. [address] any warranty or liability for your use of this information. Introducing \[facility]\"" & HEALTH SERVICES! Romayne Duster introduces [facility] patient portal. Now you can access parts of your medical record, email your doctor's office, and request medication refills online. 1. In your internet browser, go to [email or web address] 2. Click on the First Time User? Click Here link in the Sign In box. You will see the New Member Sign Up page. 3. Enter your [facility] Access Code exactly as it appears below. You will not need to use this code after youve completed the sign-up process. If you do not sign up before the expiration date, you must request a new code. · [facility] Access Code: [email or web address] Expires: 11/16/2017  9:56 AM 
 
4. Enter the last four digits of your Social Security Number (xxxx) and Date of Birth (mm/dd/yyyy) as indicated and click Submit. You will be taken to the next sign-up page. 5. Create a "[employer]t ID. This will be your [facility] login ID and cannot be changed, so think of one that is secure and easy to remember. 6. Create a [facility] password. You can change your password at any time. 7. Enter your Password Reset Question and Answer. This can be used at a later time if you forget your password. 8. Enter your e-mail address.  You will receive e-mail notification when new information is available in [employer]. 9. Click Sign Up. You can now view and download portions of your medical record. 10. Click the Download Summary menu link to download a portable copy of your medical information. If you have questions, please visit the Frequently Asked Questions section of the [employer] website. Remember, [employer] is NOT to be used for urgent needs. For medical emergencies, dial 911. Now available from your iPhone and Android! Please provide this summary of care documentation to your next provider. Your primary care clinician is listed as Anibal Haddad. If you have any questions after today's visit, please call [phone].

## 2017-10-16 NOTE — PROGRESS NOTES
1. Have you been to the ER, urgent care clinic since your last visit? Hospitalized since your last visit? No     2. Have you seen or consulted any other health care providers outside of the [address] [place] [address] since your last visit? Include any pap smears or colon screening.  No

## 2017-10-16 NOTE — PROGRESS NOTES
Subjective:      Eunice Atkins is a 65 y.o. male presents for postop care 4 weeks s/p left lap inguinal hernia repair. Appetite is good. Eating a regular diet without difficulty. Bowel movements are regular. The patient is voiding without difficulty. The patient is not having any pain. .    Patient has an advanced directive: yes      Mr. Mariana Rowe has a reminder for a \"due or due soon\" health maintenance. I have asked that he contact his primary care provider for follow-up on this health maintenance. Objective:     Visit Vitals    /76 (BP 1 Location: Left arm, BP Patient Position: Sitting)    Pulse 71    Temp 98.5 °F (36.9 °C) (Oral)    Resp 18    Ht 5' 10\" (1.778 m)    Wt 204 lb (92.5 kg)    SpO2 98%    BMI 29.27 kg/m2       General:  alert, cooperative, no distress   Abdomen: soft, bowel sounds active, non-tender   Incision:   healing well, no drainage, no erythema, no hernia, no seroma, no swelling, no dehiscence, incision well approximated     Assessment:     Doing well postoperatively. Plan:     1. May increase activities as tolerated  2. Follow up as needed. Pt verbalized understanding and questions were answered to the best of my knowledge and ability.

## 2017-10-31 ENCOUNTER — CLINICAL SUPPORT (OUTPATIENT)
Dept: CARDIOLOGY CLINIC | Age: 77
End: 2017-10-31

## 2017-10-31 DIAGNOSIS — Z95.810 PRESENCE OF AUTOMATIC CARDIOVERTER/DEFIBRILLATOR (AICD): Primary | ICD-10-CM

## 2017-11-06 ENCOUNTER — ANTI-COAG VISIT (OUTPATIENT)
Dept: INTERNAL MEDICINE CLINIC | Age: 77
End: 2017-11-06

## 2017-11-06 DIAGNOSIS — Z23 ENCOUNTER FOR IMMUNIZATION: Primary | ICD-10-CM

## 2017-11-06 NOTE — PROGRESS NOTES
Kali Fairbanks is a 65 y.o. male  who present for routine immunizations. he  denies any symptoms , reactions or allergies that would exclude them from being immunized today. Risks and adverse reactions were discussed and the VIS was given to them. All questions were addressed. he was observed for 5 min post injection. There were no reactions observed.     Mary Montanez RN

## 2017-11-07 RX ORDER — TADALAFIL 20 MG/1
20 TABLET ORAL AS NEEDED
Qty: 30 TAB | Refills: 2 | Status: SHIPPED | OUTPATIENT
Start: 2017-11-07 | End: 2018-03-23

## 2017-11-13 RX ORDER — PRAVASTATIN SODIUM 10 MG/1
TABLET ORAL
Qty: 90 TAB | Refills: 1 | Status: SHIPPED | OUTPATIENT
Start: 2017-11-13 | End: 2018-05-11 | Stop reason: SDUPTHER

## 2017-12-18 ENCOUNTER — OFFICE VISIT (OUTPATIENT)
Dept: INTERNAL MEDICINE CLINIC | Age: 77
End: 2017-12-18

## 2017-12-18 VITALS
TEMPERATURE: 97.8 F | DIASTOLIC BLOOD PRESSURE: 80 MMHG | HEART RATE: 74 BPM | HEIGHT: 70 IN | BODY MASS INDEX: 31.21 KG/M2 | SYSTOLIC BLOOD PRESSURE: 116 MMHG | WEIGHT: 218 LBS | OXYGEN SATURATION: 98 % | RESPIRATION RATE: 16 BRPM

## 2017-12-18 DIAGNOSIS — I48.0 PAROXYSMAL ATRIAL FIBRILLATION (HCC): Primary | ICD-10-CM

## 2017-12-18 DIAGNOSIS — I10 ESSENTIAL HYPERTENSION: ICD-10-CM

## 2017-12-18 DIAGNOSIS — E66.9 OBESITY (BMI 30.0-34.9): ICD-10-CM

## 2017-12-18 PROBLEM — I25.10 CORONARY ARTERY DISEASE INVOLVING NATIVE CORONARY ARTERY OF NATIVE HEART WITHOUT ANGINA PECTORIS: Status: RESOLVED | Noted: 2017-08-24 | Resolved: 2017-12-18

## 2017-12-18 NOTE — PATIENT INSTRUCTIONS
Alternative blood thinners: Pradaxa or Eliquis or Warfarin (coumadin). Ã¯Â»Â¿  Anticoagulants: After Your Visit  Your Care Instructions  Your doctor prescribed an anticoagulant medicine. Anticoagulants, often called blood thinners, prevent new blood clots from forming and keep existing clots from getting larger. They do not actually thin the blood, but they make the blood take longer to clot. This lowers the risk of a blood clot moving to the lungs (pulmonary embolism) or moving to the brain and causing a stroke. Blood thinners come in two forms. Heparin is given by shot, either under your skin or through a needle in your vein, and starts working right away. Warfarin (Coumadin) comes in pill form and takes longer to work. Your doctor may have you begin taking both forms at the same time. As soon as the pills start to work, you will stop the shots but continue to take the pills. If you have a blood clot in your leg, you may need to take warfarin for several months. People who have heart conditions such as atrial fibrillation often need to take it for the rest of their lives. The right dose of this medicine is different for each person. You will need regular blood tests to see if your dose is correct. Follow-up care is a key part of your treatment and safety. Be sure to make and go to all appointments, and call your doctor if you are having problems. Itâs also a good idea to know your test results and keep a list of the medicines you take. How do you take blood thinners? · Take your medicines exactly as prescribed. Call your doctor if you think you are having a problem with your medicine. · Call your doctor if you are not sure what to do if you missed a dose of blood thinner. ¨ Your doctor can tell you exactly what to do so you do not take too much or too little blood thinner. Then you will be as safe as possible.   · Some general rules for what to do if you miss a dose:  ¨ If you remember it in the same day, take the missed dose. Then go back to your regular schedule. ¨ If it is the next day, or almost time to take the next dose, do not take the missed dose. Do not double the dose to make up for the missed one. At your next regularly scheduled time, take your normal dose. ¨ If you miss your dose for 2 or more days, call your doctor. · To help you stay on schedule, use a calendar to remind you when to take your blood thinner. When you take the medicine, note it on the calendar. · If you are going to give yourself shots, your doctor will give you instructions for how to safely inject the medicine. Follow the directions carefully. · Do not take any vitamins, over-the-counter medicines, or herbal products without talking to your doctor first.  · Avoid contact sports and other activities that could lead to injury. Make your home safe and take other measures to reduce your risk of falling. Always wear a seat belt while in a car. · Do not suddenly change your intake of vitamin Kârich foods, such as broccoli, cabbage, asparagus, lettuce, and spinach. This will help blood thinners work evenly from day to day. · Limit alcohol to 2 drinks a day for men and 1 drink a day for women. Alcohol may interfere with blood thinners. It also increases your risk of falls, which can cause bruising and bleeding. · Tell your dentist, pharmacist, and other health professionals that you take blood thinners. Wear medical alert jewelry that says you take blood thinners. You can buy this at most drugstores. When should you call for help? Call 911 anytime you think you may need emergency care. For example, call if:  · You cough up blood. · You vomit blood or what looks like coffee grounds. · You pass maroon or very bloody stools. Call your doctor now or seek immediate medical care if:  · You have new bruises or blood spots under your skin. · You have a nosebleed. · Your gums bleed when you brush your teeth.   · You have blood in your urine. · Your stools are black and tarlike or have streaks of blood. · You have vaginal bleeding when you are not having your period, or heavy period bleeding. Watch closely for changes in your health, and be sure to contact your doctor if:  · You have questions about your medicine. Where can you learn more? Go to [employer]  Enter R352 in the search box to learn more about \"Anticoagulants: After Your Visit. \"   Â© 0758-0311 Healthwise, Incorporated. Care instructions adapted under license by [facility] (which disclaims liability or warranty for this information). This care instruction is for use with your licensed healthcare professional. If you have questions about a medical condition or this instruction, always ask your healthcare professional. [place] [address] any warranty or liability for your use of this information.   Content Version: 6.4.99880; Last Revised: July 1, 2009

## 2017-12-18 NOTE — PROGRESS NOTES
Deepak Eddy is a 65 y.o. male who was seen in clinic today (12/18/2017). Assessment & Plan:   Diagnoses and all orders for this visit:    1. Paroxysmal atrial fibrillation ([address]- asymptomatic, CHADS-2 score is: 2. Reviewed medication options, he will inquire about cost of other NOAC (see AVS), if no going to be affordable did review warfarin. Reviewed the restrictions w/ warfarin and INR monitoring (he has been on meds in the past). He will let me know. 2. Essential hypertension- well controlled, continue current treatment     3. Obesity (BMI 30.0-34.9)- worsening weight but pt reports pants are fitting better (37\" down from 40\") and attributes increase in weight to lifting weights & exercising more. I have reviewed/discussed the above normal BMI with the patient. I have recommended the following interventions: encourage exercise and lifestyle education regarding diet. He wants to get to 180 lbs. We reviewed a more realistic goal of 200-210 to start with. Follow-up Disposition:  Return if symptoms worsen or fail to improve. Subjective:   Bjorn Varela was seen today for Anticoagulation    Cardiovascular Review  The patient has paroxysmal atrial fibrillation and hypertension. Since last visit: he reports having had to change insurance 2-3 times. Current insurance is going to be $500/month for Xarelto. He is hear to talk about alternatives. He reports taking medications as instructed, rare edication side effects noted- headache a few times a week. Prior to Admission medications    Medication Sig Start Date End Date Taking? Authorizing Provider   pravastatin (PRAVACHOL) 10 mg tablet TAKE 1 TABLET EVERY NIGHT 11/13/17  Yes Lore Oviedo MD   tadalafil (CIALIS) 20 mg tablet Take 1 Tab by mouth as needed.  11/7/17  Yes Lore Oviedo MD   potassium chloride (KLOR-CON) 10 mEq tablet TAKE 1 TABLET THREE TIMES DAILY 9/18/17  Yes Lore Oviedo MD   ondansetron (ZOFRAN ODT) 4 mg disintegrating tablet Take 1 Tab by mouth every eight (8) hours as needed for Nausea. 9/12/17  Yes Raad Jansen MD   amLODIPine (NORVASC) 5 mg tablet Take 5 mg by mouth daily. Yes Historical Provider   XARELTO 20 mg tab tablet TAKE 1 TABLET BY MOUTH DAILY WITH DINNER 7/17/17  Yes Destiney Monet MD   metoprolol succinate (TOPROL-XL) 25 mg XL tablet TAKE 1 TABLET EVERY DAY 6/30/17  Yes Destiney Monet MD   terazosin (HYTRIN) 10 mg capsule TAKE 1 CAPSULE EVERY NIGHT 6/27/17  Yes Destiney Monet MD   enalapril (VASOTEC) 20 mg tablet TAKE 1 TABLET TWICE DAILY 6/27/17  Yes Destiney Monet MD   aspirin delayed-release 81 mg tablet Take  by mouth daily. Yes Historical Provider   hydroCHLOROthiazide (HYDRODIURIL) 25 mg tablet TAKE 1 TABLET EVERY DAY 1/13/17  Yes Destiney Monet MD   acetaminophen (TYLENOL) 325 mg tablet Take  by mouth every four (4) hours as needed for Pain. Yes Historical Provider   ARGININE, L-ARGININE, PO Take 1 Tab by mouth two (2) times a day. L Arginine/L Citrulline 4000mg/1000mg qd   Yes Historical Provider   FOLIC ACID PO Take 964 mcg by mouth daily. Yes Historical Provider   thioctic acid (ALPHA LIPOIC ACID) 50 mg tab Take 1 Tab by mouth daily. Yes Historical Provider   fiber Cap Take 2 Caps by mouth two (2) times a day. Yes Historical Provider   MULTIVITAMIN WITH MINERALS (MULTI-VIT 55 PLUS PO) Take 1 Tab by mouth daily. 7/8/11  Yes Historical Provider          Allergies   Allergen Reactions    Pcn [Penicillins] Unknown (comments)     As a child    Percocet [Oxycodone-Acetaminophen] Swelling     Leg swelling           Review of Systems   Constitutional: Positive for weight loss. HENT: Negative for nosebleeds. Respiratory: Negative for hemoptysis. Cardiovascular: Negative for chest pain and palpitations. Gastrointestinal: Negative for blood in stool and melena. Genitourinary: Negative for hematuria.    Endo/Heme/Allergies: Bruises/bleeds easily. Objective:   Physical Exam   Constitutional:   obese   Cardiovascular: Regular rhythm and normal heart sounds. No murmur heard. Pulmonary/Chest: Effort normal and breath sounds normal. He has no wheezes. He has no rales. Musculoskeletal: He exhibits no edema. Visit Vitals    /80    Pulse 74    Temp 97.8 °F (36.6 °C) (Oral)    Resp 16    Ht 5' 10\" (1.778 m)    Wt 218 lb (98.9 kg)    SpO2 98%    BMI 31.28 kg/m2         Disclaimer:  Advised him to call back or return to office if symptoms worsen/change/persist.  Discussed expected course/resolution/complications of diagnosis in detail with patient. Medication risks/benefits/costs/interactions/alternatives discussed with patient. He was given an after visit summary which includes diagnoses, current medications, & vitals. He expressed understanding with the diagnosis and plan. Aspects of this note may have been generated using voice recognition software. Despite editing, there may be some syntax errors.        Chelo Yoon MD

## 2017-12-18 NOTE — MR AVS SNAPSHOT
Visit Information Date & Time Provider Department Dept. Phone Encounter #  
 12/18/2017 11:00 AM Quintin Cheney, [facility] Internal Medicine [phone] [id number] Follow-up Instructions Return if symptoms worsen or fail to improve. Your Appointments 2/22/2018  8:40 AM  
ESTABLISHED PATIENT with Thierno Elizabeth MD  
[facility] ([facility] Appt Note: 6 month follow up  
 [address]  
[address] [address]  
  
    
 2/22/2018  9:00 AM  
PACEMAKER with Teja Hunter [facility] ([facility]) Appt Note: mdt icd, rc, no M, b 10/31/17; mdt icd, rc, no M, b 10/31/17, see [address] [facility] [address]  
[address] [facility] [address]  
  
    
 3/23/2018  3:30 PM  
PHYSICAL with Quintin Cheney MD  
[facility] Internal Medicine [facility]) Appt Note: Medicare wellness [address] [facility] [address] [facility] [address]  
[address] [address] Upcoming Health Maintenance Date Due  
 GLAUCOMA SCREENING Q2Y 9/1/2016 MEDICARE YEARLY EXAM 3/4/2018 COLONOSCOPY 5/10/2022 DTaP/Tdap/Td series (2 - Td) 7/14/2025 Allergies as of 12/18/2017  Review Complete On: 12/18/2017 By: Quintin Cheney MD  
  
 Severity Noted Reaction Type Reactions Pcn [Penicillins]  03/27/2011    Unknown (comments) As a child Percocet [Oxycodone-acetaminophen]  03/27/2011    Swelling Leg swelling Current Immunizations  Reviewed on 12/18/2017 Name Date Influenza High Dose Vaccine PF 11/6/2017, 10/7/2016, 10/12/2015, 10/6/2014 Influenza Vaccine 10/1/2013 Influenza Vaccine Split 10/15/2012, 10/20/2011 Pneumococcal Conjugate (PCV-13) 7/14/2015 Pneumococcal Polysaccharide (PPSV-23) 10/7/2016 Pneumococcal Vaccine (Pcv) 1/1/2005 Tdap 7/14/2015 Zoster Vaccine, Live 1/1/2011 Reviewed by Sonali Murphy RN on 12/18/2017 at 10:40 AM  
You Were Diagnosed With   
  
 Codes Comments Paroxysmal atrial fibrillation (HCC)    -  Primary ICD-10-CM: I48.0 ICD-9-CM: 427.31 Essential hypertension     ICD-10-CM: I10 
ICD-9-CM: 401.9 Vitals BP Pulse Temp Resp Height(growth percentile) Weight(growth percentile) 116/80 74 97.8 °F (36.6 °C) (Oral) 16 5' 10\" (1.778 m) 218 lb (98.9 kg) SpO2 BMI Smoking Status 98% 31.28 kg/m2 Never Smoker BMI and BSA Data Body Mass Index Body Surface Area  
 31.28 kg/m 2 2.21 m 2 Preferred Pharmacy Pharmacy Name Phone [address] [phone] Your Updated Medication List  
  
   
This list is accurate as of: 12/18/17 11:01 AM.  Always use your most recent med list. amLODIPine 5 mg tablet Commonly known as:  Johnny Abraham Take 5 mg by mouth daily. ARGININE (L-ARGININE) PO Take 1 Tab by mouth two (2) times a day. L Arginine/L Citrulline 4000mg/1000mg qd  
  
 aspirin delayed-release 81 mg tablet Take  by mouth daily. enalapril 20 mg tablet Commonly known as:  VASOTEC  
TAKE 1 TABLET TWICE DAILY  
  
 fiber Cap Take 2 Caps by mouth two (2) times a day. FOLIC ACID PO Take 400 mcg by mouth daily. hydroCHLOROthiazide 25 mg tablet Commonly known as:  HYDRODIURIL  
TAKE 1 TABLET EVERY DAY  
  
 metoprolol succinate 25 mg XL tablet Commonly known as:  TOPROL-XL  
TAKE 1 TABLET EVERY DAY  
  
 MULTI-VIT 55 PLUS PO Take 1 Tab by mouth daily. ondansetron 4 mg disintegrating tablet Commonly known as:  ZOFRAN ODT Take 1 Tab by mouth every eight (8) hours as needed for Nausea. potassium chloride 10 mEq tablet Commonly known as:  KLOR-CON  
 TAKE 1 TABLET THREE TIMES DAILY pravastatin 10 mg tablet Commonly known as:  PRAVACHOL  
TAKE 1 TABLET EVERY NIGHT  
  
 tadalafil 20 mg tablet Commonly known as:  CIALIS Take 1 Tab by mouth as needed. terazosin 10 mg capsule Commonly known as:  HYTRIN  
TAKE 1 CAPSULE EVERY NIGHT  
  
 thioctic acid 50 mg Tab Generic drug:  Alpha Lipoic Acid Take 1 Tab by mouth daily. TYLENOL 325 mg tablet Generic drug:  acetaminophen Take  by mouth every four (4) hours as needed for Pain. XARELTO 20 mg Tab tablet Generic drug:  rivaroxaban TAKE 1 TABLET BY MOUTH DAILY WITH DINNER Follow-up Instructions Return if symptoms worsen or fail to improve. Patient Instructions Alternative blood thinners: Pradaxa or Eliquis or Warfarin (coumadin). Ã¯Â»Â Anticoagulants: After Your Visit Your Care Instructions Your doctor prescribed an anticoagulant medicine. Anticoagulants, often called blood thinners, prevent new blood clots from forming and keep existing clots from getting larger. They do not actually thin the blood, but they make the blood take longer to clot. This lowers the risk of a blood clot moving to the lungs (pulmonary embolism) or moving to the brain and causing a stroke. Blood thinners come in two forms. Heparin is given by shot, either under your skin or through a needle in your vein, and starts working right away. Warfarin (Coumadin) comes in pill form and takes longer to work. Your doctor may have you begin taking both forms at the same time. As soon as the pills start to work, you will stop the shots but continue to take the pills. If you have a blood clot in your leg, you may need to take warfarin for several months. People who have heart conditions such as atrial fibrillation often need to take it for the rest of their lives. The right dose of this medicine is different for each person.  You will need regular blood tests to see if your dose is correct. Follow-up care is a key part of your treatment and safety. Be sure to make and go to all appointments, and call your doctor if you are having problems. Itâs also a good idea to know your test results and keep a list of the medicines you take. How do you take blood thinners? · Take your medicines exactly as prescribed. Call your doctor if you think you are having a problem with your medicine. · Call your doctor if you are not sure what to do if you missed a dose of blood thinner. ¨ Your doctor can tell you exactly what to do so you do not take too much or too little blood thinner. Then you will be as safe as possible. · Some general rules for what to do if you miss a dose: ¨ If you remember it in the same day, take the missed dose. Then go back to your regular schedule. ¨ If it is the next day, or almost time to take the next dose, do not take the missed dose. Do not double the dose to make up for the missed one. At your next regularly scheduled time, take your normal dose. ¨ If you miss your dose for 2 or more days, call your doctor. · To help you stay on schedule, use a calendar to remind you when to take your blood thinner. When you take the medicine, note it on the calendar. · If you are going to give yourself shots, your doctor will give you instructions for how to safely inject the medicine. Follow the directions carefully. · Do not take any vitamins, over-the-counter medicines, or herbal products without talking to your doctor first. 
· Avoid contact sports and other activities that could lead to injury. Make your home safe and take other measures to reduce your risk of falling. Always wear a seat belt while in a car. · Do not suddenly change your intake of vitamin Kârich foods, such as broccoli, cabbage, asparagus, lettuce, and spinach. This will help blood thinners work evenly from day to day. · Limit alcohol to 2 drinks a day for men and 1 drink a day for women. Alcohol may interfere with blood thinners. It also increases your risk of falls, which can cause bruising and bleeding. · Tell your dentist, pharmacist, and other health professionals that you take blood thinners. Wear medical alert jewelry that says you take blood thinners. You can buy this at most drugstores. When should you call for help? Call 911 anytime you think you may need emergency care. For example, call if: 
· You cough up blood. · You vomit blood or what looks like coffee grounds. · You pass maroon or very bloody stools. Call your doctor now or seek immediate medical care if: 
· You have new bruises or blood spots under your skin. · You have a nosebleed. · Your gums bleed when you brush your teeth. · You have blood in your urine. · Your stools are black and tarlike or have streaks of blood. · You have vaginal bleeding when you are not having your period, or heavy period bleeding. Watch closely for changes in your health, and be sure to contact your doctor if: 
· You have questions about your medicine. Where can you learn more? Go to Queue-it.be Enter S819 in the search box to learn more about \"Anticoagulants: After Your Visit. \" Â© 9446-3744 Healthwise, Incorporated. Care instructions adapted under license by [employer] (which disclaims liability or warranty for this information). This care instruction is for use with your licensed healthcare professional. If you have questions about a medical condition or this instruction, always ask your healthcare professional. [place] [address] any warranty or liability for your use of this information. Content Version: 1.5.22002; Last Revised: July 1, 2009 Introducing \[facility]\"" & HEALTH SERVICES!    
 [facility] introduces Epiclist patient portal. Now you can access parts of your medical record, email your doctor's office, and request medication refills online. 1. In your internet browser, go to [email or web address] 2. Click on the First Time User? Click Here link in the Sign In box. You will see the New Member Sign Up page. 3. Enter your BranchOut Access Code exactly as it appears below. You will not need to use this code after youve completed the sign-up process. If you do not sign up before the expiration date, you must request a new code. · BranchOut Access Code: [email or web address] Expires: 3/18/2018 11:01 AM 
 
4. Enter the last four digits of your Social Security Number (xxxx) and Date of Birth (mm/dd/yyyy) as indicated and click Submit. You will be taken to the next sign-up page. 5. Create a BranchOut ID. This will be your BranchOut login ID and cannot be changed, so think of one that is secure and easy to remember. 6. Create a BranchOut password. You can change your password at any time. 7. Enter your Password Reset Question and Answer. This can be used at a later time if you forget your password. 8. Enter your e-mail address. You will receive e-mail notification when new information is available in [address] 9. Click Sign Up. You can now view and download portions of your medical record. 10. Click the Download Summary menu link to download a portable copy of your medical information. If you have questions, please visit the Frequently Asked Questions section of the BranchOut website. Remember, BranchOut is NOT to be used for urgent needs. For medical emergencies, dial 911. Now available from your iPhone and Android! Please provide this summary of care documentation to your next provider. Your primary care clinician is listed as Joe Hutton. If you have any questions after today's visit, please call [phone].

## 2017-12-19 ENCOUNTER — TELEPHONE (OUTPATIENT)
Dept: INTERNAL MEDICINE CLINIC | Age: 77
End: 2017-12-19

## 2017-12-19 RX ORDER — WARFARIN 2 MG/1
4 TABLET ORAL DAILY
Qty: 180 TAB | Refills: 1 | Status: SHIPPED | OUTPATIENT
Start: 2017-12-19 | End: 2018-05-14 | Stop reason: DRUGHIGH

## 2017-12-19 NOTE — TELEPHONE ENCOUNTER
Would like to discuss changing his b/p  Xarelto to  Warfarin . The script is a lot cheaper.  Please send the script threw [facility]

## 2017-12-19 NOTE — TELEPHONE ENCOUNTER
Pt returned our call and looked at other options besides xarelto and pt stated he wants to take coumadin because the other medications including xarelto are too expensive and he is willing to come in to be tested every week while on coumadin.

## 2017-12-20 ENCOUNTER — TELEPHONE (OUTPATIENT)
Dept: INTERNAL MEDICINE CLINIC | Age: 77
End: 2017-12-20

## 2017-12-20 RX ORDER — WARFARIN 2 MG/1
TABLET ORAL
Qty: 20 TAB | Refills: 0 | Status: SHIPPED | OUTPATIENT
Start: 2017-12-20 | End: 2017-12-30

## 2017-12-20 NOTE — TELEPHONE ENCOUNTER
Called pt and informed him the Coumadin has been sent to his mail order pharmacy and gave pt instructions to take xarelto and coumadin the 1st day which will be on a Monday the same day and the following day Tuesday to stop xarelto. Pt is scheduled to see Amarilis in [facility] one week later on Tuesday. Should pt take Xarelto and Coumadin at the same time or separate on same day? Pt is also asking for a short script to be sent to his local pharmacy because he is afraid his script from mail order will not arrive in time because of holidays.

## 2017-12-20 NOTE — TELEPHONE ENCOUNTER
Coumadin sent to [facility] based on previous dose he was on. Should overlap medications x 1 day when he starts coumadin and stop Xarelto next day. Needs to schedule appt w/ Amarilis 1 wk later (Mon/Tues) for INR check.

## 2017-12-25 RX ORDER — ENALAPRIL MALEATE 20 MG/1
TABLET ORAL
Qty: 180 TAB | Refills: 1 | Status: SHIPPED | OUTPATIENT
Start: 2017-12-25 | End: 2018-06-11 | Stop reason: SDUPTHER

## 2018-01-02 ENCOUNTER — ANTI-COAG VISIT (OUTPATIENT)
Dept: INTERNAL MEDICINE CLINIC | Age: 78
End: 2018-01-02

## 2018-01-02 DIAGNOSIS — Z79.01 CHRONIC ANTICOAGULATION: ICD-10-CM

## 2018-01-02 DIAGNOSIS — I48.0 PAROXYSMAL ATRIAL FIBRILLATION (HCC): Primary | ICD-10-CM

## 2018-01-02 LAB
INR BLD: 1.8
PT POC: 21.7 SECONDS
VALID INTERNAL CONTROL?: YES

## 2018-01-02 NOTE — MR AVS SNAPSHOT
Visit Information Date & Time Provider Department Dept. Phone Encounter #  
 1/2/2018 10:45 AM Matthew Stanford [facility] Internal Medicine [phone] [id number] Your Appointments 2/22/2018  8:40 AM  
ESTABLISHED PATIENT with Khang Jansen MD  
[facility] ([place] [employer] [facility] Appt Note: 6 month follow up  
 [address]  
[address] [address]  
  
    
 2/22/2018  9:00 AM  
PACEMAKER with Sourav Price [facility] ([facility]) Appt Note: mdt icd, rc, no M, b 10/31/17; mdt icd, rc, no M, b 10/31/17, see [address] [facility] [address]  
[address] [address]  
  
    
 3/23/2018  3:30 PM  
PHYSICAL with Cathleen Carroll MD  
[address] [address] Internal Medicine [facility]) Appt Note: Medicare wellness [address] [facility] [address] [facility] [address]  
[address] [address] Upcoming Health Maintenance Date Due  
 GLAUCOMA SCREENING Q2Y 9/1/2016 MEDICARE YEARLY EXAM 3/4/2018 COLONOSCOPY 5/10/2022 DTaP/Tdap/Td series (2 - Td) 7/14/2025 Allergies as of 1/2/2018  Review Complete On: 12/18/2017 By: Cathleen Carroll MD  
  
 Severity Noted Reaction Type Reactions Pcn [Penicillins]  03/27/2011    Unknown (comments) As a child Percocet [Oxycodone-acetaminophen]  03/27/2011    Swelling Leg swelling Current Immunizations  Reviewed on 12/18/2017 Name Date Influenza High Dose Vaccine PF 11/6/2017, 10/7/2016, 10/12/2015, 10/6/2014 Influenza Vaccine 10/1/2013 Influenza Vaccine Split 10/15/2012, 10/20/2011 Pneumococcal Conjugate (PCV-13) 7/14/2015 Pneumococcal Polysaccharide (PPSV-23) 10/7/2016 Pneumococcal Vaccine (Pcv) 1/1/2005 Tdap 7/14/2015 Zoster Vaccine, Live 1/1/2011 Not reviewed this visit You Were Diagnosed With   
  
 Codes Comments Paroxysmal atrial fibrillation (HCC)    -  Primary ICD-10-CM: I48.0 ICD-9-CM: 427.31 Chronic anticoagulation     ICD-10-CM: Z79.01 
ICD-9-CM: V58.61 Vitals Smoking Status Never Smoker Preferred Pharmacy Pharmacy Name Phone [facility] Moncada [address] [place] [address] [address] [phone] Your Updated Medication List  
  
   
This list is accurate as of: 1/2/18 10:45 AM.  Always use your most recent med list. amLODIPine 5 mg tablet Commonly known as:  Allena Osiris Take 5 mg by mouth daily. ARGININE (L-ARGININE) PO Take 1 Tab by mouth two (2) times a day. L Arginine/L Citrulline 4000mg/1000mg qd  
  
 aspirin delayed-release 81 mg tablet Take  by mouth daily. enalapril 20 mg tablet Commonly known as:  VASOTEC  
TAKE 1 TABLET TWICE DAILY  
  
 fiber Cap Take 2 Caps by mouth two (2) times a day. FOLIC ACID PO Take 400 mcg by mouth daily. hydroCHLOROthiazide 25 mg tablet Commonly known as:  HYDRODIURIL  
TAKE 1 TABLET EVERY DAY  
  
 metoprolol succinate 25 mg XL tablet Commonly known as:  TOPROL-XL  
TAKE 1 TABLET EVERY DAY  
  
 MULTI-VIT 55 PLUS PO Take 1 Tab by mouth daily. ondansetron 4 mg disintegrating tablet Commonly known as:  ZOFRAN ODT Take 1 Tab by mouth every eight (8) hours as needed for Nausea. potassium chloride 10 mEq tablet Commonly known as:  KLOR-CON  
TAKE 1 TABLET THREE TIMES DAILY pravastatin 10 mg tablet Commonly known as:  PRAVACHOL  
TAKE 1 TABLET EVERY NIGHT  
  
 tadalafil 20 mg tablet Commonly known as:  CIALIS Take 1 Tab by mouth as needed. terazosin 10 mg capsule Commonly known as:  HYTRIN  
TAKE 1 CAPSULE EVERY NIGHT  
  
 thioctic acid 50 mg Tab Generic drug:  Alpha Lipoic Acid Take 1 Tab by mouth daily. TYLENOL 325 mg tablet Generic drug:  acetaminophen Take  by mouth every four (4) hours as needed for Pain.  
  
 warfarin 2 mg tablet Commonly known as:  COUMADIN Take 2 Tabs by mouth daily. Description Resumed Coumadin 4mg daily 1 week ago. New dose: Coumadin 4mg daily except 6mg on Tuesdays Recheck in 1 week January 2018 Details Annie Weems Tue Wed Thu Fri Sat  
   1  
  
  
  
   2  
  
6 mg See details 3  
  
4 mg 4  
  
4 mg 5  
  
4 mg 6  
  
4 mg 7  
  
4 mg 8  
  
4 mg 9  
  
6 mg  
  
   10  
  
  
  
   11  
  
  
  
   12  
  
  
  
   13  
  
  
  
  
  14  
  
  
  
   15  
  
  
  
   16  
  
  
  
   17  
  
  
  
   18  
  
  
  
   19  
  
  
  
   20  
  
  
  
  
  21  
  
  
  
   22  
  
  
  
   23  
  
  
  
   24  
  
  
  
   25  
  
  
  
   26  
  
  
  
   27  
  
  
  
  
  28  
  
  
  
   29  
  
  
  
   30  
  
  
  
   31  
  
  
  
     
 Date Details 01/02 This INR check INR: 1.8! Date of next INR:  1/9/2018 How to take your warfarin dose To take:  4 mg Take two of the 2 mg tablets. To take:  6 mg Take three of the 2 mg tablets. We Performed the Following AMB POC PT/INR [35423 CPT(R)] Introducing [facility] & HEALTH SERVICES! Edwar Dubose introduces [employer] patient portal. Now you can access parts of your medical record, email your doctor's office, and request medication refills online. 1. In your internet browser, go to [email or web address] 2. Click on the First Time User? Click Here link in the Sign In box. You will see the New Member Sign Up page. 3. Enter your [employer] Access Code exactly as it appears below. You will not need to use this code after youve completed the sign-up process. If you do not sign up before the expiration date, you must request a new code.  
 
· [employer] Access Code: [email or web address] 
 Expires: 3/18/2018 11:01 AM 
 
4. Enter the last four digits of your Social Security Number (xxxx) and Date of Birth (mm/dd/yyyy) as indicated and click Submit. You will be taken to the next sign-up page. 5. Create a Medialive ID. This will be your Medialive login ID and cannot be changed, so think of one that is secure and easy to remember. 6. Create a Medialive password. You can change your password at any time. 7. Enter your Password Reset Question and Answer. This can be used at a later time if you forget your password. 8. Enter your e-mail address. You will receive e-mail notification when new information is available in [address] 9. Click Sign Up. You can now view and download portions of your medical record. 10. Click the Download Summary menu link to download a portable copy of your medical information. If you have questions, please visit the Frequently Asked Questions section of the Medialive website. Remember, Medialive is NOT to be used for urgent needs. For medical emergencies, dial 911. Now available from your iPhone and Android! Please provide this summary of care documentation to your next provider. Your primary care clinician is listed as Paula Swan. If you have any questions after today's visit, please call [phone].

## 2018-01-02 NOTE — PROGRESS NOTES
INR 1.8 today. Patient discontinued Xarelto due to cost.  Resumed Coumadin 4mg daily 1 week ago. Patient instructions:   New dose: Coumadin 4mg daily except 6mg on Tuesdays  Recheck in 1 week    A full discussion of the nature of anticoagulants has been carried out. A benefit risk analysis has been presented to the patient, so that they understand the justification for choosing anticoagulation at this time. The need for frequent and regular monitoring, precise dosage adjustment and compliance is stressed. Side effects of potential bleeding are discussed. The patient should avoid any OTC items containing aspirin or ibuprofen, and should avoid great swings in general diet. Avoid alcohol consumption. Call if any signs of abnormal bleeding. Patient verbalized understanding.

## 2018-01-05 RX ORDER — METOPROLOL SUCCINATE 25 MG/1
TABLET, EXTENDED RELEASE ORAL
Qty: 90 TAB | Refills: 1 | Status: SHIPPED | OUTPATIENT
Start: 2018-01-05 | End: 2018-07-31 | Stop reason: SDUPTHER

## 2018-01-05 RX ORDER — TERAZOSIN 10 MG/1
CAPSULE ORAL
Qty: 90 CAP | Refills: 1 | Status: SHIPPED | OUTPATIENT
Start: 2018-01-05 | End: 2018-05-11 | Stop reason: SDUPTHER

## 2018-01-05 RX ORDER — HYDROCHLOROTHIAZIDE 25 MG/1
TABLET ORAL
Qty: 90 TAB | Refills: 1 | Status: SHIPPED | OUTPATIENT
Start: 2018-01-05 | End: 2018-07-05 | Stop reason: SDUPTHER

## 2018-01-05 RX ORDER — POTASSIUM CHLORIDE 750 MG/1
TABLET, EXTENDED RELEASE ORAL
Qty: 270 TAB | Refills: 1 | Status: SHIPPED | OUTPATIENT
Start: 2018-01-05 | End: 2018-08-15 | Stop reason: SDUPTHER

## 2018-01-09 ENCOUNTER — ANTI-COAG VISIT (OUTPATIENT)
Dept: INTERNAL MEDICINE CLINIC | Age: 78
End: 2018-01-09

## 2018-01-09 DIAGNOSIS — I48.0 PAROXYSMAL ATRIAL FIBRILLATION (HCC): Primary | ICD-10-CM

## 2018-01-09 LAB
INR BLD: 2.4
PT POC: 28.5 SECONDS
VALID INTERNAL CONTROL?: YES

## 2018-01-09 NOTE — MR AVS SNAPSHOT
Visit Information Date & Time Provider Department Dept. Phone Encounter #  
 1/9/2018 11:30 AM [facility] Internal Medicine [phone] [id number] Your Appointments 2/22/2018  8:40 AM  
ESTABLISHED PATIENT with Nicholas Harris MD  
[facility] ([address]) Appt Note: 6 month follow up  
 [address]  
[address] [address]  
  
    
 2/22/2018  9:00 AM  
PACEMAKER with Leopoldo Orona [facility] ([facility] SCHEDULING) Appt Note: mdt icd, rc, no M, b 10/31/17; mdt icd, rc, no M, b 10/31/17, see [address] [facility] [address] [facility] [address]  
[address] [address]  
  
    
 3/23/2018  3:30 PM  
PHYSICAL with Dory Talavera MD  
[facility] Internal Medicine [address]) Appt Note: Medicare wellness [address] [facility] [address] [facility] [address]  
[address] [address] Upcoming Health Maintenance Date Due  
 GLAUCOMA SCREENING Q2Y 9/1/2016 MEDICARE YEARLY EXAM 3/4/2018 COLONOSCOPY 5/10/2022 DTaP/Tdap/Td series (2 - Td) 7/14/2025 Allergies as of 1/9/2018  Review Complete On: 1/2/2018 By: Meagan Markham RN Severity Noted Reaction Type Reactions Pcn [Penicillins]  03/27/2011    Unknown (comments) As a child Percocet [Oxycodone-acetaminophen]  03/27/2011    Swelling Leg swelling Current Immunizations  Reviewed on 12/18/2017 Name Date Influenza High Dose Vaccine PF 11/6/2017, 10/7/2016, 10/12/2015, 10/6/2014 Influenza Vaccine 10/1/2013 Influenza Vaccine Split 10/15/2012, 10/20/2011 Pneumococcal Conjugate (PCV-13) 7/14/2015 Pneumococcal Polysaccharide (PPSV-23) 10/7/2016 Pneumococcal Vaccine (Pcv) 1/1/2005 Tdap 7/14/2015 Zoster Vaccine, Live 1/1/2011 Not reviewed this visit You Were Diagnosed With   
  
 Codes Comments Paroxysmal atrial fibrillation (HCC)    -  Primary ICD-10-CM: I48.0 ICD-9-CM: 427.31 Vitals Smoking Status Never Smoker Preferred Pharmacy Pharmacy Name Phone Axel Duran [facility] [place] - [address] [phone] Your Updated Medication List  
  
   
This list is accurate as of: 1/9/18 11:36 AM.  Always use your most recent med list. amLODIPine 5 mg tablet Commonly known as:  Rhoda Prow Take 5 mg by mouth daily. ARGININE (L-ARGININE) PO Take 1 Tab by mouth two (2) times a day. L Arginine/L Citrulline 4000mg/1000mg qd  
  
 aspirin delayed-release 81 mg tablet Take  by mouth daily. enalapril 20 mg tablet Commonly known as:  VASOTEC  
TAKE 1 TABLET TWICE DAILY  
  
 fiber Cap Take 2 Caps by mouth two (2) times a day. FOLIC ACID PO Take 400 mcg by mouth daily. hydroCHLOROthiazide 25 mg tablet Commonly known as:  HYDRODIURIL  
TAKE 1 TABLET EVERY DAY  
  
 metoprolol succinate 25 mg XL tablet Commonly known as:  TOPROL-XL  
TAKE 1 TABLET EVERY DAY  
  
 MULTI-VIT 55 PLUS PO Take 1 Tab by mouth daily. ondansetron 4 mg disintegrating tablet Commonly known as:  ZOFRAN ODT Take 1 Tab by mouth every eight (8) hours as needed for Nausea. potassium chloride 10 mEq tablet Commonly known as:  KLOR-CON  
TAKE 1 TABLET THREE TIMES DAILY pravastatin 10 mg tablet Commonly known as:  PRAVACHOL  
TAKE 1 TABLET EVERY NIGHT  
  
 tadalafil 20 mg tablet Commonly known as:  CIALIS Take 1 Tab by mouth as needed. terazosin 10 mg capsule Commonly known as:  HYTRIN  
TAKE 1 CAPSULE EVERY NIGHT  
  
 thioctic acid 50 mg Tab Generic drug:  Alpha Lipoic Acid Take 1 Tab by mouth daily. TYLENOL 325 mg tablet Generic drug:  acetaminophen Take  by mouth every four (4) hours as needed for Pain.  
  
 warfarin 2 mg tablet Commonly known as:  COUMADIN Take 2 Tabs by mouth daily. Description Continue Coumadin 4mg daily 1 week ago. Recheck in 1 week January 2018 Details Sun Mon Tue Wed Thu Fri Sat  
   1  
  
  
  
   2  
  
  
  
   3  
  
  
  
   4  
  
  
  
   5  
  
  
  
   6  
  
  
  
  
  7  
  
  
  
   8  
  
  
  
   9  
  
4 mg See details 10  
  
4 mg  
  
   11  
  
4 mg  
  
   12  
  
4 mg  
  
   13  
  
4 mg  
  
  
  14  
  
4 mg  
  
   15  
  
4 mg  
  
   16  
  
4 mg  
  
   17  
  
  
  
   18  
  
  
  
   19  
  
  
  
   20  
  
  
  
  
  21  
  
  
  
   22  
  
  
  
   23  
  
  
  
   24  
  
  
  
   25  
  
  
  
   26  
  
  
  
   27  
  
  
  
  
  28  
  
  
  
   29  
  
  
  
   30  
  
  
  
   31  
  
  
  
     
 Date Details 01/09 This INR check INR: 2.4 Date of next INR:  1/16/2018 How to take your warfarin dose To take:  4 mg Take two of the 2 mg tablets. We Performed the Following AMB POC PT/INR [71495 CPT(R)] Introducing [facility] SERVICES! Earl Dumas introduces eBrevia patient portal. Now you can access parts of your medical record, email your doctor's office, and request medication refills online. 1. In your internet browser, go to [email or web address] 2. Click on the First Time User? Click Here link in the Sign In box. You will see the New Member Sign Up page. 3. Enter your eBrevia Access Code exactly as it appears below. You will not need to use this code after youve completed the sign-up process. If you do not sign up before the expiration date, you must request a new code. · eBrevia Access Code: [email or web address] Expires: 3/18/2018 11:01 AM 
 
4. Enter the last four digits of your Social Security Number (xxxx) and Date of Birth (mm/dd/yyyy) as indicated and click Submit.  You will be taken to the next sign-up page. 5. Create a Deckerton ID. This will be your Deckerton login ID and cannot be changed, so think of one that is secure and easy to remember. 6. Create a Deckerton password. You can change your password at any time. 7. Enter your Password Reset Question and Answer. This can be used at a later time if you forget your password. 8. Enter your e-mail address. You will receive e-mail notification when new information is available in [address] 9. Click Sign Up. You can now view and download portions of your medical record. 10. Click the Download Summary menu link to download a portable copy of your medical information. If you have questions, please visit the Frequently Asked Questions section of the Deckerton website. Remember, Deckerton is NOT to be used for urgent needs. For medical emergencies, dial 911. Now available from your iPhone and Android! Please provide this summary of care documentation to your next provider. Your primary care clinician is listed as Angeles Albarran. If you have any questions after today's visit, please call [phone].

## 2018-01-09 NOTE — PROGRESS NOTES
INR 2.4 today. Up from 1.8 last week. Patient states he realized when he resumed coumadin on 12/25/17 he as only taking one 2mg tab daily instead of two 2mg (4mg) tabs as prescribed. Dose was increased last week to 4mg daily except 6mg on Tuesday based on thinking he was taking 4mg daily. He has now taken 4mg daily this past week with one dose of 6mg last week. Will continue 4mg daily and recheck in 1 week. Patient instructions:   Continue Coumadin 4mg daily 1 week ago. Recheck in 1 week    A full discussion of the nature of anticoagulants has been carried out. A benefit risk analysis has been presented to the patient, so that they understand the justification for choosing anticoagulation at this time. The need for frequent and regular monitoring, precise dosage adjustment and compliance is stressed. Side effects of potential bleeding are discussed. The patient should avoid any OTC items containing aspirin or ibuprofen, and should avoid great swings in general diet. Avoid alcohol consumption. Call if any signs of abnormal bleeding. Patient verbalized understanding.

## 2018-01-16 ENCOUNTER — ANTI-COAG VISIT (OUTPATIENT)
Dept: INTERNAL MEDICINE CLINIC | Age: 78
End: 2018-01-16

## 2018-01-16 DIAGNOSIS — I48.0 PAROXYSMAL ATRIAL FIBRILLATION (HCC): Primary | ICD-10-CM

## 2018-01-16 LAB
INR BLD: 1.9
PT POC: 22.5 SECONDS
VALID INTERNAL CONTROL?: YES

## 2018-01-16 NOTE — PROGRESS NOTES
Pt. Missed 1 dose of coumadin yesterday. Pt. Instructed to continue coumadin 4mg daily per Dr Tiff Mora and recheck INR 1wk. Pt. Will get a pill box. A full discussion of the nature of anticoagulants has been carried out. A benefit risk analysis has been presented to the patient, so that they understand the justification for choosing anticoagulation at this time. The need for frequent and regular monitoring, precise dosage adjustment and compliance is stressed. Side effects of potential bleeding are discussed. The patient should avoid any OTC items containing aspirin or ibuprofen, and should avoid great swings in general diet. Avoid alcohol consumption. Call if any signs of abnormal bleeding. Next PT/INR test in 1 wk; telephone follow up thereafter.

## 2018-01-16 NOTE — MR AVS SNAPSHOT
[address] [facility] [address] [facility] 
[phone] Patient: Emily Sierra MRN: [id number] Visit Information Date & Time Provider Department Dept. Phone Encounter #  
 1/16/2018 11:30 AM Sindhu [facility] [phone] [id number] Follow-up Instructions Return in about 1 week (around 1/23/2018). Your Appointments 2/22/2018  8:40 AM  
ESTABLISHED PATIENT with Andrew Morales MD  
[facility] ([address]) Appt Note: 6 month follow up  
 [address]  
[address] [facility] [address]  
  
    
 2/22/2018  9:00 AM  
PACEMAKER with Abad Ames [facility] ([facility] SCHEDULING) Appt Note: mdt icd, rc, no M, b 10/31/17; mdt icd, rc, no M, b 10/31/17, see [address] [facility] [address] [facility] [address]  
[address] [facility] [address]  
  
    
 3/23/2018  3:30 PM  
PHYSICAL with Mariela Naik MD  
[facility] Internal Medicine [address]) Appt Note: Medicare wellness [address] [facility] [address] [facility] [address]  
[address] [address] [address] [place] [address] Upcoming Health Maintenance Date Due  
 GLAUCOMA SCREENING Q2Y 9/1/2016 MEDICARE YEARLY EXAM 3/4/2018 COLONOSCOPY 5/10/2022 DTaP/Tdap/Td series (2 - Td) 7/14/2025 Allergies as of 1/16/2018  Review Complete On: 1/16/2018 By: Luis Fernando Somers LPN Severity Noted Reaction Type Reactions Pcn [Penicillins]  03/27/2011    Unknown (comments) As a child Percocet [Oxycodone-acetaminophen]  03/27/2011    Swelling Leg swelling Current Immunizations  Reviewed on 12/18/2017 Name Date Influenza High Dose Vaccine PF 11/6/2017, 10/7/2016, 10/12/2015, 10/6/2014 Influenza Vaccine 10/1/2013 Influenza Vaccine Split 10/15/2012, 10/20/2011 Pneumococcal Conjugate (PCV-13) 7/14/2015 Pneumococcal Polysaccharide (PPSV-23) 10/7/2016 Pneumococcal Vaccine (Pcv) 1/1/2005 Tdap 7/14/2015 Zoster Vaccine, Live 1/1/2011 Not reviewed this visit You Were Diagnosed With   
  
 Codes Comments Paroxysmal atrial fibrillation (HCC)    -  Primary ICD-10-CM: I48.0 ICD-9-CM: 427.31 Vitals Smoking Status Never Smoker Preferred Pharmacy Pharmacy Name Phone Axel Moncada [address] [place] [address] [address] [facility] [address] [place] [address] [phone] Your Updated Medication List  
  
   
This list is accurate as of: 1/16/18 11:46 AM.  Always use your most recent med list. amLODIPine 5 mg tablet Commonly known as:  Escobar Nearing Take 5 mg by mouth daily. ARGININE (L-ARGININE) PO Take 1 Tab by mouth two (2) times a day. L Arginine/L Citrulline 4000mg/1000mg qd  
  
 aspirin delayed-release 81 mg tablet Take  by mouth daily. enalapril 20 mg tablet Commonly known as:  VASOTEC  
TAKE 1 TABLET TWICE DAILY  
  
 fiber Cap Take 2 Caps by mouth two (2) times a day. FOLIC ACID PO Take 400 mcg by mouth daily. hydroCHLOROthiazide 25 mg tablet Commonly known as:  HYDRODIURIL  
TAKE 1 TABLET EVERY DAY  
  
 metoprolol succinate 25 mg XL tablet Commonly known as:  TOPROL-XL  
TAKE 1 TABLET EVERY DAY  
  
 MULTI-VIT 55 PLUS PO Take 1 Tab by mouth daily. ondansetron 4 mg disintegrating tablet Commonly known as:  ZOFRAN ODT Take 1 Tab by mouth every eight (8) hours as needed for Nausea. potassium chloride 10 mEq tablet Commonly known as:  KLOR-CON  
TAKE 1 TABLET THREE TIMES DAILY pravastatin 10 mg tablet Commonly known as:  PRAVACHOL  
TAKE 1 TABLET EVERY NIGHT  
  
 tadalafil 20 mg tablet Commonly known as:  CIALIS Take 1 Tab by mouth as needed. terazosin 10 mg capsule Commonly known as:  HYTRIN  
TAKE 1 CAPSULE EVERY NIGHT  
  
 thioctic acid 50 mg Tab Generic drug:  Alpha Lipoic Acid Take 1 Tab by mouth daily. TYLENOL 325 mg tablet Generic drug:  acetaminophen Take  by mouth every four (4) hours as needed for Pain.  
  
 warfarin 2 mg tablet Commonly known as:  COUMADIN Take 2 Tabs by mouth daily. Description Continue Coumadin 4mg daily 1 week ago. Recheck in 1 week January 2018 Details Sun Mon Tue Wed Thu Fri Sat  
   1  
  
  
  
   2  
  
  
  
   3  
  
  
  
   4  
  
  
  
   5  
  
  
  
   6  
  
  
  
  
  7  
  
  
  
   8  
  
  
  
   9  
  
  
  
   10  
  
  
  
   11  
  
  
  
   12  
  
  
  
   13  
  
  
  
  
  14  
  
  
  
   15  
  
  
  
   16  
  
4 mg See details 17  
  
4 mg  
  
   18  
  
4 mg  
  
   19  
  
4 mg  
  
   20  
  
4 mg  
  
  
  21  
  
4 mg  
  
   22  
  
4 mg  
  
   23  
  
4 mg 24  
  
  
  
   25  
  
  
  
   26  
  
  
  
   27  
  
  
  
  
  28  
  
  
  
   29  
  
  
  
   30  
  
  
  
   31  
  
  
  
     
 Date Details 01/16 This INR check INR: 1.9! Date of next INR:  1/23/2018 How to take your warfarin dose To take:  4 mg Take two of the 2 mg tablets. We Performed the Following AMB POC PT/INR [37152 CPT(R)] Follow-up Instructions Return in about 1 week (around 1/23/2018). Patient Instructions Continue coumadin 4mg daily. Introducing [facility] SERVICES! Elio Srivastava introduces Meludia patient portal. Now you can access parts of your medical record, email your doctor's office, and request medication refills online. 1. In your internet browser, go to [email or web address] 2. Click on the First Time User? Click Here link in the Sign In box. You will see the New Member Sign Up page. 3. Enter your Meludia Access Code exactly as it appears below.  You will not need to use this code after youve completed the sign-up process. If you do not sign up before the expiration date, you must request a new code. · Study2gether Access Code: [email or web address] Expires: 3/18/2018 11:01 AM 
 
4. Enter the last four digits of your Social Security Number (xxxx) and Date of Birth (mm/dd/yyyy) as indicated and click Submit. You will be taken to the next sign-up page. 5. Create a Study2gether ID. This will be your Study2gether login ID and cannot be changed, so think of one that is secure and easy to remember. 6. Create a Study2gether password. You can change your password at any time. 7. Enter your Password Reset Question and Answer. This can be used at a later time if you forget your password. 8. Enter your e-mail address. You will receive e-mail notification when new information is available in [address] 9. Click Sign Up. You can now view and download portions of your medical record. 10. Click the Download Summary menu link to download a portable copy of your medical information. If you have questions, please visit the Frequently Asked Questions section of the Study2gether website. Remember, Study2gether is NOT to be used for urgent needs. For medical emergencies, dial 911. Now available from your iPhone and Android! Please provide this summary of care documentation to your next provider. Your primary care clinician is listed as Raymon Roland. If you have any questions after today's visit, please call [phone].

## 2018-01-23 ENCOUNTER — ANTI-COAG VISIT (OUTPATIENT)
Dept: INTERNAL MEDICINE CLINIC | Age: 78
End: 2018-01-23

## 2018-01-23 DIAGNOSIS — I48.0 PAROXYSMAL ATRIAL FIBRILLATION (HCC): Primary | ICD-10-CM

## 2018-01-23 LAB
INR BLD: 2.1
PT POC: 24.8 SECONDS
VALID INTERNAL CONTROL?: YES

## 2018-01-23 NOTE — PROGRESS NOTES
INR 2.1 today. Patient instructions:   Continue Coumadin 4mg daily. Recheck in 2 weeks    A full discussion of the nature of anticoagulants has been carried out. A benefit risk analysis has been presented to the patient, so that they understand the justification for choosing anticoagulation at this time. The need for frequent and regular monitoring, precise dosage adjustment and compliance is stressed. Side effects of potential bleeding are discussed. The patient should avoid any OTC items containing aspirin or ibuprofen, and should avoid great swings in general diet. Avoid alcohol consumption. Call if any signs of abnormal bleeding. Patient verbalized understanding.

## 2018-01-23 NOTE — MR AVS SNAPSHOT
[address] [facility] [address] [place] [address] 
[phone] Patient: Michelle Matta MRN: [id number] Visit Information Date & Time Provider Department Dept. Phone Encounter #  
 1/23/2018 11:00 AM Hodan Collins [facility] Internal Medicine [phone] [id number] Your Appointments 2/22/2018  8:40 AM  
ESTABLISHED PATIENT with Sanjeev Coleman MD  
[facility] Appt Note: 6 month follow up  
 [address] [facility]  
[address] [address]  
  
    
 2/22/2018  9:00 AM  
PACEMAKER with Jong Bauer [facility] ([facility] SCHEDULING) Appt Note: mdt icd, rc, no M, b 10/31/17; mdt icd, rc, no M, b 10/31/17, see [address] [facility] [address] [facility] [address]  
[address] [address]  
  
    
 3/23/2018  3:30 PM  
PHYSICAL with Molly Shaw MD  
[facility] Internal Medicine [facility]) Appt Note: Medicare wellness [address] [facility] [address] Upcoming Health Maintenance Date Due  
 GLAUCOMA SCREENING Q2Y 9/1/2016 MEDICARE YEARLY EXAM 3/4/2018 COLONOSCOPY 5/10/2022 DTaP/Tdap/Td series (2 - Td) 7/14/2025 Allergies as of 1/23/2018  Review Complete On: 1/23/2018 By: Cher Cee RN Severity Noted Reaction Type Reactions Pcn [Penicillins]  03/27/2011    Unknown (comments) As a child Percocet [Oxycodone-acetaminophen]  03/27/2011    Swelling Leg swelling Current Immunizations  Reviewed on 12/18/2017 Name Date Influenza High Dose Vaccine PF 11/6/2017, 10/7/2016, 10/12/2015, 10/6/2014 Influenza Vaccine 10/1/2013 Influenza Vaccine Split 10/15/2012, 10/20/2011 Pneumococcal Conjugate (PCV-13) 7/14/2015 Pneumococcal Polysaccharide (PPSV-23) 10/7/2016 Pneumococcal Vaccine (Pcv) 1/1/2005 Tdap 7/14/2015 Zoster Vaccine, Live 1/1/2011 Not reviewed this visit You Were Diagnosed With   
  
 Codes Comments Paroxysmal atrial fibrillation (HCC)    -  Primary ICD-10-CM: I48.0 ICD-9-CM: 427.31 Vitals Smoking Status Never Smoker Preferred Pharmacy Pharmacy Name Phone [address] [facility] [address] [phone] Your Updated Medication List  
  
   
This list is accurate as of: 1/23/18 11:06 AM.  Always use your most recent med list. amLODIPine 5 mg tablet Commonly known as:  Eve Samuelsen Take 5 mg by mouth daily. ARGININE (L-ARGININE) PO Take 1 Tab by mouth two (2) times a day. L Arginine/L Citrulline 4000mg/1000mg qd  
  
 aspirin delayed-release 81 mg tablet Take  by mouth daily. enalapril 20 mg tablet Commonly known as:  VASOTEC  
TAKE 1 TABLET TWICE DAILY  
  
 fiber Cap Take 2 Caps by mouth two (2) times a day. FOLIC ACID PO Take 400 mcg by mouth daily. hydroCHLOROthiazide 25 mg tablet Commonly known as:  HYDRODIURIL  
TAKE 1 TABLET EVERY DAY  
  
 metoprolol succinate 25 mg XL tablet Commonly known as:  TOPROL-XL  
TAKE 1 TABLET EVERY DAY  
  
 MULTI-VIT 55 PLUS PO Take 1 Tab by mouth daily. ondansetron 4 mg disintegrating tablet Commonly known as:  ZOFRAN ODT Take 1 Tab by mouth every eight (8) hours as needed for Nausea. potassium chloride 10 mEq tablet Commonly known as:  KLOR-CON  
TAKE 1 TABLET THREE TIMES DAILY pravastatin 10 mg tablet Commonly known as:  PRAVACHOL  
TAKE 1 TABLET EVERY NIGHT  
  
 tadalafil 20 mg tablet Commonly known as:  CIALIS Take 1 Tab by mouth as needed. terazosin 10 mg capsule Commonly known as:  HYTRIN  
TAKE 1 CAPSULE EVERY NIGHT thioctic acid 50 mg Tab Generic drug:  Alpha Lipoic Acid Take 1 Tab by mouth daily. TYLENOL 325 mg tablet Generic drug:  acetaminophen Take  by mouth every four (4) hours as needed for Pain.  
  
 warfarin 2 mg tablet Commonly known as:  COUMADIN Take 2 Tabs by mouth daily. Description Continue Coumadin 4mg daily. Recheck in 2 weeks January 2018 Details Sun Mon Tue Wed Thu Fri Sat  
   1  
  
  
  
   2  
  
  
  
   3  
  
  
  
   4  
  
  
  
   5  
  
  
  
   6  
  
  
  
  
  7  
  
  
  
   8  
  
  
  
   9  
  
  
  
   10  
  
  
  
   11  
  
  
  
   12  
  
  
  
   13  
  
  
  
  
  14  
  
  
  
   15  
  
  
  
   16  
  
  
  
   17  
  
  
  
   18  
  
  
  
   19  
  
  
  
   20  
  
  
  
  
  21  
  
  
  
   22  
  
  
  
   23  
  
4 mg See details 24  
  
4 mg  
  
   25  
  
4 mg  
  
   26  
  
4 mg  
  
   27  
  
4 mg  
  
  
  28  
  
4 mg  
  
   29  
  
4 mg  
  
   30  
  
4 mg  
  
   31  
  
4 mg Date Details 01/23 This INR check INR: 2.1 How to take your warfarin dose To take:  4 mg Take two of the 2 mg tablets. February 2018 Details Jillian Kim Tue Wed Thu Fri Sat 1  
  
4 mg 2  
  
4 mg 3  
  
4 mg 4  
  
4 mg 5  
  
4 mg 6  
  
4 mg  
  
   7  
  
  
  
   8  
  
  
  
   9  
  
  
  
   10  
  
  
  
  
  11  
  
  
  
   12  
  
  
  
   13  
  
  
  
   14  
  
  
  
   15  
  
  
  
   16  
  
  
  
   17  
  
  
  
  
  18  
  
  
  
   19  
  
  
  
   20  
  
  
  
   21  
  
  
  
   22  
  
  
  
   23  
  
  
  
   24  
  
  
  
  
  25  
  
  
  
   26  
  
  
  
   27  
  
  
  
   28  
  
  
  
     
 Date Details No additional details Date of next INR:  2/6/2018 How to take your warfarin dose To take:  4 mg Take two of the 2 mg tablets. We Performed the Following AMB POC PT/INR [15202 CPT(R)] Introducing [facility] SERVICES! [facility] introduces Satellogic patient portal. Now you can access parts of your medical record, email your doctor's office, and request medication refills online. 1. In your internet browser, go to [email or web address] 2. Click on the First Time User? Click Here link in the Sign In box. You will see the New Member Sign Up page. 3. Enter your Satellogic Access Code exactly as it appears below. You will not need to use this code after youve completed the sign-up process. If you do not sign up before the expiration date, you must request a new code. · Satellogic Access Code: [email or web address] Expires: 3/18/2018 11:01 AM 
 
4. Enter the last four digits of your Social Security Number (xxxx) and Date of Birth (mm/dd/yyyy) as indicated and click Submit. You will be taken to the next sign-up page. 5. Create a Satellogic ID. This will be your Satellogic login ID and cannot be changed, so think of one that is secure and easy to remember. 6. Create a Satellogic password. You can change your password at any time. 7. Enter your Password Reset Question and Answer. This can be used at a later time if you forget your password. 8. Enter your e-mail address. You will receive e-mail notification when new information is available in [address] 9. Click Sign Up. You can now view and download portions of your medical record. 10. Click the Download Summary menu link to download a portable copy of your medical information. If you have questions, please visit the Frequently Asked Questions section of the Satellogic website. Remember, Satellogic is NOT to be used for urgent needs. For medical emergencies, dial 911. Now available from your iPhone and Android! Please provide this summary of care documentation to your next provider. Your primary care clinician is listed as Roel Phillip.  If you have any questions after today's visit, please call [phone].

## 2018-02-06 ENCOUNTER — ANTI-COAG VISIT (OUTPATIENT)
Dept: INTERNAL MEDICINE CLINIC | Age: 78
End: 2018-02-06

## 2018-02-06 DIAGNOSIS — I48.0 PAROXYSMAL ATRIAL FIBRILLATION (HCC): Primary | ICD-10-CM

## 2018-02-06 LAB
INR BLD: 2
PT POC: 24.2 SECONDS
VALID INTERNAL CONTROL?: YES

## 2018-02-06 NOTE — MR AVS SNAPSHOT
[address] [facility] [address] [place] [address] 
[phone] Patient: Demario Avalos MRN: [id number] Visit Information Date & Time Provider Department Dept. Phone Encounter #  
 2/6/2018  8:45 AM [facility] [phone] [id number] Your Appointments 2/22/2018  8:40 AM  
ESTABLISHED PATIENT with Alisa Armas MD  
[facility] ([address]) Appt Note: 6 month follow up  
 [address]  [facility] [address]  
[address] [address] [address] [address]  
  
    
 2/22/2018  9:00 AM  
PACEMAKER with Violet Baker [facility] ([facility]UR[facility] SCHEDULING) Appt Note: mdt icd, rc, no M, b 10/31/17; mdt icd, rc, no M, b 10/31/17, see [address] [facility] [address] [facility] [address]  
[address] [address]  [address] [address]  
  
    
 3/23/2018  3:30 PM  
PHYSICAL with Karen Felix MD  
[facility] [address] [place] [address]) Appt Note: Medicare wellness [address] [facility] [address] [facility] [address]  
[address] [place] [address] Upcoming Health Maintenance Date Due  
 GLAUCOMA SCREENING Q2Y 9/1/2016 MEDICARE YEARLY EXAM 3/4/2018 COLONOSCOPY 5/10/2022 DTaP/Tdap/Td series (2 - Td) 7/14/2025 Allergies as of 2/6/2018  Review Complete On: 2/6/2018 By: Manish Rodriguez RN Severity Noted Reaction Type Reactions Pcn [Penicillins]  03/27/2011    Unknown (comments) As a child Percocet [Oxycodone-acetaminophen]  03/27/2011    Swelling Leg swelling Current Immunizations  Reviewed on 12/18/2017 Name Date Influenza High Dose Vaccine PF 11/6/2017, 10/7/2016, 10/12/2015, 10/6/2014 Influenza Vaccine 10/1/2013 Influenza Vaccine Split 10/15/2012, 10/20/2011 Pneumococcal Conjugate (PCV-13) 7/14/2015 Pneumococcal Polysaccharide (PPSV-23) 10/7/2016 Pneumococcal Vaccine (Pcv) 1/1/2005 Tdap 7/14/2015 Zoster Vaccine, Live 1/1/2011 Not reviewed this visit You Were Diagnosed With   
  
 Codes Comments Paroxysmal atrial fibrillation (HCC)    -  Primary ICD-10-CM: I48.0 ICD-9-CM: 427.31 Vitals Smoking Status Never Smoker Preferred Pharmacy Pharmacy Name Phone [facility] [facility] [address] [phone] Your Updated Medication List  
  
   
This list is accurate as of: 2/6/18  8:54 AM.  Always use your most recent med list. amLODIPine 5 mg tablet Commonly known as:  Escobar Nearing Take 5 mg by mouth daily. ARGININE (L-ARGININE) PO Take 1 Tab by mouth two (2) times a day. L Arginine/L Citrulline 4000mg/1000mg qd  
  
 aspirin delayed-release 81 mg tablet Take  by mouth daily. enalapril 20 mg tablet Commonly known as:  VASOTEC  
TAKE 1 TABLET TWICE DAILY  
  
 fiber Cap Take 2 Caps by mouth two (2) times a day. FOLIC ACID PO Take 400 mcg by mouth daily. hydroCHLOROthiazide 25 mg tablet Commonly known as:  HYDRODIURIL  
TAKE 1 TABLET EVERY DAY  
  
 metoprolol succinate 25 mg XL tablet Commonly known as:  TOPROL-XL  
TAKE 1 TABLET EVERY DAY  
  
 MULTI-VIT 55 PLUS PO Take 1 Tab by mouth daily. ondansetron 4 mg disintegrating tablet Commonly known as:  ZOFRAN ODT Take 1 Tab by mouth every eight (8) hours as needed for Nausea. potassium chloride 10 mEq tablet Commonly known as:  KLOR-CON  
TAKE 1 TABLET THREE TIMES DAILY pravastatin 10 mg tablet Commonly known as:  PRAVACHOL  
TAKE 1 TABLET EVERY NIGHT  
  
 tadalafil 20 mg tablet Commonly known as:  CIALIS Take 1 Tab by mouth as needed. terazosin 10 mg capsule Commonly known as:  HYTRIN  
TAKE 1 CAPSULE EVERY NIGHT thioctic acid 50 mg Tab Generic drug:  Alpha Lipoic Acid Take 1 Tab by mouth daily. TYLENOL 325 mg tablet Generic drug:  acetaminophen Take  by mouth every four (4) hours as needed for Pain.  
  
 warfarin 2 mg tablet Commonly known as:  COUMADIN Take 2 Tabs by mouth daily. Description Continue Coumadin 4mg daily. Recheck in 3 weeks February 2018 Details Sun Mon Tue Wed Thu Fri Sat  
      1  
  
  
  
   2  
  
  
  
   3  
  
  
  
  
  4  
  
  
  
   5  
  
  
  
   6  
  
4 mg See details 7  
  
4 mg 8  
  
4 mg 9  
  
4 mg  
  
   10  
  
4 mg  
  
  
  11  
  
4 mg  
  
   12  
  
4 mg  
  
   13  
  
4 mg  
  
   14  
  
4 mg  
  
   15  
  
4 mg  
  
   16  
  
4 mg  
  
   17  
  
4 mg  
  
  
  18  
  
4 mg  
  
   19  
  
4 mg  
  
   20  
  
4 mg  
  
   21  
  
4 mg  
  
   22  
  
4 mg  
  
   23  
  
4 mg  
  
   24  
  
4 mg  
  
  
  25  
  
4 mg  
  
   26  
  
4 mg  
  
   27  
  
4 mg 29 Date Details 02/06 This INR check INR: 2.0 Date of next INR:  2/27/2018 How to take your warfarin dose To take:  4 mg Take two of the 2 mg tablets. We Performed the Following AMB POC PT/INR [96899 CPT(R)] Introducing [facility] SERVICES! [employer] introduces Prixtel patient portal. Now you can access parts of your medical record, email your doctor's office, and request medication refills online. 1. In your internet browser, go to [email or web address] 2. Click on the First Time User? Click Here link in the Sign In box. You will see the New Member Sign Up page. 3. Enter your Prixtel Access Code exactly as it appears below. You will not need to use this code after youve completed the sign-up process. If you do not sign up before the expiration date, you must request a new code. · Prixtel Access Code: [email or web address] Expires: 3/18/2018 11:01 AM 
 
 4. Enter the last four digits of your Social Security Number (xxxx) and Date of Birth (mm/dd/yyyy) as indicated and click Submit. You will be taken to the next sign-up page. 5. Create a ChemiSense ID. This will be your ChemiSense login ID and cannot be changed, so think of one that is secure and easy to remember. 6. Create a ChemiSense password. You can change your password at any time. 7. Enter your Password Reset Question and Answer. This can be used at a later time if you forget your password. 8. Enter your e-mail address. You will receive e-mail notification when new information is available in [address] 9. Click Sign Up. You can now view and download portions of your medical record. 10. Click the Download Summary menu link to download a portable copy of your medical information. If you have questions, please visit the Frequently Asked Questions section of the ChemiSense website. Remember, ChemiSense is NOT to be used for urgent needs. For medical emergencies, dial 911. Now available from your iPhone and Android! Please provide this summary of care documentation to your next provider. Your primary care clinician is listed as Karen Felix. If you have any questions after today's visit, please call [phone].

## 2018-02-06 NOTE — PROGRESS NOTES
INR 2.0 today. Patient instructions:   Continue Coumadin 4mg daily. Recheck in 3 weeks    A full discussion of the nature of anticoagulants has been carried out. A benefit risk analysis has been presented to the patient, so that they understand the justification for choosing anticoagulation at this time. The need for frequent and regular monitoring, precise dosage adjustment and compliance is stressed. Side effects of potential bleeding are discussed. The patient should avoid any OTC items containing aspirin or ibuprofen, and should avoid great swings in general diet. Avoid alcohol consumption. Call if any signs of abnormal bleeding. Patient verbalized understanding.

## 2018-02-27 ENCOUNTER — ANTI-COAG VISIT (OUTPATIENT)
Dept: INTERNAL MEDICINE CLINIC | Age: 78
End: 2018-02-27

## 2018-02-27 ENCOUNTER — OFFICE VISIT (OUTPATIENT)
Dept: INTERNAL MEDICINE CLINIC | Age: 78
End: 2018-02-27

## 2018-02-27 VITALS
SYSTOLIC BLOOD PRESSURE: 124 MMHG | HEIGHT: 70 IN | WEIGHT: 219 LBS | BODY MASS INDEX: 31.35 KG/M2 | OXYGEN SATURATION: 97 % | TEMPERATURE: 98 F | HEART RATE: 84 BPM | RESPIRATION RATE: 18 BRPM | DIASTOLIC BLOOD PRESSURE: 80 MMHG

## 2018-02-27 DIAGNOSIS — I48.0 PAROXYSMAL ATRIAL FIBRILLATION (HCC): ICD-10-CM

## 2018-02-27 DIAGNOSIS — I48.0 PAROXYSMAL ATRIAL FIBRILLATION (HCC): Primary | ICD-10-CM

## 2018-02-27 DIAGNOSIS — N52.9 ERECTILE DYSFUNCTION, UNSPECIFIED ERECTILE DYSFUNCTION TYPE: ICD-10-CM

## 2018-02-27 DIAGNOSIS — R42 VERTIGO: Primary | ICD-10-CM

## 2018-02-27 LAB
INR BLD: 2.3
PT POC: 27 SECONDS
VALID INTERNAL CONTROL?: YES

## 2018-02-27 NOTE — PATIENT INSTRUCTIONS
INR 2.3 today. Patient instructions:   Continue Coumadin 4mg daily. Recheck in 4 weeks - March 27th at 8:30 am         Vertigo: Care Instructions  Your Care Instructions    Vertigo is the feeling that you or your surroundings are moving when there is no actual movement. It is often described as a feeling of spinning, whirling, falling, or tilting. Vertigo may make you vomit or feel nauseated. You may have trouble standing or walking and may lose your balance. Vertigo is often related to an inner ear problem, but it can have other more serious causes. If vertigo continues, you may need more tests to find its cause. Follow-up care is a key part of your treatment and safety. Be sure to make and go to all appointments, and call your doctor if you are having problems. It's also a good idea to know your test results and keep a list of the medicines you take. How can you care for yourself at home? · Do not lie flat on your back. Prop yourself up slightly. This may reduce the spinning feeling. Keep your eyes open. · Move slowly so that you do not fall. · If your doctor recommends medicine, take it exactly as directed. · Do not drive while you are having vertigo. Certain exercises, called Ariza-Daroff exercises, can help decrease vertigo. To do Ariza-Daroff exercises:  · Sit on the edge of a bed or sofa and quickly lie down on the side that causes the worst vertigo. Lie on your side with your ear down. · Stay in this position for at least 30 seconds or until the vertigo goes away. · Sit up. If this causes vertigo, wait for it to stop. · Repeat the procedure on the other side. · Repeat this 10 times. Do these exercises 2 times a day until the vertigo is gone. When should you call for help? Call 911 anytime you think you may need emergency care. For example, call if:  ? · You passed out (lost consciousness). ? · You have symptoms of a stroke.  These may include:  ¨ Sudden numbness, tingling, weakness, or loss of movement in your face, arm, or leg, especially on only one side of your body. ¨ Sudden vision changes. ¨ Sudden trouble speaking. ¨ Sudden confusion or trouble understanding simple statements. ¨ Sudden problems with walking or balance. ¨ A sudden, severe headache that is different from past headaches. ?Call your doctor now or seek immediate medical care if:  ? · Vertigo occurs with a fever, a headache, or ringing in your ears. ? · You have new or increased nausea and vomiting. ? Watch closely for changes in your health, and be sure to contact your doctor if:  ? · Vertigo gets worse or happens more often. ? · Vertigo has not gotten better after 2 weeks. Where can you learn more? Go to http://marquis-thomas.info/. Enter N327 in the search box to learn more about \"Vertigo: Care Instructions. \"  Current as of: May 12, 2017  Content Version: 11.4  © 3417-1066 [employer]. Care instructions adapted under license by [employer] (which disclaims liability or warranty for this information). If you have questions about a medical condition or this instruction, always ask your healthcare professional. [place] [address] any warranty or liability for your use of this information.

## 2018-02-27 NOTE — PROGRESS NOTES
Had some dizziness. Used swimmer's ear drops and cleared up. Also for INR check coumadin clinic.   Has question about multivitamin

## 2018-02-27 NOTE — PROGRESS NOTES
INR 2.3 today. Patient instructions:   Continue Coumadin 4mg daily. Recheck in 4 weeks    A full discussion of the nature of anticoagulants has been carried out. A benefit risk analysis has been presented to the patient, so that they understand the justification for choosing anticoagulation at this time. The need for frequent and regular monitoring, precise dosage adjustment and compliance is stressed. Side effects of potential bleeding are discussed. The patient should avoid any OTC items containing aspirin or ibuprofen, and should avoid great swings in general diet. Avoid alcohol consumption. Call if any signs of abnormal bleeding. Patient verbalized understanding.

## 2018-02-27 NOTE — MR AVS SNAPSHOT
[address] [facility] [address] [place] 
[phone] Patient: Yenifer Carlos MRN: [id number] Visit Information Date & Time Provider Department Dept. Phone Encounter #  
 2018  9:15 AM Maryellen Panda, [address] [place] [address] Internal Medicine [phone] [id number] Follow-up Instructions Return if symptoms worsen or fail to improve. Your Appointments 3/13/2018  3:15 PM  
PACEMAKER with PACEMAKER3, [facility] ([facility] SCHEDULING) Appt Note: 6 month follow up along w/pacemaker pt rsd  to 3/13 [address] [facility]  
[address] [facility]  
  
    
 3/13/2018  3:40 PM  
ESTABLISHED PATIENT with Charlotte Mccoy MD  
[facility] ([address]) Appt Note: 6 month follow up; 6 month follow up along w/pacemaker pt rsd  to 3/13 [address] [address] [facility] [address]  
[address] [address]  
  
    
 3/23/2018  3:30 PM  
PHYSICAL with Maryellen Panda MD  
[facility] [address] [place] [address]) Appt Note: Medicare wellness 330 [facility] [address] [place] [address]  
[address] [facility]  
  
    
 3/27/2018  8:30 AM  
ANTICOAG NURSE with Martha Murray [facility] ([address]) Appt Note: [address] [facility] [address]  
[address] [place] Upcoming Health Maintenance Date Due  
 GLAUCOMA SCREENING Q2Y 2016 MEDICARE YEARLY EXAM 3/4/2018 COLONOSCOPY 5/10/2022 DTaP/Tdap/Td series (2 - Td) 2025 Allergies as of 2018  Review Complete On: 2018 By: Maryellen Panda MD  
  
 Severity Noted Reaction Type Reactions Pcn [Penicillins]  03/27/2011    Unknown (comments) As a child Percocet [Oxycodone-acetaminophen]  03/27/2011    Swelling Leg swelling Current Immunizations  Reviewed on 2/27/2018 Name Date Influenza High Dose Vaccine PF 11/6/2017, 10/7/2016, 10/12/2015, 10/6/2014 Influenza Vaccine 10/1/2013 Influenza Vaccine Split 10/15/2012, 10/20/2011 Pneumococcal Conjugate (PCV-13) 7/14/2015 Pneumococcal Polysaccharide (PPSV-23) 10/7/2016 Pneumococcal Vaccine (Pcv) 1/1/2005 Tdap 7/14/2015 Zoster Vaccine, Live 1/1/2011 Reviewed by Nicole Mendosa RN on 2/27/2018 at  8:41 AM  
You Were Diagnosed With   
  
 Codes Comments Vertigo    -  Primary ICD-10-CM: M15 ICD-9-CM: 780.4 Vitals BP Pulse Temp Resp Height(growth percentile) Weight(growth percentile) 124/80 84 98 °F (36.7 °C) (Oral) 18 5' 10\" (1.778 m) 219 lb (99.3 kg) SpO2 BMI Smoking Status 97% 31.42 kg/m2 Never Smoker BMI and BSA Data Body Mass Index Body Surface Area  
 31.42 kg/m 2 2.21 m 2 Preferred Pharmacy Pharmacy Name Phone [facility]  [address] [phone] Your Updated Medication List  
  
   
This list is accurate as of 2/27/18  9:21 AM.  Always use your most recent med list. amLODIPine 5 mg tablet Commonly known as:  Eve Samuelsen Take 5 mg by mouth daily. ARGININE (L-ARGININE) PO Take 1 Tab by mouth two (2) times a day. L Arginine/L Citrulline 4000mg/1000mg qd  
  
 aspirin delayed-release 81 mg tablet Take  by mouth daily. enalapril 20 mg tablet Commonly known as:  VASOTEC  
TAKE 1 TABLET TWICE DAILY  
  
 fiber Cap Take 2 Caps by mouth two (2) times a day. FOLIC ACID PO Take 400 mcg by mouth daily. hydroCHLOROthiazide 25 mg tablet Commonly known as:  HYDRODIURIL  
TAKE 1 TABLET EVERY DAY  
  
 metoprolol succinate 25 mg XL tablet Commonly known as:  TOPROL-XL  
TAKE 1 TABLET EVERY DAY  
  
 MULTI-VIT 55 PLUS PO Take 1 Tab by mouth daily. ondansetron 4 mg disintegrating tablet Commonly known as:  ZOFRAN ODT Take 1 Tab by mouth every eight (8) hours as needed for Nausea. potassium chloride 10 mEq tablet Commonly known as:  KLOR-CON  
TAKE 1 TABLET THREE TIMES DAILY pravastatin 10 mg tablet Commonly known as:  PRAVACHOL  
TAKE 1 TABLET EVERY NIGHT  
  
 tadalafil 20 mg tablet Commonly known as:  CIALIS Take 1 Tab by mouth as needed. terazosin 10 mg capsule Commonly known as:  HYTRIN  
TAKE 1 CAPSULE EVERY NIGHT  
  
 thioctic acid 50 mg Tab Generic drug:  Alpha Lipoic Acid Take 1 Tab by mouth daily. TYLENOL 325 mg tablet Generic drug:  acetaminophen Take  by mouth every four (4) hours as needed for Pain.  
  
 warfarin 2 mg tablet Commonly known as:  COUMADIN Take 2 Tabs by mouth daily. Follow-up Instructions Return if symptoms worsen or fail to improve. Patient Instructions INR 2.3 today. Patient instructions:  
Continue Coumadin 4mg daily. Recheck in 4 weeks - March 27th at 8:30 am 
 
 
  
Vertigo: Care Instructions Your Care Instructions Vertigo is the feeling that you or your surroundings are moving when there is no actual movement. It is often described as a feeling of spinning, whirling, falling, or tilting. Vertigo may make you vomit or feel nauseated. You may have trouble standing or walking and may lose your balance. Vertigo is often related to an inner ear problem, but it can have other more serious causes. If vertigo continues, you may need more tests to find its cause. Follow-up care is a key part of your treatment and safety. Be sure to make and go to all appointments, and call your doctor if you are having problems.  It's also a good idea to know your test results and keep a list of the medicines you take. How can you care for yourself at home? · Do not lie flat on your back. Prop yourself up slightly. This may reduce the spinning feeling. Keep your eyes open. · Move slowly so that you do not fall. · If your doctor recommends medicine, take it exactly as directed. · Do not drive while you are having vertigo. Certain exercises, called Ariza-Daroff exercises, can help decrease vertigo. To do Ariza-Daroff exercises: · Sit on the edge of a bed or sofa and quickly lie down on the side that causes the worst vertigo. Lie on your side with your ear down. · Stay in this position for at least 30 seconds or until the vertigo goes away. · Sit up. If this causes vertigo, wait for it to stop. · Repeat the procedure on the other side. · Repeat this 10 times. Do these exercises 2 times a day until the vertigo is gone. When should you call for help? Call 911 anytime you think you may need emergency care. For example, call if: 
? · You passed out (lost consciousness). ? · You have symptoms of a stroke. These may include: 
¨ Sudden numbness, tingling, weakness, or loss of movement in your face, arm, or leg, especially on only one side of your body. ¨ Sudden vision changes. ¨ Sudden trouble speaking. ¨ Sudden confusion or trouble understanding simple statements. ¨ Sudden problems with walking or balance. ¨ A sudden, severe headache that is different from past headaches. ?Call your doctor now or seek immediate medical care if: 
? · Vertigo occurs with a fever, a headache, or ringing in your ears. ? · You have new or increased nausea and vomiting. ? Watch closely for changes in your health, and be sure to contact your doctor if: 
? · Vertigo gets worse or happens more often. ? · Vertigo has not gotten better after 2 weeks. Where can you learn more? Go to http://marquis-thomas.info/. Enter F220 in the search box to learn more about \"Vertigo: Care Instructions. \" 
 Current as of: May 12, 2017 Content Version: 11.4 © 9686-4496 Healthwise, [employer]. Care instructions adapted under license by [employer] (which disclaims liability or warranty for this information). If you have questions about a medical condition or this instruction, always ask your healthcare professional. [address] any warranty or liability for your use of this information. Introducing [facility] HE[facility] SERVICES! Edwar Dubose introduces "[employer]" patient portal. Now you can access parts of your medical record, email your doctor's office, and request medication refills online. 1. In your internet browser, go to [email or web address] 2. Click on the First Time User? Click Here link in the Sign In box. You will see the New Member Sign Up page. 3. Enter your "[employer]" Access Code exactly as it appears below. You will not need to use this code after youve completed the sign-up process. If you do not sign up before the expiration date, you must request a new code. · "[employer]" Access Code: [email or web address] Expires: 3/18/2018 11:01 AM 
 
4. Enter the last four digits of your Social Security Number (xxxx) and Date of Birth (mm/dd/yyyy) as indicated and click Submit. You will be taken to the next sign-up page. 5. Create a "[employer]" ID. This will be your "[employer]" login ID and cannot be changed, so think of one that is secure and easy to remember. 6. Create a "[employer]" password. You can change your password at any time. 7. Enter your Password Reset Question and Answer. This can be used at a later time if you forget your password. 8. Enter your e-mail address. You will receive e-mail notification when new information is available in [address] 9. Click Sign Up. You can now view and download portions of your medical record. 10. Click the Download Summary menu link to download a portable copy of your medical information. If you have questions, please visit the Frequently Asked Questions section of the Noblt website. Remember, [employer] is NOT to be used for urgent needs. For medical emergencies, dial 911. Now available from your iPhone and Android! Please provide this summary of care documentation to your next provider. Your primary care clinician is listed as Karen Felix. If you have any questions after today's visit, please call [phone].

## 2018-02-27 NOTE — PROGRESS NOTES
Yung Jernigan is a 65 y.o. male who was seen in clinic today (2/27/2018). Assessment & Plan:   Diagnoses and all orders for this visit:    1. Vertigo differential diagnosis reviewed- new dx, has resolved, do not think this is ear related and reviewed my rationale, would favor more orthostatic or arrhythmia. Has not returned so we will monitor for now. If any further episodes develop reviewed when to go to ER or notify me. Will need Holter monitor at that time. 2. Paroxysmal atrial fibrillation ([address]- in NSR currently, unclear if related to #1, monitor for now, INR at goal    3. Erectile dysfunction, unspecified erectile dysfunction type- improved, dramatically improved after hernia surgery per him and has not needed Cialis. Unclear of correlation but will continue to monitor. Follow-up Disposition:  Return if symptoms worsen or fail to improve. Subjective:   Tori Bardales was seen today for Dizziness    URI Review  Tori Bardales returns to clinic today to talk about: dizziness for 2 days ago, which are resolved since yesterday. He reports it was intermittent, felt like he was going to faint (but did not), only happened once and lasted for 15 minutes. (Sunday, 2/25 at ~9am). He tried some OTC swimmer's ear drops. See ROS          Prior to Admission medications    Medication Sig Start Date End Date Taking?  Authorizing Provider   terazosin (HYTRIN) 10 mg capsule TAKE 1 CAPSULE EVERY NIGHT 1/5/18  Yes Swapnil Rm MD   hydroCHLOROthiazide (HYDRODIURIL) 25 mg tablet TAKE 1 TABLET EVERY DAY 1/5/18  Yes Swapnil Rm MD   potassium chloride (KLOR-CON) 10 mEq tablet TAKE 1 TABLET THREE TIMES DAILY 1/5/18  Yes Swapnil Rm MD   metoprolol succinate (TOPROL-XL) 25 mg XL tablet TAKE 1 TABLET EVERY DAY 1/5/18  Yes Swapnil Rm MD   enalapril (VASOTEC) 20 mg tablet TAKE 1 TABLET TWICE DAILY 12/25/17  Yes Swapnil Rm MD   warfarin (COUMADIN) 2 mg tablet Take 2 Tabs by mouth daily. 12/19/17  Yes Deanrde Loving MD   pravastatin (PRAVACHOL) 10 mg tablet TAKE 1 TABLET EVERY NIGHT 11/13/17  Yes Deandre Loving MD   tadalafil (CIALIS) 20 mg tablet Take 1 Tab by mouth as needed. 11/7/17  Yes Deandre Loving MD   ondansetron (ZOFRAN ODT) 4 mg disintegrating tablet Take 1 Tab by mouth every eight (8) hours as needed for Nausea. 9/12/17  Yes Armando Dowling MD   amLODIPine (NORVASC) 5 mg tablet Take 5 mg by mouth daily. Yes Historical Provider   aspirin delayed-release 81 mg tablet Take  by mouth daily. Yes Historical Provider   ARGININE, L-ARGININE, PO Take 1 Tab by mouth two (2) times a day. L Arginine/L Citrulline 4000mg/1000mg qd   Yes Historical Provider   FOLIC ACID PO Take 452 mcg by mouth daily. Yes Historical Provider   thioctic acid (ALPHA LIPOIC ACID) 50 mg tab Take 1 Tab by mouth daily. Yes Historical Provider   fiber Cap Take 2 Caps by mouth two (2) times a day. Yes Historical Provider   MULTIVITAMIN WITH MINERALS (MULTI-VIT 55 PLUS PO) Take 1 Tab by mouth daily. 7/8/11  Yes Historical Provider   acetaminophen (TYLENOL) 325 mg tablet Take  by mouth every four (4) hours as needed for Pain. Historical Provider          Allergies   Allergen Reactions    Pcn [Penicillins] Unknown (comments)     As a child    Percocet [Oxycodone-Acetaminophen] Swelling     Leg swelling           Review of Systems   Constitutional: Negative for malaise/fatigue and weight loss. Respiratory: Negative for cough and shortness of breath. Cardiovascular: Negative for chest pain and palpitations. Neurological: Positive for dizziness. Negative for headaches. Objective:   Physical Exam   Constitutional:   obese   HENT:   Right Ear: Tympanic membrane, external ear and ear canal normal.   Left Ear: Tympanic membrane, external ear and ear canal normal.   Cardiovascular: Regular rhythm and normal heart sounds.     Pulmonary/Chest: Effort normal and breath sounds normal. He has no wheezes. He has no rales. Visit Vitals    /80    Pulse 84    Temp 98 °F (36.7 °C) (Oral)    Resp 18    Ht 5' 10\" (1.778 m)    Wt 219 lb (99.3 kg)    SpO2 97%    BMI 31.42 kg/m2         Disclaimer:  Advised him to call back or return to office if symptoms worsen/change/persist.  Discussed expected course/resolution/complications of diagnosis in detail with patient. Medication risks/benefits/costs/interactions/alternatives discussed with patient. He was given an after visit summary which includes diagnoses, current medications, & vitals. He expressed understanding with the diagnosis and plan. Aspects of this note may have been generated using voice recognition software. Despite editing, there may be some syntax errors.        Julieta Felty, MD

## 2018-03-13 ENCOUNTER — CLINICAL SUPPORT (OUTPATIENT)
Dept: CARDIOLOGY CLINIC | Age: 78
End: 2018-03-13

## 2018-03-13 ENCOUNTER — OFFICE VISIT (OUTPATIENT)
Dept: CARDIOLOGY CLINIC | Age: 78
End: 2018-03-13

## 2018-03-13 VITALS
HEIGHT: 70 IN | DIASTOLIC BLOOD PRESSURE: 78 MMHG | OXYGEN SATURATION: 98 % | HEART RATE: 84 BPM | SYSTOLIC BLOOD PRESSURE: 120 MMHG | WEIGHT: 219 LBS | RESPIRATION RATE: 16 BRPM | BODY MASS INDEX: 31.35 KG/M2

## 2018-03-13 DIAGNOSIS — I48.0 PAROXYSMAL ATRIAL FIBRILLATION (HCC): ICD-10-CM

## 2018-03-13 DIAGNOSIS — I10 BENIGN ESSENTIAL HTN: ICD-10-CM

## 2018-03-13 DIAGNOSIS — Z79.01 CHRONIC ANTICOAGULATION: ICD-10-CM

## 2018-03-13 DIAGNOSIS — Z95.810 S/P ICD (INTERNAL CARDIAC DEFIBRILLATOR) PROCEDURE: ICD-10-CM

## 2018-03-13 DIAGNOSIS — I25.10 CORONARY ARTERY DISEASE INVOLVING NATIVE CORONARY ARTERY OF NATIVE HEART WITHOUT ANGINA PECTORIS: Primary | ICD-10-CM

## 2018-03-13 DIAGNOSIS — Z95.810 PRESENCE OF AUTOMATIC CARDIOVERTER/DEFIBRILLATOR (AICD): Primary | ICD-10-CM

## 2018-03-13 NOTE — MR AVS SNAPSHOT
[address] [facility] [address] [place] 
[phone] Patient: Mayito Gentile MRN: [id number] Visit Information Date & Time Provider Department Dept. Phone Encounter #  
 3/13/2018  3:40 PM Bailey Menchaca MD [facility] [place]ia [phone] [id number] Your Appointments 3/23/2018  3:30 PM  
PHYSICAL with Grace Batres MD  
[facility] Internal Medicine [facility]) Appt Note: Medicare wellness [address] [place]  
[address] [place]  
  
    
 3/27/2018  8:30 AM  
ANTICOAG NURSE with [facility] Internal Medicine ([facility]) Appt Note: [address] [place]  
[address] [place]  
  
    
 6/26/2018 10:45 AM  
PACEMAKER with Coatessrikanth Landers [facility] Appt Note: mdt icd, rc, no CL b 3/13/18  
 [address] [address]  
[address] [place]  
  
    
 9/14/2018  8:40 AM  
ESTABLISHED PATIENT with Bailey Menchaca MD  
[facility] ([facility]) Appt Note: 6 mo f/u per Dr. Constantine Dyer [address] [place] [address]  
[address] [facility] [id number] Upcoming Health Maintenance Date Due  
 GLAUCOMA SCREENING Q2Y 9/1/2016 MEDICARE YEARLY EXAM 3/4/2018 COLONOSCOPY 5/10/2022 DTaP/Tdap/Td series (2 - Td) 7/14/2025 Allergies as of 3/13/2018  Review Complete On: 3/13/2018 By: Anson Apley Severity Noted Reaction Type Reactions Pcn [Penicillins]  03/27/2011    Unknown (comments) As a child Percocet [Oxycodone-acetaminophen]  03/27/2011    Swelling Leg swelling Current Immunizations  Reviewed on 2/27/2018 Name Date Influenza High Dose Vaccine PF 11/6/2017, 10/7/2016, 10/12/2015, 10/6/2014 Influenza Vaccine 10/1/2013 Influenza Vaccine Split 10/15/2012, 10/20/2011 Pneumococcal Conjugate (PCV-13) 7/14/2015 Pneumococcal Polysaccharide (PPSV-23) 10/7/2016 Pneumococcal Vaccine (Pcv) 1/1/2005 Tdap 7/14/2015 Zoster Vaccine, Live 1/1/2011 Not reviewed this visit Vitals BP Pulse Resp Height(growth percentile) Weight(growth percentile) SpO2  
 120/78 (BP 1 Location: Left arm, BP Patient Position: Sitting) 84 16 5' 10\" (1.778 m) 219 lb (99.3 kg) 98% BMI Smoking Status 31.42 kg/m2 Never Smoker Vitals History BMI and BSA Data Body Mass Index Body Surface Area  
 31.42 kg/m 2 2.21 m 2 Preferred Pharmacy Pharmacy Name Phone [place] [id number] [address] [phone] Your Updated Medication List  
  
   
This list is accurate as of 3/13/18  4:01 PM.  Always use your most recent med list. amLODIPine 5 mg tablet Commonly known as:  Regi Ross Take 5 mg by mouth daily. ARGININE (L-ARGININE) PO Take 1 Tab by mouth two (2) times a day. L Arginine/L Citrulline 4000mg/1000mg qd  
  
 aspirin delayed-release 81 mg tablet Take  by mouth daily. enalapril 20 mg tablet Commonly known as:  VASOTEC  
TAKE 1 TABLET TWICE DAILY  
  
 fiber Cap Take 2 Caps by mouth two (2) times a day. FOLIC ACID PO Take 400 mcg by mouth daily. hydroCHLOROthiazide 25 mg tablet Commonly known as:  HYDRODIURIL  
TAKE 1 TABLET EVERY DAY  
  
 metoprolol succinate 25 mg XL tablet Commonly known as:  TOPROL-XL  
TAKE 1 TABLET EVERY DAY  
  
 MULTI-VIT 55 PLUS PO Take 1 Tab by mouth daily. ondansetron 4 mg disintegrating tablet Commonly known as:  ZOFRAN ODT  
 Take 1 Tab by mouth every eight (8) hours as needed for Nausea. potassium chloride 10 mEq tablet Commonly known as:  KLOR-CON  
TAKE 1 TABLET THREE TIMES DAILY pravastatin 10 mg tablet Commonly known as:  PRAVACHOL  
TAKE 1 TABLET EVERY NIGHT  
  
 tadalafil 20 mg tablet Commonly known as:  CIALIS Take 1 Tab by mouth as needed. terazosin 10 mg capsule Commonly known as:  HYTRIN  
TAKE 1 CAPSULE EVERY NIGHT  
  
 thioctic acid 50 mg Tab Generic drug:  Alpha Lipoic Acid Take 1 Tab by mouth daily. TYLENOL 325 mg tablet Generic drug:  acetaminophen Take  by mouth every four (4) hours as needed for Pain.  
  
 warfarin 2 mg tablet Commonly known as:  COUMADIN Take 2 Tabs by mouth daily. Introducing [facility]! Agustin Fiore introduces [employer] patient portal. Now you can access parts of your medical record, email your doctor's office, and request medication refills online. 1. In your internet browser, go to [email or web address] 2. Click on the First Time User? Click Here link in the Sign In box. You will see the New Member Sign Up page. 3. Enter your [employer] Access Code exactly as it appears below. You will not need to use this code after youve completed the sign-up process. If you do not sign up before the expiration date, you must request a new code. · [employer] Access Code: [email or web address] Expires: 3/18/2018 12:01 PM 
 
4. Enter the last four digits of your Social Security Number (xxxx) and Date of Birth (mm/dd/yyyy) as indicated and click Submit. You will be taken to the next sign-up page. 5. Create a Case Rover[employer] ID. This will be your [employer] login ID and cannot be changed, so think of one that is secure and easy to remember. 6. Create a [employer] password. You can change your password at any time. 7. Enter your Password Reset Question and Answer. This can be used at a later time if you forget your password. 8. Enter your e-mail address. You will receive e-mail notification when new information is available in [address] 9. Click Sign Up. You can now view and download portions of your medical record. 10. Click the Download Summary menu link to download a portable copy of your medical information. If you have questions, please visit the Frequently Asked Questions section of the [employer] website. Remember, [employer] is NOT to be used for urgent needs. For medical emergencies, dial 911. Now available from your iPhone and Android! Please provide this summary of care documentation to your next provider. Your primary care clinician is listed as Paula Swan. If you have any questions after today's visit, please call [phone].

## 2018-03-13 NOTE — PROGRESS NOTES
[place]: Cullen Koch  [phone]    HPI:   Mr. Lindsay Alvarenga is a 66 yo M with h/o CAD s/p PCI in 2003, patent stent with no significant CAD on 2008 cath, afib on metoprolol for rate control and coumadin for anticoagulation, apical variant of a hypertrophic cardiomyopathy with deep T wave inversions on ECG, sick sinus with NSVT noted on 11/14/13 holter with multiple > 3 sec pauses s/p Medtronic ICD on 11/15/13 with Dr. Lidia Spaulding, s/p right hip revision on 12/5/13. MARIBEL in 2012 was normal.  7/2011 nuclear stress test was normal.  5/2012 echo noted EF of 50-55% and apical hypertrophy. Since his last visit he has been doing okay. He saw Dr. Krystyna Julien for dizziness back in 02/2018, but he says this has not recurred. He denies any palpitations, chest pain or shortness of breath. He has been compliant with his medications. Due to cost, he is back on Coumadin instead of Xarelto. No bleeding issues. He has some weight gain, but he attributes this to working out more and thinks he has been building more muscle. Blood pressure is 120/78 with a heart rate of 84. He is compensated on exam with clear lungs and no lower extremity edema. Assessment and Plan:   1. Atrial fibrillation and sick sinus syndrome, status post ICD for nonsustained ventricular tachycardia in 2013. Heart rate and blood pressure are stable. No bleeding issues on Coumadin. His Xarelto was cost prohibitive. 2. Coronary artery disease. Stable and compensated. Continue aspirin, statin, beta-blocker and ACE inhibitor. Will have him follow back in six months. 3. Hypertrophic cardiomyopathy. Stable on beta-blocker. 4. Mixed hyperlipidemia. Tolerating statin. 5. Essential hypertension. Blood pressure is controlled and no changes made. 6. Erectile dysfunction. He takes Cialis or Viagra prn. Cautioned in the past about avoiding nitroglycerin concurrently.           He  has a past medical history of Arthritis; CAD (coronary artery disease); Chronic atrial fibrillation [facility]); ED (erectile dysfunction); Hypertension; Kidney stone on right side (10/30/2011); JAMEY (obstructive sleep apnea) (4/25/2012); Skin cancer; Sun-damaged skin; and TIA (transient ischemic attack). He also has no past medical history of Arsenic suspected exposure; Family history of skin cancer; Radiation exposure; Sunburn, blistering; or Tanning bed exposure. All other systems negative except as above. PE  Vitals:    03/13/18 1502   BP: 120/78   Pulse: 84   Resp: 16   SpO2: 98%   Weight: 219 lb (99.3 kg)   Height: 5' 10\" (1.778 m)    Body mass index is 31.42 kg/(m^2). General appearance - alert, well appearing, and in no distress  Mental status - affect appropriate to mood  Neck - supple, no JVD. No bruits present.    Chest - clear to auscultation, no wheezes, rales or rhonchi  Heart - Regular rate, regular rhythm, normal S1, S2, I/VI systolic murmur LUSB  Abdomen - soft, nontender, nondistended, no masses or organomegaly  Extremities - peripheral pulses normal, no pedal edema        Recent Labs:  Lab Results   Component Value Date/Time    Cholesterol, total 104 03/21/2017 08:41 AM    HDL Cholesterol 28 (L) 03/21/2017 08:41 AM    LDL, calculated 54 03/21/2017 08:41 AM    Triglyceride 110 03/21/2017 08:41 AM     Lab Results   Component Value Date/Time    Creatinine 0.98 09/05/2017 10:20 AM     Lab Results   Component Value Date/Time    BUN 16 09/05/2017 10:20 AM    BUN (POC) 16 12/04/2013 06:46 AM     Lab Results   Component Value Date/Time    Potassium 3.6 09/05/2017 10:20 AM     Lab Results   Component Value Date/Time    Hemoglobin A1c 5.3 11/19/2013 08:45 AM     Lab Results   Component Value Date/Time    HGB 13.8 09/05/2017 10:20 AM     Lab Results   Component Value Date/Time    PLATELET 455 29/64/4249 10:20 AM       Reviewed:  Past Medical History:   Diagnosis Date    Arthritis     knees    CAD (coronary artery disease)     stent x3 approx 2001    Chronic atrial fibrillation (HCC)     ED (erectile dysfunction)     Hypertension     Kidney stone on right side 10/30/2011    JAMEY (obstructive sleep apnea) 4/25/2012    Skin cancer     BCC, s/p Mohs on L flank    Sun-damaged skin     TIA (transient ischemic attack)     6/12 - [facility] at [facility]     History   Smoking Status    Never Smoker   Smokeless Tobacco    Never Used     History   Alcohol Use No     Allergies   Allergen Reactions    Pcn [Penicillins] Unknown (comments)     As a child    Percocet [Oxycodone-Acetaminophen] Swelling     Leg swelling       Current Outpatient Prescriptions   Medication Sig    terazosin (HYTRIN) 10 mg capsule TAKE 1 CAPSULE EVERY NIGHT    hydroCHLOROthiazide (HYDRODIURIL) 25 mg tablet TAKE 1 TABLET EVERY DAY    potassium chloride (KLOR-CON) 10 mEq tablet TAKE 1 TABLET THREE TIMES DAILY    metoprolol succinate (TOPROL-XL) 25 mg XL tablet TAKE 1 TABLET EVERY DAY    enalapril (VASOTEC) 20 mg tablet TAKE 1 TABLET TWICE DAILY    warfarin (COUMADIN) 2 mg tablet Take 2 Tabs by mouth daily.  pravastatin (PRAVACHOL) 10 mg tablet TAKE 1 TABLET EVERY NIGHT    tadalafil (CIALIS) 20 mg tablet Take 1 Tab by mouth as needed.  ondansetron (ZOFRAN ODT) 4 mg disintegrating tablet Take 1 Tab by mouth every eight (8) hours as needed for Nausea.  amLODIPine (NORVASC) 5 mg tablet Take 5 mg by mouth daily.  aspirin delayed-release 81 mg tablet Take  by mouth daily.  acetaminophen (TYLENOL) 325 mg tablet Take  by mouth every four (4) hours as needed for Pain.  ARGININE, L-ARGININE, PO Take 1 Tab by mouth two (2) times a day. L Arginine/L Citrulline [id number]/[id number] qd    FOLIC ACID PO Take 382 mcg by mouth daily.  thioctic acid (ALPHA LIPOIC ACID) 50 mg tab Take 1 Tab by mouth daily.  fiber Cap Take 2 Caps by mouth two (2) times a day.  MULTIVITAMIN WITH MINERALS (MULTI-VIT 55 PLUS PO) Take 1 Tab by mouth daily. No current facility-administered medications for this visit. MD [facility] [place]  [address] [facility] [address]  [address]

## 2018-03-14 ENCOUNTER — TELEPHONE (OUTPATIENT)
Dept: INTERNAL MEDICINE CLINIC | Age: 78
End: 2018-03-14

## 2018-03-23 ENCOUNTER — OFFICE VISIT (OUTPATIENT)
Dept: INTERNAL MEDICINE CLINIC | Age: 78
End: 2018-03-23

## 2018-03-23 VITALS
OXYGEN SATURATION: 97 % | TEMPERATURE: 97.9 F | SYSTOLIC BLOOD PRESSURE: 132 MMHG | DIASTOLIC BLOOD PRESSURE: 76 MMHG | WEIGHT: 216 LBS | RESPIRATION RATE: 16 BRPM | HEART RATE: 76 BPM | BODY MASS INDEX: 32.74 KG/M2 | HEIGHT: 68 IN

## 2018-03-23 DIAGNOSIS — I25.10 CORONARY ARTERY DISEASE INVOLVING NATIVE CORONARY ARTERY OF NATIVE HEART WITHOUT ANGINA PECTORIS: ICD-10-CM

## 2018-03-23 DIAGNOSIS — Z79.01 CHRONIC ANTICOAGULATION: ICD-10-CM

## 2018-03-23 DIAGNOSIS — E66.9 OBESITY (BMI 30.0-34.9): ICD-10-CM

## 2018-03-23 DIAGNOSIS — I48.0 PAROXYSMAL ATRIAL FIBRILLATION (HCC): ICD-10-CM

## 2018-03-23 DIAGNOSIS — Z71.89 ACP (ADVANCE CARE PLANNING): ICD-10-CM

## 2018-03-23 DIAGNOSIS — Z23 ENCOUNTER FOR IMMUNIZATION: ICD-10-CM

## 2018-03-23 DIAGNOSIS — I10 ESSENTIAL HYPERTENSION: ICD-10-CM

## 2018-03-23 DIAGNOSIS — Z00.00 MEDICARE ANNUAL WELLNESS VISIT, SUBSEQUENT: Primary | ICD-10-CM

## 2018-03-23 DIAGNOSIS — N52.9 ERECTILE DYSFUNCTION, UNSPECIFIED ERECTILE DYSFUNCTION TYPE: ICD-10-CM

## 2018-03-23 LAB
INR BLD: 2.6
PT POC: 31 SECONDS
VALID INTERNAL CONTROL?: YES

## 2018-03-23 RX ORDER — SILDENAFIL 50 MG/1
50 TABLET, FILM COATED ORAL AS NEEDED
Qty: 10 TAB | Refills: 5 | Status: SHIPPED | OUTPATIENT
Start: 2018-03-23 | End: 2018-04-30 | Stop reason: SDUPTHER

## 2018-03-23 NOTE — MR AVS SNAPSHOT
[address] [facility] [address] [place] [address] 
[phone] Patient: Hieu Espinoza MRN: [id number] YAP:[id number] Visit Information Date & Time Provider Department Dept. Phone Encounter #  
 3/23/2018  3:30 PM Isabel Titus, [address] [facility] Internal Medicine [phone] [id number] Follow-up Instructions Return in about 6 months (around 9/23/2018), or if symptoms worsen or fail to improve. Your Appointments 3/27/2018  8:30 AM  
ANTICOAG NURSE with AZAR Brunson [facility] Internal Medicine ([facility] Appt Note: [address] [facility] [address] [place] [address]  
[address] [facility]  
  
    
 6/26/2018 10:45 AM  
PACEMAKER with Whitney Shah [facility] ([facility] SCHEDULING) Appt Note: mdt icd, rc, no CL b 3/13/18  
 [address]001 [address] 200 [facility] [address] [facility] [address]  
[address] 1000 [facility]  
  
    
 9/14/2018  8:40 AM  
ESTABLISHED PATIENT with Ruby Martinez MD  
[facility] ([facility]) Appt Note: 6 mo f/u per Dr. Godinez List 200 1400 [address] [place] [address] 63 2301 [facility],[address] [facility] [id number] Upcoming Health Maintenance Date Due  
 GLAUCOMA SCREENING Q2Y 9/1/2016 MEDICARE YEARLY EXAM 3/23/2018 COLONOSCOPY 5/10/2022 DTaP/Tdap/Td series (2 - Td) 7/14/2025 Allergies as of 3/23/2018  Review Complete On: 3/23/2018 By: Isabel Titus MD  
  
 Severity Noted Reaction Type Reactions Pcn [Penicillins]  03/27/2011    Unknown (comments) As a child Percocet [Oxycodone-acetaminophen]  03/27/2011    Swelling Leg swelling Current Immunizations  Reviewed on 3/23/2018 Name Date Influenza High Dose Vaccine PF 11/6/2017, 10/7/2016, 10/12/2015, 10/6/2014 Influenza Vaccine 10/1/2013 Influenza Vaccine Split 10/15/2012, 10/20/2011 Pneumococcal Conjugate (PCV-13) 7/14/2015 Pneumococcal Polysaccharide (PPSV-23) 10/7/2016 Pneumococcal Vaccine (Pcv) 1/1/2005 Tdap 7/14/2015 Zoster Vaccine, Live 1/1/2011 Reviewed by Mich Gnuter RN on 3/23/2018 at  2:27 PM  
You Were Diagnosed With   
  
 Codes Comments Medicare annual wellness visit, subsequent    -  Primary ICD-10-CM: Z00.00 ICD-9-CM: V70.0 Encounter for immunization     ICD-10-CM: X73 ICD-9-CM: V03.89   
 ACP (advance care planning)     ICD-10-CM: Z71.89 ICD-9-CM: V65.49 Chronic anticoagulation     ICD-10-CM: Z79.01 
ICD-9-CM: V58.61 Paroxysmal atrial fibrillation (HCC)     ICD-10-CM: I48.0 ICD-9-CM: 427.31 Obesity (BMI 30.0-34.9)     ICD-10-CM: H14.2 ICD-9-CM: 278.00 Coronary artery disease involving native coronary artery of native heart without angina pectoris     ICD-10-CM: I25.10 ICD-9-CM: 414.01 Essential hypertension     ICD-10-CM: I10 
ICD-9-CM: 401.9 Erectile dysfunction, unspecified erectile dysfunction type     ICD-10-CM: N52.9 ICD-9-CM: 607.84 Vitals BP Pulse Temp Resp Height(growth percentile) Weight(growth percentile) 132/76 76 97.9 °F (36.6 °C) (Oral) 16 5' 8.25\" (1.734 m) 216 lb (98 kg) SpO2 BMI Smoking Status 97% 32.6 kg/m2 Never Smoker BMI and BSA Data Body Mass Index Body Surface Area  
 32.6 kg/m 2 2.17 m 2 Preferred Pharmacy Pharmacy Name Phone [place] [phone] [address] AT [place] [address] [phone] Your Updated Medication List  
  
   
This list is accurate as of 3/23/18  3:31 PM.  Always use your most recent med list. amLODIPine 5 mg tablet Commonly known as:  Ruba Beverly Take 5 mg by mouth daily. ARGININE (L-ARGININE) PO Take 1 Tab by mouth two (2) times a day. L Arginine/L Citrulline 4000mg/1000mg qd  
  
 aspirin delayed-release 81 mg tablet Take  by mouth daily. enalapril 20 mg tablet Commonly known as:  VASOTEC  
TAKE 1 TABLET TWICE DAILY  
  
 fiber Cap Take 2 Caps by mouth two (2) times a day. FOLIC ACID PO Take 400 mcg by mouth daily. hydroCHLOROthiazide 25 mg tablet Commonly known as:  HYDRODIURIL  
TAKE 1 TABLET EVERY DAY  
  
 metoprolol succinate 25 mg XL tablet Commonly known as:  TOPROL-XL  
TAKE 1 TABLET EVERY DAY  
  
 MULTI-VIT 55 PLUS PO Take 1 Tab by mouth daily. ondansetron 4 mg disintegrating tablet Commonly known as:  ZOFRAN ODT Take 1 Tab by mouth every eight (8) hours as needed for Nausea. potassium chloride 10 mEq tablet Commonly known as:  KLOR-CON  
TAKE 1 TABLET THREE TIMES DAILY pravastatin 10 mg tablet Commonly known as:  PRAVACHOL  
TAKE 1 TABLET EVERY NIGHT  
  
 sildenafil citrate 50 mg tablet Commonly known as:  VIAGRA Take 1 Tab by mouth as needed. terazosin 10 mg capsule Commonly known as:  HYTRIN  
TAKE 1 CAPSULE EVERY NIGHT  
  
 thioctic acid 50 mg Tab Generic drug:  Alpha Lipoic Acid Take 1 Tab by mouth daily. TYLENOL 325 mg tablet Generic drug:  acetaminophen Take  by mouth every four (4) hours as needed for Pain.  
  
 varicella-zoster recombinant (PF) 50 mcg/0.5 mL Susr injection Commonly known as:  SHINGRIX (PF)  
0.5 ml IM once and then repeat in 2-6 months. warfarin 2 mg tablet Commonly known as:  COUMADIN Take 2 Tabs by mouth daily. Prescriptions Printed Refills  
 varicella-zoster recombinant, PF, (SHINGRIX, PF,) 50 mcg/0.5 mL susr injection 1 Si.5 ml IM once and then repeat in 2-6 months. Class: Print Prescriptions Sent to Pharmacy Refills  
 sildenafil citrate (VIAGRA) 50 mg tablet 5 Sig: Take 1 Tab by mouth as needed.   
 Class: Normal  
 Pharmacy: [facility] [address] [place] [address] [address] [facility] #: [phone] Route: Oral  
  
We Performed the Following AMB POC PT/INR [01741 CPT(R)] CBC W/O DIFF [51999 CPT(R)] LIPID PANEL [08912 CPT(R)] METABOLIC PANEL, COMPREHENSIVE [10074 CPT(R)] Follow-up Instructions Return in about 6 months (around 9/23/2018), or if symptoms worsen or fail to improve. Patient Instructions Medicare Wellness Visit, Male The best way to live healthy is to have a healthy lifestyle by eating a well-balanced diet, exercising regularly, limiting alcohol and stopping smoking. Regular physical exams and screening tests are another way to keep healthy. Preventive exams provided by your health care provider can find health problems before they become diseases or illnesses. Preventive services including immunizations, screening tests, monitoring and exams can help you take care of your own health. All people over age 72 should have a pneumovax  and and a prevnar shot to prevent pneumonia. These are once in a lifetime unless you and your provider decide differently. All people over 65 should have a yearly flu shot and a tetanus vaccine every 10 years. Screening for diabetes mellitus with a blood sugar test should be done every year. Glaucoma is a disease of the eye due to increased ocular pressure that can lead to blindness and it should be done every year by an eye professional. 
 
Cardiovascular screening tests that check for elevated lipids (fatty part of blood) which can lead to heart disease and strokes should be done every 5 years. Colorectal screening that evaluates for blood or polyps in your colon should be done yearly as a stool test or every five years as a flexible sigmoidoscope or every 10 years as a colonoscopy up to age 76. Men up to age 76 may need a screening blood test for prostate cancer at certain intervals, depending on their personal and family history. This decision is between the patient and his provider. If you have been a smoker or had family history of abdominal aortic aneurysms, you and your provider may decide to schedule an ultrasound test of your aorta. Hepatitis C screening is also recommended for anyone born between [birth year] through [address]. A shingles vaccine is also recommended once in a lifetime after age 61. Your Medicare Wellness Exam is recommended annually. Here is a list of your current Health Maintenance items with a due date: 
Health Maintenance Due Topic Date Due  Glaucoma Screening   09/01/2016 Madeline Severs Annual Well Visit  03/23/2018 Shakira Wheately 1729 What is a living will? A living will is a legal form you use to write down the kind of care you want at the end of your life. It is used by the health professionals who will treat you if you aren't able to decide for yourself. If you put your wishes in writing, your loved ones and others will know what kind of care you want. They won't need to guess. This can ease your mind and be helpful to others. A living will is not the same as an estate or property will. An estate will explains what you want to happen with your money and property after you die. Is a living will a legal document? A living will is a legal document. Each state has its own laws about living rubi. If you move to another state, make sure that your living will is legal in the state where you now live. Or you might use a universal form that has been approved by many states. This kind of form can sometimes be completed and stored online. Your electronic copy will then be available wherever you have a connection to the Internet. In most cases, doctors will respect your wishes even if you have a form from a different state. · You don't need an  to complete a living will. But legal advice can be helpful if your state's laws are unclear, your health history is complicated, or your family can't agree on what should be in your living will. · You can change your living will at any time. Some people find that their wishes about end-of-life care change as their health changes. · In addition to making a living will, think about completing a medical power of  form. This form lets you name the person you want to make end-of-life treatment decisions for you (your \"health care agent\") if you're not able to. Many hospitals and nursing homes will give you the forms you need to complete a living will and a medical power of . · Your living will is used only if you can't make or communicate decisions for yourself anymore. If you become able to make decisions again, you can accept or refuse any treatment, no matter what you wrote in your living will. · Your state may offer an online registry. This is a place where you can store your living will online so the doctors and nurses who need to treat you can find it right away. What should you think about when creating a living will? Talk about your end-of-life wishes with your family members and your doctor. Let them know what you want. That way the people making decisions for you won't be surprised by your choices. Think about these questions as you make your living will: · Do you know enough about life support methods that might be used? If not, talk to your doctor so you know what might be done if you can't breathe on your own, your heart stops, or you're unable to swallow. · What things would you still want to be able to do after you receive life-support methods? Would you want to be able to walk? To speak? To eat on your own? To live without the help of machines? · If you have a choice, where do you want to be cared for? In your home? At a hospital or nursing home? · Do you want certain Restorationist practices performed if you become very ill? · If you have a choice at the end of your life, where would you prefer to die? At home? In a hospital or nursing home? Somewhere else? · Would you prefer to be buried or cremated? · Do you want your organs to be donated after you die? What should you do with your living will? · Make sure that your family members and your health care agent have copies of your living will. · Give your doctor a copy of your living will to keep in your medical record. If you have more than one doctor, make sure that each one has a copy. · You may want to put a copy of your living will where it can be easily found. Where can you learn more? Go to http://marquis-thomas.info/. Enter E125 in the search box to learn more about \"Learning About Living Perroy. \" Current as of: August 8, 2016 Content Version: 11.3 © 2331-6658 [employer] [place]. Care instructions adapted under license by [employer] (which disclaims liability or warranty for this information). If you have questions about a medical condition or this instruction, always ask your healthcare professional. [place] [address] any warranty or liability for your use of this information. Generic Viagra Prescriptions [address] Phone: [phone] Fax: [phone] Email: [email or web address] Sildenafil 20mg. #90 tablets for $90. 
 
  
 
 
  
Introducing [facility]! Carmelita Fothergill introduces Admeld patient portal. Now you can access parts of your medical record, email your doctor's office, and request medication refills online. 1. In your internet browser, go to [email or web address] 2. Click on the First Time User? Click Here link in the Sign In box. You will see the New Member Sign Up page. 3. Enter your Admeld Access Code exactly as it appears below.  You will not need to use this code after youve completed the sign-up process. If you do not sign up before the expiration date, you must request a new code. · Bgifty Access Code: [email or web address] Expires: 6/21/2018  3:31 PM 
 
4. Enter the last four digits of your Social Security Number (xxxx) and Date of Birth (mm/dd/yyyy) as indicated and click Submit. You will be taken to the next sign-up page. 5. Create a Bgifty ID. This will be your Bgifty login ID and cannot be changed, so think of one that is secure and easy to remember. 6. Create a Bgifty password. You can change your password at any time. 7. Enter your Password Reset Question and Answer. This can be used at a later time if you forget your password. 8. Enter your e-mail address. You will receive e-mail notification when new information is available in [address]. 9. Click Sign Up. You can now view and download portions of your medical record. 10. Click the Download Summary menu link to download a portable copy of your medical information. If you have questions, please visit the Frequently Asked Questions section of the Bgifty website. Remember, Bgifty is NOT to be used for urgent needs. For medical emergencies, dial 911. Now available from your iPhone and Android! Please provide this summary of care documentation to your next provider. Your primary care clinician is listed as Lalitha Conte. If you have any questions after today's visit, please call [phone].

## 2018-03-23 NOTE — PATIENT INSTRUCTIONS
Medicare Wellness Visit, Male    The best way to live healthy is to have a healthy lifestyle by eating a well-balanced diet, exercising regularly, limiting alcohol and stopping smoking. Regular physical exams and screening tests are another way to keep healthy. Preventive exams provided by your health care provider can find health problems before they become diseases or illnesses. Preventive services including immunizations, screening tests, monitoring and exams can help you take care of your own health. All people over age 72 should have a pneumovax  and and a prevnar shot to prevent pneumonia. These are once in a lifetime unless you and your provider decide differently. All people over 65 should have a yearly flu shot and a tetanus vaccine every 10 years. Screening for diabetes mellitus with a blood sugar test should be done every year. Glaucoma is a disease of the eye due to increased ocular pressure that can lead to blindness and it should be done every year by an eye professional.    Cardiovascular screening tests that check for elevated lipids (fatty part of blood) which can lead to heart disease and strokes should be done every 5 years. Colorectal screening that evaluates for blood or polyps in your colon should be done yearly as a stool test or every five years as a flexible sigmoidoscope or every 10 years as a colonoscopy up to age 76. Men up to age 76 may need a screening blood test for prostate cancer at certain intervals, depending on their personal and family history. This decision is between the patient and his provider. If you have been a smoker or had family history of abdominal aortic aneurysms, you and your provider may decide to schedule an ultrasound test of your aorta. Hepatitis C screening is also recommended for anyone born between [birth year] through [address]. A shingles vaccine is also recommended once in a lifetime after age 61.     Your Medicare Wellness Exam is recommended annually. Here is a list of your current Health Maintenance items with a due date:  Health Maintenance Due   Topic Date Due    Glaucoma Screening   09/01/2016    Annual Well Visit  03/23/2018            Learning About Living Doretha  What is a living will? A living will is a legal form you use to write down the kind of care you want at the end of your life. It is used by the health professionals who will treat you if you aren't able to decide for yourself. If you put your wishes in writing, your loved ones and others will know what kind of care you want. They won't need to guess. This can ease your mind and be helpful to others. A living will is not the same as an estate or property will. An estate will explains what you want to happen with your money and property after you die. Is a living will a legal document? A living will is a legal document. Each state has its own laws about living rubi. If you move to another state, make sure that your living will is legal in the state where you now live. Or you might use a universal form that has been approved by many states. This kind of form can sometimes be completed and stored online. Your electronic copy will then be available wherever you have a connection to the Internet. In most cases, doctors will respect your wishes even if you have a form from a different state. · You don't need an  to complete a living will. But legal advice can be helpful if your state's laws are unclear, your health history is complicated, or your family can't agree on what should be in your living will. · You can change your living will at any time. Some people find that their wishes about end-of-life care change as their health changes. · In addition to making a living will, think about completing a medical power of  form.  This form lets you name the person you want to make end-of-life treatment decisions for you (your \"health care agent\") if you're not able to. Many hospitals and nursing homes will give you the forms you need to complete a living will and a medical power of . · Your living will is used only if you can't make or communicate decisions for yourself anymore. If you become able to make decisions again, you can accept or refuse any treatment, no matter what you wrote in your living will. · Your state may offer an online registry. This is a place where you can store your living will online so the doctors and nurses who need to treat you can find it right away. What should you think about when creating a living will? Talk about your end-of-life wishes with your family members and your doctor. Let them know what you want. That way the people making decisions for you won't be surprised by your choices. Think about these questions as you make your living will:  · Do you know enough about life support methods that might be used? If not, talk to your doctor so you know what might be done if you can't breathe on your own, your heart stops, or you're unable to swallow. · What things would you still want to be able to do after you receive life-support methods? Would you want to be able to walk? To speak? To eat on your own? To live without the help of machines? · If you have a choice, where do you want to be cared for? In your home? At a hospital or nursing home? · Do you want certain Mormonism practices performed if you become very ill? · If you have a choice at the end of your life, where would you prefer to die? At home? In a hospital or nursing home? Somewhere else? · Would you prefer to be buried or cremated? · Do you want your organs to be donated after you die? What should you do with your living will? · Make sure that your family members and your health care agent have copies of your living will. · Give your doctor a copy of your living will to keep in your medical record.  If you have more than one doctor, make sure that each one has a copy. · You may want to put a copy of your living will where it can be easily found. Where can you learn more? Go to http://marquis-thomas.info/. Enter T373 in the search box to learn more about \"Learning About Living [place]. \"  Current as of: August 8, 2016  Content Version: 11.3  © 0429-3423 [employer]. Care instructions adapted under license by [employer] (which disclaims liability or warranty for this information). If you have questions about a medical condition or this instruction, always ask your healthcare professional. [place] [address] any warranty or liability for your use of this information. Generic Viagra Prescriptions    [facility]  [address] [place] [address]  [address] [place] [address]    Phone: [phone]  Fax: [phone]  Email: [email or web address]    Sildenafil 20mg.   #90 tablets for $90.

## 2018-03-23 NOTE — ACP (ADVANCE CARE PLANNING)
Advance Care Planning (ACP) Provider Note - Comprehensive     Date of ACP Conversation: 03/23/18  Persons included in Conversation:  patient  Length of ACP Conversation in minutes: <16 minutes (Non-Billable)    Authorized Decision Maker (if patient is incapable of making informed decisions): This person is: Healthcare Agent/Medical Power of  under Advance Directive          General ACP for ALL Patients with Decision Making Capacity:  Importance of advance care planning, including choosing a healthcare agent to communicate patient's healthcare decisions if patient lost the ability to make decisions, such as after a sudden illness or accident  Understanding of the healthcare agent role was assessed and information provided  Opportunity offered to explore how cultural, Church, spiritual, or personal beliefs would affect decisions for future care  Exploration of values, goals, and preferences if recovery is not expected, even with continued medical treatment in the event of: Imminent death or severe, permanent brain injury    For Serious or Chronic Illness:  Understanding of CPR, goals and expected outcomes, benefits and burdens discussed. Understanding of medical condition  Information on CPR success rates provided (e.g. for CPR in hospital, survival to d/c at two weeks is 22%, for chronically ill or elderly/frail survival is less than 3%)    Interventions Provided:  Reviewed existing Advance Directive   Recommended communicating the plan and making copies for the healthcare agent, personal physician, and others as appropriate (e.g., health system)  Recommended review of completed ACP document annually or upon change in health status       He has an advanced directive - a copy has been provided & still reflects him wishes. Reviewed DNR/DNI and patient is not interested.

## 2018-03-23 NOTE — PROGRESS NOTES
Emily Sierra is a 65 y.o. male who was seen in clinic today (3/23/2018) for a full physical.   Patient was seen with Dr Aurea Rankin (R2 at [address] [place] [address]). Assessment & Plan:   Diagnoses and all orders for this visit:    1. Medicare annual wellness visit, subsequent    2. Encounter for immunization  -     varicella-zoster recombinant, PF, (SHINGRIX, PF,) 50 mcg/0.5 mL susr injection; 0.5 ml IM once and then repeat in 2-6 months. 3. ACP (advance care planning)    4. Chronic anticoagulation- stable, continue current treatment   -     AMB POC PT/INR    5. Paroxysmal atrial fibrillation (HCC)- stable, asymptomatic, no changed pending review of labs, INR at goal   -     CBC W/O DIFF    6. Obesity (BMI 30.0-34.9)- improved, needs improvement, I have reviewed/discussed the above normal BMI with the patient. I have recommended the following interventions: encourage exercise and lifestyle education regarding diet. 7. Coronary artery disease involving native coronary artery of native heart without angina pectoris- well controlled, asymptomatic. BP is well controlled, lipids are well controlled. Continue: current plan pending review of labs. -     METABOLIC PANEL, COMPREHENSIVE  -     LIPID PANEL    8. Essential hypertension- well controlled, continue current treatment pending review of labs  -     METABOLIC PANEL, COMPREHENSIVE    9. Erectile dysfunction, unspecified erectile dysfunction type- stable, due to cost asking for alternative options, will start on meds below, he reports covered by insurance, reviewed [place] options if this is to expensive. -     sildenafil citrate (VIAGRA) 50 mg tablet; Take 1 Tab by mouth as needed. Follow-up Disposition:  Return in about 6 months (around 9/23/2018), or if symptoms worsen or fail to improve, for Regular follow up.        ------------------------------------------------------------------------------------------    Subjective:    Annual Wellness Visit- Subsequent Visit    End of Life Planning: This was discussed with him today and he has NO advanced directive  - add't info provided. Reviewed DNR/DNI and patient is not interested. Depression Screen:   PHQ over the last two weeks 3/23/2018   Little interest or pleasure in doing things Not at all   Feeling down, depressed or hopeless Not at all   Total Score PHQ 2 0       Fall Risk:   Fall Risk Assessment, last 12 mths 3/23/2018   Able to walk? Yes   Fall in past 12 months? No       Abuse Screen:  Abuse Screening Questionnaire 3/23/2018   Do you ever feel afraid of your partner? N   Are you in a relationship with someone who physically or mentally threatens you? N   Is it safe for you to go home? Y         Alcohol Risk Factor Screening: On any occasion during the past 3 months, have you had more than 4 drinks containing alcohol? No  Do you average more than 14 drinks per week? No    Hearing Loss: Hearing is good. Cognition Screen:  Has your family/caregiver stated any concerns about your memory: no    Activities of Daily Living:    Requires assistance with: no ADLs  Home contains: grab bars  Exercise: very active    Adult Nutrition Screen:  No risk factors noted. Health Maintenance  Daily Aspirin: yes  AAA Screening: n/a (IPPE only)  Glaucoma Screening: overdue, he will schedule f/u      Immunizations:     Influenza: up to date. Tetanus: up to date. Shingles: due for Shingrix - script provided. Pneumonia: up to date. Cancer screening:    Prostate: reviewed guidelines, n/a. Colon: guidelines reviewed, n/a.        Patient Care Team:  Myron Ortiz MD as PCP - General (Internal Medicine)  Mireya Powers MD (Orthopedic Surgery)  Libertad Ramos MD (Dermatology)  Viviana Jorge MD (Urology)  Shayan Padilla MD (Neurology)  Avinash Chávez MD as Consulting Provider (Cardiology)  Marlene Jaime MD as Consulting Provider (Orthopedic Surgery)  Debbie Gonzalez RN as Nurse Navigator (Internal Medicine)  Gian Brown MD (Cardiology)  Nancy Merritt MD as Physician (Optometry)  Haris Swanson MD as Surgeon (Surgery)       The following sections were reviewed & updated as appropriate: PMH, PSH, FH, and SH. Patient Active Problem List   Diagnosis Code    DJD (degenerative joint disease) of knee M17.10    ED (erectile dysfunction) N52.9    Hypertension I10    CAD (coronary artery disease) I25.10    Paroxysmal atrial fibrillation (HCC) I48.0    JAMEY (obstructive sleep apnea) G47.33    Colon polyp K63.5    TIA (transient ischemic attack) G45.9    BCC (basal cell carcinoma), face C44.310    S/P ICD (internal cardiac defibrillator) procedure Z95.810    Recurrent UTI N39.0    Chronic anticoagulation Z79.01    Obesity (BMI 30.0-34. 9) E66.9          Prior to Admission medications    Medication Sig Start Date End Date Taking? Authorizing Provider   terazosin (HYTRIN) 10 mg capsule TAKE 1 CAPSULE EVERY NIGHT 1/5/18  Yes Aki Hazel MD   hydroCHLOROthiazide (HYDRODIURIL) 25 mg tablet TAKE 1 TABLET EVERY DAY 1/5/18  Yes Aki Hazel MD   potassium chloride (KLOR-CON) 10 mEq tablet TAKE 1 TABLET THREE TIMES DAILY 1/5/18  Yes Aki Hazel MD   metoprolol succinate (TOPROL-XL) 25 mg XL tablet TAKE 1 TABLET EVERY DAY 1/5/18  Yes Aki Hazel MD   enalapril (VASOTEC) 20 mg tablet TAKE 1 TABLET TWICE DAILY 12/25/17  Yes Aki Hazel MD   warfarin (COUMADIN) 2 mg tablet Take 2 Tabs by mouth daily. 12/19/17  Yes Aki Hazel MD   pravastatin (PRAVACHOL) 10 mg tablet TAKE 1 TABLET EVERY NIGHT 11/13/17  Yes Aki Hazel MD   ondansetron (ZOFRAN ODT) 4 mg disintegrating tablet Take 1 Tab by mouth every eight (8) hours as needed for Nausea. 9/12/17  Yes Haris Swanson MD   amLODIPine (NORVASC) 5 mg tablet Take 5 mg by mouth daily. Yes Historical Provider   aspirin delayed-release 81 mg tablet Take  by mouth daily.    Yes Historical Provider acetaminophen (TYLENOL) 325 mg tablet Take  by mouth every four (4) hours as needed for Pain. Yes Historical Provider   ARGININE, L-ARGININE, PO Take 1 Tab by mouth two (2) times a day. L Arginine/L Citrulline 4000mg/1000mg qd   Yes Historical Provider   FOLIC ACID PO Take 440 mcg by mouth daily. Yes Historical Provider   thioctic acid (ALPHA LIPOIC ACID) 50 mg tab Take 1 Tab by mouth daily. Yes Historical Provider   fiber Cap Take 2 Caps by mouth two (2) times a day. Yes Historical Provider   MULTIVITAMIN WITH MINERALS (MULTI-VIT 55 PLUS PO) Take 1 Tab by mouth daily. 7/8/11  Yes Historical Provider          Allergies   Allergen Reactions    Pcn [Penicillins] Unknown (comments)     As a child    Percocet [Oxycodone-Acetaminophen] Swelling     Leg swelling              Review of Systems   Constitutional: Negative for chills and fever. Respiratory: Negative for cough and shortness of breath. Cardiovascular: Negative for chest pain and palpitations. Gastrointestinal: Negative for abdominal pain, blood in stool, constipation, diarrhea, heartburn, nausea and vomiting. Genitourinary:        He denies: nocturia, urinary hesitancy, urinary frequency, weak stream.       Reports trouble getting an erection or maintaining an erection. Reports trouble with AM erections. Musculoskeletal: Negative for joint pain and myalgias. Neurological: Negative for tingling, focal weakness and headaches. Endo/Heme/Allergies: Does not bruise/bleed easily. Psychiatric/Behavioral: Negative for depression. The patient is not nervous/anxious and does not have insomnia. Objective:   Physical Exam   Constitutional: No distress. HENT:   Mouth/Throat: Mucous membranes are normal.   Eyes: Conjunctivae are normal. No scleral icterus. Neck: Neck supple. Cardiovascular: Regular rhythm and normal heart sounds. No murmur heard. Pulmonary/Chest: Effort normal and breath sounds normal. He has no wheezes.  He has no rales. Abdominal: Soft. Bowel sounds are normal. He exhibits no mass. There is no hepatosplenomegaly. There is no tenderness. Musculoskeletal: He exhibits no edema. Lymphadenopathy:     He has no cervical adenopathy. Skin: No rash noted. Psychiatric: He has a normal mood and affect. His behavior is normal.          Visit Vitals    /76    Pulse 76    Temp 97.9 °F (36.6 °C) (Oral)    Resp 16    Ht 5' 8.25\" (1.734 m)    Wt 216 lb (98 kg)    SpO2 97%    BMI 32.6 kg/m2          Advised him to call back or return to office if symptoms worsen/change/persist.  Discussed expected course/resolution/complications of diagnosis in detail with patient. Medication risks/benefits/costs/interactions/alternatives discussed with patient. He was given an after visit summary which includes diagnoses, current medications, & vitals. He expressed understanding with the diagnosis and plan. Aspects of this note may have been generated using voice recognition software. Despite editing, there may be some syntax errors.        Adrián Bridges MD

## 2018-03-28 LAB
ALBUMIN SERPL-MCNC: 4 G/DL (ref 3.5–4.8)
ALBUMIN/GLOB SERPL: 1.6 {RATIO} (ref 1.2–2.2)
ALP SERPL-CCNC: 105 IU/L (ref 39–117)
ALT SERPL-CCNC: 13 IU/L (ref 0–44)
AST SERPL-CCNC: 19 IU/L (ref 0–40)
BILIRUB SERPL-MCNC: 0.6 MG/DL (ref 0–1.2)
BUN SERPL-MCNC: 18 MG/DL (ref 8–27)
BUN/CREAT SERPL: 20 (ref 10–24)
CALCIUM SERPL-MCNC: 9 MG/DL (ref 8.6–10.2)
CHLORIDE SERPL-SCNC: 104 MMOL/L (ref 96–106)
CHOLEST SERPL-MCNC: 103 MG/DL (ref 100–199)
CO2 SERPL-SCNC: 29 MMOL/L (ref 18–29)
CREAT SERPL-MCNC: 0.88 MG/DL (ref 0.76–1.27)
ERYTHROCYTE [DISTWIDTH] IN BLOOD BY AUTOMATED COUNT: 14.2 % (ref 12.3–15.4)
GFR SERPLBLD CREATININE-BSD FMLA CKD-EPI: 82 ML/MIN/1.73
GFR SERPLBLD CREATININE-BSD FMLA CKD-EPI: 95 ML/MIN/1.73
GLOBULIN SER CALC-MCNC: 2.5 G/DL (ref 1.5–4.5)
GLUCOSE SERPL-MCNC: 90 MG/DL (ref 65–99)
HCT VFR BLD AUTO: 40 % (ref 37.5–51)
HDLC SERPL-MCNC: 29 MG/DL
HGB BLD-MCNC: 13.9 G/DL (ref 13–17.7)
LDLC SERPL CALC-MCNC: 48 MG/DL (ref 0–99)
MCH RBC QN AUTO: 32.3 PG (ref 26.6–33)
MCHC RBC AUTO-ENTMCNC: 34.8 G/DL (ref 31.5–35.7)
MCV RBC AUTO: 93 FL (ref 79–97)
PLATELET # BLD AUTO: 144 X10E3/UL (ref 150–379)
POTASSIUM SERPL-SCNC: 3.9 MMOL/L (ref 3.5–5.2)
PROT SERPL-MCNC: 6.5 G/DL (ref 6–8.5)
RBC # BLD AUTO: 4.31 X10E6/UL (ref 4.14–5.8)
SODIUM SERPL-SCNC: 145 MMOL/L (ref 134–144)
TRIGL SERPL-MCNC: 130 MG/DL (ref 0–149)
VLDLC SERPL CALC-MCNC: 26 MG/DL (ref 5–40)
WBC # BLD AUTO: 5.3 X10E3/UL (ref 3.4–10.8)

## 2018-03-28 NOTE — PROGRESS NOTES
Letter sent to patient. All labs are stable or at goal for him. PLT runs low normal baseline, this is just slightly more than normal, will continue to monitor but not concerned.

## 2018-04-23 ENCOUNTER — ANTI-COAG VISIT (OUTPATIENT)
Dept: INTERNAL MEDICINE CLINIC | Age: 78
End: 2018-04-23

## 2018-04-23 DIAGNOSIS — I48.0 PAROXYSMAL ATRIAL FIBRILLATION (HCC): Primary | ICD-10-CM

## 2018-04-23 LAB
INR BLD: 4.3
PT POC: 51.3 SECONDS
VALID INTERNAL CONTROL?: YES

## 2018-04-23 NOTE — MR AVS SNAPSHOT
[address] [facility] [address] [place] 
[phone] Patient: Hollis Aschoff MRN: [id number] Visit Information Date & Time Provider Department Dept. Phone Encounter #  
 4/23/2018 10:00 AM Yulia Lyle [facility] Internal Medicine [phone] [id number] Your Appointments 4/23/2018 10:00 AM  
ANTICOAG NURSE with Suresh Knutson [facility] Internal Medicine ([facility]) Appt Note: inr; due/elig for [facility] - 3/24/19 or later [address] [facility] [address] [place]  
[address] [facility]  
  
    
 6/26/2018 10:45 AM  
PACEMAKER with Braden Elias [facility] ([facility] SCHEDULING) Appt Note: mdt icd, rc, no CL b 3/13/18  
 [email or web address] [facility] [address]  
[address] [facility]  
  
    
 9/14/2018  8:40 AM  
ESTABLISHED PATIENT with Caden Hogan MD  
[facility] ([facility]) Appt Note: 6 mo f/u per Dr. Clifton [address] [place] [address]  
[address] [facility] [facility] [phone] Upcoming Health Maintenance Date Due  
 GLAUCOMA SCREENING Q2Y 9/1/2016 MEDICARE YEARLY EXAM 3/24/2019 COLONOSCOPY 5/10/2022 DTaP/Tdap/Td series (2 - Td) 7/14/2025 Allergies as of 4/23/2018  Review Complete On: 4/23/2018 By: Monet Clayton RN Severity Noted Reaction Type Reactions Pcn [Penicillins]  03/27/2011    Unknown (comments) As a child Percocet [Oxycodone-acetaminophen]  03/27/2011    Swelling Leg swelling Current Immunizations  Reviewed on 3/23/2018 Name Date Influenza High Dose Vaccine PF 11/6/2017, 10/7/2016, 10/12/2015, 10/6/2014 Influenza Vaccine 10/1/2013 Influenza Vaccine Split 10/15/2012, 10/20/2011 Pneumococcal Conjugate (PCV-13) 7/14/2015 Pneumococcal Polysaccharide (PPSV-23) 10/7/2016 Pneumococcal Vaccine (Pcv) 1/1/2005 Tdap 7/14/2015 Zoster Vaccine, Live 1/1/2011 Not reviewed this visit You Were Diagnosed With   
  
 Codes Comments Paroxysmal atrial fibrillation (HCC)    -  Primary ICD-10-CM: I48.0 ICD-9-CM: 427.31 Vitals Smoking Status Never Smoker Preferred Pharmacy Pharmacy Name Phone [phone] [place], [address] [place] [address] [phone] Your Updated Medication List  
  
   
This list is accurate as of 4/23/18  9:52 AM.  Always use your most recent med list. amLODIPine 5 mg tablet Commonly known as:  Remonia Grates Take 5 mg by mouth daily. ARGININE (L-ARGININE) PO Take 1 Tab by mouth two (2) times a day. L Arginine/L Citrulline 4000mg/1000mg qd  
  
 aspirin delayed-release 81 mg tablet Take  by mouth daily. enalapril 20 mg tablet Commonly known as:  VASOTEC  
TAKE 1 TABLET TWICE DAILY  
  
 fiber Cap Take 2 Caps by mouth two (2) times a day. FOLIC ACID PO Take 400 mcg by mouth daily. hydroCHLOROthiazide 25 mg tablet Commonly known as:  HYDRODIURIL  
TAKE 1 TABLET EVERY DAY  
  
 metoprolol succinate 25 mg XL tablet Commonly known as:  TOPROL-XL  
TAKE 1 TABLET EVERY DAY  
  
 MULTI-VIT 55 PLUS PO Take 1 Tab by mouth daily. ondansetron 4 mg disintegrating tablet Commonly known as:  ZOFRAN ODT Take 1 Tab by mouth every eight (8) hours as needed for Nausea. potassium chloride 10 mEq tablet Commonly known as:  KLOR-CON  
TAKE 1 TABLET THREE TIMES DAILY pravastatin 10 mg tablet Commonly known as:  PRAVACHOL  
TAKE 1 TABLET EVERY NIGHT  
  
 sildenafil citrate 50 mg tablet Commonly known as:  VIAGRA Take 1 Tab by mouth as needed. terazosin 10 mg capsule Commonly known as:  HYTRIN  
TAKE 1 CAPSULE EVERY NIGHT  
  
 thioctic acid 50 mg Tab Generic drug:  Alpha Lipoic Acid Take 1 Tab by mouth daily. TYLENOL 325 mg tablet Generic drug:  acetaminophen Take  by mouth every four (4) hours as needed for Pain.  
  
 varicella-zoster recombinant (PF) 50 mcg/0.5 mL Susr injection Commonly known as:  SHINGRIX (PF)  
0.5 ml IM once and then repeat in 2-6 months. warfarin 2 mg tablet Commonly known as:  COUMADIN Take 2 Tabs by mouth daily. Description HOLD today's dose, then start new dose Old dose: Coumadin 4mg daily. New dose: Coumadin 4mg daily except 2mg on Tues and Thurs (14% decrease) Recheck in 1 week April 2018 Details Sun Mon Tue Wed Thu Fri Sat  
  1  
  
  
  
   2  
  
  
  
   3  
  
  
  
   4  
  
  
  
   5  
  
  
  
   6  
  
  
  
   7  
  
  
  
  
  8  
  
  
  
   9  
  
  
  
   10  
  
  
  
   11  
  
  
  
   12  
  
  
  
   13  
  
  
  
   14  
  
  
  
  
  15  
  
  
  
   16  
  
  
  
   17  
  
  
  
   18  
  
  
  
   19  
  
  
  
   20  
  
  
  
   21  
  
  
  
  
  22  
  
  
  
   23  
  
Hold See details 24  
  
2 mg 25  
  
4 mg  
  
   26  
  
2 mg  
  
   27  
  
4 mg  
  
   28  
  
4 mg  
  
  
  29  
  
4 mg  
  
   30  
  
4 mg Date Details 04/23 This INR check INR: 4.3! Date of next INR:  4/30/2018 How to take your warfarin dose To take:  2 mg Take one of the 2 mg tablets. To take:  4 mg Take two of the 2 mg tablets. Hold Do not take your warfarin dose. See the Details table to the right for additional instructions. We Performed the Following AMB POC PT/INR [28599 CPT(R)] Introducing [facility]! [facility] introduces [employer] patient portal. Now you can access parts of your medical record, email your doctor's office, and request medication refills online. 1. In your internet browser, go to [email or web address] 2. Click on the First Time User? Click Here link in the Sign In box. You will see the New Member Sign Up page. 3. Enter your Obsorb Access Code exactly as it appears below. You will not need to use this code after youve completed the sign-up process. If you do not sign up before the expiration date, you must request a new code. · Obsorb Access Code: [email or web address] Expires: 6/21/2018  3:31 PM 
 
4. Enter the last four digits of your Social Security Number (xxxx) and Date of Birth (mm/dd/yyyy) as indicated and click Submit. You will be taken to the next sign-up page. 5. Create a Ne[employer]pectat ID. This will be your Obsorb login ID and cannot be changed, so think of one that is secure and easy to remember. 6. Create a Obsorb password. You can change your password at any time. 7. Enter your Password Reset Question and Answer. This can be used at a later time if you forget your password. 8. Enter your e-mail address. You will receive e-mail notification when new information is available in [address] 9. Click Sign Up. You can now view and download portions of your medical record. 10. Click the Download Summary menu link to download a portable copy of your medical information. If you have questions, please visit the Frequently Asked Questions section of the Obsorb website. Remember, Obsorb is NOT to be used for urgent needs. For medical emergencies, dial 911. Now available from your iPhone and Android! Please provide this summary of care documentation to your next provider. Your primary care clinician is listed as Spero Second. If you have any questions after today's visit, please call [phone].

## 2018-04-23 NOTE — PROGRESS NOTES
INR 4.3 today. Patient states he has had some diet changes, increased cabbage and Vit K, explained this would cause INR to decrease not increase. Patient instructions:   HOLD today's dose, then start new dose  Old dose: Coumadin 4mg daily. New dose: Coumadin 4mg daily except 2mg on Tues and Thurs (14% decrease)  Recheck in 1 week  Patient denies any unusual bleeding or bruising and was instructed to call office and seek medical care if any should occur. Instructed patient to keep diet consistent. A full discussion of the nature of anticoagulants has been carried out. A benefit risk analysis has been presented to the patient, so that they understand the justification for choosing anticoagulation at this time. The need for frequent and regular monitoring, precise dosage adjustment and compliance is stressed. Side effects of potential bleeding are discussed. The patient should avoid any OTC items containing aspirin or ibuprofen, and should avoid great swings in general diet. Avoid alcohol consumption. Call if any signs of abnormal bleeding. Patient verbalized understanding.

## 2018-04-30 ENCOUNTER — ANTI-COAG VISIT (OUTPATIENT)
Dept: INTERNAL MEDICINE CLINIC | Age: 78
End: 2018-04-30

## 2018-04-30 DIAGNOSIS — N52.9 ERECTILE DYSFUNCTION, UNSPECIFIED ERECTILE DYSFUNCTION TYPE: ICD-10-CM

## 2018-04-30 DIAGNOSIS — I48.0 PAROXYSMAL ATRIAL FIBRILLATION (HCC): Primary | ICD-10-CM

## 2018-04-30 LAB
INR BLD: 1.9
PT POC: 22.8 SECONDS
VALID INTERNAL CONTROL?: YES

## 2018-04-30 RX ORDER — SILDENAFIL CITRATE 20 MG/1
20-100 TABLET ORAL
Qty: 90 TAB | Refills: 1 | Status: SHIPPED | OUTPATIENT
Start: 2018-04-30 | End: 2020-01-06

## 2018-04-30 NOTE — PROGRESS NOTES
INR 1.9 today. Down from 4.3 last week. Patient instructions:   Continue Coumadin 4mg daily except 2mg on Tues and Thurs   Recheck in 2 weeks    A full discussion of the nature of anticoagulants has been carried out. A benefit risk analysis has been presented to the patient, so that they understand the justification for choosing anticoagulation at this time. The need for frequent and regular monitoring, precise dosage adjustment and compliance is stressed. Side effects of potential bleeding are discussed. The patient should avoid any OTC items containing aspirin or ibuprofen, and should avoid great swings in general diet. Avoid alcohol consumption. Call if any signs of abnormal bleeding. Patient verbalized understanding.

## 2018-04-30 NOTE — MR AVS SNAPSHOT
[address] [facility] [address] [place] 
[phone] Patient: Kenna Vo MRN: [id number] Visit Information Date & Time Provider Department Dept. Phone Encounter #  
 4/30/2018 10:15 AM Nasima Bobo [facility] Internal Medicine [phone] [id number] Your Appointments 5/14/2018 10:15 AM  
ANTICOAG NURSE with AZAR Rojo [facility] Internal Medicine [facility] Appt Note: inr check [address]  [address] [place]  
[address] [place]  
  
    
 6/26/2018 10:45 AM  
PACEMAKER with Ramesh Andres [facility] ([facility]) Appt Note: mdt icd, rc, no CL b 3/13/18  
 [address] [id number]  
[address] [place]  
  
    
 9/14/2018  8:40 AM  
ESTABLISHED PATIENT with Gwen Cha MD  
[facility] ([facility]) Appt Note: 6 mo f/u per Dr. Lowell Thomas [address] [place] [address]  
[address] [place],[address] [facility] [phone] Upcoming Health Maintenance Date Due  
 GLAUCOMA SCREENING Q2Y 9/1/2016 Influenza Age 5 to Adult 8/1/2018 MEDICARE YEARLY EXAM 3/24/2019 COLONOSCOPY 5/10/2022 DTaP/Tdap/Td series (2 - Td) 7/14/2025 Allergies as of 4/30/2018  Review Complete On: 4/30/2018 By: Karen Dale RN Severity Noted Reaction Type Reactions Pcn [Penicillins]  03/27/2011    Unknown (comments) As a child Percocet [Oxycodone-acetaminophen]  03/27/2011    Swelling Leg swelling Current Immunizations  Reviewed on 3/23/2018 Name Date Influenza High Dose Vaccine PF 11/6/2017, 10/7/2016, 10/12/2015, 10/6/2014 Influenza Vaccine 10/1/2013 Influenza Vaccine Split 10/15/2012, 10/20/2011 Pneumococcal Conjugate (PCV-13) 7/14/2015 Pneumococcal Polysaccharide (PPSV-23) 10/7/2016 Pneumococcal Vaccine (Pcv) 1/1/2005 Tdap 7/14/2015 Zoster Vaccine, Live 1/1/2011 Not reviewed this visit You Were Diagnosed With   
  
 Codes Comments Paroxysmal atrial fibrillation (HCC)    -  Primary ICD-10-CM: I48.0 ICD-9-CM: 427.31 Vitals Smoking Status Never Smoker Preferred Pharmacy Pharmacy Name Phone [phone] [place], [address] AT [place] [address] [phone] Your Updated Medication List  
  
   
This list is accurate as of 4/30/18 10:15 AM.  Always use your most recent med list. amLODIPine 5 mg tablet Commonly known as:  Enzo Ape Take 5 mg by mouth daily. ARGININE (L-ARGININE) PO Take 1 Tab by mouth two (2) times a day. L Arginine/L Citrulline 4000mg/1000mg qd  
  
 aspirin delayed-release 81 mg tablet Take  by mouth daily. enalapril 20 mg tablet Commonly known as:  VASOTEC  
TAKE 1 TABLET TWICE DAILY  
  
 fiber Cap Take 2 Caps by mouth two (2) times a day. FOLIC ACID PO Take 400 mcg by mouth daily. hydroCHLOROthiazide 25 mg tablet Commonly known as:  HYDRODIURIL  
TAKE 1 TABLET EVERY DAY  
  
 metoprolol succinate 25 mg XL tablet Commonly known as:  TOPROL-XL  
TAKE 1 TABLET EVERY DAY  
  
 MULTI-VIT 55 PLUS PO Take 1 Tab by mouth daily. ondansetron 4 mg disintegrating tablet Commonly known as:  ZOFRAN ODT Take 1 Tab by mouth every eight (8) hours as needed for Nausea. potassium chloride 10 mEq tablet Commonly known as:  KLOR-CON  
TAKE 1 TABLET THREE TIMES DAILY pravastatin 10 mg tablet Commonly known as:  PRAVACHOL  
TAKE 1 TABLET EVERY NIGHT  
  
 sildenafil citrate 50 mg tablet Commonly known as:  VIAGRA Take 1 Tab by mouth as needed. terazosin 10 mg capsule Commonly known as:  HYTRIN  
TAKE 1 CAPSULE EVERY NIGHT  
  
 thioctic acid 50 mg Tab Generic drug:  Alpha Lipoic Acid Take 1 Tab by mouth daily. TYLENOL 325 mg tablet Generic drug:  acetaminophen Take  by mouth every four (4) hours as needed for Pain.  
  
 varicella-zoster recombinant (PF) 50 mcg/0.5 mL Susr injection Commonly known as:  SHINGRIX (PF)  
0.5 ml IM once and then repeat in 2-6 months. warfarin 2 mg tablet Commonly known as:  COUMADIN Take 2 Tabs by mouth daily. Description Continue Coumadin 4mg daily except 2mg on Tues and Thurs Recheck in 2 weeks April 2018 Details Sun Mon Tue Wed Thu Fri Sat  
  1  
  
  
  
   2  
  
  
  
   3  
  
  
  
   4  
  
  
  
   5  
  
  
  
   6  
  
  
  
   7  
  
  
  
  
  8  
  
  
  
   9  
  
  
  
   10  
  
  
  
   11  
  
  
  
   12  
  
  
  
   13  
  
  
  
   14  
  
  
  
  
  15  
  
  
  
   16  
  
  
  
   17  
  
  
  
   18  
  
  
  
   19  
  
  
  
   20  
  
  
  
   21  
  
  
  
  
  22  
  
  
  
   23  
  
  
  
   24  
  
  
  
   25  
  
  
  
   26  
  
  
  
   27  
  
  
  
   28  
  
  
  
  
  29  
  
  
  
   30  
  
4 mg See details Date Details 04/30 This INR check INR: 1.9! How to take your warfarin dose To take:  4 mg Take two of the 2 mg tablets. May 2018 Details [place] Tue Wed Thu Fri Sat 1  
  
2 mg 2  
  
4 mg 3  
  
2 mg 4  
  
4 mg 5  
  
4 mg 6  
  
4 mg 7  
  
4 mg 8  
  
2 mg    9  
  
4 mg  
  
   10  
  
2 mg  
  
   11  
  
4 mg  
  
   12  
  
4 mg  
  
  
  13  
  
4 mg  
  
   14  
  
4 mg  
  
   15  
  
  
  
   16  
  
  
  
   17  
  
  
  
   18  
  
  
  
   19  
  
  
  
  
  20  
  
  
  
   21  
  
  
  
   22  
  
  
  
   23  
  
  
  
   24  
  
  
  
   25  
  
  
  
   26  
  
  
  
  
  27  
  
  
  
   28  
  
  
  
   29  
  
 30  
  
  
  
   31  
  
  
  
    
 Date Details No additional details Date of next INR:  5/14/2018 How to take your warfarin dose To take:  2 mg Take one of the 2 mg tablets. To take:  4 mg Take two of the 2 mg tablets. We Performed the Following AMB POC PT/INR [95453 CPT(R)] Introducing [facility] SERVICES! [facility] introduces [employer] patient portal. Now you can access parts of your medical record, email your doctor's office, and request medication refills online. 1. In your internet browser, go to [email or web address] 2. Click on the First Time User? Click Here link in the Sign In box. You will see the New Member Sign Up page. 3. Enter your [employer] Access Code exactly as it appears below. You will not need to use this code after youve completed the sign-up process. If you do not sign up before the expiration date, you must request a new code. · [employer] Access Code: [email or web address] Expires: 6/21/2018  3:31 PM 
 
4. Enter the last four digits of your Social Security Number (xxxx) and Date of Birth (mm/dd/yyyy) as indicated and click Submit. You will be taken to the next sign-up page. 5. Create a [employer] ID. This will be your [employer] login ID and cannot be changed, so think of one that is secure and easy to remember. 6. Create a [employer] password. You can change your password at any time. 7. Enter your Password Reset Question and Answer. This can be used at a later time if you forget your password. 8. Enter your e-mail address. You will receive e-mail notification when new information is available in [address] 9. Click Sign Up. You can now view and download portions of your medical record. 10. Click the Download Summary menu link to download a portable copy of your medical information. If you have questions, please visit the Frequently Asked Questions section of the [employer] website.  Remember, [employer] is NOT to be used for urgent needs. For medical emergencies, dial 911. Now available from your iPhone and Android! Please provide this summary of care documentation to your next provider. Your primary care clinician is listed as Mart Hugo. If you have any questions after today's visit, please call [phone].

## 2018-05-08 ENCOUNTER — OFFICE VISIT (OUTPATIENT)
Dept: INTERNAL MEDICINE CLINIC | Age: 78
End: 2018-05-08

## 2018-05-08 VITALS
TEMPERATURE: 97.9 F | RESPIRATION RATE: 18 BRPM | HEART RATE: 96 BPM | HEIGHT: 68 IN | SYSTOLIC BLOOD PRESSURE: 104 MMHG | BODY MASS INDEX: 32.58 KG/M2 | WEIGHT: 215 LBS | DIASTOLIC BLOOD PRESSURE: 62 MMHG | OXYGEN SATURATION: 98 %

## 2018-05-08 DIAGNOSIS — R53.83 FATIGUE, UNSPECIFIED TYPE: Primary | ICD-10-CM

## 2018-05-08 DIAGNOSIS — Z23 ENCOUNTER FOR IMMUNIZATION: ICD-10-CM

## 2018-05-08 NOTE — PATIENT INSTRUCTIONS
Consistent Vitamin K Diet: Care Instructions  Your Care Instructions    Your body needs vitamin K to clot blood and keep your bones strong. It's found in leafy green vegetables such as kale and spinach. If you take the blood thinner warfarin (Coumadin), you need to be careful about how much vitamin K you get. Vitamin K can keep your warfarin from working as it should. Most people who take warfarin can eat normally. The important thing is to get about the same amount of vitamin K each day. Don't suddenly start eating foods with a lot more or a lot less vitamin K. You can choose how much vitamin K you eat. For example, if you already eat a lot of leafy green vegetables, that's fine. Just keep it about the same amount each day. Follow-up care is a key part of your treatment and safety. Be sure to make and go to all appointments, and call your doctor if you are having problems. It's also a good idea to know your test results and keep a list of the medicines you take. How can you care for yourself at home? You don't need to stop eating food high in vitamin K. But you do need to know what foods contain vitamin K. Then you can try to eat about the same amount of vitamin K each day. · You might limit foods that are high in vitamin K to about 1 serving a day. These foods have about 250 to 500 micrograms (mcg) of vitamin K in each serving. They include:  ¨ Cooked leafy green vegetables. Examples are kale, spinach, turnip greens, veronica greens, Swiss chard, and mustard greens. One serving is ½ cup. · You might limit foods that are medium-high in vitamin K to about 3 servings a day. These foods have about 50 to 150 mcg of vitamin K in each serving. These include:  ¨ Cooked brussels sprouts, broccoli, cabbage, and asparagus. One serving is ½ cup. ¨ Raw leafy green vegetables. Examples are spinach, green leaf lettuce, debbie lettuce, and endive. One serving is 1 cup.   · Vitamin K also is found in many multivitamins. You don't need to stop taking your multivitamin if it has vitamin K. But you do need to take it every day. · Check with your doctor before you start or stop taking any supplements or herbal products. Some of these may contain vitamin K. Where can you learn more? Go to http://marquis-thomas.info/. Enter T913 in the search box to learn more about \"Consistent Vitamin K Diet: Care Instructions. \"  Current as of: September 21, 2016  Content Version: 11.4  © 4106-2837 [employer]. Care instructions adapted under license by [employer] (which disclaims liability or warranty for this information). If you have questions about a medical condition or this instruction, always ask your healthcare professional. [address] any warranty or liability for your use of this information.

## 2018-05-08 NOTE — PROGRESS NOTES
Hellen Delarosa is a 65 y.o. male who was seen in clinic today (5/8/2018). Assessment & Plan:   Diagnoses and all orders for this visit:    1. Fatigue, unspecified type- this is a new problem, symptoms are: not changed, differential dx reviewed with the patient, exact etiology is unclear at this time. No known tick bites, but sounds like it could be tick related. Is not as concerning for cardiac or pulmonary at this time. Red flags were reviewed with the patient to RTC or notify me, expected time course for resolution reviewed. -     CBC WITH AUTOMATED DIFF  -     SED RATE (ESR)    2. Encounter for immunization  -     varicella-zoster recombinant, PF, (SHINGRIX, PF,) 50 mcg/0.5 mL susr injection; 0.5 ml IM once and then repeat in 2-6 months. Follow-up Disposition:  Return if symptoms worsen or fail to improve. Subjective:   Kim Pickens was seen today for Fever    He returns to clinic w/ his female friend with several complaints. Fever- mostly in the afternoon, low grade 100's, good relief w/ Tylenol, has been present for a week. Fatigue- he reports no energy, taking a nap and not feeling rested, also feels more short winded, denies any new medications or diet changes. Off balance- this is on/off, no falls, difficult to explain or describe        Prior to Admission medications    Medication Sig Start Date End Date Taking? Authorizing Provider   sildenafil, antihypertensive, (REVATIO) 20 mg tablet Take 1-5 Tabs by mouth daily as needed.  4/30/18  Yes Trey Boone MD   terazosin (HYTRIN) 10 mg capsule TAKE 1 CAPSULE EVERY NIGHT 1/5/18  Yes Trey Boone MD   hydroCHLOROthiazide (HYDRODIURIL) 25 mg tablet TAKE 1 TABLET EVERY DAY 1/5/18  Yes Trey Boone MD   potassium chloride (KLOR-CON) 10 mEq tablet TAKE 1 TABLET THREE TIMES DAILY 1/5/18  Yes Trey Boone MD   metoprolol succinate (TOPROL-XL) 25 mg XL tablet TAKE 1 TABLET EVERY DAY 1/5/18  Yes Jeremie THOMSON Luis Woody MD   enalapril (VASOTEC) 20 mg tablet TAKE 1 TABLET TWICE DAILY 12/25/17  Yes Jeanne Orona MD   warfarin (COUMADIN) 2 mg tablet Take 2 Tabs by mouth daily. Patient taking differently: Take 4 mg by mouth daily. Coumadin 4mg daily except 2mg on Tues and Thurs 12/19/17  Yes Jeanne Orona MD   pravastatin (PRAVACHOL) 10 mg tablet TAKE 1 TABLET EVERY NIGHT 11/13/17  Yes Jeanne Orona MD   amLODIPine (NORVASC) 5 mg tablet Take 5 mg by mouth daily. Yes Historical Provider   aspirin delayed-release 81 mg tablet Take  by mouth daily. Yes Historical Provider   acetaminophen (TYLENOL) 325 mg tablet Take  by mouth every four (4) hours as needed for Pain. Yes Historical Provider   FOLIC ACID PO Take 127 mcg by mouth daily. Yes Historical Provider   thioctic acid (ALPHA LIPOIC ACID) 50 mg tab Take 1 Tab by mouth daily. Yes Historical Provider   fiber Cap Take 2 Caps by mouth two (2) times a day. Yes Historical Provider   MULTIVITAMIN WITH MINERALS (MULTI-VIT 55 PLUS PO) Take 1 Tab by mouth daily. 7/8/11  Yes Historical Provider   ondansetron (ZOFRAN ODT) 4 mg disintegrating tablet Take 1 Tab by mouth every eight (8) hours as needed for Nausea. 9/12/17   Kaleb Franco MD   ARGININE, L-ARGININE, PO Take 1 Tab by mouth two (2) times a day. L Arginine/L Citrulline 4000mg/1000mg qd    Historical Provider          Allergies   Allergen Reactions    Pcn [Penicillins] Unknown (comments)     As a child    Percocet [Oxycodone-Acetaminophen] Swelling     Leg swelling             Review of Systems   Constitutional: Positive for fever, malaise/fatigue and weight loss (he reports 20 lbs weight loss by his scale at home, only 1 lbs by ours today). Negative for chills. HENT: Positive for congestion. Negative for sore throat. Respiratory: Negative for cough and shortness of breath. Cardiovascular: Negative for chest pain, palpitations and leg swelling. Gastrointestinal: Positive for nausea. Negative for abdominal pain, constipation, diarrhea, heartburn and vomiting. Genitourinary: Negative for frequency. Musculoskeletal: Negative for joint pain and myalgias. Skin: Negative for rash. Neurological: Positive for dizziness. Negative for tingling, sensory change, focal weakness, weakness and headaches. Objective:   Physical Exam   Constitutional: No distress. HENT:   Right Ear: Tympanic membrane is not erythematous and not bulging. No middle ear effusion. Left Ear: Tympanic membrane is not erythematous and not bulging. No middle ear effusion. Nose: No mucosal edema or rhinorrhea. Right sinus exhibits no maxillary sinus tenderness and no frontal sinus tenderness. Left sinus exhibits no maxillary sinus tenderness and no frontal sinus tenderness. Mouth/Throat: Uvula is midline and mucous membranes are normal. No oropharyngeal exudate or posterior oropharyngeal erythema. Eyes: Conjunctivae are normal. No scleral icterus. Neck: Neck supple. No thyromegaly present. Cardiovascular: Regular rhythm and normal heart sounds. No murmur heard. Pulmonary/Chest: Effort normal and breath sounds normal. He has no wheezes. He has no rales. Abdominal: Soft. Bowel sounds are normal. He exhibits no mass. There is no hepatosplenomegaly. There is no tenderness. Musculoskeletal: He exhibits no edema. Lymphadenopathy:     He has no cervical adenopathy. Psychiatric: He has a normal mood and affect. His behavior is normal.         Visit Vitals    /62    Pulse 96    Temp 97.9 °F (36.6 °C) (Oral)    Resp 18    Ht 5' 8.25\" (1.734 m)    Wt 215 lb (97.5 kg)    SpO2 98%    BMI 32.45 kg/m2         Disclaimer:  Advised him to call back or return to office if symptoms worsen/change/persist.  Discussed expected course/resolution/complications of diagnosis in detail with patient. Medication risks/benefits/costs/interactions/alternatives discussed with patient.   He was given an after visit summary which includes diagnoses, current medications, & vitals. He expressed understanding with the diagnosis and plan. Aspects of this note may have been generated using voice recognition software. Despite editing, there may be some syntax errors.        Gaurang Dior MD

## 2018-05-08 NOTE — PROGRESS NOTES
Fevers at night. Feeling sluggish. Had a fall yesterday. Fatigue. Ear pain has resolved. Has not yet seen eye doctor.

## 2018-05-08 NOTE — MR AVS SNAPSHOT
[address] [facility] [address] [place] 
[phone] Patient: Suri Turk MRN: [id number]:[id number] Visit Information Date & Time Provider Department Dept. Phone Encounter #  
 5/8/2018  9:00 AM Erika Jiang, [facility] Internal Medicine [phone] [id number] Follow-up Instructions Return if symptoms worsen or fail to improve. Your Appointments 5/14/2018 10:15 AM  
ANTICOAG NURSE with AZAR Coburn [facility] Internal Medicine ([facility]) Appt Note: inr check [address]  [address] [place]  
[address] [facility]  
  
    
 6/26/2018 10:45 AM  
PACEMAKER with Andres Valdez [facility] ([facility] SCHEDULING) Appt Note: mdt icd, rc, no CL b 3/13/18  
 [address] [address]  
[address] [facility]  
  
    
 9/14/2018  8:40 AM  
ESTABLISHED PATIENT with Abel Dennison MD  
[facility] ([facility]) Appt Note: 6 mo f/u per Dr. Edgardo Chris [address] [place] [address]  
[address] [facility] [place] [facility] [address] Upcoming Health Maintenance Date Due  
 GLAUCOMA SCREENING Q2Y 9/1/2016 Influenza Age 5 to Adult 8/1/2018 MEDICARE YEARLY EXAM 3/24/2019 COLONOSCOPY 5/10/2022 DTaP/Tdap/Td series (2 - Td) 7/14/2025 Allergies as of 5/8/2018  Review Complete On: 5/8/2018 By: Erika Jiang MD  
  
 Severity Noted Reaction Type Reactions Pcn [Penicillins]  03/27/2011    Unknown (comments) As a child Percocet [Oxycodone-acetaminophen]  03/27/2011    Swelling Leg swelling Current Immunizations  Reviewed on 5/8/2018 Name Date Influenza High Dose Vaccine PF 11/6/2017, 10/7/2016, 10/12/2015, 10/6/2014 Influenza Vaccine 10/1/2013 Influenza Vaccine Split 10/15/2012, 10/20/2011 Pneumococcal Conjugate (PCV-13) 7/14/2015 Pneumococcal Polysaccharide (PPSV-23) 10/7/2016 Pneumococcal Vaccine (Pcv) 1/1/2005 Tdap 7/14/2015 Zoster Vaccine, Live 1/1/2011 Reviewed by Sandro Hardy RN on 5/8/2018 at  8:52 AM  
You Were Diagnosed With   
  
 Codes Comments Fatigue, unspecified type    -  Primary ICD-10-CM: R53.83 ICD-9-CM: 780.79 Encounter for immunization     ICD-10-CM: G75 ICD-9-CM: V03.89 Vitals BP Pulse Temp Resp Height(growth percentile) Weight(growth percentile) 104/62 96 97.9 °F (36.6 °C) (Oral) 18 5' 8.25\" (1.734 m) 215 lb (97.5 kg) SpO2 BMI Smoking Status 98% 32.45 kg/m2 Never Smoker BMI and BSA Data Body Mass Index Body Surface Area  
 32.45 kg/m 2 2.17 m 2 Preferred Pharmacy Pharmacy Name Phone [address] [place] [address] [phone]. [phone] Your Updated Medication List  
  
   
This list is accurate as of 5/8/18  9:19 AM.  Always use your most recent med list. amLODIPine 5 mg tablet Commonly known as:  Marika Pall Take 5 mg by mouth daily. ARGININE (L-ARGININE) PO Take 1 Tab by mouth two (2) times a day. L Arginine/L Citrulline 4000mg/1000mg qd  
  
 aspirin delayed-release 81 mg tablet Take  by mouth daily. enalapril 20 mg tablet Commonly known as:  VASOTEC  
TAKE 1 TABLET TWICE DAILY  
  
 fiber Cap Take 2 Caps by mouth two (2) times a day. FOLIC ACID PO Take 400 mcg by mouth daily. hydroCHLOROthiazide 25 mg tablet Commonly known as:  HYDRODIURIL  
TAKE 1 TABLET EVERY DAY  
  
 metoprolol succinate 25 mg XL tablet Commonly known as:  TOPROL-XL  
TAKE 1 TABLET EVERY DAY  
  
 MULTI-VIT 55 PLUS PO Take 1 Tab by mouth daily. ondansetron 4 mg disintegrating tablet Commonly known as:  ZOFRAN ODT  
 Take 1 Tab by mouth every eight (8) hours as needed for Nausea. potassium chloride 10 mEq tablet Commonly known as:  KLOR-CON  
TAKE 1 TABLET THREE TIMES DAILY pravastatin 10 mg tablet Commonly known as:  PRAVACHOL  
TAKE 1 TABLET EVERY NIGHT  
  
 sildenafil (antihypertensive) 20 mg tablet Commonly known as:  REVATIO Take 1-5 Tabs by mouth daily as needed. terazosin 10 mg capsule Commonly known as:  HYTRIN  
TAKE 1 CAPSULE EVERY NIGHT  
  
 thioctic acid 50 mg Tab Generic drug:  Alpha Lipoic Acid Take 1 Tab by mouth daily. TYLENOL 325 mg tablet Generic drug:  acetaminophen Take  by mouth every four (4) hours as needed for Pain.  
  
 varicella-zoster recombinant (PF) 50 mcg/0.5 mL Susr injection Commonly known as:  SHINGRIX (PF)  
0.5 ml IM once and then repeat in 2-6 months. warfarin 2 mg tablet Commonly known as:  COUMADIN Take 2 Tabs by mouth daily. Prescriptions Printed Refills  
 varicella-zoster recombinant, PF, (SHINGRIX, PF,) 50 mcg/0.5 mL susr injection 1 Si.5 ml IM once and then repeat in 2-6 months. Class: Print We Performed the Following CBC WITH AUTOMATED DIFF [92210 CPT(R)] SED RATE (ESR) [id number] CPT(R)] Follow-up Instructions Return if symptoms worsen or fail to improve. Patient Instructions Consistent Vitamin K Diet: Care Instructions Your Care Instructions Your body needs vitamin K to clot blood and keep your bones strong. It's found in leafy green vegetables such as kale and spinach. If you take the blood thinner warfarin (Coumadin), you need to be careful about how much vitamin K you get. Vitamin K can keep your warfarin from working as it should. Most people who take warfarin can eat normally. The important thing is to get about the same amount of vitamin K each day. Don't suddenly start eating foods with a lot more or a lot less vitamin K. You can choose how much vitamin K you eat. For example, if you already eat a lot of leafy green vegetables, that's fine. Just keep it about the same amount each day. Follow-up care is a key part of your treatment and safety. Be sure to make and go to all appointments, and call your doctor if you are having problems. It's also a good idea to know your test results and keep a list of the medicines you take. How can you care for yourself at home? You don't need to stop eating food high in vitamin K. But you do need to know what foods contain vitamin K. Then you can try to eat about the same amount of vitamin K each day. · You might limit foods that are high in vitamin K to about 1 serving a day. These foods have about 250 to 500 micrograms (mcg) of vitamin K in each serving. They include: ¨ Cooked leafy green vegetables. Examples are kale, spinach, turnip greens, veronica greens, Swiss chard, and mustard greens. One serving is ½ cup. · You might limit foods that are medium-high in vitamin K to about 3 servings a day. These foods have about 50 to 150 mcg of vitamin K in each serving. These include: ¨ Cooked brussels sprouts, broccoli, cabbage, and asparagus. One serving is ½ cup. ¨ Raw leafy green vegetables. Examples are spinach, green leaf lettuce, debbie lettuce, and endive. One serving is 1 cup. · Vitamin K also is found in many multivitamins. You don't need to stop taking your multivitamin if it has vitamin K. But you do need to take it every day. · Check with your doctor before you start or stop taking any supplements or herbal products. Some of these may contain vitamin K. Where can you learn more? Go to http://marquis-thomas.info/. Enter Z828 in the search box to learn more about \"Consistent Vitamin K Diet: Care Instructions. \" Current as of: September 21, 2016 Content Version: 11.4 © 2753-7456 Healthwise, [employer].  Care instructions adapted under license by [address] (which disclaims liability or warranty for this information). If you have questions about a medical condition or this instruction, always ask your healthcare professional. [address] any warranty or liability for your use of this information. Introducing [facility]! [employer] introduces [employer] patient portal. Now you can access parts of your medical record, email your doctor's office, and request medication refills online. 1. In your internet browser, go to [email or web address] 2. Click on the First Time User? Click Here link in the Sign In box. You will see the New Member Sign Up page. 3. Enter your [employer] [employer] Access Code exactly as it appears below. You will not need to use this code after youve completed the sign-up process. If you do not sign up before the expiration date, you must request a new code. · [employer] [employer] Access Code: [email or web address] Expires: 6/21/2018  3:31 PM 
 
4. Enter the last four digits of your Social Security Number (xxxx) and Date of Birth (mm/dd/yyyy) as indicated and click Submit. You will be taken to the next sign-up page. 5. Create a [employer] ID. This will be your [employer] login ID and cannot be changed, so think of one that is secure and easy to remember. 6. Create a [employer] password. You can change your password at any time. 7. Enter your Password Reset Question and Answer. This can be used at a later time if you forget your password. 8. Enter your e-mail address. You will receive e-mail notification when new information is available in [address] 9. Click Sign Up. You can now view and download portions of your medical record. 10. Click the Download Summary menu link to download a portable copy of your medical information. If you have questions, please visit the Frequently Asked Questions section of the [employer] website.  Remember, [employer] is NOT to be used for urgent needs. For medical emergencies, dial 911. Now available from your iPhone and Android! Please provide this summary of care documentation to your next provider. Your primary care clinician is listed as Romana Cypher. If you have any questions after today's visit, please call [phone].

## 2018-05-09 ENCOUNTER — HOSPITAL ENCOUNTER (OUTPATIENT)
Dept: CT IMAGING | Age: 78
Discharge: HOME | End: 2018-05-09
Attending: INTERNAL MEDICINE
Payer: MEDICARE

## 2018-05-09 ENCOUNTER — OFFICE VISIT (OUTPATIENT)
Dept: INTERNAL MEDICINE CLINIC | Age: 78
End: 2018-05-09

## 2018-05-09 ENCOUNTER — TELEPHONE (OUTPATIENT)
Dept: INTERNAL MEDICINE CLINIC | Age: 78
End: 2018-05-09

## 2018-05-09 VITALS
DIASTOLIC BLOOD PRESSURE: 66 MMHG | RESPIRATION RATE: 18 BRPM | SYSTOLIC BLOOD PRESSURE: 108 MMHG | HEIGHT: 68 IN | TEMPERATURE: 97.9 F | WEIGHT: 214 LBS | HEART RATE: 76 BPM | BODY MASS INDEX: 32.43 KG/M2 | OXYGEN SATURATION: 97 %

## 2018-05-09 DIAGNOSIS — Z79.01 CHRONIC ANTICOAGULATION: ICD-10-CM

## 2018-05-09 DIAGNOSIS — R10.32 LLQ ABDOMINAL PAIN: ICD-10-CM

## 2018-05-09 DIAGNOSIS — R10.32 LLQ ABDOMINAL PAIN: Primary | ICD-10-CM

## 2018-05-09 LAB
BASOPHILS # BLD AUTO: 0.1 X10E3/UL (ref 0–0.2)
BASOPHILS NFR BLD AUTO: 1 %
EOSINOPHIL # BLD AUTO: 0.2 X10E3/UL (ref 0–0.4)
EOSINOPHIL NFR BLD AUTO: 2 %
ERYTHROCYTE [DISTWIDTH] IN BLOOD BY AUTOMATED COUNT: 14.3 % (ref 12.3–15.4)
ERYTHROCYTE [SEDIMENTATION RATE] IN BLOOD BY WESTERGREN METHOD: 5 MM/HR (ref 0–30)
HCT VFR BLD AUTO: 39.1 % (ref 37.5–51)
HGB BLD-MCNC: 13 G/DL (ref 13–17.7)
IMM GRANULOCYTES # BLD: 0 X10E3/UL (ref 0–0.1)
IMM GRANULOCYTES NFR BLD: 0 %
LYMPHOCYTES # BLD AUTO: 1.2 X10E3/UL (ref 0.7–3.1)
LYMPHOCYTES NFR BLD AUTO: 13 %
MCH RBC QN AUTO: 32.1 PG (ref 26.6–33)
MCHC RBC AUTO-ENTMCNC: 33.2 G/DL (ref 31.5–35.7)
MCV RBC AUTO: 97 FL (ref 79–97)
MONOCYTES # BLD AUTO: 1.3 X10E3/UL (ref 0.1–0.9)
MONOCYTES NFR BLD AUTO: 14 %
NEUTROPHILS # BLD AUTO: 6.5 X10E3/UL (ref 1.4–7)
NEUTROPHILS NFR BLD AUTO: 70 %
PLATELET # BLD AUTO: 197 X10E3/UL (ref 150–379)
RBC # BLD AUTO: 4.05 X10E6/UL (ref 4.14–5.8)
WBC # BLD AUTO: 9.2 X10E3/UL (ref 3.4–10.8)

## 2018-05-09 PROCEDURE — 74011000255 HC RX REV CODE- 255: Performed by: RADIOLOGY

## 2018-05-09 PROCEDURE — 74177 CT ABD & PELVIS W/CONTRAST: CPT

## 2018-05-09 PROCEDURE — 74011636320 HC RX REV CODE- 636/320: Performed by: RADIOLOGY

## 2018-05-09 PROCEDURE — 74011000258 HC RX REV CODE- 258: Performed by: RADIOLOGY

## 2018-05-09 RX ORDER — BARIUM SULFATE 20 MG/ML
900 SUSPENSION ORAL
Status: COMPLETED | OUTPATIENT
Start: 2018-05-09 | End: 2018-05-09

## 2018-05-09 RX ADMIN — BARIUM SULFATE 900 ML: 21 SUSPENSION ORAL at 15:19

## 2018-05-09 RX ADMIN — SODIUM CHLORIDE 50 ML: 900 INJECTION, SOLUTION INTRAVENOUS at 15:19

## 2018-05-09 RX ADMIN — IOPAMIDOL 100 ML: 755 INJECTION, SOLUTION INTRAVENOUS at 15:18

## 2018-05-09 NOTE — PROGRESS NOTES
Pt was seen in the office for an office visit and given the results by Dr Gwendolyn Romero during his visit.

## 2018-05-09 NOTE — PROGRESS NOTES
Case dw/ Dr Celaya Spine and Dr Jhoan Honeycutt. Favor abscess > hematoma based on symptoms. Will plan on IR drainage in the AM assuming INR is acceptable. Pt instructed to hold coumadin tonight. No abx at this time. Tylenol for pain. Patient notified by RN, see telephone encounter.

## 2018-05-09 NOTE — PROGRESS NOTES
Collin Cardoso is a 65 y.o. male who was seen in clinic today (5/9/2018). Assessment & Plan:   Diagnoses and all orders for this visit:    1. LLQ abdominal pain- this is a new problem, symptoms are: worsened, differential dx reviewed with the patient, and based on visit from yesterday is concerning for diverticulitis. Will get CT set up and then start on abx. Reviewed abx effect on his INR so need to clarify diagnosis first.  CT scheduled for 2:30pm.    -     CT ABD PELV W CONT; Future    2. Chronic anticoagulation- INR has been fluctuating, last check low but check before that is high, will have him RTC in 2 days to check INR and f/u on #1         Follow-up Disposition:  Return in about 2 days (around 5/11/2018), or if symptoms worsen or fail to improve. Subjective:   Tori Acosta was seen today for Hip Pain    Abdominal Pain  Collin Cardoso is here to talk about abdominal pain. This is a new problem and symptoms began 3-4 days ago and have not changed. Pain is LLQ in location and described as sharp. He also reports the following symptoms: change in stools (thinner & smaller). He symptoms are aggravated by palpitations. There was a fall 4 days ago, tripped over a dog's leash, and fell onto the grass on the back. Pain started the next day. He was seen yesterday. Notes and labs reviewed with them today. Prior to Admission medications    Medication Sig Start Date End Date Taking? Authorizing Provider   sildenafil, antihypertensive, (REVATIO) 20 mg tablet Take 1-5 Tabs by mouth daily as needed.  4/30/18  Yes Hawa Anders MD   terazosin (HYTRIN) 10 mg capsule TAKE 1 CAPSULE EVERY NIGHT 1/5/18  Yes Hawa Anders MD   hydroCHLOROthiazide (HYDRODIURIL) 25 mg tablet TAKE 1 TABLET EVERY DAY 1/5/18  Yes Hawa Anders MD   potassium chloride (KLOR-CON) 10 mEq tablet TAKE 1 TABLET THREE TIMES DAILY 1/5/18  Yes Hawa Anders MD   metoprolol succinate (TOPROL-XL) 25 mg XL tablet TAKE 1 TABLET EVERY DAY 1/5/18  Yes Levander Canavan, MD   enalapril (VASOTEC) 20 mg tablet TAKE 1 TABLET TWICE DAILY 12/25/17  Yes Levander Canavan, MD   warfarin (COUMADIN) 2 mg tablet Take 2 Tabs by mouth daily. Patient taking differently: Take 4 mg by mouth daily. Coumadin 4mg daily except 2mg on Tues and Thurs 12/19/17  Yes Levander Canavan, MD   pravastatin (PRAVACHOL) 10 mg tablet TAKE 1 TABLET EVERY NIGHT 11/13/17  Yes Levander Canavan, MD   ondansetron (ZOFRAN ODT) 4 mg disintegrating tablet Take 1 Tab by mouth every eight (8) hours as needed for Nausea. 9/12/17  Yes Zan Thomas MD   amLODIPine (NORVASC) 5 mg tablet Take 5 mg by mouth daily. Yes Historical Provider   aspirin delayed-release 81 mg tablet Take  by mouth daily. Yes Historical Provider   acetaminophen (TYLENOL) 325 mg tablet Take  by mouth every four (4) hours as needed for Pain. Yes Historical Provider   FOLIC ACID PO Take 590 mcg by mouth daily. Yes Historical Provider   thioctic acid (ALPHA LIPOIC ACID) 50 mg tab Take 1 Tab by mouth daily. Yes Historical Provider   fiber Cap Take 2 Caps by mouth two (2) times a day. Yes Historical Provider   MULTIVITAMIN WITH MINERALS (MULTI-VIT 55 PLUS PO) Take 1 Tab by mouth daily. 7/8/11  Yes Historical Provider          Allergies   Allergen Reactions    Pcn [Penicillins] Unknown (comments)     As a child    Percocet [Oxycodone-Acetaminophen] Swelling     Leg swelling           ROS - per HPI        Objective:   Physical Exam   Constitutional: No distress. Eyes: Conjunctivae are normal. No scleral icterus. Cardiovascular: Regular rhythm and normal heart sounds. No murmur heard. Pulmonary/Chest: Effort normal and breath sounds normal. He has no wheezes. He has no rales. Abdominal: Soft. He exhibits no mass. Bowel sounds are decreased. There is no hepatosplenomegaly. There is tenderness in the left lower quadrant. There is rigidity and guarding.  There is no rebound. Psychiatric: He has a normal mood and affect. His behavior is normal.         Visit Vitals    /66    Pulse 76    Temp 97.9 °F (36.6 °C) (Oral)    Resp 18    Ht 5' 8.25\" (1.734 m)    Wt 214 lb (97.1 kg)    SpO2 97%    BMI 32.3 kg/m2         Disclaimer:  Advised him to call back or return to office if symptoms worsen/change/persist.  Discussed expected course/resolution/complications of diagnosis in detail with patient. Medication risks/benefits/costs/interactions/alternatives discussed with patient. He was given an after visit summary which includes diagnoses, current medications, & vitals. He expressed understanding with the diagnosis and plan. Aspects of this note may have been generated using voice recognition software. Despite editing, there may be some syntax errors.        Jaguar Hawley MD

## 2018-05-09 NOTE — PROGRESS NOTES
Please call patient. ESR (inflammation marker) was normal.  Hgb normal (no anemia). His WBC is in the normal range, but elevated for him. Slight increase in monocytes otherwise normal differential.  Nothing at this time helps to determine what is going on with him. At this time would hold off on any Tylenol. Record his temperature in the morning and if he feels bad in the afternoon. Update me in 5 days. Notify me sooner if anything gets worse. Hopefully what every is going on will run its course he will feel better soon.

## 2018-05-09 NOTE — MR AVS SNAPSHOT
[address] [facility] [address] [place] 
[phone] Patient: Jesika Orta MRN: [id number] WUA:[id number] Visit Information Date & Time Provider Department Dept. Phone Encounter #  
 5/9/2018 11:15 AM Jeanne Orona, [address] [place] [address] Internal Medicine [phone] [id number] Follow-up Instructions Return if symptoms worsen or fail to improve. Your Appointments 5/14/2018 10:15 AM  
ANTICOAG NURSE with AZAR Michelle Internal Medicine ([facility]) Appt Note: inr check [address]  [address] [place]  
[address] [place]  
  
    
 6/26/2018 10:45 AM  
PACEMAKER with Rena Mcgarry [facility] ([facility] SCHEDUL[facility]) Appt Note: mdt icd, rc, no CL b 3/13/18  
 [address] [address]  
[address]  
  
    
 9/14/2018  8:40 AM  
ESTABLISHED PATIENT with Litzy Lopez MD  
[facility] ([facility]) Appt Note: 6 mo f/u per Dr. Bruce Kaiser [address] [place]  
[address] [facility] [id number] Upcoming Health Maintenance Date Due  
 GLAUCOMA SCREENING Q2Y 9/1/2016 Influenza Age 5 to Adult 8/1/2018 MEDICARE YEARLY EXAM 3/24/2019 COLONOSCOPY 5/10/2022 DTaP/Tdap/Td series (2 - Td) 7/14/2025 Allergies as of 5/9/2018  Review Complete On: 5/9/2018 By: Jeanne Orona MD  
  
 Severity Noted Reaction Type Reactions Pcn [Penicillins]  03/27/2011    Unknown (comments) As a child Percocet [Oxycodone-acetaminophen]  03/27/2011    Swelling Leg swelling Current Immunizations  Reviewed on 5/9/2018 Name Date Influenza High Dose Vaccine PF 11/6/2017, 10/7/2016, 10/12/2015, 10/6/2014 Influenza Vaccine 10/1/2013 Influenza Vaccine Split 10/15/2012, 10/20/2011 Pneumococcal Conjugate (PCV-13) 7/14/2015 Pneumococcal Polysaccharide (PPSV-23) 10/7/2016 Pneumococcal Vaccine (Pcv) 1/1/2005 Tdap 7/14/2015 Zoster Vaccine, Live 1/1/2011 Reviewed by Fercho Bob RN on 5/9/2018 at 10:58 AM  
You Were Diagnosed With   
  
 Codes Comments LLQ abdominal pain    -  Primary ICD-10-CM: R10.32 
ICD-9-CM: 789.04 Chronic anticoagulation     ICD-10-CM: Z79.01 
ICD-9-CM: V58.61 Vitals BP Pulse Temp Resp Height(growth percentile) Weight(growth percentile) 108/66 76 97.9 °F (36.6 °C) (Oral) 18 5' 8.25\" (1.734 m) 214 lb (97.1 kg) SpO2 BMI Smoking Status 97% 32.3 kg/m2 Never Smoker Vitals History BMI and BSA Data Body Mass Index Body Surface Area  
 32.3 kg/m 2 2.16 m 2 Preferred Pharmacy Pharmacy Name Phone [place] [phone] - [address], [address] AT [place] [address] [phone] Your Updated Medication List  
  
   
This list is accurate as of 5/9/18 11:29 AM.  Always use your most recent med list. amLODIPine 5 mg tablet Commonly known as:  Sundra [place] Take 5 mg by mouth daily. aspirin delayed-release 81 mg tablet Take  by mouth daily. enalapril 20 mg tablet Commonly known as:  VASOTEC  
TAKE 1 TABLET TWICE DAILY  
  
 fiber Cap Take 2 Caps by mouth two (2) times a day. FOLIC ACID PO Take 400 mcg by mouth daily. hydroCHLOROthiazide 25 mg tablet Commonly known as:  HYDRODIURIL  
TAKE 1 TABLET EVERY DAY  
  
 metoprolol succinate 25 mg XL tablet Commonly known as:  TOPROL-XL  
TAKE 1 TABLET EVERY DAY  
  
 MULTI-VIT 55 PLUS PO Take 1 Tab by mouth daily. ondansetron 4 mg disintegrating tablet Commonly known as:  ZOFRAN ODT Take 1 Tab by mouth every eight (8) hours as needed for Nausea. potassium chloride 10 mEq tablet Commonly known as:  KLOR-CON  
TAKE 1 TABLET THREE TIMES DAILY pravastatin 10 mg tablet Commonly known as:  PRAVACHOL  
TAKE 1 TABLET EVERY NIGHT  
  
 sildenafil (antihypertensive) 20 mg tablet Commonly known as:  REVATIO Take 1-5 Tabs by mouth daily as needed. terazosin 10 mg capsule Commonly known as:  HYTRIN  
TAKE 1 CAPSULE EVERY NIGHT  
  
 thioctic acid 50 mg Tab Generic drug:  Alpha Lipoic Acid Take 1 Tab by mouth daily. TYLENOL 325 mg tablet Generic drug:  acetaminophen Take  by mouth every four (4) hours as needed for Pain.  
  
 warfarin 2 mg tablet Commonly known as:  COUMADIN Take 2 Tabs by mouth daily. Follow-up Instructions Return if symptoms worsen or fail to improve. To-Do List   
 05/09/2018 Imaging:  CT ABD PELV W CONT Referral Information Referral ID Referred By Referred To  
  
 [id number] DYLAN BOOKER Not Available Visits Status Start Date End Date 1 New Request 5/9/18 5/9/19 If your referral has a status of pending review or denied, additional information will be sent to support the outcome of this decision. Patient Instructions Diverticulitis: Care Instructions Your Care Instructions Diverticulitis occurs when pouches form in the wall of the colon and become inflamed or infected. It can be very painful. Doctors aren't sure what causes diverticulitis. There is no proof that foods such as nuts, seeds, or berries cause it or make it worse. A low-fiber diet may cause the colon to work harder to push stool forward. Pouches may form because of this extra work. It may be hard to think about healthy eating while you're in pain. But as you recover, you might think about how you can use healthy eating for overall better health. Healthy eating may help you avoid future attacks. Follow-up care is a key part of your treatment and safety.  Be sure to make and go to all appointments, and call your doctor if you are having problems. It's also a good idea to know your test results and keep a list of the medicines you take. How can you care for yourself at home? · Drink plenty of fluids, enough so that your urine is light yellow or clear like water. If you have kidney, heart, or liver disease and have to limit fluids, talk with your doctor before you increase the amount of fluids you drink. · Stick to liquids or a bland diet (plain rice, bananas, dry toast or crackers, applesauce) until you are feeling better. Then you can return to regular foods and gradually increase the amount of fiber in your diet. · Use a heating pad set on low on your belly to relieve mild cramps and pain. · Get extra rest until you are feeling better. · Be safe with medicines. Read and follow all instructions on the label. ¨ If the doctor gave you a prescription medicine for pain, take it as prescribed. ¨ If you are not taking a prescription pain medicine, ask your doctor if you can take an over-the-counter medicine. · If your doctor prescribed antibiotics, take them as directed. Do not stop taking them just because you feel better. You need to take the full course of antibiotics. To prevent future attacks of diverticulitis · Avoid constipation: 
¨ Include fruits, vegetables, beans, and whole grains in your diet each day. These foods are high in fiber. ¨ Drink plenty of fluids, enough so that your urine is light yellow or clear like water. If you have kidney, heart, or liver disease and have to limit fluids, talk with your doctor before you increase the amount of fluids you drink. ¨ Get some exercise every day. Build up slowly to 30 to 60 minutes a day on 5 or more days of the week. ¨ Take a fiber supplement, such as Citrucel or Metamucil, every day if needed. Read and follow all instructions on the label. ¨ Schedule time each day for a bowel movement.  Having a daily routine may help. Take your time and do not strain when having a bowel movement. When should you call for help? Call your doctor now or seek immediate medical care if: 
? · You have a fever. ? · You are vomiting. ? · You have new or worse belly pain. ? · You cannot pass stools or gas. ? Watch closely for changes in your health, and be sure to contact your doctor if you have any problems. Where can you learn more? Go to http://marquis-thomas.info/. Enter H901 in the search box to learn more about \"Diverticulitis: Care Instructions. \" Current as of: May 12, 2017 Content Version: 11.4 © 4066-7360 [employer]. Care instructions adapted under license by [employer] (which disclaims liability or warranty for this information). If you have questions about a medical condition or this instruction, always ask your healthcare professional. [address] any warranty or liability for your use of this information. Introducing [facility]! [facility] introduces [employer] patient portal. Now you can access parts of your medical record, email your doctor's office, and request medication refills online. 1. In your internet browser, go to [email or web address] 2. Click on the First Time User? Click Here link in the Sign In box. You will see the New Member Sign Up page. 3. Enter your [employer] Access Code exactly as it appears below. You will not need to use this code after youve completed the sign-up process. If you do not sign up before the expiration date, you must request a new code. · [employer] Access Code: [email or web address] Expires: 6/21/2018  3:31 PM 
 
4. Enter the last four digits of your Social Security Number (xxxx) and Date of Birth (mm/dd/yyyy) as indicated and click Submit. You will be taken to the next sign-up page. 5. Create a [employer] ID.  This will be your [employer] login ID and cannot be changed, so think of one that is secure and easy to remember. 6. Create a DanceJam password. You can change your password at any time. 7. Enter your Password Reset Question and Answer. This can be used at a later time if you forget your password. 8. Enter your e-mail address. You will receive e-mail notification when new information is available in [address] 9. Click Sign Up. You can now view and download portions of your medical record. 10. Click the Download Summary menu link to download a portable copy of your medical information. If you have questions, please visit the Frequently Asked Questions section of the [employer] website. Remember, [employer] is NOT to be used for urgent needs. For medical emergencies, dial 911. Now available from your iPhone and Android! Please provide this summary of care documentation to your next provider. Your primary care clinician is listed as Levander Canavan. If you have any questions after today's visit, please call [phone].

## 2018-05-09 NOTE — PATIENT INSTRUCTIONS
Diverticulitis: Care Instructions  Your Care Instructions    Diverticulitis occurs when pouches form in the wall of the colon and become inflamed or infected. It can be very painful. Doctors aren't sure what causes diverticulitis. There is no proof that foods such as nuts, seeds, or berries cause it or make it worse. A low-fiber diet may cause the colon to work harder to push stool forward. Pouches may form because of this extra work. It may be hard to think about healthy eating while you're in pain. But as you recover, you might think about how you can use healthy eating for overall better health. Healthy eating may help you avoid future attacks. Follow-up care is a key part of your treatment and safety. Be sure to make and go to all appointments, and call your doctor if you are having problems. It's also a good idea to know your test results and keep a list of the medicines you take. How can you care for yourself at home? · Drink plenty of fluids, enough so that your urine is light yellow or clear like water. If you have kidney, heart, or liver disease and have to limit fluids, talk with your doctor before you increase the amount of fluids you drink. · Stick to liquids or a bland diet (plain rice, bananas, dry toast or crackers, applesauce) until you are feeling better. Then you can return to regular foods and gradually increase the amount of fiber in your diet. · Use a heating pad set on low on your belly to relieve mild cramps and pain. · Get extra rest until you are feeling better. · Be safe with medicines. Read and follow all instructions on the label. ¨ If the doctor gave you a prescription medicine for pain, take it as prescribed. ¨ If you are not taking a prescription pain medicine, ask your doctor if you can take an over-the-counter medicine. · If your doctor prescribed antibiotics, take them as directed. Do not stop taking them just because you feel better.  You need to take the full course of antibiotics. To prevent future attacks of diverticulitis  · Avoid constipation:  ¨ Include fruits, vegetables, beans, and whole grains in your diet each day. These foods are high in fiber. ¨ Drink plenty of fluids, enough so that your urine is light yellow or clear like water. If you have kidney, heart, or liver disease and have to limit fluids, talk with your doctor before you increase the amount of fluids you drink. ¨ Get some exercise every day. Build up slowly to 30 to 60 minutes a day on 5 or more days of the week. ¨ Take a fiber supplement, such as Citrucel or Metamucil, every day if needed. Read and follow all instructions on the label. ¨ Schedule time each day for a bowel movement. Having a daily routine may help. Take your time and do not strain when having a bowel movement. When should you call for help? Call your doctor now or seek immediate medical care if:  ? · You have a fever. ? · You are vomiting. ? · You have new or worse belly pain. ? · You cannot pass stools or gas. ? Watch closely for changes in your health, and be sure to contact your doctor if you have any problems. Where can you learn more? Go to http://marquis-thomas.info/. Enter H901 in the search box to learn more about \"Diverticulitis: Care Instructions. \"  Current as of: May 12, 2017  Content Version: 11.4  © 7798-7168 [employer]. Care instructions adapted under license by [employer] (which disclaims liability or warranty for this information). If you have questions about a medical condition or this instruction, always ask your healthcare professional. [place] [address] any warranty or liability for your use of this information.

## 2018-05-09 NOTE — TELEPHONE ENCOUNTER
Call to patient. Advised patient per Dr. Cesario Camarillo, there was an abnormality on his CT scan today. Is unclear whether there is a hematoma or an infectious process in his LLQ. Dr. Cesario Camarillo has spoken with Interventional Radiologist at [facility]. Patient is to hold his coumadin tonight, can take Tylenol if needed for pain, is to take no anti-inflammatory medication. Told him we would be back in touch with him tomorrow after Dr. Cesario Camarillo speaks with IR tomorrow to let him know of further plans but that he is likely looking at having the area drained this week.   Patient verbalized understanding and will await further instructions tomorrow

## 2018-05-10 ENCOUNTER — TELEPHONE (OUTPATIENT)
Dept: INTERNAL MEDICINE CLINIC | Age: 78
End: 2018-05-10

## 2018-05-10 ENCOUNTER — HOSPITAL ENCOUNTER (OUTPATIENT)
Dept: CT IMAGING | Age: 78
Discharge: HOME | End: 2018-05-10
Attending: INTERNAL MEDICINE
Payer: MEDICARE

## 2018-05-10 VITALS — HEIGHT: 70 IN | WEIGHT: 200 LBS | BODY MASS INDEX: 28.63 KG/M2

## 2018-05-10 DIAGNOSIS — R93.5 ABNORMAL CT OF THE ABDOMEN: ICD-10-CM

## 2018-05-10 DIAGNOSIS — K65.1 LEFT LOWER QUADRANT ABDOMINAL ABSCESS (HCC): Primary | ICD-10-CM

## 2018-05-10 DIAGNOSIS — K65.1 LEFT LOWER QUADRANT ABDOMINAL ABSCESS (HCC): ICD-10-CM

## 2018-05-10 LAB — INR BLD: 4.4 (ref 0.9–1.2)

## 2018-05-10 PROCEDURE — 85610 PROTHROMBIN TIME: CPT

## 2018-05-10 NOTE — TELEPHONE ENCOUNTER
Spoke with Dr. Eden Medeiros. Patient is to hold breakfast, go to [facility] today at 10:15. His INR will be checked and based on results treatment will be determined. Call to patient. Advised of above. He has already eaten breakfast and taken medications, had oatmeal and apple sauce. Held his coumadin yesterday as instructed. Told him not to have anything else by mouth and to report to [facility] as above. Told him I would call him back if any further instructions after notifying Dr. Eden Medeiros.

## 2018-05-10 NOTE — PROGRESS NOTES
INR today is 4.4. Dr. Raegan Miller made aware. Dr. Marta Núñez in to speak with patient and S/O - Pending sale to [facility]. Dr. Marta Núñez will call Dr. Caden Cooley regarding reschedule. Patient aware and agreeable to plan.

## 2018-05-10 NOTE — TELEPHONE ENCOUNTER
Spoke w/ Dr Candace Crooks (radiology) this morning. They will work patient in for IR drainage. Recommendations passed onto nurse who will notify pt.

## 2018-05-10 NOTE — TELEPHONE ENCOUNTER
Call from Lili Hendrix, [email or web address] Wanted to let us know procedure has been postponed due to INR and that Dr. Chris Ortega has called Dr. Karen Estrada and left him a VM regarding situation. Pat instructed patient to hold Coumadin until further notice from Dr. Karen Estrada and patient is requesting a call back today to know how things are to proceed.

## 2018-05-10 NOTE — PROGRESS NOTES
I spoke with Merline Mo in Dr. Rizwan Michel office regarding elevated INR. Someone from office to call patient regarding reschedule and follow-up INR. Patient will hold Coumadin until advised by Dr. Rizwan Michel office. Patient escorted to lobby with S/OMarianne schwartz.

## 2018-05-11 ENCOUNTER — OFFICE VISIT (OUTPATIENT)
Dept: INTERNAL MEDICINE CLINIC | Age: 78
End: 2018-05-11

## 2018-05-11 ENCOUNTER — TELEPHONE (OUTPATIENT)
Dept: INTERNAL MEDICINE CLINIC | Age: 78
End: 2018-05-11

## 2018-05-11 VITALS
RESPIRATION RATE: 16 BRPM | HEIGHT: 70 IN | WEIGHT: 215 LBS | TEMPERATURE: 97.8 F | SYSTOLIC BLOOD PRESSURE: 96 MMHG | HEART RATE: 78 BPM | OXYGEN SATURATION: 97 % | BODY MASS INDEX: 30.78 KG/M2 | DIASTOLIC BLOOD PRESSURE: 62 MMHG

## 2018-05-11 DIAGNOSIS — R10.32 LLQ ABDOMINAL PAIN: Primary | ICD-10-CM

## 2018-05-11 DIAGNOSIS — R79.1 ELEVATED INR: ICD-10-CM

## 2018-05-11 LAB
INR BLD: 2.8
PT POC: 33.9 SECONDS
VALID INTERNAL CONTROL?: YES

## 2018-05-11 RX ORDER — PRAVASTATIN SODIUM 10 MG/1
TABLET ORAL
Qty: 90 TAB | Refills: 1 | Status: SHIPPED | OUTPATIENT
Start: 2018-05-11 | End: 2018-11-21 | Stop reason: SDUPTHER

## 2018-05-11 RX ORDER — TERAZOSIN 10 MG/1
CAPSULE ORAL
Qty: 90 CAP | Refills: 1 | Status: SHIPPED | OUTPATIENT
Start: 2018-05-11 | End: 2018-11-26 | Stop reason: SDUPTHER

## 2018-05-11 NOTE — TELEPHONE ENCOUNTER
Pt wife calling back and asking what is plan for the pt. And informed her pt can take his daily medications today and does not have to be npo today and keep his appt this afternoon. Pt wife verbalized understanding.

## 2018-05-11 NOTE — PROGRESS NOTES
Called scheduling to have pt scheduled with IR for abscess of left lower quadrant. They will send pt information to IR and contact us on Monday to have pt scheduled.

## 2018-05-11 NOTE — TELEPHONE ENCOUNTER
Wife calling for Pt - they were expecting a call this morning at [address] Not heard anything yet.   Pt - [phone]

## 2018-05-14 ENCOUNTER — ANTI-COAG VISIT (OUTPATIENT)
Dept: INTERNAL MEDICINE CLINIC | Age: 78
End: 2018-05-14

## 2018-05-14 ENCOUNTER — TELEPHONE (OUTPATIENT)
Dept: INTERNAL MEDICINE CLINIC | Age: 78
End: 2018-05-14

## 2018-05-14 DIAGNOSIS — I48.0 PAROXYSMAL ATRIAL FIBRILLATION (HCC): Primary | ICD-10-CM

## 2018-05-14 LAB
INR BLD: 2.4
PT POC: 28.4 SECONDS
VALID INTERNAL CONTROL?: YES

## 2018-05-14 RX ORDER — WARFARIN 2 MG/1
2 TABLET ORAL DAILY
COMMUNITY
End: 2018-07-24 | Stop reason: SDUPTHER

## 2018-05-14 NOTE — TELEPHONE ENCOUNTER
Called to IR to find out if they have received pt records to have pt scheduled for CT. They stated they have received the records but now waiting for Dr Heather Bell to review and then will call us back. They stated they are completely full until 05/21/18 but will call us back to let us know for sure. Informed them Dr Jaydon Leo wants the pt to have the procedure done tomorrow. krystal

## 2018-05-14 NOTE — TELEPHONE ENCOUNTER
Called IR back and asked has Dr Cate Spangler reviewed pt records and was informed he has been in procedures all day and it will be the end of the day before we will be notified if pt will be approved for CT.

## 2018-05-14 NOTE — TELEPHONE ENCOUNTER
Called pt spouse Krystin Alicea who is on pt [employer] and informed her Dr Muna Prescott wants to plan for pt to have his CT procedure for Wed of this week. But we must plan for pt INR to be at the right level before pt goes. Instructed the pt spouse for pt to hold his coumadin tonight and tomorrow and pt can go ahead have a regular diet tomorrow and taking his regular medications. Informed pt spouse we will talk to radiology tomorrow to get a definite time and date for CT procedure. Pt wife verbalized understanding.

## 2018-05-14 NOTE — TELEPHONE ENCOUNTER
Received a call from scheduling stating the tech Estephania calling from Radiology stating the radiologist  wants the pt to be scheduled for tomorrow for his CT procedure. Pt has been notified his procedure is for tomorrow and to arrive at 9 am for his 12 pm procedure at [facility]. Instructed pt spouse as previously mentioned pt is to hold coumadin tonight, and to be npo starting at 12 pm tonight and can have only bp meds tomorrow morning. Pt spouse verbalized understanding.

## 2018-05-14 NOTE — MR AVS SNAPSHOT
[address] [facility] [address] [place] 
[phone] Patient: Jesika Orta MRN: [id number] Visit Information Date & Time Provider Department Dept. Phone Encounter #  
 5/14/2018 10:15 AM aLtricia Kaba [facility] Internal Medicine [phone] [id number] Your Appointments 5/14/2018 10:15 AM  
ANTICOAG NURSE with AZAR Michelle [facility] Internal Medicine ([facility]) Appt Note: inr check; due/elig for [address]  - 3/24/19 or later [address]  [address] [place]  
[address] [id number] [place] [address] [place]  
  
    
 5/21/2018 10:00 AM  
ANTICOAG NURSE with Geetha Cobos [facility] Medicine ([facility]) Appt Note: inr check [address]  [address] [id number]  
[phone]  
  
    
 6/26/2018 10:45 AM  
PACEMAKER with PFRQUAENORichie, Conchita Noriega [facility]) Appt Note: mdt icd, rc, no CL b 3/13/18  
 [address] [facility] [address]  
[address] [facility]  
  
    
 9/14/2018  8:40 AM  
ESTABLISHED PATIENT with Litzy Lopez MD  
[facility] ([facility]) Appt Note: 6 mo f/u per Dr. Bruce Kaiser [address] [place] [address]  
[address] [address] Upcoming Health Maintenance Date Due  
 GLAUCOMA SCREENING Q2Y 9/1/2016 Influenza Age 5 to Adult 8/1/2018 MEDICARE YEARLY EXAM 3/24/2019 COLONOSCOPY 5/10/2022 DTaP/Tdap/Td series (2 - Td) 7/14/2025 Allergies as of 5/14/2018  Review Complete On: 5/14/2018 By: Lucille Ferrer RN Severity Noted Reaction Type Reactions Pcn [Penicillins]  03/27/2011    Unknown (comments) As a child Percocet [Oxycodone-acetaminophen]  03/27/2011    Swelling Leg swelling Current Immunizations  Reviewed on 5/9/2018 Name Date Influenza High Dose Vaccine PF 11/6/2017, 10/7/2016, 10/12/2015, 10/6/2014 Influenza Vaccine 10/1/2013 Influenza Vaccine Split 10/15/2012, 10/20/2011 Pneumococcal Conjugate (PCV-13) 7/14/2015 Pneumococcal Polysaccharide (PPSV-23) 10/7/2016 Pneumococcal Vaccine (Pcv) 1/1/2005 Tdap 7/14/2015 Zoster Vaccine, Live 1/1/2011 Not reviewed this visit You Were Diagnosed With   
  
 Codes Comments Paroxysmal atrial fibrillation (HCC)    -  Primary ICD-10-CM: I48.0 ICD-9-CM: 427.31 Vitals Smoking Status Never Smoker Pref[facility] [address] [facility] [address] [phone] Your Updated Medication List  
  
   
This list is accurate as of 5/14/18 10:01 AM.  Always use your most recent med list. amLODIPine 5 mg tablet Commonly known as:  Beto Rowlandch Take 5 mg by mouth daily. aspirin delayed-release 81 mg tablet Take  by mouth daily. enalapril 20 mg tablet Commonly known as:  VASOTEC  
TAKE 1 TABLET TWICE DAILY  
  
 fiber Cap Take 2 Caps by mouth two (2) times a day. FOLIC ACID PO Take 400 mcg by mouth daily. hydroCHLOROthiazide 25 mg tablet Commonly known as:  HYDRODIURIL  
TAKE 1 TABLET EVERY DAY  
  
 metoprolol succinate 25 mg XL tablet Commonly known as:  TOPROL-XL  
TAKE 1 TABLET EVERY DAY  
  
 MULTI-VIT 55 PLUS PO Take 1 Tab by mouth daily. potassium chloride 10 mEq tablet Commonly known as:  KLOR-CON  
TAKE 1 TABLET THREE TIMES DAILY pravastatin 10 mg tablet Commonly known as:  PRAVACHOL  
TAKE 1 TABLET EVERY NIGHT  
  
 sildenafil (antihypertensive) 20 mg tablet Commonly known as:  REVATIO Take 1-5 Tabs by mouth daily as needed. terazosin 10 mg capsule Commonly known as:  HYTRIN  
TAKE 1 CAPSULE EVERY NIGHT thioctic acid 50 mg Tab Generic drug:  Alpha Lipoic Acid Take 1 Tab by mouth daily. TYLENOL 325 mg tablet Generic drug:  acetaminophen Take  by mouth every four (4) hours as needed for Pain.  
  
 warfarin 2 mg tablet Commonly known as:  COUMADIN Take 2 Tabs by mouth daily. Description HOLD Coumadin today, then resume new dose post procedure NEW dose: Coumadin 2mg daily except 4mg on M/W/F Old dose: Coumadin 4mg daily except 2mg on Tues and Thurs Recheck in 1 week May 2018 Details Sun Mon Tue Wed Thu Fri Sat  
    1  
  
  
  
   2  
  
  
  
   3  
  
  
  
   4  
  
  
  
   5  
  
  
  
  
  6  
  
  
  
   7  
  
  
  
   8  
  
  
  
   9  
  
  
  
   10  
  
  
  
   11  
  
  
  
   12  
  
  
  
  
  13  
  
  
  
   14  
  
Hold See details 15  
  
2 mg  
  
   16  
  
4 mg  
  
   17  
  
2 mg 18  
  
4 mg  
  
   19  
  
2 mg  
  
  
  20  
  
2 mg  
  
   21  
  
4 mg  
  
   22  
  
  
  
   23  
  
  
  
   24  
  
  
  
   25  
  
  
  
   26  
  
  
  
  
  27  
  
  
  
   28  
  
  
  
   29  
  
  
  
   30  
  
  
  
   31  
  
  
  
    
 Date Details 05/14 This INR check INR: 2.4 Date of next INR:  5/21/2018 How to take your warfarin dose To take:  2 mg Take one of the 2 mg tablets. To take:  4 mg Take two of the 2 mg tablets. Hold Do not take your warfarin dose. See the Details table to the right for additional instructions. We Performed the Following AMB POC PT/INR [89337 CPT(R)] Introducing [facility]! Kenton Siddiqui introduces [employer] patient portal. Now you can access parts of your medical record, email your doctor's office, and request medication refills online. 1. In your internet browser, go to [email or web address] 2. Click on the First Time User? Click Here link in the Sign In box. You will see the New Member Sign Up page. 3. Enter your [email or web address] Access Code exactly as it appears below. You will not need to use this code after youve completed the sign-up process. If you do not sign up before the expiration date, you must request a new code. · [email or web address] Access Code: [email or web address] Expires: 6/21/2018  3:31 PM 
 
4. Enter the last four digits of your Social Security Number (xxxx) and Date of Birth (mm/dd/yyyy) as indicated and click Submit. You will be taken to the next sign-up page. 5. Create a [email or web address] ID. This will be your [email or web address] login ID and cannot be changed, so think of one that is secure and easy to remember. 6. Create a [email or web address] password. You can change your password at any time. 7. Enter your Password Reset Question and Answer. This can be used at a later time if you forget your password. 8. Enter your e-mail address. You will receive e-mail notification when new information is available in [address]. 9. Click Sign Up. You can now view and download portions of your medical record. 10. Click the Download Summary menu link to download a portable copy of your medical information. If you have questions, please visit the Frequently Asked Questions section of the [email or web address] website. Remember, [email or web address] is NOT to be used for urgent needs. For medical emergencies, dial 911. Now available from your iPhone and Android! Please provide this summary of care documentation to your next provider. Your primary care clinician is listed as Tyree Collazo. If you have any questions after today's visit, please call [phone].

## 2018-05-15 ENCOUNTER — HOSPITAL ENCOUNTER (OUTPATIENT)
Dept: CT IMAGING | Age: 78
Discharge: HOME | End: 2018-05-15
Attending: INTERNAL MEDICINE
Payer: MEDICARE

## 2018-05-15 ENCOUNTER — TELEPHONE (OUTPATIENT)
Dept: INTERNAL MEDICINE CLINIC | Age: 78
End: 2018-05-15

## 2018-05-15 VITALS
OXYGEN SATURATION: 100 % | SYSTOLIC BLOOD PRESSURE: 142 MMHG | DIASTOLIC BLOOD PRESSURE: 76 MMHG | RESPIRATION RATE: 20 BRPM | HEART RATE: 78 BPM

## 2018-05-15 DIAGNOSIS — R93.5 ABNORMAL CT OF THE ABDOMEN: ICD-10-CM

## 2018-05-15 PROCEDURE — 77030003462 HC NDL BIOP BRST MDT -B

## 2018-05-15 PROCEDURE — 10022 CT FINE NDL ASPIR W IMAGE: CPT

## 2018-05-15 PROCEDURE — 74011000250 HC RX REV CODE- 250: Performed by: RADIOLOGY

## 2018-05-15 PROCEDURE — 74011250636 HC RX REV CODE- 250/636: Performed by: RADIOLOGY

## 2018-05-15 PROCEDURE — 77030028872 HC BN BIOP NDL ON CNTRL TY TELE -C

## 2018-05-15 PROCEDURE — 77030003666 HC NDL SPINAL BD -A

## 2018-05-15 PROCEDURE — 77030018781

## 2018-05-15 RX ORDER — SODIUM CHLORIDE 9 MG/ML
25 INJECTION, SOLUTION INTRAVENOUS ONCE
Status: DISCONTINUED | OUTPATIENT
Start: 2018-05-15 | End: 2018-05-15

## 2018-05-15 RX ORDER — MIDAZOLAM HYDROCHLORIDE 1 MG/ML
5 INJECTION, SOLUTION INTRAMUSCULAR; INTRAVENOUS
Status: DISCONTINUED | OUTPATIENT
Start: 2018-05-15 | End: 2018-05-15

## 2018-05-15 RX ORDER — LIDOCAINE HYDROCHLORIDE 10 MG/ML
10 INJECTION INFILTRATION; PERINEURAL
Status: DISCONTINUED | OUTPATIENT
Start: 2018-05-15 | End: 2018-05-15

## 2018-05-15 RX ORDER — FENTANYL CITRATE 50 UG/ML
25 INJECTION, SOLUTION INTRAMUSCULAR; INTRAVENOUS
Status: DISCONTINUED | OUTPATIENT
Start: 2018-05-15 | End: 2018-05-15

## 2018-05-15 NOTE — TELEPHONE ENCOUNTER
Received a call from Dr March Boxer nurse from radiology and pt had the CT procedure today and 10 cc's of fluid were drained from the pt abcess. That was all Dr Mirella Bedoya wanted to do since pt did just take his coumadin last Sunday night is what the pt told the nurse. The fluid that was drained was a frothy, tan and pink in color and did appear to look like infection. A culture and sensitivity and cytology has been done and sent to the lab and results should be come back around tomorrow afternoon. The pt did receive 2 grams of vancomycin and Pat the nurse did not know if Dr Zora Jean wanted to start the pt on any other abt and when he wanted the pt to restart his coumadin. Also she wanted Dr Zora Jean to know that the pt and his spouse seemed to be very confused about what medications the pt should currently be taking.

## 2018-05-15 NOTE — H&P
Radiology History and Physical    Patient: Jacqueline Rodriguez 65 y.o. male     Chief Complaint: No chief complaint on file. History of Present Illness: LLQ collection for drainage. History:    Past Medical History:   Diagnosis Date    Arthritis     knees    CAD (coronary artery disease)     stent x3 approx 2001    Chronic atrial fibrillation ([facility] [address]     ED (erectile dysfunction)     Hypertension     Kidney stone on right side 10/30/2011    JAMEY (obstructive sleep apnea) 4/25/2012    Skin cancer     BCC, s/p Mohs on L flank    Sun-damaged skin     TIA (transient ischemic attack)     6/12 - seen at [facility]     Family History   Problem Relation Age of Onset    Stroke Father     Heart Attack Father     Heart Disease Father     Cancer Father 80     colon cancer    Heart Disease Mother    [facility] Arthritis-osteo Sister      s/p bilateral knee replacements    No Known Problems Daughter     Anesth Problems Neg Hx      Social History     Social History    Marital status:      Spouse name: N/A    Number of children: N/A    Years of education: N/A     Occupational History    Not on file. Social History Main Topics    Smoking status: Never Smoker    Smokeless tobacco: Never Used    Alcohol use No    Drug use: No    Sexual activity: Yes     Partners: Female     Birth control/ protection: None     Other Topics Concern    Not on file     Social History Narrative       Allergies: Allergies   Allergen Reactions    Pcn [Penicillins] Unknown (comments)     As a child    Percocet [Oxycodone-Acetaminophen] Swelling     Leg swelling       Current Medications:  Current Outpatient Prescriptions   Medication Sig    warfarin (COUMADIN) 2 mg tablet Take 2 mg by mouth daily.  Coumadin 2mg daily except 4mg on M/W/F    pravastatin (PRAVACHOL) 10 mg tablet TAKE 1 TABLET EVERY NIGHT    terazosin (HYTRIN) 10 mg capsule TAKE 1 CAPSULE EVERY NIGHT    sildenafil, antihypertensive, (REVATIO) 20 mg tablet Take 1-5 Tabs by mouth daily as needed.  hydroCHLOROthiazide (HYDRODIURIL) 25 mg tablet TAKE 1 TABLET EVERY DAY    potassium chloride (KLOR-CON) 10 mEq tablet TAKE 1 TABLET THREE TIMES DAILY    metoprolol succinate (TOPROL-XL) 25 mg XL tablet TAKE 1 TABLET EVERY DAY    enalapril (VASOTEC) 20 mg tablet TAKE 1 TABLET TWICE DAILY    amLODIPine (NORVASC) 5 mg tablet Take 5 mg by mouth daily.  aspirin delayed-release 81 mg tablet Take  by mouth daily.  acetaminophen (TYLENOL) 325 mg tablet Take  by mouth every four (4) hours as needed for Pain.  FOLIC ACID PO Take 742 mcg by mouth daily.  thioctic acid (ALPHA LIPOIC ACID) 50 mg tab Take 1 Tab by mouth daily.  fiber Cap Take 2 Caps by mouth two (2) times a day.  MULTIVITAMIN WITH MINERALS (MULTI-VIT 55 PLUS PO) Take 1 Tab by mouth daily. No current facility-administered medications for this encounter. Facility-Administered Medications Ordered in Other Encounters   Medication Dose Route Frequency    vancomycin (VANCOCIN) 2000 mg in  ml infusion  2,000 mg IntraVENous RAD ONCE        Physical Exam:  Blood pressure 142/76, pulse 78, resp. rate 20, SpO2 100 %. GENERAL: alert, cooperative, mild distress, appears stated age, LUNG: clear to auscultation bilaterally, HEART: regular rate and rhythm      Alerts:    Hospital Problems  Date Reviewed: 5/11/2018    None          Laboratory:      Recent Labs      05/15/18   0921   INR  2.4*         Plan of Care/Planned Procedure:  Risks, benefits, and alternatives reviewed with patient and he agrees to proceed with the procedure.

## 2018-05-15 NOTE — PROCEDURES
PROCEDURE:CT-guided aspiration. INDICATION:LLQ collection. ANESTHESIA:local.  COMPLICATION:NONE. SPECIMENS REMOVED:10cc turbid fluid sent for labs. BLOOD LOSS:NONE. /ASSISTANT:SANDRA Alaniz RECOMMENDATIONS:correlate for antibiotics. CONSENT OBTAINED:YES.  NOTES:none.

## 2018-05-15 NOTE — PROGRESS NOTES
I spoke with Tiffanie Wilson in Dr. Sherry Jennings regarding fluid sent to lab with left lower abd fluid collection aspiration, Vancomycin given today and patient need for medications / Coumadin education. Patient will get orders from Dr. Sherry Jennings office to restart Coumadin.

## 2018-05-15 NOTE — TELEPHONE ENCOUNTER
Discussed w/ Amarilis. She will d/w pt. Will restart coumadin at previous dosing.   Will hold off on starting abx until culture reviewed due to being on coumadin and issues w/ keeping his INR in range in the past.

## 2018-05-18 ENCOUNTER — OFFICE VISIT (OUTPATIENT)
Dept: INTERNAL MEDICINE CLINIC | Age: 78
End: 2018-05-18

## 2018-05-18 VITALS
RESPIRATION RATE: 16 BRPM | DIASTOLIC BLOOD PRESSURE: 70 MMHG | HEIGHT: 70 IN | OXYGEN SATURATION: 97 % | HEART RATE: 80 BPM | BODY MASS INDEX: 30.35 KG/M2 | SYSTOLIC BLOOD PRESSURE: 116 MMHG | WEIGHT: 212 LBS | TEMPERATURE: 97.8 F

## 2018-05-18 DIAGNOSIS — Z79.01 CHRONIC ANTICOAGULATION: ICD-10-CM

## 2018-05-18 DIAGNOSIS — Z87.898 HISTORY OF ABDOMINAL ABSCESS: Primary | ICD-10-CM

## 2018-05-18 LAB
INR BLD: 1.6
PT POC: 18.8 SECONDS
VALID INTERNAL CONTROL?: YES

## 2018-05-18 RX ORDER — CLINDAMYCIN HYDROCHLORIDE 300 MG/1
300 CAPSULE ORAL 4 TIMES DAILY
Qty: 28 CAP | Refills: 0 | Status: SHIPPED | OUTPATIENT
Start: 2018-05-18 | End: 2018-05-25

## 2018-05-18 NOTE — PROGRESS NOTES
Han Mayo is a 65 y.o. male who was seen in clinic today (5/18/2018). Assessment & Plan:   Diagnoses and all orders for this visit:    1. History of abdominal abscess- new dx since last visit, s/p IR drainage, reviewed culture & sensitivity w/ patient. Will treat w/ abx x 7 days. Reviewed if symptoms do not improve will need to get MARINE set up. Cont Tylenol prn. Red flags and expectations were reviewed & discussed with the him. He verbalized understanding.   -     clindamycin (CLEOCIN) 300 mg capsule; Take 1 Cap by mouth four (4) times daily for 7 days. 2. Chronic anticoagulation- INR low, just restarted back, has f/u next week to f/u & repeat. -     AMB POC PT/INR       Follow-up Disposition:  Return if symptoms worsen or fail to improve. Subjective:   Gemma Humphries was seen today for Hospital Follow Up (procedure on 05/15/18)    Infectious Disease Review  Pt is here to talk about LLQ abdominal abscess. Since last visit he had CT guided drainage on Tuesday (8/15). He reports feeling better. Not back to normal.  No further fevers. Cultures grew out: STAPHYLOCOCCUS LUGDUNENSIS       Brief Labs:     Lab Results   Component Value Date/Time    Sodium 145 03/27/2018 08:54 AM    Potassium 3.9 03/27/2018 08:54 AM    Creatinine 0.88 03/27/2018 08:54 AM    Cholesterol, total 103 03/27/2018 08:54 AM    HDL Cholesterol 29 03/27/2018 08:54 AM    LDL, calculated 48 03/27/2018 08:54 AM    Triglyceride 130 03/27/2018 08:54 AM          Prior to Admission medications    Medication Sig Start Date End Date Taking? Authorizing Provider   warfarin (COUMADIN) 2 mg tablet Take 2 mg by mouth daily.  Coumadin 2mg daily except 4mg on M/W/F   Yes Historical Provider   pravastatin (PRAVACHOL) 10 mg tablet TAKE 1 TABLET EVERY NIGHT 5/11/18  Yes Huang Pisano MD   terazosin (HYTRIN) 10 mg capsule TAKE 1 CAPSULE EVERY NIGHT 5/11/18  Yes Huang Pisano MD   sildenafil, antihypertensive, (REVATIO) 20 mg tablet Take 1-5 Tabs by mouth daily as needed. 4/30/18  Yes Pollo Almodovar MD   hydroCHLOROthiazide (HYDRODIURIL) 25 mg tablet TAKE 1 TABLET EVERY DAY 1/5/18  Yes Pollo Almodovar MD   potassium chloride (KLOR-CON) 10 mEq tablet TAKE 1 TABLET THREE TIMES DAILY 1/5/18  Yes Pollo Almodovar MD   metoprolol succinate (TOPROL-XL) 25 mg XL tablet TAKE 1 TABLET EVERY DAY 1/5/18  Yes Pollo Almodovar MD   enalapril (VASOTEC) 20 mg tablet TAKE 1 TABLET TWICE DAILY 12/25/17  Yes Pollo Almodovar MD   amLODIPine (NORVASC) 5 mg tablet Take 5 mg by mouth daily. Yes Historical Provider   aspirin delayed-release 81 mg tablet Take  by mouth daily. Yes Historical Provider   acetaminophen (TYLENOL) 325 mg tablet Take  by mouth every four (4) hours as needed for Pain. Yes Historical Provider   FOLIC ACID PO Take 067 mcg by mouth daily. Yes Historical Provider   thioctic acid (ALPHA LIPOIC ACID) 50 mg tab Take 1 Tab by mouth daily. Yes Historical Provider   fiber Cap Take 2 Caps by mouth two (2) times a day. Yes Historical Provider   MULTIVITAMIN WITH MINERALS (MULTI-VIT 55 PLUS PO) Take 1 Tab by mouth daily. 7/8/11  Yes Historical Provider          Allergies   Allergen Reactions    Pcn [Penicillins] Unknown (comments)     As a child    Percocet [Oxycodone-Acetaminophen] Swelling     Leg swelling           Review of Systems   Constitutional: Positive for malaise/fatigue. Negative for chills, diaphoresis and fever. Respiratory: Negative for cough and shortness of breath. Cardiovascular: Negative for chest pain and palpitations. Gastrointestinal: Negative for abdominal pain, constipation, diarrhea, nausea and vomiting. Neurological: Negative for weakness. Objective:   Physical Exam   Eyes: Conjunctivae are normal. No scleral icterus. Cardiovascular: Regular rhythm and normal heart sounds. No murmur heard. Pulmonary/Chest: Effort normal and breath sounds normal. He has no wheezes.  He has no rales. Abdominal: Soft. Bowel sounds are normal. He exhibits no mass. There is no hepatosplenomegaly. There is no tenderness. Visit Vitals    /70    Pulse 80    Temp 97.8 °F (36.6 °C) (Oral)    Resp 16    Ht 5' 10\" (1.778 m)    Wt 212 lb (96.2 kg)    SpO2 97%    BMI 30.42 kg/m2         Disclaimer:  Advised him to call back or return to office if symptoms worsen/change/persist.  Discussed expected course/resolution/complications of diagnosis in detail with patient. Medication risks/benefits/costs/interactions/alternatives discussed with patient. He was given an after visit summary which includes diagnoses, current medications, & vitals. He expressed understanding with the diagnosis and plan. Aspects of this note may have been generated using voice recognition software. Despite editing, there may be some syntax errors.        Marilu Mendiola MD

## 2018-05-18 NOTE — PATIENT INSTRUCTIONS
STAPHYLOCOCCUS LUGDUNENSIS          Skin Abscess: Care Instructions  Your Care Instructions    A skin abscess is a bacterial infection that forms a pocket of pus. A boil is a kind of skin abscess. The doctor may have cut an opening in the abscess so that the pus can drain out. You may have gauze in the cut so that the abscess will stay open and keep draining. You may need antibiotics. You will need to follow up with your doctor to make sure the infection has gone away. The doctor has checked you carefully, but problems can develop later. If you notice any problems or new symptoms, get medical treatment right away. Follow-up care is a key part of your treatment and safety. Be sure to make and go to all appointments, and call your doctor if you are having problems. It's also a good idea to know your test results and keep a list of the medicines you take. How can you care for yourself at home? · Apply warm and dry compresses, a heating pad set on low, or a hot water bottle 3 or 4 times a day for pain. Keep a cloth between the heat source and your skin. · If your doctor prescribed antibiotics, take them as directed. Do not stop taking them just because you feel better. You need to take the full course of antibiotics. · Take pain medicines exactly as directed. ¨ If the doctor gave you a prescription medicine for pain, take it as prescribed. ¨ If you are not taking a prescription pain medicine, ask your doctor if you can take an over-the-counter medicine. · Keep your bandage clean and dry. Change the bandage whenever it gets wet or dirty, or at least one time a day. · If the abscess was packed with gauze:  ¨ Keep follow-up appointments to have the gauze changed or removed. If the doctor instructed you to remove the gauze, gently pull out all of the gauze when your doctor tells you to. ¨ After the gauze is removed, soak the area in warm water for 15 to 20 minutes 2 times a day, until the wound closes.   When should you call for help? Call your doctor now or seek immediate medical care if:  ? · You have signs of worsening infection, such as:  ¨ Increased pain, swelling, warmth, or redness. ¨ Red streaks leading from the infected skin. ¨ Pus draining from the wound. ¨ A fever. ? Watch closely for changes in your health, and be sure to contact your doctor if:  ? · You do not get better as expected. Where can you learn more? Go to http://marquis-thomas.info/. Enter P341 in the search box to learn more about \"Skin Abscess: Care Instructions. \"  Current as of: October 13, 2016  Content Version: 11.4  © 8095-4041 [employer]. Care instructions adapted under license by [employer] (which disclaims liability or warranty for this information). If you have questions about a medical condition or this instruction, always ask your healthcare professional. [address] any warranty or liability for your use of this information.

## 2018-05-18 NOTE — MR AVS SNAPSHOT
[address] [facility] [address] 
[phone] Patient: Kenna Vo MRN: [id number] ALEXI:6[id number] Visit Information Date & Time Provider Department Dept. Phone Encounter #  
 5/18/2018 12:30 PM Serafin Hashimoto, 12[address] [facility] [phone] [id number] Follow-up Instructions Return if symptoms worsen or fail to improve. Your Appointments 5/21/2018 10:00 AM  
ANTICOAG NURSE with Anna Beltran [facility] Internal Medicine ([facility]) Appt Note: inr check 330 [facility] [address]  
[facility] [address] [place] [address]   
  
    
 6/26/2018 10:45 AM  
PACEMAKER with Ramesh Andres [facility] ([facility] SCHEDULING) Appt Note: mdt icd, rc, no CL b 3/13/18  
 [address] 200 [facility] [address]  
[address] [place] [address] Dr  
  
    
 9/14/2018  8:40 AM  
ESTABLISHED PATIENT with Gwen Cha MD  
[facility] ([facility]) Appt Note: 6 mo f/u per Dr. Lowell Thomas 200 [facility] [address]  
[address] [facility] 7 [address] Upcoming Health Maintenance Date Due  
 GLAUCOMA SCREENING Q2Y 9/1/2016 Influenza Age 5 to Adult 8/1/2018 MEDICARE YEARLY EXAM 3/24/2019 COLONOSCOPY 5/10/2022 DTaP/Tdap/Td series (2 - Td) 7/14/2025 Allergies as of 5/18/2018  Review Complete On: 5/18/2018 By: Serafin Hashimoto, MD  
  
 Severity Noted Reaction Type Reactions Pcn [Penicillins]  03/27/2011    Unknown (comments) As a child Percocet [Oxycodone-acetaminophen]  03/27/2011    Swelling Leg swelling Current Immunizations  Reviewed on 5/9/2018 Name Date Influenza High Dose Vaccine PF 11/6/2017, 10/7/2016, 10/12/2015, 10/6/2014 Influenza Vaccine 10/1/2013 Influenza Vaccine Split 10/15/2012, 10/20/2011 Pneumococcal Conjugate (PCV-13) 7/14/2015 Pneumococcal Polysaccharide (PPSV-23) 10/7/2016 Pneumococcal Vaccine (Pcv) 1/1/2005 Tdap 7/14/2015 Zoster Vaccine, Live 1/1/2011 Not reviewed this visit You Were Diagnosed With   
  
 Codes Comments Chronic anticoagulation    -  Primary ICD-10-CM: Z79.01 
ICD-9-CM: V58.61 Vitals BP Pulse Temp Resp Height(growth percentile) Weight(growth percentile) 116/70 80 97.8 °F (36.6 °C) (Oral) 16 5' 10\" (1.778 m) 212 lb (96.2 kg) SpO2 BMI Smoking Status 97% 30.42 kg/m2 Never Smoker BMI and BSA Data Body Mass Index Body Surface Area  
 30.42 kg/m 2 2.18 m 2 Preferred Pharmacy Pharmacy Name Phone [facility] [phone] - [address] [place], [address] AT [place] [address] [phone] Your Updated Medication List  
  
   
This list is accurate as of 5/18/18 12:44 PM.  Always use your most recent med list. amLODIPine 5 mg tablet Commonly known as:  Achilles Covington Take 5 mg by mouth daily. aspirin delayed-release 81 mg tablet Take  by mouth daily. clindamycin 300 mg capsule Commonly known as:  CLEOCIN Take 1 Cap by mouth four (4) times daily for 7 days. COUMADIN 2 mg tablet Generic drug:  warfarin Take 2 mg by mouth daily. Coumadin 2mg daily except 4mg on M/W/F  
  
 enalapril 20 mg tablet Commonly known as:  VASOTEC  
TAKE 1 TABLET TWICE DAILY  
  
 fiber Cap Take 2 Caps by mouth two (2) times a day. FOLIC ACID PO Take 400 mcg by mouth daily. hydroCHLOROthiazide 25 mg tablet Commonly known as:  HYDRODIURIL  
TAKE 1 TABLET EVERY DAY  
  
 metoprolol succinate 25 mg XL tablet Commonly known as:  TOPROL-XL  
TAKE 1 TABLET EVERY DAY  
  
 MULTI-VIT 55 PLUS PO Take 1 Tab by mouth daily. potassium chloride 10 mEq tablet Commonly known as:  KLOR-CON  
TAKE 1 TABLET THREE TIMES DAILY pravastatin 10 mg tablet Commonly known as:  PRAVACHOL  
TAKE 1 TABLET EVERY NIGHT  
  
 sildenafil (antihypertensive) 20 mg tablet Commonly known as:  REVATIO Take 1-5 Tabs by mouth daily as needed. terazosin 10 mg capsule Commonly known as:  HYTRIN  
TAKE 1 CAPSULE EVERY NIGHT  
  
 thioctic acid 50 mg Tab Generic drug:  Alpha Lipoic Acid Take 1 Tab by mouth daily. TYLENOL 325 mg tablet Generic drug:  acetaminophen Take  by mouth every four (4) hours as needed for Pain. Prescriptions Sent to Pharmacy Refills  
 clindamycin (CLEOCIN) 300 mg capsule 0 Sig: Take 1 Cap by mouth four (4) times daily for 7 days. Class: Normal  
 [facility] [address] [place] [address] Ph #: [phone] Route: Oral  
  
We Performed the Following AMB POC PT/INR [68979 CPT(R)] Follow-up Instructions Return if symptoms worsen or fail to improve. Patient Instructions STAPHYLOCOCCUS LUGDUNENSIS Skin Abscess: Care Instructions Your Care Instructions A skin abscess is a bacterial infection that forms a pocket of pus. A boil is a kind of skin abscess. The doctor may have cut an opening in the abscess so that the pus can drain out. You may have gauze in the cut so that the abscess will stay open and keep draining. You may need antibiotics. You will need to follow up with your doctor to make sure the infection has gone away. The doctor has checked you carefully, but problems can develop later. If you notice any problems or new symptoms, get medical treatment right away. Follow-up care is a key part of your treatment and safety. Be sure to make and go to all appointments, and call your doctor if you are having problems.  It's also a good idea to know your test results and keep a list of the medicines you take. How can you care for yourself at home? · Apply warm and dry compresses, a heating pad set on low, or a hot water bottle 3 or 4 times a day for pain. Keep a cloth between the heat source and your skin. · If your doctor prescribed antibiotics, take them as directed. Do not stop taking them just because you feel better. You need to take the full course of antibiotics. · Take pain medicines exactly as directed. ¨ If the doctor gave you a prescription medicine for pain, take it as prescribed. ¨ If you are not taking a prescription pain medicine, ask your doctor if you can take an over-the-counter medicine. · Keep your bandage clean and dry. Change the bandage whenever it gets wet or dirty, or at least one time a day. · If the abscess was packed with gauze: 
¨ Keep follow-up appointments to have the gauze changed or removed. If the doctor instructed you to remove the gauze, gently pull out all of the gauze when your doctor tells you to. ¨ After the gauze is removed, soak the area in warm water for 15 to 20 minutes 2 times a day, until the wound closes. When should you call for help? Call your doctor now or seek immediate medical care if: 
? · You have signs of worsening infection, such as: 
¨ Increased pain, swelling, warmth, or redness. ¨ Red streaks leading from the infected skin. ¨ Pus draining from the wound. ¨ A fever. ? Watch closely for changes in your health, and be sure to contact your doctor if: 
? · You do not get better as expected. Where can you learn more? Go to http://marquis-thomas.info/. Enter Q557 in the search box to learn more about \"Skin Abscess: Care Instructions. \" Current as of: October 13, 2016 Content Version: 11.4 © 4619-2335 [employer]. Care instructions adapted under license by [employer] (which disclaims liability or warranty for this information).  If you have questions about a medical condition or this instruction, always ask your healthcare professional. [place] [address] any warranty or liability for your use of this information. Introducing [facility]! Sierra Espino introduces [employer] patient portal. Now you can access parts of your medical record, email your doctor's office, and request medication refills online. 1. In your internet browser, go to [email or web address] 2. Click on the First Time User? Click Here link in the Sign In box. You will see the New Member Sign Up page. 3. Enter your [employer] Access Code exactly as it appears below. You will not need to use this code after youve completed the sign-up process. If you do not sign up before the expiration date, you must request a new code. · [employer] Access Code: [email or web address] Expires: 6/21/2018  3:31 PM 
 
4. Enter the last four digits of your Social Security Number (xxxx) and Date of Birth (mm/dd/yyyy) as indicated and click Submit. You will be taken to the next sign-up page. 5. Create a Sn[employer] Chow ID. This will be your [employer] login ID and cannot be changed, so think of one that is secure and easy to remember. 6. Create a [employer] password. You can change your password at any time. 7. Enter your Password Reset Question and Answer. This can be used at a later time if you forget your password. 8. Enter your e-mail address. You will receive e-mail notification when new information is available in [address] 9. Click Sign Up. You can now view and download portions of your medical record. 10. Click the Download Summary menu link to download a portable copy of your medical information. If you have questions, please visit the Frequently Asked Questions section of the [employer] website. Remember, [employer] is NOT to be used for urgent needs. For medical emergencies, dial 911. Now available from your iPhone and Android! Please provide this summary of care documentation to your next provider. Your primary care clinician is listed as Cirilo Grubbs. If you have any questions after today's visit, please call [phone].

## 2018-05-21 ENCOUNTER — ANTI-COAG VISIT (OUTPATIENT)
Dept: INTERNAL MEDICINE CLINIC | Age: 78
End: 2018-05-21

## 2018-05-21 DIAGNOSIS — Z79.01 CHRONIC ANTICOAGULATION: Primary | ICD-10-CM

## 2018-05-21 DIAGNOSIS — I48.0 PAROXYSMAL ATRIAL FIBRILLATION (HCC): ICD-10-CM

## 2018-05-21 LAB
INR BLD: 2
PT POC: 24.5 SECONDS
VALID INTERNAL CONTROL?: YES

## 2018-05-21 NOTE — PROGRESS NOTES
INR 2.0 today. Patient instructions:   Continue Coumadin 2mg daily except 4mg on M/W/F  Recheck in 1 week    A full discussion of the nature of anticoagulants has been carried out. A benefit risk analysis has been presented to the patient, so that they understand the justification for choosing anticoagulation at this time. The need for frequent and regular monitoring, precise dosage adjustment and compliance is stressed. Side effects of potential bleeding are discussed. The patient should avoid any OTC items containing aspirin or ibuprofen, and should avoid great swings in general diet. Avoid alcohol consumption. Call if any signs of abnormal bleeding. Patient verbalized understanding.

## 2018-05-21 NOTE — MR AVS SNAPSHOT
[address] [facility] [address] [place] 
[phone] Patient: Gertrudis Rosales MRN: [id number] TXL:[birth year] Visit Information Date & Time Provider Department Dept. Phone Encounter #  
 5/21/2018 10:00 AM [place] Gardner [facility] Internal Medicine [phone] [id number] Your Appointments 5/21/2018 10:00 AM  
ANTICOAG NURSE with Jose Peterson [facility] Internal Medicine ([address]) Appt Note: inr check; due/elig for [address]  - 3/24/19 or later 330 [facility] [address] [place]  
[address] [facility]  
  
    
 6/26/2018 10:45 AM  
PACEMAKER with Melony Otero [facility] ([facility]) Appt Note: mdt icd, rc, no CL b 3/13/18  
 [address] [address]  
[address] [facility]  
  
    
 9/14/2018  8:40 AM  
ESTABLISHED PATIENT with Jordy Greene MD  
[facility] ([address]) Appt Note: 6 mo f/u per Dr. Ruthine Collet 200 [place] [address]  
[address] [facility] 7 [address] Upcoming Health Maintenance Date Due  
 GLAUCOMA SCREENING Q2Y 9/1/2016 Influenza Age 5 to Adult 8/1/2018 MEDICARE YEARLY EXAM 3/24/2019 COLONOSCOPY 5/10/2022 DTaP/Tdap/Td series (2 - Td) 7/14/2025 Allergies as of 5/21/2018  Review Complete On: 5/21/2018 By: Ameena Casiano RN Severity Noted Reaction Type Reactions Pcn [Penicillins]  03/27/2011    Unknown (comments) As a child Percocet [Oxycodone-acetaminophen]  03/27/2011    Swelling Leg swelling Current Immunizations  Reviewed on 5/9/2018 Name Date Influenza High Dose Vaccine PF 11/6/2017, 10/7/2016, 10/12/2015, 10/6/2014 Influenza Vaccine 10/1/2013 Influenza Vaccine Split 10/15/2012, 10/20/2011 Pneumococcal Conjugate (PCV-13) 7/14/2015 Pneumococcal Polysaccharide (PPSV-23) 10/7/2016 Pneumococcal Vaccine (Pcv) 1/1/2005 Tdap 7/14/2015 Zoster Vaccine, Live 1/1/2011 Not reviewed this visit You Were Diagnosed With   
  
 Codes Comments Chronic anticoagulation    -  Primary ICD-10-CM: Z79.01 
ICD-9-CM: V58.61 Paroxysmal atrial fibrillation (HCC)     ICD-10-CM: I48.0 ICD-9-CM: 427.31 Vitals Smoking Status Never Smoker Preferred Pharmacy Pharmacy Name Phone [phone] - [address], [address] AT [place] [address] [phone] Your Updated Medication List  
  
   
This list is accurate as of 5/21/18  9:51 AM.  Always use your most recent med list. amLODIPine 5 mg tablet Commonly known as:  Jacquetta Couch Take 5 mg by mouth daily. aspirin delayed-release 81 mg tablet Take  by mouth daily. clindamycin 300 mg capsule Commonly known as:  CLEOCIN Take 1 Cap by mouth four (4) times daily for 7 days. COUMADIN 2 mg tablet Generic drug:  warfarin Take 2 mg by mouth daily. Coumadin 2mg daily except 4mg on M/W/F  
  
 enalapril 20 mg tablet Commonly known as:  VASOTEC  
TAKE 1 TABLET TWICE DAILY  
  
 fiber Cap Take 2 Caps by mouth two (2) times a day. FOLIC ACID PO Take 400 mcg by mouth daily. hydroCHLOROthiazide 25 mg tablet Commonly known as:  HYDRODIURIL  
TAKE 1 TABLET EVERY DAY  
  
 metoprolol succinate 25 mg XL tablet Commonly known as:  TOPROL-XL  
TAKE 1 TABLET EVERY DAY  
  
 MULTI-VIT 55 PLUS PO Take 1 Tab by mouth daily. potassium chloride 10 mEq tablet Commonly known as:  KLOR-CON  
TAKE 1 TABLET THREE TIMES DAILY pravastatin 10 mg tablet Commonly known as:  PRAVACHOL  
TAKE 1 TABLET EVERY NIGHT  
  
 sildenafil (antihypertensive) 20 mg tablet Commonly known as:  REVATIO Take 1-5 Tabs by mouth daily as needed. terazosin 10 mg capsule Commonly known as:  HYTRIN  
TAKE 1 CAPSULE EVERY NIGHT  
  
 thioctic acid 50 mg Tab Generic drug:  Alpha Lipoic Acid Take 1 Tab by mouth daily. TYLENOL 325 mg tablet Generic drug:  acetaminophen Take  by mouth every four (4) hours as needed for Pain. Description Continue Coumadin 2mg daily except 4mg on M/W/F Recheck in 1 week May 2018 Details Sun Mon Tue Wed Thu Fri Sat  
    1  
  
  
  
   2  
  
  
  
   3  
  
  
  
   4  
  
  
  
   5  
  
  
  
  
  6  
  
  
  
   7  
  
  
  
   8  
  
  
  
   9  
  
  
  
   10  
  
  
  
   11  
  
  
  
   12  
  
  
  
  
  13  
  
  
  
   14  
  
  
  
   15  
  
  
  
   16  
  
  
  
   17  
  
  
  
   18  
  
  
  
   19  
  
  
  
  
  20  
  
  
  
   21  
  
4 mg See details 22  
  
2 mg  
  
   23  
  
4 mg  
  
   24  
  
2 mg 25  
  
4 mg  
  
   26  
  
2 mg  
  
  
  27  
  
2 mg 28  
  
4 mg  
  
   29  
  
2 mg 30  
  
  
  
   31  
  
  
  
    
 Date Details 05/21 This INR check INR: 2.0 Date of next INR:  5/29/2018 How to take your warfarin dose To take:  2 mg Take one of the 2 mg tablets. To take:  4 mg Take two of the 2 mg tablets. We Performed the Following AMB POC PT/INR [24334 CPT(R)] Introducing [facility]! Destiny Mcneal introduces [employer] patient portal. Now you can access parts of your medical record, email your doctor's office, and request medication refills online. 1. In your internet browser, go to [email or web address] 2. Click on the First Time User? Click Here link in the Sign In box. You will see the New Member Sign Up page. 3. Enter your [employer] Access Code exactly as it appears below. You will not need to use this code after youve completed the sign-up process.  If you do not sign up before the expiration date, you must request a new code. · Sp[employer] Wave Access Code: [email or web address] Expires: 6/21/2018  3:31 PM 
 
4. Enter the last four digits of your Social Security Number (xxxx) and Date of Birth (mm/dd/yyyy) as indicated and click Submit. You will be taken to the next sign-up page. 5. Create a Sp[employer] Wave ID. This will be your [employer] Wave login ID and cannot be changed, so think of one that is secure and easy to remember. 6. Create a Sp[employer] Wave password. You can change your password at any time. 7. Enter your Password Reset Question and Answer. This can be used at a later time if you forget your password. 8. Enter your e-mail address. You will receive e-mail notification when new information is available in [address] 9. Click Sign Up. You can now view and download portions of your medical record. 10. Click the Download Summary menu link to download a portable copy of your medical information. If you have questions, please visit the Frequently Asked Questions section of the [employer] website. Remember, [employer] Wave is NOT to be used for urgent needs. For medical emergencies, dial 911. Now available from your iPhone and Android! Please provide this summary of care documentation to your next provider. Your primary care clinician is listed as Carmen Salmon. If you have any questions after today's visit, please call [phone].

## 2018-05-29 ENCOUNTER — ANTI-COAG VISIT (OUTPATIENT)
Dept: INTERNAL MEDICINE CLINIC | Age: 78
End: 2018-05-29

## 2018-05-29 DIAGNOSIS — I48.0 PAROXYSMAL ATRIAL FIBRILLATION (HCC): Primary | ICD-10-CM

## 2018-05-29 LAB
INR BLD: 2.1
PT POC: 24.7 SECONDS
VALID INTERNAL CONTROL?: YES

## 2018-05-29 NOTE — MR AVS SNAPSHOT
[address] [facility] [address] [place] [address] 
[phone] Patient: Hollis Aschoff MRN: [id number] QGL:3/54/1987 Visit Information Date & Time Provider Department Dept. Phone Encounter #  
 5/29/2018 10:00 AM Yulia Lyle [facility] Internal Medicine [phone] [id number] Your Appointments 5/29/2018 10:00 AM  
[facility] ([facility]) Appt Note: inr check; due/elig for [address]  - 3/24/19 or later [address]  [address] [facility] [address]  
[address] [facility]  
  
    
 6/11/2018 10:15 AM  
[facility] Appt Note: inr check [address] [address]  
[phone]  
  
    
 6/26/2018 10:45 AM  
PACEMAKER with [id number], Leamon Gala [facility] Appt Note: mdt icd, rc, no CL b 3/13/18  
 [address] [facility] 200 [facility] [address]  
[address] [facility]  
  
    
 9/14/2018  8:40 AM  
ESTABLISHED PATIENT with Caden Hogan MD  
[facility] ([facility]) Appt Note: 6 mo f/u per Dr. Clifton [address] [place] [address]  
[address] [address] Upcoming Health Maintenance Date Due  
 GLAUCOMA SCREENING Q2Y 9/1/2016 Influenza Age 5 to Adult 8/1/2018 MEDICARE YEARLY EXAM 3/24/2019 COLONOSCOPY 5/10/2022 DTaP/Tdap/Td series (2 - Td) 7/14/2025 Allergies as of 5/29/2018  Review Complete On: 5/29/2018 By: Monet Clayton RN Severity Noted Reaction Type Reactions Pcn [Penicillins]  03/27/2011    Unknown (comments) As a child Percocet [Oxycodone-acetaminophen]  03/27/2011    Swelling Leg swelling Current Immunizations  Reviewed on 5/9/2018 Name Date Influenza High Dose Vaccine PF 11/6/2017, 10/7/2016, 10/12/2015, 10/6/2014 Influenza Vaccine 10/1/2013 Influenza Vaccine Split 10/15/2012, 10/20/2011 Pneumococcal Conjugate (PCV-13) 7/14/2015 Pneumococcal Polysaccharide (PPSV-23) 10/7/2016 Pneumococcal Vaccine (Pcv) 1/1/2005 Tdap 7/14/2015 Zoster Vaccine, Live 1/1/2011 Not reviewed this visit You Were Diagnosed With   
  
 Codes Comments Paroxysmal atrial fibrillation (HCC)    -  Primary ICD-10-CM: I48.0 ICD-9-CM: 427.31 Vitals Smoking Status Never Smoker Preferred Pharmacy Pharmacy Name Phone [facility] [phone] - [address] [place], [address] [place] [address] [phone] Your Updated Medication List  
  
   
This list is accurate as of 5/29/18  9:57 AM.  Always use your most recent med list. amLODIPine 5 mg tablet Commonly known as:  Robert Archibald Take 5 mg by mouth daily. aspirin delayed-release 81 mg tablet Take  by mouth daily. COUMADIN 2 mg tablet Generic drug:  warfarin Take 2 mg by mouth daily. Coumadin 2mg daily except 4mg on M/W/F  
  
 enalapril 20 mg tablet Commonly known as:  VASOTEC  
TAKE 1 TABLET TWICE DAILY  
  
 fiber Cap Take 2 Caps by mouth two (2) times a day. FOLIC ACID PO Take 400 mcg by mouth daily. hydroCHLOROthiazide 25 mg tablet Commonly known as:  HYDRODIURIL  
TAKE 1 TABLET EVERY DAY  
  
 metoprolol succinate 25 mg XL tablet Commonly known as:  TOPROL-XL  
TAKE 1 TABLET EVERY DAY  
  
 MULTI-VIT 55 PLUS PO Take 1 Tab by mouth daily. potassium chloride 10 mEq tablet Commonly known as:  KLOR-CON  
TAKE 1 TABLET THREE TIMES DAILY pravastatin 10 mg tablet Commonly known as:  PRAVACHOL  
TAKE 1 TABLET EVERY NIGHT sildenafil (antihypertensive) 20 mg tablet Commonly known as:  REVATIO Take 1-5 Tabs by mouth daily as needed. terazosin 10 mg capsule Commonly known as:  HYTRIN  
TAKE 1 CAPSULE EVERY NIGHT  
  
 thioctic acid 50 mg Tab Generic drug:  Alpha Lipoic Acid Take 1 Tab by mouth daily. TYLENOL 325 mg tablet Generic drug:  acetaminophen Take  by mouth every four (4) hours as needed for Pain. Description Continue Coumadin 2mg daily except 4mg on M/W/F Recheck in 2 weeks May 2018 Details Sun Mon Tue Wed Thu Fri Sat  
    1  
  
  
  
   2  
  
  
  
   3  
  
  
  
   4  
  
  
  
   5  
  
  
  
  
  6  
  
  
  
   7  
  
  
  
   8  
  
  
  
   9  
  
  
  
   10  
  
  
  
   11  
  
  
  
   12  
  
  
  
  
  13  
  
  
  
   14  
  
  
  
   15  
  
  
  
   16  
  
  
  
   17  
  
  
  
   18  
  
  
  
   19  
  
  
  
  
  20  
  
  
  
   21  
  
  
  
   22  
  
  
  
   23  
  
  
  
   24  
  
  
  
   25  
  
  
  
   26  
  
  
  
  
  27  
  
  
  
   28  
  
  
  
   29  
  
2 mg See details 30  
  
4 mg  
  
   31  
  
2 mg Date Details 05/29 This INR check INR: 2.1 How to take your warfarin dose To take:  2 mg Take one of the 2 mg tablets. To take:  4 mg Take two of the 2 mg tablets. June 2018 Details Roiht Padilla Tue Wed Thu Fri Sat 1  
  
4 mg 2  
  
2 mg 3  
  
2 mg 4  
  
4 mg 5  
  
2 mg 6  
  
4 mg  
  
   7  
  
2 mg 8  
  
4 mg 9  
  
2 mg  
  
  
  10  
  
2 mg  
  
   11  
  
4 mg  
  
   12  
  
  
  
   13  
  
  
  
   14  
  
  
  
   15  
  
  
  
   16  
  
  
  
  
  17  
  
  
  
   18  
  
  
  
   19  
  
  
  
   20  
  
  
  
   21  
  
  
  
   22  
  
  
  
   23  
  
  
  
  
  24  
  
  
  
   25  
  
  
  
   26  
  
  
  
   27  
  
  
  
   28  
  
  
  
   29  
  
  
  
   30  
  
  
  
  
 Date Details No additional details Date of next INR:  6/11/2018 How to take your warfarin dose To take:  2 mg Take one of the 2 mg tablets. To take:  4 mg Take two of the 2 mg tablets. We Performed the Following AMB POC PT/INR [95394 CPT(R)] Introducing [facility]! [facility] introduces [employer] patient portal. Now you can access parts of your medical record, email your doctor's office, and request medication refills online. 1. In your internet browser, go to [email or web address] 2. Click on the First Time User? Click Here link in the Sign In box. You will see the New Member Sign Up page. 3. Enter your Mixgar Access Code exactly as it appears below. You will not need to use this code after youve completed the sign-up process. If you do not sign up before the expiration date, you must request a new code. · [employer] Access Code: [email or web address] Expires: 6/21/2018  3:31 PM 
 
4. Enter the last four digits of your Social Security Number (xxxx) and Date of Birth (mm/dd/yyyy) as indicated and click Submit. You will be taken to the next sign-up page. 5. Create a Mixgar ID. This will be your Mixgar login ID and cannot be changed, so think of one that is secure and easy to remember. 6. Create a Mix[employer] password. You can change your password at any time. 7. Enter your Password Reset Question and Answer. This can be used at a later time if you forget your password. 8. Enter your e-mail address. You will receive e-mail notification when new information is available in [address] 9. Click Sign Up. You can now view and download portions of your medical record. 10. Click the Download Summary menu link to download a portable copy of your medical information. If you have questions, please visit the Frequently Asked Questions section of the [employer] website. Remember, [employer] is NOT to be used for urgent needs. For medical emergencies, dial 911. Now available from your iPhone and Android! Please provide this summary of care documentation to your next provider. Your primary care clinician is listed as Huang Pisano. If you have any questions after today's visit, please call [phone].

## 2018-05-29 NOTE — PROGRESS NOTES
INR 2.1 today. Patient instructions:   Continue Coumadin 2mg daily except 4mg on M/W/F  Recheck in 2 weeks  A calendar with daily dosage instructions has been given to patient at the end of today's visit. A full discussion of the nature of anticoagulants has been carried out. A benefit risk analysis has been presented to the patient, so that they understand the justification for choosing anticoagulation at this time. The need for frequent and regular monitoring, precise dosage adjustment and compliance is stressed. Side effects of potential bleeding are discussed. The patient should avoid any OTC items containing aspirin or ibuprofen, and should avoid great swings in general diet. Avoid alcohol consumption. Call if any signs of abnormal bleeding. Patient verbalized understanding.

## 2018-06-11 ENCOUNTER — ANTI-COAG VISIT (OUTPATIENT)
Dept: INTERNAL MEDICINE CLINIC | Age: 78
End: 2018-06-11

## 2018-06-11 DIAGNOSIS — Z79.01 CHRONIC ANTICOAGULATION: ICD-10-CM

## 2018-06-11 DIAGNOSIS — I48.0 PAROXYSMAL ATRIAL FIBRILLATION (HCC): Primary | ICD-10-CM

## 2018-06-11 LAB
INR BLD: 2.4
PT POC: 28.3 SECONDS
VALID INTERNAL CONTROL?: YES

## 2018-06-11 RX ORDER — ENALAPRIL MALEATE 20 MG/1
TABLET ORAL
Qty: 180 TAB | Refills: 1 | Status: SHIPPED | OUTPATIENT
Start: 2018-06-11 | End: 2018-11-28 | Stop reason: SDUPTHER

## 2018-06-11 NOTE — PROGRESS NOTES
INR 2.4 today. Patient instructions:   Continue Coumadin 2mg daily except 4mg on M/W/F  Recheck in 4 weeks    A full discussion of the nature of anticoagulants has been carried out. A benefit risk analysis has been presented to the patient, so that they understand the justification for choosing anticoagulation at this time. The need for frequent and regular monitoring, precise dosage adjustment and compliance is stressed. Side effects of potential bleeding are discussed. The patient should avoid any OTC items containing aspirin or ibuprofen, and should avoid great swings in general diet. Avoid alcohol consumption. Call if any signs of abnormal bleeding. Patient verbalized understanding.

## 2018-06-11 NOTE — MR AVS SNAPSHOT
[address] [facility] [address] 
[phone] Patient: Elly Cox MRN: [id number] TJF:[birth year] Visit Information Date & Time Provider Department Dept. Phone Encounter #  
 6/11/2018 10:15 AM [facility] Internal Medicine [phone] [id number] Your Appointments 6/11/2018 10:15 AM  
ANTICOAG NURSE with AZAR Cain [facility] Internal Medicine ([address]) Appt Note: inr check; due/elig for [address]  - 3/24/19 or later [address]  [address] [place]  
  
    
 6/26/2018 10:45 AM  
PACEMAKER with Nilsa Sauceda [facility] ([facility]) Appt Note: mdt icd, rc, no CL b 3/13/18  
 [address] [place] [address]  
[address]  
  
    
 9/14/2018  8:40 AM  
ESTABLISHED PATIENT with Venu Borjas MD  
[facility] ([address]) Appt Note: 6 mo f/u per Dr. Neena Moseley [address]  
[address] [place] [id number] Upcoming Health Maintenance Date Due  
 GLAUCOMA SCREENING Q2Y 9/1/2016 Influenza Age 5 to Adult 8/1/2018 MEDICARE YEARLY EXAM 3/24/2019 COLONOSCOPY 5/10/2022 DTaP/Tdap/Td series (2 - Td) 7/14/2025 Allergies as of 6/11/2018  Review Complete On: 6/11/2018 By: Eliceo Zamora RN Severity Noted Reaction Type Reactions Pcn [Penicillins]  03/27/2011    Unknown (comments) As a child Percocet [Oxycodone-acetaminophen]  03/27/2011    Swelling Leg swelling Current Immunizations  Reviewed on 5/9/2018 Name Date Influenza High Dose Vaccine PF 11/6/2017, 10/7/2016, 10/12/2015, 10/6/2014 Influenza Vaccine 10/1/2013 Influenza Vaccine Split 10/15/2012, 10/20/2011 Pneumococcal Conjugate (PCV-13) 7/14/2015 Pneumococcal Polysaccharide (PPSV-23) 10/7/2016 Pneumococcal Vaccine (Pcv) 1/1/2005 Tdap 7/14/2015 Zoster Vaccine, Live 1/1/2011 Not reviewed this visit You Were Diagnosed With   
  
 Codes Comments Paroxysmal atrial fibrillation (HCC)    -  Primary ICD-10-CM: I48.0 ICD-9-CM: 427.31 Chronic anticoagulation     ICD-10-CM: Z79.01 
ICD-9-CM: V58.61 Vitals Smoking Status Never Smoker Preferred Pharmacy Pharmacy Name Phone [place] [phone] - [address] , [address] [place] [address] [phone] Your Updated Medication List  
  
   
This list is accurate as of 6/11/18 10:13 AM.  Always use your most recent med list. amLODIPine 5 mg tablet Commonly known as:  Keenan Katie Take 5 mg by mouth daily. aspirin delayed-release 81 mg tablet Take  by mouth daily. COUMADIN 2 mg tablet Generic drug:  warfarin Take 2 mg by mouth daily. Coumadin 2mg daily except 4mg on M/W/F  
  
 enalapril 20 mg tablet Commonly known as:  VASOTEC  
TAKE 1 TABLET TWICE DAILY  
  
 fiber Cap Take 2 Caps by mouth two (2) times a day. FOLIC ACID PO Take 400 mcg by mouth daily. hydroCHLOROthiazide 25 mg tablet Commonly known as:  HYDRODIURIL  
TAKE 1 TABLET EVERY DAY  
  
 metoprolol succinate 25 mg XL tablet Commonly known as:  TOPROL-XL  
TAKE 1 TABLET EVERY DAY  
  
 MULTI-VIT 55 PLUS PO Take 1 Tab by mouth daily. potassium chloride 10 mEq tablet Commonly known as:  KLOR-CON  
TAKE 1 TABLET THREE TIMES DAILY pravastatin 10 mg tablet Commonly known as:  PRAVACHOL  
TAKE 1 TABLET EVERY NIGHT  
  
 sildenafil (antihypertensive) 20 mg tablet Commonly known as:  REVATIO Take 1-5 Tabs by mouth daily as needed. terazosin 10 mg capsule Commonly known as:  HYTRIN  
TAKE 1 CAPSULE EVERY NIGHT  
  
 thioctic acid 50 mg Tab Generic drug:  Alpha Lipoic Acid Take 1 Tab by mouth daily. TYLENOL 325 mg tablet Generic drug:  acetaminophen Take  by mouth every four (4) hours as needed for Pain. Description Continue Coumadin 2mg daily except 4mg on M/W/F Recheck in 4 weeks June 2018 Details Sun Mon Tue Wed Thu Fri Sat  
       1  
  
  
  
   2  
  
  
  
  
  3  
  
  
  
   4  
  
  
  
   5  
  
  
  
   6  
  
  
  
   7  
  
  
  
   8  
  
  
  
   9  
  
  
  
  
  10  
  
  
  
   11  
  
4 mg See details 12  
  
2 mg  
  
   13  
  
4 mg  
  
   14  
  
2 mg 15  
  
4 mg  
  
   16  
  
2 mg  
  
  
  17  
  
2 mg 18  
  
4 mg  
  
   19  
  
2 mg  
  
   20  
  
4 mg  
  
   21  
  
2 mg  
  
   22  
  
4 mg  
  
   23  
  
2 mg  
  
  
  24  
  
2 mg 25  
  
4 mg  
  
   26  
  
2 mg  
  
   27  
  
4 mg  
  
   28  
  
2 mg  
  
   29  
  
4 mg  
  
   30  
  
2 mg Date Details 06/11 This INR check INR: 2.4 How to take your warfarin dose To take:  2 mg Take one of the 2 mg tablets. To take:  4 mg Take two of the 2 mg tablets. July 2018 Details Jagrutifortino Alfredo Tue Wed Thu Fri Sat 1  
  
2 mg 2  
  
4 mg 3  
  
2 mg 4  
  
4 mg 5  
  
2 mg 6  
  
4 mg  
  
   7  
  
2 mg 8  
  
2 mg 9  
  
4 mg  
  
   10  
  
  
  
   11  
  
  
  
   12  
  
  
  
   13  
  
  
  
   14  
  
  
  
  
  15  
  
  
  
   16  
  
  
  
   17  
  
  
  
   18  
  
  
  
   19  
  
  
  
   20  
  
  
  
   21  
  
  
  
  
  22  
  
  
  
   23  
  
  
  
   24  
  
  
  
   25  
  
  
  
   26  
  
  
  
   27  
  
  
  
   28  
  
  
  
  
  29  
  
  
  
   30  
  
  
  
   31  
  
  
  
      
 Date Details No additional details Date of next INR:  7/9/2018 How to take your warfarin dose To take:  2 mg Take one of the 2 mg tablets. To take:  4 mg Take two of the 2 mg tablets. We Performed the Following AMB POC PT/INR [85300 CPT(R)] Introducing [facility] SERVICES! [employer] introduces [employer] patient portal. Now you can access parts of your medical record, email your doctor's office, and request medication refills online. 1. In your internet browser, go to [email or web address] 2. Click on the First Time User? Click Here link in the Sign In box. You will see the New Member Sign Up page. 3. Enter your AppSurfer Access Code exactly as it appears below. You will not need to use this code after youve completed the sign-up process. If you do not sign up before the expiration date, you must request a new code. · App[employer] Access Code: [email or web address] Expires: 6/21/2018  3:31 PM 
 
4. Enter the last four digits of your Social Security Number (xxxx) and Date of Birth (mm/dd/yyyy) as indicated and click Submit. You will be taken to the next sign-up page. 5. Create a AppSurfer ID. This will be your AppSurfer login ID and cannot be changed, so think of one that is secure and easy to remember. 6. Create a AppSurfer password. You can change your password at any time. 7. Enter your Password Reset Question and Answer. This can be used at a later time if you forget your password. 8. Enter your e-mail address. You will receive e-mail notification when new information is available in [address] 9. Click Sign Up. You can now view and download portions of your medical record. 10. Click the Download Summary menu link to download a portable copy of your medical information. If you have questions, please visit the Frequently Asked Questions section of the AppSurfer website. Remember, AppSurfer is NOT to be used for urgent needs. For medical emergencies, dial 911. Now available from your iPhone and Android! Please provide this summary of care documentation to your next provider. Your primary care clinician is listed as Vero Plata. If you have any questions after today's visit, please call [phone].

## 2018-06-26 ENCOUNTER — CLINICAL SUPPORT (OUTPATIENT)
Dept: CARDIOLOGY CLINIC | Age: 78
End: 2018-06-26

## 2018-06-26 DIAGNOSIS — Z95.810 PRESENCE OF AUTOMATIC CARDIOVERTER/DEFIBRILLATOR (AICD): Primary | ICD-10-CM

## 2018-07-05 RX ORDER — AMLODIPINE BESYLATE 5 MG/1
5 TABLET ORAL DAILY
Qty: 90 TAB | Refills: 1 | Status: SHIPPED | OUTPATIENT
Start: 2018-07-05 | End: 2018-11-23 | Stop reason: SDUPTHER

## 2018-07-05 RX ORDER — HYDROCHLOROTHIAZIDE 25 MG/1
TABLET ORAL
Qty: 90 TAB | Refills: 1 | Status: SHIPPED | OUTPATIENT
Start: 2018-07-05 | End: 2018-08-28 | Stop reason: ALTCHOICE

## 2018-07-09 ENCOUNTER — ANTI-COAG VISIT (OUTPATIENT)
Dept: INTERNAL MEDICINE CLINIC | Age: 78
End: 2018-07-09

## 2018-07-09 DIAGNOSIS — I48.0 PAROXYSMAL ATRIAL FIBRILLATION (HCC): Primary | ICD-10-CM

## 2018-07-09 LAB
INR BLD: 2
PT POC: 24.3 SECONDS
VALID INTERNAL CONTROL?: YES

## 2018-07-09 NOTE — MR AVS SNAPSHOT
[address] [facility] [address] [place] [address] 
[phone] Patient: Carlos A Ireland MRN: [id number] ABJ:[birth year] Visit Information Date & Time Provider Department Dept. Phone Encounter #  
 7/9/2018 10:00 AM Sarita Chan Baljinder Internal Medicine [phone] [id number] Your Appointments 9/14/2018  8:40 AM  
ESTABLISHED PATIENT with Guzman Prince MD  
[facility] Appt Note: 6 mo f/u per Dr. Rachael Braden [address]  
[address] [id number]  
  
    
 10/2/2018  9:15 AM  
PACEMAKER with Robb Mcdonald [facility] ([facility]) Appt Note: med icd/rc  
 [address] [facility] [address] [place] [address]  
[address] [facility],[address] [facility] 7 [address] Upcoming Health Maintenance Date Due  
 GLAUCOMA SCREENING Q2Y 9/1/2016 Influenza Age 5 to Adult 8/1/2018 MEDICARE YEARLY EXAM 3/24/2019 COLONOSCOPY 5/10/2022 DTaP/Tdap/Td series (2 - Td) 7/14/2025 Allergies as of 7/9/2018  Review Complete On: 7/9/2018 By: Veronica Boogie RN Severity Noted Reaction Type Reactions Pcn [Penicillins]  03/27/2011    Unknown (comments) As a child Percocet [Oxycodone-acetaminophen]  03/27/2011    Swelling Leg swelling Current Immunizations  Reviewed on 5/9/2018 Name Date Influenza High Dose Vaccine PF 11/6/2017, 10/7/2016, 10/12/2015, 10/6/2014 Influenza Vaccine 10/1/2013 Influenza Vaccine Split 10/15/2012, 10/20/2011 Pneumococcal Conjugate (PCV-13) 7/14/2015 Pneumococcal Polysaccharide (PPSV-23) 10/7/2016 Pneumococcal Vaccine (Pcv) 1/1/2005 Tdap 7/14/2015 Zoster Vaccine, Live 1/1/2011 Not reviewed this visit You Were Diagnosed With   
  
 Codes Comments Paroxysmal atrial fibrillation (HCC)    -  Primary ICD-10-CM: I48.0 ICD-9-CM: 427.31 Vitals Smoking Status Never Smoker Preferred Pharmacy Pharmacy Name Phone [place], [place] - [address] [facility] [address] [facility] [address] [phone] Your Updated Medication List  
  
   
This list is accurate as of 7/9/18 10:03 AM.  Always use your most recent med list. amLODIPine 5 mg tablet Commonly known as:  Lori Pace Take 1 Tab by mouth daily. aspirin delayed-release 81 mg tablet Take  by mouth daily. COUMADIN 2 mg tablet Generic drug:  warfarin Take 2 mg by mouth daily. Coumadin 2mg daily except 4mg on M/W/F  
  
 enalapril 20 mg tablet Commonly known as:  VASOTEC  
TAKE 1 TABLET TWICE DAILY  
  
 fiber Cap Take 2 Caps by mouth two (2) times a day. FOLIC ACID PO Take 400 mcg by mouth daily. hydroCHLOROthiazide 25 mg tablet Commonly known as:  HYDRODIURIL  
TAKE 1 TABLET EVERY DAY  
  
 metoprolol succinate 25 mg XL tablet Commonly known as:  TOPROL-XL  
TAKE 1 TABLET EVERY DAY  
  
 MULTI-VIT 55 PLUS PO Take 1 Tab by mouth daily. potassium chloride 10 mEq tablet Commonly known as:  KLOR-CON  
TAKE 1 TABLET THREE TIMES DAILY pravastatin 10 mg tablet Commonly known as:  PRAVACHOL  
TAKE 1 TABLET EVERY NIGHT  
  
 sildenafil (antihypertensive) 20 mg tablet Commonly known as:  REVATIO Take 1-5 Tabs by mouth daily as needed. terazosin 10 mg capsule Commonly known as:  HYTRIN  
TAKE 1 CAPSULE EVERY NIGHT  
  
 thioctic acid 50 mg Tab Generic drug:  Alpha Lipoic Acid Take 1 Tab by mouth daily. TYLENOL 325 mg tablet Generic drug:  acetaminophen Take  by mouth every four (4) hours as needed for Pain. Description Continue Coumadin 2mg daily except 4mg on M/W/F Recheck in 3 weeks July 2018 Details Ana Maria Joe Tufortino Wed Thu Fri Sat  
  1  
  
  
  
 2  
  
  
  
   3  
  
  
  
   4  
  
  
  
   5  
  
  
  
   6  
  
  
  
   7  
  
  
  
  
  8  
  
  
  
   9  
  
4 mg See details 10  
  
2 mg  
  
   11  
  
4 mg  
  
   12  
  
2 mg  
  
   13  
  
4 mg  
  
   14  
  
2 mg  
  
  
  15  
  
2 mg  
  
   16  
  
4 mg  
  
   17  
  
2 mg 18  
  
4 mg  
  
   19  
  
2 mg  
  
   20  
  
4 mg  
  
   21  
  
2 mg  
  
  
  22  
  
2 mg  
  
   23  
  
4 mg  
  
   24  
  
2 mg 25  
  
4 mg  
  
   26  
  
2 mg  
  
   27  
  
4 mg  
  
   28  
  
2 mg  
  
  
  29  
  
2 mg  
  
   30  
  
4 mg  
  
   31 Date Details 07/09 This INR check INR: 2.0 Date of next INR:  7/30/2018 How to take your warfarin dose To take:  2 mg Take one of the 2 mg tablets. To take:  4 mg Take two of the 2 mg tablets. We Performed the Following AMB POC PT/INR [17458 CPT(R)] Introducing [facility] & HEALTH SERVICES! [employer] introduces [employer] patient portal. Now you can access parts of your medical record, email your doctor's office, and request medication refills online. 1. In your internet browser, go to [email or web address] 2. Click on the First Time User? Click Here link in the Sign In box. You will see the New Member Sign Up page. 3. Enter your [employer] Access Code exactly as it appears below. You will not need to use this code after youve completed the sign-up process. If you do not sign up before the expiration date, you must request a new code. · [employer] Access Code: [email or web address] Expires: 9/24/2018 11:49 AM 
 
4. Enter the last four digits of your Social Security Number (xxxx) and Date of Birth (mm/dd/yyyy) as indicated and click Submit. You will be taken to the next sign-up page. 5. Create a [employer] ID. This will be your [employer] login ID and cannot be changed, so think of one that is secure and easy to remember. 6. Create a [employer] password. You can change your password at any time. 7. Enter your Password Reset Question and Answer. This can be used at a later time if you forget your password. 8. Enter your e-mail address. You will receive e-mail notification when new information is available in [address] 9. Click Sign Up. You can now view and download portions of your medical record. 10. Click the Download Summary menu link to download a portable copy of your medical information. If you have questions, please visit the Frequently Asked Questions section of the [employer] website. Remember, [employer] is NOT to be used for urgent needs. For medical emergencies, dial 911. Now available from your iPhone and Android! Please provide this summary of care documentation to your next provider. Your primary care clinician is listed as Kadi Miller. If you have any questions after today's visit, please call [phone].

## 2018-07-09 NOTE — PROGRESS NOTES
INR 2.0 today. Patient instructions:   Continue Coumadin 2mg daily except 4mg on M/W/F  Recheck in 3 weeks    A full discussion of the nature of anticoagulants has been carried out. A benefit risk analysis has been presented to the patient, so that they understand the justification for choosing anticoagulation at this time. The need for frequent and regular monitoring, precise dosage adjustment and compliance is stressed. Side effects of potential bleeding are discussed. The patient should avoid any OTC items containing aspirin or ibuprofen, and should avoid great swings in general diet. Avoid alcohol consumption. Call if any signs of abnormal bleeding. Patient verbalized understanding.

## 2018-07-10 RX ORDER — AMLODIPINE BESYLATE 5 MG/1
5 TABLET ORAL DAILY
Qty: 90 TAB | Refills: 1 | OUTPATIENT
Start: 2018-07-10

## 2018-07-24 RX ORDER — WARFARIN 2 MG/1
TABLET ORAL
Qty: 180 TAB | Refills: 1 | Status: SHIPPED | OUTPATIENT
Start: 2018-07-24 | End: 2018-08-28 | Stop reason: DRUGHIGH

## 2018-07-30 ENCOUNTER — OFFICE VISIT (OUTPATIENT)
Dept: INTERNAL MEDICINE CLINIC | Age: 78
End: 2018-07-30

## 2018-07-30 ENCOUNTER — ANTI-COAG VISIT (OUTPATIENT)
Dept: INTERNAL MEDICINE CLINIC | Age: 78
End: 2018-07-30

## 2018-07-30 VITALS
TEMPERATURE: 98 F | HEART RATE: 74 BPM | BODY MASS INDEX: 30.78 KG/M2 | WEIGHT: 215 LBS | OXYGEN SATURATION: 98 % | HEIGHT: 70 IN | RESPIRATION RATE: 16 BRPM | DIASTOLIC BLOOD PRESSURE: 82 MMHG | SYSTOLIC BLOOD PRESSURE: 132 MMHG

## 2018-07-30 DIAGNOSIS — R30.0 DYSURIA: ICD-10-CM

## 2018-07-30 DIAGNOSIS — N52.9 ERECTILE DYSFUNCTION, UNSPECIFIED ERECTILE DYSFUNCTION TYPE: ICD-10-CM

## 2018-07-30 DIAGNOSIS — I48.0 PAROXYSMAL ATRIAL FIBRILLATION (HCC): Primary | ICD-10-CM

## 2018-07-30 DIAGNOSIS — Z79.01 CHRONIC ANTICOAGULATION: ICD-10-CM

## 2018-07-30 DIAGNOSIS — H93.8X3 SENSATION OF FULLNESS IN BOTH EARS: Primary | ICD-10-CM

## 2018-07-30 DIAGNOSIS — R35.0 URINARY FREQUENCY: ICD-10-CM

## 2018-07-30 DIAGNOSIS — Z23 ENCOUNTER FOR IMMUNIZATION: ICD-10-CM

## 2018-07-30 LAB
BILIRUB UR QL STRIP: NEGATIVE
GLUCOSE UR-MCNC: NEGATIVE MG/DL
INR BLD: 2.5
KETONES P FAST UR STRIP-MCNC: NEGATIVE MG/DL
PH UR STRIP: 7 [PH] (ref 4.6–8)
PROT UR QL STRIP: NEGATIVE
PT POC: 30.1 SECONDS
SP GR UR STRIP: 1.01 (ref 1–1.03)
UA UROBILINOGEN AMB POC: NORMAL (ref 0.2–1)
URINALYSIS CLARITY POC: CLEAR
URINALYSIS COLOR POC: YELLOW
URINE BLOOD POC: NORMAL
URINE LEUKOCYTES POC: NORMAL
URINE NITRITES POC: NEGATIVE
VALID INTERNAL CONTROL?: YES

## 2018-07-30 NOTE — PROGRESS NOTES
INR 2.5 today. Patient instructions:   Continue Coumadin 2mg daily except 4mg on M/W/F    A full discussion of the nature of anticoagulants has been carried out. A benefit risk analysis has been presented to the patient, so that they understand the justification for choosing anticoagulation at this time. The need for frequent and regular monitoring, precise dosage adjustment and compliance is stressed. Side effects of potential bleeding are discussed. The patient should avoid any OTC items containing aspirin or ibuprofen, and should avoid great swings in general diet. Avoid alcohol consumption. Call if any signs of abnormal bleeding. Patient verbalized understanding.

## 2018-07-30 NOTE — PROGRESS NOTES
Nadeen Kim is a 65 y.o. male who was seen in clinic today (7/30/2018). Assessment & Plan:   Diagnoses and all orders for this visit:    1. Sensation of fullness in both ears- new dx, reviewed differential, no evidence of cerumen or otitis externa, favor eustachian tube. Will avoid nasal steroid due to coumadin, will start on anti-histamines. Red flags and expectations were reviewed & discussed with the him. He verbalized understanding. 2. Urinary frequency- abnormal, based on previous UA sounds like UTI, will hold off on meds pending review of UC.  -     AMB POC URINALYSIS DIP STICK AUTO W/O MICRO    3. Dysuria  -     CULTURE, URINE    4. Encounter for immunization- declined Shingles vaccine, he reports talking to some friends that report it causes shingles. 5. Erectile dysfunction, unspecified erectile dysfunction type- has been using Viagra 1 tab daily, reviewed does not work like Cialis, reviewed how meds work and expectations, he will use as directed (prn). Follow-up Disposition: Not on File      Subjective:   Koby Osorio was seen today for Urinary Frequency; Dizziness; and Ear Fullness    URI Review  Koby Osorio returns to clinic today to talk about: ear fullness for about 1 week, which are improved since that time. He denies ear pain, tinnitus, or hearing loss. He reports sinus congestion. He denies a history of: headache, sinus pain, fever, chills and chest congestion. Treatments have included: GF ear drops of swimmers ear and Vicks Vapor rub. This has helped. Patient reports sick contacts: no.       Urinary Changes  Koby Osorio returns to clinic today to discuss frequency, urgency, nocturia for 2-3  weeks. This is a new problem. He denies burning with urination, hematuria, suprapubic pressure. He reports symptoms are intermittent. Patient has tried: nothing for these symptoms. He reports the following likely etiologies: nothing.          Prior to Admission medications Medication Sig Start Date End Date Taking? Authorizing Provider   warfarin (COUMADIN) 2 mg tablet TAKE 2 TABLETS EVERY DAY 7/24/18  Yes Evan Chow MD   hydroCHLOROthiazide (HYDRODIURIL) 25 mg tablet TAKE 1 TABLET EVERY DAY 7/5/18  Yes Evan Chow MD   amLODIPine (NORVASC) 5 mg tablet Take 1 Tab by mouth daily. 7/5/18  Yes Evan Chow MD   enalapril (VASOTEC) 20 mg tablet TAKE 1 TABLET TWICE DAILY 6/11/18  Yes Evan Chow MD   pravastatin (PRAVACHOL) 10 mg tablet TAKE 1 TABLET EVERY NIGHT 5/11/18  Yes Evan Chow MD   terazosin (HYTRIN) 10 mg capsule TAKE 1 CAPSULE EVERY NIGHT 5/11/18  Yes Evan Chow MD   sildenafil, antihypertensive, (REVATIO) 20 mg tablet Take 1-5 Tabs by mouth daily as needed. 4/30/18  Yes Evan Chow MD   potassium chloride (KLOR-CON) 10 mEq tablet TAKE 1 TABLET THREE TIMES DAILY 1/5/18  Yes Evan Chow MD   metoprolol succinate (TOPROL-XL) 25 mg XL tablet TAKE 1 TABLET EVERY DAY 1/5/18  Yes Evan Chow MD   aspirin delayed-release 81 mg tablet Take  by mouth daily. Yes Historical Provider   acetaminophen (TYLENOL) 325 mg tablet Take  by mouth every four (4) hours as needed for Pain. Yes Historical Provider   FOLIC ACID PO Take 421 mcg by mouth daily. Yes Historical Provider   thioctic acid (ALPHA LIPOIC ACID) 50 mg tab Take 1 Tab by mouth daily. Yes Historical Provider   fiber Cap Take 2 Caps by mouth two (2) times a day. Yes Historical Provider   MULTIVITAMIN WITH MINERALS (MULTI-VIT 55 PLUS PO) Take 1 Tab by mouth daily. 7/8/11  Yes Historical Provider          Allergies   Allergen Reactions    Pcn [Penicillins] Unknown (comments)     As a child    Percocet [Oxycodone-Acetaminophen] Swelling     Leg swelling           Review of Systems   Constitutional: Negative for chills and fever. HENT: Negative for nosebleeds. Respiratory: Negative for hemoptysis.     Cardiovascular: Negative for chest pain and palpitations. Gastrointestinal: Negative for abdominal pain, blood in stool, constipation, diarrhea, melena, nausea and vomiting. Genitourinary: Negative for hematuria. Endo/Heme/Allergies: Does not bruise/bleed easily. Objective:   Physical Exam   Constitutional: No distress. HENT:   Right Ear: Tympanic membrane is not erythematous and not bulging. No middle ear effusion. Left Ear: Tympanic membrane is not erythematous and not bulging. No middle ear effusion. Nose: No mucosal edema or rhinorrhea. Right sinus exhibits no maxillary sinus tenderness and no frontal sinus tenderness. Left sinus exhibits no maxillary sinus tenderness and no frontal sinus tenderness. Mouth/Throat: Uvula is midline and mucous membranes are normal. No oropharyngeal exudate or posterior oropharyngeal erythema. Eyes: Conjunctivae are normal. No scleral icterus. Neck: Neck supple. Cardiovascular: Regular rhythm and normal heart sounds. No murmur heard. Pulmonary/Chest: Effort normal and breath sounds normal. He has no wheezes. He has no rales. Abdominal: Soft. Bowel sounds are normal. He exhibits no mass. There is no hepatosplenomegaly. There is no tenderness. Lymphadenopathy:     He has no cervical adenopathy. Visit Vitals    /82    Pulse 74    Temp 98 °F (36.7 °C) (Oral)    Resp 16    Ht 5' 10\" (1.778 m)    Wt 215 lb (97.5 kg)    SpO2 98%    BMI 30.85 kg/m2         Disclaimer:  Advised him to call back or return to office if symptoms worsen/change/persist.  Discussed expected course/resolution/complications of diagnosis in detail with patient. Medication risks/benefits/costs/interactions/alternatives discussed with patient. He was given an after visit summary which includes diagnoses, current medications, & vitals. He expressed understanding with the diagnosis and plan. Aspects of this note may have been generated using voice recognition software.  Despite editing, there may be some syntax errors.        Kadi Miller MD

## 2018-07-30 NOTE — PROGRESS NOTES
INR 2.5 today. Patient instructions:   Continue Coumadin 2mg daily except 4mg on M/W/F  recheck in 4 weeks   A full discussion of the nature of anticoagulants has been carried out. A benefit risk analysis has been presented to the patient, so that they understand the justification for choosing anticoagulation at this time. The need for frequent and regular monitoring, precise dosage adjustment and compliance is stressed. Side effects of potential bleeding are discussed. The patient should avoid any OTC items containing aspirin or ibuprofen, and should avoid great swings in general diet. Avoid alcohol consumption. Call if any signs of abnormal bleeding. Patient verbalized understanding.

## 2018-07-30 NOTE — PATIENT INSTRUCTIONS
Claritin or Zyrtec or Allegra. 1 tab daily x 10 days. We will call you in 2-3 days with the results of your urine test         Eustachian Tube Problems: Care Instructions  Your Care Instructions    The eustachian (say \"you-STAY-shee-un\") tubes run between the inside of the ears and the throat. They keep air pressure stable in the ears. If your eustachian tubes become blocked, the air pressure in your ears changes. The fluids from a cold can clog eustachian tubes, causing pain in the ears. A quick change in air pressure can cause eustachian tubes to close up. This might happen when an airplane changes altitude or when a  goes up or down underwater. Eustachian tube problems often clear up on their own or after antibiotic treatment. If your tubes continue to be blocked, you may need surgery. Follow-up care is a key part of your treatment and safety. Be sure to make and go to all appointments, and call your doctor if you are having problems. It's also a good idea to know your test results and keep a list of the medicines you take. How can you care for yourself at home? · To ease ear pain, apply a warm washcloth or a heating pad set on low. There may be some drainage from the ear when the heat melts earwax. Put a cloth between the heat source and your skin. Do not use a heating pad with children. · If your doctor prescribed antibiotics, take them as directed. Do not stop taking them just because you feel better. You need to take the full course of antibiotics. · Your doctor may recommend over-the-counter medicine. Be safe with medicines. Oral or nasal decongestants may relieve ear pain. Avoid decongestants that are combined with antihistamines, which tend to cause more blockage. But if allergies seem to be the problem, your doctor may recommend a combination. Be careful with cough and cold medicines.  Don't give them to children younger than 6, because they don't work for children that age and can even be harmful. For children 6 and older, always follow all the instructions carefully. Make sure you know how much medicine to give and how long to use it. And use the dosing device if one is included. When should you call for help? Call your doctor now or seek immediate medical care if:    · You develop sudden, complete hearing loss.     · You have severe pain or feel dizzy.     · You have new or increasing pus or blood draining from your ear.     · You have redness, swelling, or pain around or behind the ear.    Watch closely for changes in your health, and be sure to contact your doctor if:    · You do not get better after 2 weeks.     · You have any new symptoms, such as itching or a feeling of fullness in the ear. Where can you learn more? Go to http://marquis-thomas.info/. Enter Y822 in the search box to learn more about \"Eustachian Tube Problems: Care Instructions. \"  Current as of: May 12, 2017  Content Version: 11.7  © 8665-4108 [employer]. Care instructions adapted under license by [employer] (which disclaims liability or warranty for this information). If you have questions about a medical condition or this instruction, always ask your healthcare professional. [address] any warranty or liability for your use of this information.       Continue Coumadin 2mg daily except 4mg on M/W/F  Recheck in 4 weeks

## 2018-07-30 NOTE — MR AVS SNAPSHOT
[address] [facility] [address] [facility] 
[phone] Patient: Nola Preston MRN: [id number] WSN:[id number] Visit Information Date & Time Provider Department Dept. Phone Encounter #  
 7/30/2018 10:00 AM Espinoza Brannon, [address] [facility] Internal [facility] [phone] Your Appointments 7/30/2018 10:45 AM  
ANTICOAG NURSE with Debra Reese [facility] Internal Medicine ([address]) Appt Note: inr check; seeing Dr. Varun Smiley at [facility] 132; [address] [facility] for [facility] - 3/24/19 or later [address] [facility] [address]  
[address] [facility]  
  
    
 9/14/2018  8:40 AM  
ESTABLISHED PATIENT with Rangel Franco MD  
[facility] ([address]) Appt Note: 6 mo f/u per Dr. Donata Barthel [address] [facility]  
[address] [address] [facility] [address]  
  
    
 10/2/2018  9:15 AM  
PACEMAKER with Glen Cuadra [facility] ([facility] SCHEDULING) Appt Note: med icd/rc  
 [address]  [address] [facility]  
[address] [facility] 100 [facility] [address] Upcoming Health Maintenance Date Due  
 GLAUCOMA SCREENING Q2Y 9/1/2016 Influenza Age 5 to Adult 8/1/2018 MEDICARE YEARLY EXAM 3/24/2019 COLONOSCOPY 5/10/2022 DTaP/Tdap/Td series (2 - Td) 7/14/2025 Allergies as of 7/30/2018  Review Complete On: 7/30/2018 By: Espinoza Brannon MD  
  
 Severity Noted Reaction Type Reactions Pcn [Penicillins]  03/27/2011    Unknown (comments) As a child Percocet [Oxycodone-acetaminophen]  03/27/2011    Swelling Leg swelling Current Immunizations  Reviewed on 7/30/2018 Name Date Influenza High Dose Vaccine PF 11/6/2017, 10/7/2016, 10/12/2015, 10/6/2014 Influenza Vaccine 10/1/2013 Influenza Vaccine Split 10/15/2012, 10/20/2011 Pneumococcal Conjugate (PCV-13) 7/14/2015 Pneumococcal Polysaccharide (PPSV-23) 10/7/2016 Pneumococcal Vaccine (Pcv) 1/1/2005 Tdap 7/14/2015 Zoster Vaccine, Live 1/1/2011 Reviewed by Majo Carnes RN on 7/30/2018 at  9:50 AM  
You Were Diagnosed With   
  
 Codes Comments Sensation of fullness in both ears    -  Primary ICD-10-CM: W88.9O9 ICD-9-CM: 388.8 Urinary frequency     ICD-10-CM: R35.0 ICD-9-CM: 788.41 Dysuria     ICD-10-CM: R30.0 ICD-9-CM: 788.1 Encounter for immunization     ICD-10-CM: F51 ICD-9-CM: V03.89 Erectile dysfunction, unspecified erectile dysfunction type     ICD-10-CM: N52.9 ICD-9-CM: 607.84 Vitals BP Pulse Temp Resp Height(growth percentile) Weight(growth percentile) 132/82 74 98 °F (36.7 °C) (Oral) 16 5' 10\" (1.778 m) 215 lb (97.5 kg) SpO2 BMI Smoking Status 98% 30.85 kg/m2 Never Smoker Vitals History BMI and BSA Data Body Mass Index Body Surface Area  
 30.85 kg/m 2 2.19 m 2 Preferred Pharmacy Pharmacy Name Phone [address] [phone] Your Updated Medication List  
  
   
This list is accurate as of 7/30/18 10:31 AM.  Always use your most recent med list. amLODIPine 5 mg tablet Commonly known as:  Archibald Fanti Take 1 Tab by mouth daily. aspirin delayed-release 81 mg tablet Take  by mouth daily. enalapril 20 mg tablet Commonly known as:  VASOTEC  
TAKE 1 TABLET TWICE DAILY  
  
 fiber Cap Take 2 Caps by mouth two (2) times a day. FOLIC ACID PO Take 400 mcg by mouth daily. hydroCHLOROthiazide 25 mg tablet Commonly known as:  HYDRODIURIL  
TAKE 1 TABLET EVERY DAY  
  
 metoprolol succinate 25 mg XL tablet Commonly known as:  TOPROL-XL  
TAKE 1 TABLET EVERY DAY  
  
 MULTI-VIT 55 PLUS PO  
 Take 1 Tab by mouth daily. potassium chloride 10 mEq tablet Commonly known as:  KLOR-CON  
TAKE 1 TABLET THREE TIMES DAILY pravastatin 10 mg tablet Commonly known as:  PRAVACHOL  
TAKE 1 TABLET EVERY NIGHT  
  
 sildenafil (antihypertensive) 20 mg tablet Commonly known as:  REVATIO Take 1-5 Tabs by mouth daily as needed. terazosin 10 mg capsule Commonly known as:  HYTRIN  
TAKE 1 CAPSULE EVERY NIGHT  
  
 thioctic acid 50 mg Tab Generic drug:  Alpha Lipoic Acid Take 1 Tab by mouth daily. TYLENOL 325 mg tablet Generic drug:  acetaminophen Take  by mouth every four (4) hours as needed for Pain.  
  
 warfarin 2 mg tablet Commonly known as:  COUMADIN  
TAKE 2 TABLETS EVERY DAY Description Continue Coumadin 2mg daily except 4mg on M/W/F Recheck in 4 weeks July 2018 Details Sun Mon Tue Wed Thu Fri Sat  
  1  
  
  
  
   2  
  
  
  
   3  
  
  
  
   4  
  
  
  
   5  
  
  
  
   6  
  
  
  
   7  
  
  
  
  
  8  
  
  
  
   9  
  
  
  
   10  
  
  
  
   11  
  
  
  
   12  
  
  
  
   13  
  
  
  
   14  
  
  
  
  
  15  
  
  
  
   16  
  
  
  
   17  
  
  
  
   18  
  
  
  
   19  
  
  
  
   20  
  
  
  
   21  
  
  
  
  
  22  
  
  
  
   23  
  
  
  
   24  
  
  
  
   25  
  
  
  
   26  
  
  
  
   27  
  
  
  
   28  
  
  
  
  
  29  
  
  
  
   30  
  
4 mg See details 31  
  
2 mg Date Details 07/30 This INR check INR: 2.5 How to take your warfarin dose To take:  2 mg Take one of the 2 mg tablets. To take:  4 mg Take two of the 2 mg tablets. August 2018 Details Chelo Osorio Tue Wed Thu Fri Sat 1  
  
4 mg 2  
  
2 mg 3  
  
4 mg 4  
  
2 mg 5  
  
2 mg 6  
  
4 mg  
  
   7  
  
2 mg 8  
  
4 mg 9  
  
2 mg  
  
   10  
  
4 mg  
  
   11  
  
2 mg  
  
  
  12  
  
2 mg  
  
   13 4 mg  
  
   14  
  
2 mg 15  
  
4 mg  
  
   16  
  
2 mg  
  
   17  
  
4 mg  
  
   18  
  
2 mg  
  
  
  19  
  
2 mg  
  
   20  
  
4 mg  
  
   21  
  
2 mg  
  
   22  
  
4 mg  
  
   23  
  
2 mg 24  
  
4 mg  
  
   25  
  
2 mg  
  
  
  26  
  
2 mg  
  
   27  
  
4 mg 28  
  
  
  
   29  
  
  
  
   30  
  
  
  
   31  
  
  
  
   
 Date Details No additional details Date of next INR:  8/27/2018 How to take your warfarin dose To take:  2 mg Take one of the 2 mg tablets. To take:  4 mg Take two of the 2 mg tablets. We Performed the Following AMB POC URINALYSIS DIP STICK AUTO W/O MICRO [41529 CPT(R)] CULTURE, URINE [id number] CPT(R)] Patient Instructions Claritin or Zyrtec or Allegra. 1 tab daily x 10 days. We will call you in 2-3 days with the results of your urine test 
 
 
  
Eustachian Tube Problems: Care Instructions Your Care Instructions The eustachian (say \"you-STAY-shee-un\") tubes run between the inside of the ears and the throat. They keep air pressure stable in the ears. If your eustachian tubes become blocked, the air pressure in your ears changes. The fluids from a cold can clog eustachian tubes, causing pain in the ears. A quick change in air pressure can cause eustachian tubes to close up. This might happen when an airplane changes altitude or when a  goes up or down underwater. Eustachian tube problems often clear up on their own or after antibiotic treatment. If your tubes continue to be blocked, you may need surgery. Follow-up care is a key part of your treatment and safety. Be sure to make and go to all appointments, and call your doctor if you are having problems. It's also a good idea to know your test results and keep a list of the medicines you take. How can you care for yourself at home? · To ease ear pain, apply a warm washcloth or a heating pad set on low. There may be some drainage from the ear when the heat melts earwax. Put a cloth between the heat source and your skin. Do not use a heating pad with children. · If your doctor prescribed antibiotics, take them as directed. Do not stop taking them just because you feel better. You need to take the full course of antibiotics. · Your doctor may recommend over-the-counter medicine. Be safe with medicines. Oral or nasal decongestants may relieve ear pain. Avoid decongestants that are combined with antihistamines, which tend to cause more blockage. But if allergies seem to be the problem, your doctor may recommend a combination. Be careful with cough and cold medicines. Don't give them to children younger than 6, because they don't work for children that age and can even be harmful. For children 6 and older, always follow all the instructions carefully. Make sure you know how much medicine to give and how long to use it. And use the dosing device if one is included. When should you call for help? Call your doctor now or seek immediate medical care if: 
  · You develop sudden, complete hearing loss.  
  · You have severe pain or feel dizzy.  
  · You have new or increasing pus or blood draining from your ear.  
  · You have redness, swelling, or pain around or behind the ear.  
 Watch closely for changes in your health, and be sure to contact your doctor if: 
  · You do not get better after 2 weeks.  
  · You have any new symptoms, such as itching or a feeling of fullness in the ear. Where can you learn more? Go to http://marquis-thomas.info/. Enter Y822 in the search box to learn more about \"Eustachian Tube Problems: Care Instructions. \" Current as of: May 12, 2017 Content Version: 11.7 © 9861-7548 [employer].  Care instructions adapted under license by [employer] (which disclaims liability or warranty for this information). If you have questions about a medical condition or this instruction, always ask your healthcare professional. [address] any warranty or liability for your use of this information. Continue Coumadin 2mg daily except 4mg on M/W/F Recheck in 4 weeks Introducing [facility]! [facility] introduces [employer] patient portal. Now you can access parts of your medical record, email your doctor's office, and request medication refills online. 1. In your internet browser, go to [email or web address] 2. Click on the First Time User? Click Here link in the Sign In box. You will see the New Member Sign Up page. 3. Enter your [employer] Access Code exactly as it appears below. You will not need to use this code after youve completed the sign-up process. If you do not sign up before the expiration date, you must request a new code. · [employer] Access Code: [email or web address] Expires: 9/24/2018 11:49 AM 
 
4. Enter the last four digits of your Social Security Number (xxxx) and Date of Birth (mm/dd/yyyy) as indicated and click Submit. You will be taken to the next sign-up page. 5. Create a [employer] ID. This will be your [employer] login ID and cannot be changed, so think of one that is secure and easy to remember. 6. Create a [employer] password. You can change your password at any time. 7. Enter your Password Reset Question and Answer. This can be used at a later time if you forget your password. 8. Enter your e-mail address. You will receive e-mail notification when new information is available in [address] 9. Click Sign Up. You can now view and download portions of your medical record. 10. Click the Download Summary menu link to download a portable copy of your medical information. If you have questions, please visit the Frequently Asked Questions section of the [employer] website.  Remember, [employer] is NOT to be used for urgent needs. For medical emergencies, dial 911. Now available from your iPhone and Android! Please provide this summary of care documentation to your next provider. Your primary care clinician is listed as Douglasserina Ray. If you have any questions after today's visit, please call [phone].

## 2018-07-31 RX ORDER — METOPROLOL SUCCINATE 25 MG/1
TABLET, EXTENDED RELEASE ORAL
Qty: 90 TAB | Refills: 1 | Status: SHIPPED | OUTPATIENT
Start: 2018-07-31 | End: 2018-11-26 | Stop reason: SDUPTHER

## 2018-08-02 ENCOUNTER — TELEPHONE (OUTPATIENT)
Dept: INTERNAL MEDICINE CLINIC | Age: 78
End: 2018-08-02

## 2018-08-02 LAB — BACTERIA UR CULT: ABNORMAL

## 2018-08-02 NOTE — TELEPHONE ENCOUNTER
Called pt back and informed him that Dr Adithya Rashid did not send in an abt for him and what has changed. Pt states he is now actually feeling better and has cut out drinking coke and drinking only water. But wanted to know UC results and informed him we have not received them back yet and will let him know when they have come in.

## 2018-08-02 NOTE — TELEPHONE ENCOUNTER
Tyrone Gonzalez (Self) [phone]     Pt says that he was seen here on Monday and that an antibiotic was suppose to be sent to the pharm for him and he has not heard anything about it.

## 2018-08-03 RX ORDER — NITROFURANTOIN (MACROCRYSTALS) 100 MG/1
100 CAPSULE ORAL 2 TIMES DAILY
Qty: 14 CAP | Refills: 0 | Status: SHIPPED | OUTPATIENT
Start: 2018-08-03 | End: 2018-08-10

## 2018-08-03 NOTE — TELEPHONE ENCOUNTER
Pt calling again for his UC results. Pt first asked does he have an infection. Pt then asked does he have a disease in his penis. He then stated if he has an infection he should not be having sex. Informed we have not received the results yet and will let him know as soon as we know.

## 2018-08-03 NOTE — TELEPHONE ENCOUNTER
Please call patient. UC w/ low grade UTI. Will treat w/ Macrobid x 7 days, abx already sent to pharmacy.

## 2018-08-03 NOTE — TELEPHONE ENCOUNTER
Call to patient. Advised of Dr. Ezzie Dakins' note and recommendations. RX already sent to pharmacy. Patient verbalized understanding.

## 2018-08-15 RX ORDER — POTASSIUM CHLORIDE 750 MG/1
TABLET, EXTENDED RELEASE ORAL
Qty: 270 TAB | Refills: 1 | Status: SHIPPED | OUTPATIENT
Start: 2018-08-15 | End: 2018-11-26 | Stop reason: SDUPTHER

## 2018-08-24 ENCOUNTER — TELEPHONE (OUTPATIENT)
Dept: INTERNAL MEDICINE CLINIC | Age: 78
End: 2018-08-24

## 2018-08-24 NOTE — TELEPHONE ENCOUNTER
Pt. States UTI symptoms had cleared up but recurred several days ago, has burning, afebrile, slight back pain. Pt. States he has been taking viagra and everytime he takes it he gets recurrent discomfort. Informed I will notify Dr Joe Calix but he may need to come in and give another sample.

## 2018-08-24 NOTE — TELEPHONE ENCOUNTER
UTI was minimal at last check. Over the weekend push fluids and cranberry tabs. Avoid using Viagra and abstain from sex thru the weekend. If symptoms do not resolve will need to give urine sample as previous treatment should have worked (he has appt on 8/28 already scheduled).

## 2018-08-28 ENCOUNTER — OFFICE VISIT (OUTPATIENT)
Dept: INTERNAL MEDICINE CLINIC | Age: 78
End: 2018-08-28

## 2018-08-28 ENCOUNTER — ANTI-COAG VISIT (OUTPATIENT)
Dept: INTERNAL MEDICINE CLINIC | Age: 78
End: 2018-08-28

## 2018-08-28 VITALS
OXYGEN SATURATION: 97 % | HEART RATE: 66 BPM | WEIGHT: 208 LBS | TEMPERATURE: 98.5 F | DIASTOLIC BLOOD PRESSURE: 58 MMHG | BODY MASS INDEX: 29.78 KG/M2 | RESPIRATION RATE: 14 BRPM | SYSTOLIC BLOOD PRESSURE: 90 MMHG | HEIGHT: 70 IN

## 2018-08-28 DIAGNOSIS — Z79.01 CHRONIC ANTICOAGULATION: ICD-10-CM

## 2018-08-28 DIAGNOSIS — R35.0 URINARY FREQUENCY: Primary | ICD-10-CM

## 2018-08-28 DIAGNOSIS — I10 ESSENTIAL HYPERTENSION: ICD-10-CM

## 2018-08-28 DIAGNOSIS — I48.0 PAROXYSMAL ATRIAL FIBRILLATION (HCC): Primary | ICD-10-CM

## 2018-08-28 LAB
BILIRUB UR QL STRIP: NEGATIVE
GLUCOSE UR-MCNC: NEGATIVE MG/DL
INR BLD: 1.5
KETONES P FAST UR STRIP-MCNC: NEGATIVE MG/DL
PH UR STRIP: 5.5 [PH] (ref 4.6–8)
PROT UR QL STRIP: NEGATIVE
PT POC: 18.1 SECONDS
SP GR UR STRIP: 1.01 (ref 1–1.03)
UA UROBILINOGEN AMB POC: NORMAL (ref 0.2–1)
URINALYSIS CLARITY POC: NORMAL
URINALYSIS COLOR POC: YELLOW
URINE BLOOD POC: NORMAL
URINE LEUKOCYTES POC: NORMAL
URINE NITRITES POC: NEGATIVE
VALID INTERNAL CONTROL?: YES

## 2018-08-28 RX ORDER — WARFARIN 2 MG/1
2 TABLET ORAL DAILY
COMMUNITY
End: 2018-11-26 | Stop reason: SDUPTHER

## 2018-08-28 NOTE — PROGRESS NOTES
INR 1.5 today. Denies missing doses, denies dietary changes, only med change is addition of OTC cranberry tablets. Patient instructions d/w Dr. Jorge Marley:    New dose: Coumadin 4mg daily except 2mg on M/W/F (10% increase)  Old dose: Coumadin 2mg daily except 4mg on M/W/F  Recheck in 1 week    A full discussion of the nature of anticoagulants has been carried out. A benefit risk analysis has been presented to the patient, so that they understand the justification for choosing anticoagulation at this time. The need for frequent and regular monitoring, precise dosage adjustment and compliance is stressed. Side effects of potential bleeding are discussed. The patient should avoid any OTC items containing aspirin or ibuprofen, and should avoid great swings in general diet. Avoid alcohol consumption. Call if any signs of abnormal bleeding. Patient verbalized understanding.

## 2018-08-28 NOTE — MR AVS SNAPSHOT
[address] [facility] [address] [place] 
[phone] Patient: My Harris MRN: [id number] DIQ:3/[id number] Visit Information Date & Time Provider Department Dept. Phone Encounter #  
 8/28/2018 10:00 AM Bertrand Fletcher [facility] Internal Medicine [phone] [id number] Your Appointments 8/28/2018 11:00 AM  
ACUTE CARE with Stephanie Golden MD  
[facility] Internal Medicine [facility]) Appt Note: uti  
 [address] [place]  
[address] [address] [address] [place]  
  
    
 9/5/2018 10:00 AM  
ANTICOAG NURSE with Johnrufina [facility] Internal Medicine ([facility]) Appt Note: inr check [address] [facility] [address]  
[address] [facility]  
  
    
 9/14/2018  8:40 AM  
ESTABLISHED PATIENT with Shalonda Garrido MD  
[facility] ([facility]) Appt Note: 6 mo f/u per Dr. Kym Bourne [address] [place]  
[address] [facility] [address]  
  
    
 10/2/2018  9:15 AM  
PACEMAKER with Virginie Sotelo [facility] Appt Note: med icd/rc  
 [address] [place] [address]  
[address] [place] [address] Upcoming Health Maintenance Date Due  
 GLAUCOMA SCREENING Q2Y 9/1/2016 Influenza Age 5 to Adult 8/1/2018 MEDICARE YEARLY EXAM 3/24/2019 COLONOSCOPY 5/10/2022 DTaP/Tdap/Td series (2 - Td) 7/14/2025 Allergies as of 8/28/2018  Review Complete On: 8/28/2018 By: Yamilka Torres RN Severity Noted Reaction Type Reactions Pcn [Penicillins]  03/27/2011    Unknown (comments) As a child Percocet [Oxycodone-acetaminophen]  03/27/2011    Swelling Leg swelling Current Immunizations  Reviewed on 7/30/2018 Name Date Influenza High Dose Vaccine PF 11/6/2017, 10/7/2016, 10/12/2015, 10/6/2014 Influenza Vaccine 10/1/2013 Influenza Vaccine Split 10/15/2012, 10/20/2011 Pneumococcal Conjugate (PCV-13) 7/14/2015 Pneumococcal Polysaccharide (PPSV-23) 10/7/2016 Pneumococcal Vaccine (Pcv) 1/1/2005 Tdap 7/14/2015 Zoster Vaccine, Live 1/1/2011 Not reviewed this visit You Were Diagnosed With   
  
 Codes Comments Paroxysmal atrial fibrillation (HCC)    -  Primary ICD-10-CM: I48.0 ICD-9-CM: 427.31 Chronic anticoagulation     ICD-10-CM: Z79.01 
ICD-9-CM: V58.61 Vitals Smoking Status Never Smoker Preferred Pharmacy Pharmacy Name Phone Axel Moncada [address] [place] [address] [address] [facility] [address] [place] [address] [phone] Your Updated Medication List  
  
   
This list is accurate as of 8/28/18 10:04 AM.  Always use your most recent med list. amLODIPine 5 mg tablet Commonly known as:  Lucas Guaman Take 1 Tab by mouth daily. aspirin delayed-release 81 mg tablet Take  by mouth daily. cranberry extract 450 mg Tab tablet Take 450 mg by mouth.  
  
 enalapril 20 mg tablet Commonly known as:  VASOTEC  
TAKE 1 TABLET TWICE DAILY  
  
 fiber Cap Take 2 Caps by mouth two (2) times a day. FOLIC ACID PO Take 400 mcg by mouth daily. hydroCHLOROthiazide 25 mg tablet Commonly known as:  HYDRODIURIL  
TAKE 1 TABLET EVERY DAY  
  
 metoprolol succinate 25 mg XL tablet Commonly known as:  TOPROL-XL  
TAKE 1 TABLET EVERY DAY  
  
 MULTI-VIT 55 PLUS PO Take 1 Tab by mouth daily. potassium chloride 10 mEq tablet Commonly known as:  KLOR-CON  
TAKE 1 TABLET THREE TIMES DAILY pravastatin 10 mg tablet Commonly known as:  PRAVACHOL  
TAKE 1 TABLET EVERY NIGHT  
  
 sildenafil (antihypertensive) 20 mg tablet Commonly known as:  REVATIO Take 1-5 Tabs by mouth daily as needed. terazosin 10 mg capsule Commonly known as:  HYTRIN  
TAKE 1 CAPSULE EVERY NIGHT  
  
 thioctic acid 50 mg Tab Generic drug:  Alpha Lipoic Acid Take 1 Tab by mouth daily. TYLENOL 325 mg tablet Generic drug:  acetaminophen Take  by mouth every four (4) hours as needed for Pain.  
  
 warfarin 2 mg tablet Commonly known as:  COUMADIN  
TAKE 2 TABLETS EVERY DAY Description New dose: Coumadin 4mg daily except 2mg on M/W/F (10% increase) Old dose: Coumadin 2mg daily except 4mg on M/W/F Recheck in 1 week August 2018 Details Sun Mon Tue Wed Thu Fri Sat  
     1  
  
  
  
   2  
  
  
  
   3  
  
  
  
   4  
  
  
  
  
  5  
  
  
  
   6  
  
  
  
   7  
  
  
  
   8  
  
  
  
   9  
  
  
  
   10  
  
  
  
   11  
  
  
  
  
  12  
  
  
  
   13  
  
  
  
   14  
  
  
  
   15  
  
  
  
   16  
  
  
  
   17  
  
  
  
   18  
  
  
  
  
  19  
  
  
  
   20  
  
  
  
   21  
  
  
  
   22  
  
  
  
   23  
  
  
  
   24  
  
  
  
   25  
  
  
  
  
  26  
  
  
  
   27  
  
  
  
   28  
  
4 mg See details 29  
  
2 mg  
  
   30  
  
4 mg  
  
   31  
  
2 mg Date Details 08/28 This INR check INR: 1.5! How to take your warfarin dose To take:  2 mg Take one of the 2 mg tablets. To take:  4 mg Take two of the 2 mg tablets. September 2018 Details Michael Simpson Tue Wed Thu Fri Sat 1  
  
4 mg 2  
  
4 mg 3  
  
2 mg 4  
  
4 mg    5  
  
2 mg  
  
   6  
  
  
  
   7  
  
  
  
   8  
  
  
  
  
  9  
  
  
  
   10  
  
  
  
   11  
  
  
  
   12  
  
  
  
   13  
  
  
  
   14  
  
  
  
   15  
  
  
  
  
  16  
  
  
  
   17  
  
  
  
   18  
  
  
  
   19  
  
  
  
   20  
  
  
  
   21  
  
  
  
   22  
  
  
  
  
  23  
  
  
  
   24  
  
  
  
   25  
  
  
  
   26  
 27  
  
  
  
   28  
  
  
  
   29  
  
  
  
  
  30  
  
  
  
        
 Date Details No additional details Date of next INR:  9/5/2018 How to take your warfarin dose To take:  2 mg Take one of the 2 mg tablets. To take:  4 mg Take two of the 2 mg tablets. We Performed the Following AMB POC PT/INR [83899 CPT(R)] Introducing [facility] & HEALTH SERVICES! [employer] introduces [employer] patient portal. Now you can access parts of your medical record, email your doctor's office, and request medication refills online. 1. In your internet browser, go to [email or web address] 2. Click on the First Time User? Click Here link in the Sign In box. You will see the New Member Sign Up page. 3. Enter your [employer] Access Code exactly as it appears below. You will not need to use this code after youve completed the sign-up process. If you do not sign up before the expiration date, you must request a new code. · [employer] Access Code: [email or web address] Expires: 9/24/2018 11:49 AM 
 
4. Enter the last four digits of your Social Security Number (xxxx) and Date of Birth (mm/dd/yyyy) as indicated and click Submit. You will be taken to the next sign-up page. 5. Create a [employer] ID. This will be your [employer] login ID and cannot be changed, so think of one that is secure and easy to remember. 6. Create a [employer] password. You can change your password at any time. 7. Enter your Password Reset Question and Answer. This can be used at a later time if you forget your password. 8. Enter your e-mail address. You will receive e-mail notification when new information is available in [address] 9. Click Sign Up. You can now view and download portions of your medical record. 10. Click the Download Summary menu link to download a portable copy of your medical information.  
 
If you have questions, please visit the Frequently Asked Questions section of the thesixtyone. Remember, [email or web address] is NOT to be used for urgent needs. For medical emergencies, dial 911. Now available from your iPhone and Android! Please provide this summary of care documentation to your next provider. Your primary care clinician is listed as Toby Sandifer. If you have any questions after today's visit, please call [phone].

## 2018-08-28 NOTE — PROGRESS NOTES
Tomas Junior is a 77 y.o. male who was seen in clinic today (8/28/2018). He RTC w/ friend. Assessment & Plan:   Diagnoses and all orders for this visit:    1. Urinary frequency- this is a recurrent problem, symptoms are: not changed, differential dx reviewed with the patient, exact etiology is unclear at this time. Reviewed this could be untreated previous UTI vs prostatitis. Will repeat UC as UA looks similar. Reviewed will try different abx and may extend course for 14 days. If no changes to urology. See AVS, Red flags were reviewed with the patient to RTC or notify me, expected time course for resolution reviewed. -     AMB POC URINALYSIS DIP STICK AUTO W/O MICRO  -     CULTURE, URINE    2. Essential hypertension- low today, will stop HCTZ and reassess. RN adjusted coumadin dosing today due to low INR. See coumadin note. Follow-up Disposition: Not on File      Subjective:   Ernesto Shah was seen today for Urinary Frequency    Urinary Changes  Ernesto Shah returns to clinic today to discuss frequency, urgency for 7 days. Did have some low grade temperatures (.3) and improved w/ Cranberry. This is a recurrent issue. He denies dysuria, hematuria, hesitancy, inability to void, suprapubic pressure. He reports symptoms are stable. Patient has tried: cranberry pills or juice. He was seen 1 month ago for similar issues. Found to have a UTI (Enterobacter aerogenes) and treated w/ Macrobid. He reports symptoms resolved but current episode started soon after stopping the medication.            Brief Labs:     Lab Results   Component Value Date/Time    Sodium 145 03/27/2018 08:54 AM    Potassium 3.9 03/27/2018 08:54 AM    Creatinine 0.88 03/27/2018 08:54 AM    Cholesterol, total 103 03/27/2018 08:54 AM    HDL Cholesterol 29 03/27/2018 08:54 AM    LDL, calculated 48 03/27/2018 08:54 AM    Triglyceride 130 03/27/2018 08:54 AM          Prior to Admission medications    Medication Sig Start Date End Date Taking? Authorizing Provider   cranberry extract 450 mg tab tablet Take 450 mg by mouth. Yes Historical Provider   warfarin (COUMADIN) 2 mg tablet Take 4 mg by mouth daily. Except 2mg on M/W/F   Yes Historical Provider   OTHER L-Arginine with L-Citrulline, 1000 mg twice a day. Yes Historical Provider   potassium chloride (KLOR-CON) 10 mEq tablet TAKE 1 TABLET THREE TIMES DAILY 8/15/18  Yes David Rm MD   metoprolol succinate (TOPROL-XL) 25 mg XL tablet TAKE 1 TABLET EVERY DAY 7/31/18  Yes David Rm MD   hydroCHLOROthiazide (HYDRODIURIL) 25 mg tablet TAKE 1 TABLET EVERY DAY 7/5/18  Yes David Rm MD   amLODIPine (NORVASC) 5 mg tablet Take 1 Tab by mouth daily. 7/5/18  Yes David Rm MD   enalapril (VASOTEC) 20 mg tablet TAKE 1 TABLET TWICE DAILY 6/11/18  Yes David Rm MD   pravastatin (PRAVACHOL) 10 mg tablet TAKE 1 TABLET EVERY NIGHT 5/11/18  Yes David Rm MD   terazosin (HYTRIN) 10 mg capsule TAKE 1 CAPSULE EVERY NIGHT 5/11/18  Yes David Rm MD   sildenafil, antihypertensive, (REVATIO) 20 mg tablet Take 1-5 Tabs by mouth daily as needed. 4/30/18  Yes David Rm MD   aspirin delayed-release 81 mg tablet Take  by mouth daily. Yes Historical Provider   acetaminophen (TYLENOL) 325 mg tablet Take  by mouth every four (4) hours as needed for Pain. Yes Historical Provider   FOLIC ACID PO Take 472 mcg by mouth daily. Yes Historical Provider   thioctic acid (ALPHA LIPOIC ACID) 50 mg tab Take 1 Tab by mouth daily. Yes Historical Provider   fiber Cap Take 2 Caps by mouth two (2) times a day. Yes Historical Provider   MULTIVITAMIN WITH MINERALS (MULTI-VIT 55 PLUS PO) Take 1 Tab by mouth daily.  7/8/11  Yes Historical Provider          Allergies   Allergen Reactions    Pcn [Penicillins] Unknown (comments)     As a child    Percocet [Oxycodone-Acetaminophen] Swelling     Leg swelling           Review of Systems Constitutional: Positive for malaise/fatigue and weight loss. Respiratory: Negative for cough and shortness of breath. Cardiovascular: Negative for chest pain and palpitations. Gastrointestinal: Negative for abdominal pain, constipation, diarrhea, nausea and vomiting. Objective:   Physical Exam   Cardiovascular: Regular rhythm and normal heart sounds. No murmur heard. Pulmonary/Chest: Effort normal and breath sounds normal. He has no wheezes. He has no rales. Abdominal: Soft. Bowel sounds are normal. He exhibits no mass. There is no hepatosplenomegaly. There is no tenderness. There is no CVA tenderness. Visit Vitals    BP 90/58    Pulse 66    Temp 98.5 °F (36.9 °C) (Oral)    Resp 14    Ht 5' 10\" (1.778 m)    Wt 208 lb (94.3 kg)    SpO2 97%    BMI 29.84 kg/m2         Disclaimer:  Advised him to call back or return to office if symptoms worsen/change/persist.  Discussed expected course/resolution/complications of diagnosis in detail with patient. Medication risks/benefits/costs/interactions/alternatives discussed with patient. He was given an after visit summary which includes diagnoses, current medications, & vitals. He expressed understanding with the diagnosis and plan. Aspects of this note may have been generated using voice recognition software. Despite editing, there may be some syntax errors.        Adri Patterson MD

## 2018-08-30 ENCOUNTER — TELEPHONE (OUTPATIENT)
Dept: INTERNAL MEDICINE CLINIC | Age: 78
End: 2018-08-30

## 2018-08-30 LAB — BACTERIA UR CULT: ABNORMAL

## 2018-08-30 RX ORDER — SULFAMETHOXAZOLE AND TRIMETHOPRIM 800; 160 MG/1; MG/1
1 TABLET ORAL 2 TIMES DAILY
Qty: 28 TAB | Refills: 0 | Status: SHIPPED | OUTPATIENT
Start: 2018-08-30 | End: 2018-09-11 | Stop reason: ALTCHOICE

## 2018-08-30 NOTE — TELEPHONE ENCOUNTER
Advised pt on call MD has reviewed culture. He needs to start septra ds bid x 14 days - sent to pharmacy. See Amarilis for INR check on Tuesday.

## 2018-08-30 NOTE — PROGRESS NOTES
Please call patient. UTI persistent. On call physician started him on Bactrim x 14 days. Inform pt should only use for 10 days. Bactrim will effect his coumadin.   Should go back to previous regimen- coumadin 2mg daily except 4mg on M/W/F

## 2018-08-31 NOTE — PROGRESS NOTES
Pt. And girlfriend notified to decrease bactrim to only 10d bid and resume previous dose of coumadin 2mg every day except 4mg mwf since the bactrim will likely increase his INR as a side effect. Pt. Read back and verbalized understanding. Instructed to keep his appt. With Jeremiah Rosas next week to see where he's at.

## 2018-09-04 ENCOUNTER — TELEPHONE (OUTPATIENT)
Dept: INTERNAL MEDICINE CLINIC | Age: 78
End: 2018-09-04

## 2018-09-04 NOTE — TELEPHONE ENCOUNTER
ON CALL NOTE:  Pt's GF calls w/ reports of him taking a 2nd dose of Bactrim in the evening (so 3 tabs on Saturday). Instructed to hold coumadin dose tomorrow. Push fluids. Take continue w/ bactrim BID tomorrow. Has f/u for INR on Wednesday. Red flags were reviewed & discussed with the patient to notify me or go to the ED. He verbalized understanding.

## 2018-09-05 ENCOUNTER — ANTI-COAG VISIT (OUTPATIENT)
Dept: INTERNAL MEDICINE CLINIC | Age: 78
End: 2018-09-05

## 2018-09-05 DIAGNOSIS — I48.0 PAROXYSMAL ATRIAL FIBRILLATION (HCC): Primary | ICD-10-CM

## 2018-09-05 DIAGNOSIS — Z79.01 CHRONIC ANTICOAGULATION: ICD-10-CM

## 2018-09-05 LAB
INR BLD: 1.7
PT POC: 20.6 SECONDS
VALID INTERNAL CONTROL?: YES

## 2018-09-05 NOTE — MR AVS SNAPSHOT
[address] [facility] [address] [place] 
[phone] Patient: Armando Baxter MRN: [id number] JENNIFER:[birth year] Visit Information Date & Time Provider Department Dept. Phone Encounter #  
 9/5/2018 10:00 AM Jada Gale Internal Medicine [phone] [id number] Your Appointments 9/14/2018  8:40 AM  
ESTABLISHED PATIENT with Rafita Basurto MD  
[facility] ([address]) Appt Note: 6 mo f/u per Dr. K[address] [place] [address]  
[address] [place] [address]  
  
    
 10/2/2018  9:15 AM  
PACEMAKER with Wiley Oviedo [facility] Appt Note: med icd/rc  
 [address] [place]  
[address] [facility] 7 [address] Upcoming Health Maintenance Date Due  
 GLAUCOMA SCREENING Q2Y 9/1/2016 Influenza Age 5 to Adult 8/1/2018 MEDICARE YEARLY EXAM 3/24/2019 COLONOSCOPY 5/10/2022 DTaP/Tdap/Td series (2 - Td) 7/14/2025 Allergies as of 9/5/2018  Review Complete On: 9/5/2018 By: Simba Pina RN Severity Noted Reaction Type Reactions Pcn [Penicillins]  03/27/2011    Unknown (comments) As a child Percocet [Oxycodone-acetaminophen]  03/27/2011    Swelling Leg swelling Current Immunizations  Reviewed on 8/28/2018 Name Date Influenza High Dose Vaccine PF 11/6/2017, 10/7/2016, 10/12/2015, 10/6/2014 Influenza Vaccine 10/1/2013 Influenza Vaccine Split 10/15/2012, 10/20/2011 Pneumococcal Conjugate (PCV-13) 7/14/2015 Pneumococcal Polysaccharide (PPSV-23) 10/7/2016 Pneumococcal Vaccine (Pcv) 1/1/2005 Tdap 7/14/2015 Zoster Vaccine, Live 1/1/2011 Not reviewed this visit You Were Diagnosed With   
  
 Codes Comments Paroxysmal atrial fibrillation (HCC)    -  Primary ICD-10-CM: I48.0 ICD-9-CM: 427.31 Chronic anticoagulation     ICD-10-CM: Z79.01 
ICD-9-CM: V58.61 Vitals Smoking Status Never Smoker Preferred Pharmacy Pharmacy Name Phone Joycelyn [facility] [phone] - [address], [address] [place] [address] [phone] Your Updated Medication List  
  
   
This list is accurate as of 9/5/18 10:28 AM.  Always use your most recent med list. amLODIPine 5 mg tablet Commonly known as:  Hannah Darting Take 1 Tab by mouth daily. aspirin delayed-release 81 mg tablet Take  by mouth daily. COUMADIN 2 mg tablet Generic drug:  warfarin Take 4 mg by mouth daily. Except 2mg on M/W/F  
  
 cranberry extract 450 mg Tab tablet Take 450 mg by mouth.  
  
 enalapril 20 mg tablet Commonly known as:  VASOTEC  
TAKE 1 TABLET TWICE DAILY  
  
 fiber Cap Take 2 Caps by mouth two (2) times a day. FOLIC ACID PO Take 400 mcg by mouth daily. metoprolol succinate 25 mg XL tablet Commonly known as:  TOPROL-XL  
TAKE 1 TABLET EVERY DAY  
  
 MULTI-VIT 55 PLUS PO Take 1 Tab by mouth daily. OTHER  
L-Arginine with L-Citrulline, 1000 mg twice a day. potassium chloride 10 mEq tablet Commonly known as:  KLOR-CON  
TAKE 1 TABLET THREE TIMES DAILY pravastatin 10 mg tablet Commonly known as:  PRAVACHOL  
TAKE 1 TABLET EVERY NIGHT  
  
 sildenafil (antihypertensive) 20 mg tablet Commonly known as:  REVATIO Take 1-5 Tabs by mouth daily as needed. terazosin 10 mg capsule Commonly known as:  HYTRIN  
TAKE 1 CAPSULE EVERY NIGHT  
  
 thioctic acid 50 mg Tab Generic drug:  Alpha Lipoic Acid Take 1 Tab by mouth daily. trimethoprim-sulfamethoxazole 160-800 mg per tablet Commonly known as:  BACTRIM DS, SEPTRA DS Take 1 Tab by mouth two (2) times a day for 14 days. TYLENOL 325 mg tablet Generic drug:  acetaminophen Take  by mouth every four (4) hours as needed for Pain. Description See progress note September 2018 Details Amina Hairston Tue Wed Thu Fri Sat  
        1  
  
  
  
  
  2  
  
  
  
   3  
  
  
  
   4  
  
  
  
   5  
  
4 mg See details 6  
  
2 mg 7  
  
4 mg 8  
  
2 mg  
  
  
  9  
  
2 mg  
  
   10  
  
4 mg  
  
   11  
  
2 mg  
  
   12  
  
  
  
   13  
  
  
  
   14  
  
  
  
   15  
  
  
  
  
  16  
  
  
  
   17  
  
  
  
   18  
  
  
  
   19  
  
  
  
   20  
  
  
  
   21  
  
  
  
   22  
  
  
  
  
  23  
  
  
  
   24  
  
  
  
   25  
  
  
  
   26  
  
  
  
   27  
  
  
  
   28  
  
  
  
   29  
  
  
  
  
  30  
  
  
  
        
 Date Details 09/05 This INR check INR: 1.7! Date of next INR:  9/11/2018 How to take your warfarin dose To take:  2 mg Take one of the 2 mg tablets. To take:  4 mg Take two of the 2 mg tablets. We Performed the Following AMB POC PT/INR [36640 CPT(R)] Introducing [facility] SERVICES! [employer] [facility] introduces [employer] patient portal. Now you can access parts of your medical record, email your doctor's office, and request medication refills online. 1. In your internet browser, go to [email or web address] 2. Click on the First Time User? Click Here link in the Sign In box. You will see the New Member Sign Up page. 3. Enter your [employer] Access Code exactly as it appears below. You will not need to use this code after youve completed the sign-up process. If you do not sign up before the expiration date, you must request a new code. · [employer] Access Code: [email or web address] Expires: 9/24/2018 11:49 AM 
 
4. Enter the last four digits of your Social Security Number (xxxx) and Date of Birth (mm/dd/yyyy) as indicated and click Submit. You will be taken to the next sign-up page. 5. Create a deltamethod ID. This will be your deltamethod login ID and cannot be changed, so think of one that is secure and easy to remember. 6. Create a deltamethod password. You can change your password at any time. 7. Enter your Password Reset Question and Answer. This can be used at a later time if you forget your password. 8. Enter your e-mail address. You will receive e-mail notification when new information is available in [address] 9. Click Sign Up. You can now view and download portions of your medical record. 10. Click the Download Summary menu link to download a portable copy of your medical information. If you have questions, please visit the Frequently Asked Questions section of the deltamethod website. Remember, [employer] is NOT to be used for urgent needs. For medical emergencies, dial 911. Now available from your iPhone and Android! Please provide this summary of care documentation to your next provider. Your primary care clinician is listed as Rafi Healy. If you have any questions after today's visit, please call [phone].

## 2018-09-05 NOTE — PROGRESS NOTES
INR 1.7 today. Up from 1.5 last week, dose was increased last week but then patient was instructed not to take increased dose due to antibiotic prescription. Patient also missed dose of Coumadin on Monday per instructions from on call as he accidentally doubled up on Bactrim dose. Patient on day 5 of 10 day Bactrim course. Patient instructions:   Continue old dose of Coumadin: Coumadin 2mg daily except 4mg M/W/F  Return in 1 week  A full discussion of the nature of anticoagulants has been carried out. A benefit risk analysis has been presented to the patient, so that they understand the justification for choosing anticoagulation at this time. The need for frequent and regular monitoring, precise dosage adjustment and compliance is stressed. Side effects of potential bleeding are discussed. The patient should avoid any OTC items containing aspirin or ibuprofen, and should avoid great swings in general diet. Avoid alcohol consumption. Call if any signs of abnormal bleeding. Patient verbalized understanding.

## 2018-09-11 ENCOUNTER — ANTI-COAG VISIT (OUTPATIENT)
Dept: INTERNAL MEDICINE CLINIC | Age: 78
End: 2018-09-11

## 2018-09-11 DIAGNOSIS — I48.0 PAROXYSMAL ATRIAL FIBRILLATION (HCC): Primary | ICD-10-CM

## 2018-09-11 LAB
INR BLD: 1.7
PT POC: 20.9 SECONDS
VALID INTERNAL CONTROL?: YES

## 2018-09-11 NOTE — PROGRESS NOTES
Verified patient identity with two identifiers. Spoke with patient by phone. BP low in office is reason for HCTZ being held, patient states he has appt this Friday with cardiology and will call to report BP after that appt.

## 2018-09-11 NOTE — PROGRESS NOTES
INR 1.7 today. Patient instructions: Take 4mg today instead of 2mg then continue normal dosing Coumadin 2mg daily except 4mg M/W/F  Recheck in 1 week    A full discussion of the nature of anticoagulants has been carried out. A benefit risk analysis has been presented to the patient, so that they understand the justification for choosing anticoagulation at this time. The need for frequent and regular monitoring, precise dosage adjustment and compliance is stressed. Side effects of potential bleeding are discussed. The patient should avoid any OTC items containing aspirin or ibuprofen, and should avoid great swings in general diet. Avoid alcohol consumption. Call if any signs of abnormal bleeding. Patient verbalized understanding.

## 2018-09-11 NOTE — PROGRESS NOTES
We stopped HCTZ due to low BP in the office, not b/c of abx.   Should check amb BP at pharmacy or RTC for [place] check before restarting

## 2018-09-11 NOTE — MR AVS SNAPSHOT
[address] [facility] [address] [place] 
[phone] Patient: Tomas Junior MRN: [id number] JOSEPH:[id number] Visit Information Date & Time Provider Department Dept. Phone Encounter #  
 9/11/2018 10:00 AM Carlos De La Cruz [facility] [phone] [id number] Your Appointments 9/14/2018  8:40 AM  
ESTABLISHED PATIENT with Aura Arriola MD  
[facility] ([facility] Appt Note: 6 mo f/u per Dr. Adrianne Pettit [address] [place] [address]  
[address] [facility] [address]  
  
    
 10/2/2018  9:15 AM  
PACEMAKER with Yogi Johnson [facility] Appt Note: med icd/rc  
 [address] [facility] [address] [place] [address]  
[address] [facility] 7 [address] Upcoming Health Maintenance Date Due  
 GLAUCOMA SCREENING Q2Y 9/1/2016 Influenza Age 5 to Adult 8/1/2018 MEDICARE YEARLY EXAM 3/24/2019 COLONOSCOPY 5/10/2022 DTaP/Tdap/Td series (2 - Td) 7/14/2025 Allergies as of 9/11/2018  Review Complete On: 9/11/2018 By: Samantha Eckert RN Severity Noted Reaction Type Reactions Pcn [Penicillins]  03/27/2011    Unknown (comments) As a child Percocet [Oxycodone-acetaminophen]  03/27/2011    Swelling Leg swelling Current Immunizations  Reviewed on 8/28/2018 Name Date Influenza High Dose Vaccine PF 11/6/2017, 10/7/2016, 10/12/2015, 10/6/2014 Influenza Vaccine 10/1/2013 Influenza Vaccine Split 10/15/2012, 10/20/2011 Pneumococcal Conjugate (PCV-13) 7/14/2015 Pneumococcal Polysaccharide (PPSV-23) 10/7/2016 Pneumococcal Vaccine (Pcv) 1/1/2005 Tdap 7/14/2015 Zoster Vaccine, Live 1/1/2011 Not reviewed this visit You Were Diagnosed With   
  
 Codes Comments Paroxysmal atrial fibrillation (HCC)    -  Primary ICD-10-CM: I48.0 ICD-9-CM: 427.31 Vitals Smoking Status Never Smoker Preferred Pharmacy Pharmacy Name Phone [facility] [address] - [address] [place], [facility] [place] [address] [phone] Your Updated Medication List  
  
   
This list is accurate as of 9/11/18 10:00 AM.  Always use your most recent med list. amLODIPine 5 mg tablet Commonly known as:  Yvonne Faith Take 1 Tab by mouth daily. aspirin delayed-release 81 mg tablet Take  by mouth daily. COUMADIN 2 mg tablet Generic drug:  warfarin Take 2 mg by mouth daily. Except 4mg on M/W/F  
  
 cranberry extract 450 mg Tab tablet Take 450 mg by mouth.  
  
 enalapril 20 mg tablet Commonly known as:  VASOTEC  
TAKE 1 TABLET TWICE DAILY  
  
 fiber Cap Take 2 Caps by mouth two (2) times a day. FOLIC ACID PO Take 400 mcg by mouth daily. metoprolol succinate 25 mg XL tablet Commonly known as:  TOPROL-XL  
TAKE 1 TABLET EVERY DAY  
  
 MULTI-VIT 55 PLUS PO Take 1 Tab by mouth daily. OTHER  
L-Arginine with L-Citrulline, 1000 mg twice a day. potassium chloride 10 mEq tablet Commonly known as:  KLOR-CON  
TAKE 1 TABLET THREE TIMES DAILY pravastatin 10 mg tablet Commonly known as:  PRAVACHOL  
TAKE 1 TABLET EVERY NIGHT  
  
 sildenafil (antihypertensive) 20 mg tablet Commonly known as:  REVATIO Take 1-5 Tabs by mouth daily as needed. terazosin 10 mg capsule Commonly known as:  HYTRIN  
TAKE 1 CAPSULE EVERY NIGHT  
  
 thioctic acid 50 mg Tab Generic drug:  Alpha Lipoic Acid Take 1 Tab by mouth daily. trimethoprim-sulfamethoxazole 160-800 mg per tablet Commonly known as:  BACTRIM DS, SEPTRA DS Take 1 Tab by mouth two (2) times a day for 14 days. TYLENOL 325 mg tablet Generic drug:  acetaminophen Take  by mouth every four (4) hours as needed for Pain. Description Take 4mg today instead of 2mg then continue normal dosing Coumadin 2mg daily except 4mg M/W/F Recheck in 1 week September 2018 Details Sun Mon Tue Wed Thu Fri Sat  
        1  
  
  
  
  
  2  
  
  
  
   3  
  
  
  
   4  
  
  
  
   5  
  
  
  
   6  
  
  
  
   7  
  
  
  
   8  
  
  
  
  
  9  
  
  
  
   10  
  
  
  
   11  
  
4 mg See details 12  
  
4 mg  
  
   13  
  
2 mg 14  
  
4 mg  
  
   15  
  
2 mg  
  
  
  16  
  
2 mg  
  
   17  
  
4 mg  
  
   18  
  
2 mg  
  
   19  
  
  
  
   20  
  
  
  
   21  
  
  
  
   22  
  
  
  
  
  23  
  
  
  
   24  
  
  
  
   25  
  
  
  
   26  
  
  
  
   27  
  
  
  
   28  
  
  
  
   29  
  
  
  
  
  30  
  
  
  
        
 Date Details 09/11 This INR check INR: 1.7! Date of next INR:  9/18/2018 How to take your warfarin dose To take:  2 mg Take one of the 2 mg tablets. To take:  4 mg Take two of the 2 mg tablets. We Performed the Following AMB POC PT/INR [47247 CPT(R)] Introducing [facility] SERVICES! [employer] [facility] introduces [employer] patient portal. Now you can access parts of your medical record, email your doctor's office, and request medication refills online. 1. In your internet browser, go to [email or web address] 2. Click on the First Time User? Click Here link in the Sign In box. You will see the New Member Sign Up page. 3. Enter your [email or web address] Access Code exactly as it appears below. You will not need to use this code after youve completed the sign-up process. If you do not sign up before the expiration date, you must request a new code. · [email or web address] Access Code: [email or web address] Expires: 9/24/2018 11:49 AM 
 
4. Enter the last four digits of your Social Security Number (xxxx) and Date of Birth (mm/dd/yyyy) as indicated and click Submit.  You will be taken to the next sign-up page. 5. Create a [employer] ID. This will be your [employer] login ID and cannot be changed, so think of one that is secure and easy to remember. 6. Create a [employer] password. You can change your password at any time. 7. Enter your Password Reset Question and Answer. This can be used at a later time if you forget your password. 8. Enter your e-mail address. You will receive e-mail notification when new information is available in [address] 9. Click Sign Up. You can now view and download portions of your medical record. 10. Click the Download Summary menu link to download a portable copy of your medical information. If you have questions, please visit the Frequently Asked Questions section of the [employer] website. Remember, [employer] is NOT to be used for urgent needs. For medical emergencies, dial 911. Now available from your iPhone and Android! Please provide this summary of care documentation to your next provider. Your primary care clinician is listed as Ladonna Steen. If you have any questions after today's visit, please call [phone].

## 2018-09-14 ENCOUNTER — OFFICE VISIT (OUTPATIENT)
Dept: CARDIOLOGY CLINIC | Age: 78
End: 2018-09-14

## 2018-09-14 VITALS
DIASTOLIC BLOOD PRESSURE: 70 MMHG | OXYGEN SATURATION: 99 % | RESPIRATION RATE: 16 BRPM | HEIGHT: 70 IN | BODY MASS INDEX: 29.92 KG/M2 | HEART RATE: 64 BPM | WEIGHT: 209 LBS | SYSTOLIC BLOOD PRESSURE: 140 MMHG

## 2018-09-14 DIAGNOSIS — Z95.810 S/P ICD (INTERNAL CARDIAC DEFIBRILLATOR) PROCEDURE: ICD-10-CM

## 2018-09-14 DIAGNOSIS — Z79.01 CHRONIC ANTICOAGULATION: ICD-10-CM

## 2018-09-14 DIAGNOSIS — I25.10 CORONARY ARTERY DISEASE INVOLVING NATIVE CORONARY ARTERY OF NATIVE HEART WITHOUT ANGINA PECTORIS: Primary | ICD-10-CM

## 2018-09-14 DIAGNOSIS — I48.0 PAROXYSMAL ATRIAL FIBRILLATION (HCC): ICD-10-CM

## 2018-09-14 NOTE — PROGRESS NOTES
[place]: Peter Newsome  ([phone]    HPI:   Mr. Hawk Huerta is a 78 yo M with h/o CAD s/p PCI in 2003, patent stent with no significant CAD on 2008 cath, afib on metoprolol for rate control and coumadin for anticoagulation, apical variant of a hypertrophic cardiomyopathy with deep T wave inversions on ECG, sick sinus with NSVT noted on 11/14/13 holter with multiple > 3 sec pauses s/p Med[employer] ICD on 11/15/13 with Dr. Corwin Miller, s/p right hip revision on 12/5/13. MARIBEL in 2012 was normal.  7/2011 nuclear stress test was normal.  5/2012 echo noted EF of 50-55% and apical hypertrophy. Since his last visit, he has been doing well. Mostly though having problems with right hip pain that has very much restricted him and he is going to see Dr. Karen Suarez, his orthopedist on Monday. He had his hip replaced on the right more than ten years ago. He also had recent UTI, but this has resolved. From a cardiac standpoint, he denies any chest pain and his breathing has been stable. No significant palpitations. He has been compliant with his medications. They stopped his Hydrochlorothiazide a few weeks ago due to low blood pressure and his blood pressure has been normal.  His weight has gone down some from 219 to 209. He is compensated on exam with clear lungs. Assessment and Plan:   1. Atrial fibrillation and sick sinus syndrome, status post ICD and nonsustained ventricular tachycardia in 2013. He is stable and compensated. No bleeding issues on Coumadin. Xarelto was cost prohibitive. 2. Preop cardiac evaluation. If he does need to have work done on his right hip, he would be low to moderate risk for cardiac complications. No additional cardiac evaluation is indicated at this time and he is optimized. Coumadin could be held for five to seven days prior to surgery and restarted after. 3. Coronary artery disease. Stable and compensated. Continue aspirin, statin, beta-blocker and ACE inhibitor. Will have him follow back in six months. 4. Hypertrophic cardiomyopathy. Stable on beta-blocker. For surgery, would recommend adequate hydration given his hypertrophic cardiomyopathy. Also would be careful about diuretics, as these patients are preload dependent. 5. Mixed hyperlipidemia. Tolerating statin. 6. Essential hypertension. Blood pressure is controlled and no changes made. 7. Erectile dysfunction. Cialis and Viagra prn. Cautioned in the past about avoiding Nitroglycerin concurrently. He  has a past medical history of Arthritis; CAD (coronary artery disease); Chronic atrial fibrillation [facility]); ED (erectile dysfunction); Hypertension; Kidney stone on right side (10/30/2011); JAMEY (obstructive sleep apnea) (4/25/2012); Skin cancer; Sun-damaged skin; and TIA (transient ischemic attack). He also has no past medical history of Arsenic suspected exposure; Family history of skin cancer; Radiation exposure; Sunburn, blistering; or Tanning bed exposure. All other systems negative except as above. PE  Vitals:    09/14/18 0822   BP: 140/70   Pulse: 64   Resp: 16   SpO2: 99%   Weight: 209 lb (94.8 kg)   Height: 5' 10\" (1.778 m)    Body mass index is 29.99 kg/(m^2). General appearance - alert, well appearing, and in no distress  Mental status - affect appropriate to mood  Neck - supple, no JVD. No bruits present.    Chest - clear to auscultation, no wheezes, rales or rhonchi  Heart - Regular rate, regular rhythm, normal S1, S2, I/VI systolic murmur LUSB  Abdomen - soft, nontender, nondistended, no masses or organomegaly  Extremities - peripheral pulses normal, no pedal edema        Recent Labs:  Lab Results   Component Value Date/Time    Cholesterol, total 103 03/27/2018 08:54 AM    HDL Cholesterol 29 (L) 03/27/2018 08:54 AM    LDL, calculated 48 03/27/2018 08:54 AM    Triglyceride 130 03/27/2018 08:54 AM     Lab Results   Component Value Date/Time    Creatinine 0.88 03/27/2018 08:54 AM Lab Results   Component Value Date/Time    BUN 18 03/27/2018 08:54 AM    BUN (POC) 16 12/04/2013 06:46 AM     Lab Results   Component Value Date/Time    Potassium 3.9 03/27/2018 08:54 AM     Lab Results   Component Value Date/Time    Hemoglobin A1c 5.3 11/19/2013 08:45 AM     Lab Results   Component Value Date/Time    HGB 13.0 05/08/2018 11:59 AM     Lab Results   Component Value Date/Time    PLATELET 839 11/07/7184 11:59 AM       Reviewed:  Past Medical History:   Diagnosis Date    Arthritis     knees    CAD (coronary artery disease)     stent x3 approx 2001    Chronic atrial fibrillation ([facility] [address]     ED (erectile dysfunction)     Hypertension     Kidney stone on right side 10/30/2011    JAMEY (obstructive sleep apnea) 4/25/2012    Skin cancer     BCC, s/p Mohs on L flank    Sun-damaged skin     TIA (transient ischemic attack)     6/12 - [facility]     History   Smoking Status    Never Smoker   Smokeless Tobacco    Never Used     History   Alcohol Use No     Allergies   Allergen Reactions    Pcn [Penicillins] Unknown (comments)     As a child    Percocet [Oxycodone-Acetaminophen] Swelling     Leg swelling       Current Outpatient Prescriptions   Medication Sig    cranberry extract 450 mg tab tablet Take 450 mg by mouth.  warfarin (COUMADIN) 2 mg tablet Take 2 mg by mouth daily. Except 4mg on M/W/F    OTHER L-Arginine with L-Citrulline, 1000 mg twice a day.  potassium chloride (KLOR-CON) 10 mEq tablet TAKE 1 TABLET THREE TIMES DAILY    metoprolol succinate (TOPROL-XL) 25 mg XL tablet TAKE 1 TABLET EVERY DAY    amLODIPine (NORVASC) 5 mg tablet Take 1 Tab by mouth daily.  enalapril (VASOTEC) 20 mg tablet TAKE 1 TABLET TWICE DAILY    pravastatin (PRAVACHOL) 10 mg tablet TAKE 1 TABLET EVERY NIGHT    terazosin (HYTRIN) 10 mg capsule TAKE 1 CAPSULE EVERY NIGHT    sildenafil, antihypertensive, (REVATIO) 20 mg tablet Take 1-5 Tabs by mouth daily as needed.     aspirin delayed-release 81 mg tablet Take  by mouth daily.  acetaminophen (TYLENOL) 325 mg tablet Take  by mouth every four (4) hours as needed for Pain.  FOLIC ACID PO Take 448 mcg by mouth daily.  thioctic acid (ALPHA LIPOIC ACID) 50 mg tab Take 1 Tab by mouth daily.  fiber Cap Take 2 Caps by mouth two (2) times a day.  MULTIVITAMIN WITH MINERALS (MULTI-VIT 55 PLUS PO) Take 1 Tab by mouth daily. No current facility-administered medications for this visit.         Delaney Stanford MD  [facility] heart and Vascular [place]  [address] [facility] [address]  [address]

## 2018-09-14 NOTE — MR AVS SNAPSHOT
[address] [facility] [address] [place] 
[phone] Patient: Macy Germain MRN: [id number] NJB:[id number] Visit Information Date & Time Provider Department Dept. Phone Encounter #  
 9/14/2018  8:40 AM Hawk Pickering [facility] [phone] [id number] Your Appointments 9/18/2018  9:30 AM  
[facility] Appt Note: inr check [address] [facility] [address]  
[address]  
  
    
 10/2/2018  9:15 AM  
PACEMAKER with Kevin Peña [facility] ([facility]EDUL[facility]) Appt Note: med icd/rc  
 [address] [facility] [address]  
[address] [facility]  
  
    
 3/22/2019  9:20 AM  
ESTABLISHED PATIENT with Hawk Pickering MD  
[facility] ([facility]) Appt Note: 6 month follow up  
 [address] [place] [address]  
[address] [facility] [address] Upcoming Health Maintenance Date Due  
 GLAUCOMA SCREENING Q2Y 9/1/2016 Influenza Age 5 to Adult 8/1/2018 MEDICARE YEARLY EXAM 3/24/2019 COLONOSCOPY 5/10/2022 DTaP/Tdap/Td series (2 - Td) 7/14/2025 Allergies as of 9/14/2018  Review Complete On: 9/14/2018 By: Tyrell Moya Severity Noted Reaction Type Reactions Pcn [Penicillins]  03/27/2011    Unknown (comments) As a child Percocet [Oxycodone-acetaminophen]  03/27/2011    Swelling Leg swelling Current Immunizations  Reviewed on 8/28/2018 Name Date Influenza High Dose Vaccine PF 11/6/2017, 10/7/2016, 10/12/2015, 10/6/2014 Influenza Vaccine 10/1/2013 Influenza Vaccine Split 10/15/2012, 10/20/2011 Pneumococcal Conjugate (PCV-13) 7/14/2015 Pneumococcal Polysaccharide (PPSV-23) 10/7/2016 Pneumococcal Vaccine (Pcv) 1/1/2005 Tdap 7/14/2015 Zoster Vaccine, Live 1/1/2011 Not reviewed this visit You Were Diagnosed With   
  
 Codes Comments Coronary artery disease involving native coronary artery of native heart without angina pectoris    -  Primary ICD-10-CM: I25.10 ICD-9-CM: 414.01 Vitals BP Pulse Resp Height(growth percentile) Weight(growth percentile) SpO2  
 140/70 (BP 1 Location: Left arm, BP Patient Position: Sitting) 64 16 5' 10\" (1.778 m) 209 lb (94.8 kg) 99% BMI Smoking Status 29.99 kg/m2 Never Smoker Vitals History BMI and BSA Data Body Mass Index Body Surface Area  
 29.99 kg/m 2 2.16 m 2 Preferred Pharmacy Pharmacy Name Phone [facility]  [address] - [address], [address] [place] [address] [phone] Your Updated Medication List  
  
   
This list is accurate as of 9/14/18  9:07 AM.  Always use your most recent med list. amLODIPine 5 mg tablet Commonly known as:  Derick Chafe Take 1 Tab by mouth daily. aspirin delayed-release 81 mg tablet Take  by mouth daily. COUMADIN 2 mg tablet Generic drug:  warfarin Take 2 mg by mouth daily. Except 4mg on M/W/F  
  
 cranberry extract 450 mg Tab tablet Take 450 mg by mouth.  
  
 enalapril 20 mg tablet Commonly known as:  VASOTEC  
TAKE 1 TABLET TWICE DAILY  
  
 fiber Cap Take 2 Caps by mouth two (2) times a day. FOLIC ACID PO Take 400 mcg by mouth daily. metoprolol succinate 25 mg XL tablet Commonly known as:  TOPROL-XL  
TAKE 1 TABLET EVERY DAY  
  
 MULTI-VIT 55 PLUS PO Take 1 Tab by mouth daily. OTHER  
L-Arginine with L-Citrulline, 1000 mg twice a day. potassium chloride 10 mEq tablet Commonly known as:  KLOR-CON  
TAKE 1 TABLET THREE TIMES DAILY pravastatin 10 mg tablet Commonly known as:  PRAVACHOL  
TAKE 1 TABLET EVERY NIGHT  
  
 sildenafil (antihypertensive) 20 mg tablet Commonly known as:  REVATIO Take 1-5 Tabs by mouth daily as needed. terazosin 10 mg capsule Commonly known as:  HYTRIN  
TAKE 1 CAPSULE EVERY NIGHT  
  
 thioctic acid 50 mg Tab Generic drug:  Alpha Lipoic Acid Take 1 Tab by mouth daily. TYLENOL 325 mg tablet Generic drug:  acetaminophen Take  by mouth every four (4) hours as needed for Pain. We Performed the Following AMB POC EKG ROUTINE W/ 12 LEADS, INTER & REP [89150 CPT(R)] Introducing [facility] & HEALTH SERVICES! [employer] introduces [employer] patient portal. Now you can access parts of your medical record, email your doctor's office, and request medication refills online. 1. In your internet browser, go to [email or web address] 2. Click on the First Time User? Click Here link in the Sign In box. You will see the New Member Sign Up page. 3. Enter your [employer] Access Code exactly as it appears below. You will not need to use this code after youve completed the sign-up process. If you do not sign up before the expiration date, you must request a new code. · [employer] Access Code: [email or web address] Expires: 9/24/2018 11:49 AM 
 
4. Enter the last four digits of your Social Security Number (xxxx) and Date of Birth (mm/dd/yyyy) as indicated and click Submit. You will be taken to the next sign-up page. 5. Create a [employer] ID. This will be your [employer] login ID and cannot be changed, so think of one that is secure and easy to remember. 6. Create a [employer] password. You can change your password at any time. 7. Enter your Password Reset Question and Answer. This can be used at a later time if you forget your password. 8. Enter your e-mail address. You will receive e-mail notification when new information is available in [address] 9. Click Sign Up. You can now view and download portions of your medical record. 10. Click the Download Summary menu link to download a portable copy of your medical information. If you have questions, please visit the Frequently Asked Questions section of the [employer] website. Remember, [employer] is NOT to be used for urgent needs. For medical emergencies, dial 911. Now available from your iPhone and Android! Please provide this summary of care documentation to your next provider. Your primary care clinician is listed as Loli Desai. If you have any questions after today's visit, please call [phone].

## 2018-09-18 ENCOUNTER — ANTI-COAG VISIT (OUTPATIENT)
Dept: INTERNAL MEDICINE CLINIC | Age: 78
End: 2018-09-18

## 2018-09-18 DIAGNOSIS — I48.0 PAROXYSMAL ATRIAL FIBRILLATION (HCC): Primary | ICD-10-CM

## 2018-09-18 DIAGNOSIS — Z79.01 CHRONIC ANTICOAGULATION: ICD-10-CM

## 2018-09-18 LAB
INR BLD: 1.9
PT POC: 22.5 SECONDS
VALID INTERNAL CONTROL?: YES

## 2018-09-18 RX ORDER — TRAMADOL HYDROCHLORIDE 50 MG/1
50 TABLET ORAL
COMMUNITY
End: 2018-11-28

## 2018-09-18 NOTE — MR AVS SNAPSHOT
[address] [facility] [address] [facility] 
[phone] Patient: Tisha Ferreira MRN: [id number] Visit Information Date & Time Provider Department Dept. Phone Encounter #  
 9/18/2018  9:30 AM Marylee [facility] [phone] [id number] Your Appointments 9/18/2018  9:30 AM  
ANTICOAG NURSE with [facility] ([address]) Appt Note: inr check; due/elig for [address]  - 3/24/19 or later [address] [place]  
[phone] [id number] [place] [address] [place]  
  
    
 10/2/2018  8:45 AM  
ANTICOAG NURSE with [facility] ([address]) Appt Note: inr check [address] [facility]  
[phone]  
  
    
 10/2/2018  9:15 AM  
PACEMAKER with [id number], Marjoriemercy Savage [facility] ([facility] Appt Note: med icd/rc  
 [address] [facility] [id number]  
[address] [facility]  
  
    
 3/22/2019  9:20 AM  
ESTABLISHED PATIENT with Delaney Stanford MD  
[facility] ([address]) Appt Note: 6 month follow up  
 [address] [place]  
[address] [facility] [address] Upcoming Health Maintenance Date Due  
 GLAUCOMA SCREENING Q2Y 9/1/2016 Influenza Age 5 to Adult 8/1/2018 MEDICARE YEARLY EXAM 3/24/2019 COLONOSCOPY 5/10/2022 DTaP/Tdap/Td series (2 - Td) 7/14/2025 Allergies as of 9/18/2018  Review Complete On: 9/18/2018 By: Karen Pena RN Severity Noted Reaction Type Reactions Pcn [Penicillins]  03/27/2011    Unknown (comments) As a child Percocet [Oxycodone-acetaminophen]  03/27/2011    Swelling Leg swelling Current Immunizations  Reviewed on 8/28/2018 Name Date Influenza High Dose Vaccine PF 11/6/2017, 10/7/2016, 10/12/2015, 10/6/2014 Influenza Vaccine 10/1/2013 Influenza Vaccine Split 10/15/2012, 10/20/2011 Pneumococcal Conjugate (PCV-13) 7/14/2015 Pneumococcal Polysaccharide (PPSV-23) 10/7/2016 Pneumococcal Vaccine (Pcv) 1/1/2005 Tdap 7/14/2015 Zoster Vaccine, Live 1/1/2011 Not reviewed this visit You Were Diagnosed With   
  
 Codes Comments Paroxysmal atrial fibrillation (HCC)    -  Primary ICD-10-CM: I48.0 ICD-9-CM: 427.31 Chronic anticoagulation     ICD-10-CM: Z79.01 
ICD-9-CM: V58.61 Vitals Smoking Status Never Smoker Preferred Pharmacy Pharmacy Name Phone [phone] - [address], [address] AT [place] [address] [phone] Your Updated Medication List  
  
   
This list is accurate as of 9/18/18  9:20 AM.  Always use your most recent med list. amLODIPine 5 mg tablet Commonly known as:  Karolina Fraction Take 1 Tab by mouth daily. aspirin delayed-release 81 mg tablet Take  by mouth daily. COUMADIN 2 mg tablet Generic drug:  warfarin Take 2 mg by mouth daily. Except 4mg on M/W/F  
  
 cranberry extract 450 mg Tab tablet Take 450 mg by mouth.  
  
 enalapril 20 mg tablet Commonly known as:  VASOTEC  
TAKE 1 TABLET TWICE DAILY  
  
 fiber Cap Take 2 Caps by mouth two (2) times a day. FOLIC ACID PO Take 400 mcg by mouth daily. metoprolol succinate 25 mg XL tablet Commonly known as:  TOPROL-XL  
TAKE 1 TABLET EVERY DAY  
  
 MULTI-VIT 55 PLUS PO Take 1 Tab by mouth daily. OTHER  
L-Arginine with L-Citrulline, 1000 mg twice a day. potassium chloride 10 mEq tablet Commonly known as:  KLOR-CON  
TAKE 1 TABLET THREE TIMES DAILY pravastatin 10 mg tablet Commonly known as:  PRAVACHOL  
 TAKE 1 TABLET EVERY NIGHT  
  
 sildenafil (antihypertensive) 20 mg tablet Commonly known as:  REVATIO Take 1-5 Tabs by mouth daily as needed. terazosin 10 mg capsule Commonly known as:  HYTRIN  
TAKE 1 CAPSULE EVERY NIGHT  
  
 thioctic acid 50 mg Tab Generic drug:  Alpha Lipoic Acid Take 1 Tab by mouth daily. traMADol 50 mg tablet Commonly known as:  ULTRAM  
Take 50 mg by mouth every eight (8) hours as needed for Pain. TYLENOL 325 mg tablet Generic drug:  acetaminophen Take  by mouth every four (4) hours as needed for Pain. Description Take 4mg today instead of 2mg then continue normal dosing Coumadin 2mg daily except 4mg M/W/F Recheck in 2 weeks September 2018 Details Sun Mon Tue Wed Thu Fri Sat  
        1  
  
  
  
  
  2  
  
  
  
   3  
  
  
  
   4  
  
  
  
   5  
  
  
  
   6  
  
  
  
   7  
  
  
  
   8  
  
  
  
  
  9  
  
  
  
   10  
  
  
  
   11  
  
  
  
   12  
  
  
  
   13  
  
  
  
   14  
  
  
  
   15  
  
  
  
  
  16  
  
  
  
   17  
  
  
  
   18  
  
2 mg See details 19  
  
4 mg  
  
   20  
  
2 mg  
  
   21  
  
4 mg  
  
   22  
  
2 mg  
  
  
  23  
  
2 mg 24  
  
4 mg  
  
   25  
  
2 mg  
  
   26  
  
4 mg  
  
   27  
  
2 mg 28  
  
4 mg  
  
   29  
  
2 mg  
  
  
  30  
  
2 mg Date Details 09/18 This INR check INR: 1.9! How to take your warfarin dose To take:  2 mg Take one of the 2 mg tablets. To take:  4 mg Take two of the 2 mg tablets. October 2018 Details [facility] Tue Wed Thu Fri Sat 1  
  
4 mg    2  
  
2 mg  
  
   3  
  
  
  
   4  
  
  
  
   5  
  
  
  
   6  
  
  
  
  
  7  
  
  
  
   8  
  
  
  
   9  
  
  
  
   10  
  
  
  
   11  
  
  
  
   12  
  
  
  
   13  
  
  
  
  
  14  
  
  
  
   15  
  
  
  
   16  
  
  
  
   17  
  
  
  
   18  
  
  
  
   19  
  
  
  
 20  
  
  
  
  
  21  
  
  
  
   22  
  
  
  
   23  
  
  
  
   24  
  
  
  
   25  
  
  
  
   26  
  
  
  
   27  
  
  
  
  
  28  
  
  
  
   29  
  
  
  
   30  
  
  
  
   31  
  
  
  
     
 Date Details No additional details Date of next INR:  10/2/2018 How to take your warfarin dose To take:  2 mg Take one of the 2 mg tablets. To take:  4 mg Take two of the 2 mg tablets. We Performed the Following AMB POC PT/INR [76142 CPT(R)] Introducing [facility]! [facility] introduces YouNoodle patient portal. Now you can access parts of your medical record, email your doctor's office, and request medication refills online. 1. In your internet browser, go to [email or web address] 2. Click on the First Time User? Click Here link in the Sign In box. You will see the New Member Sign Up page. 3. Enter your YouNoodle Access Code exactly as it appears below. You will not need to use this code after youve completed the sign-up process. If you do not sign up before the expiration date, you must request a new code. · YouNoodle Access Code: [email or web address] Expires: 9/24/2018 11:49 AM 
 
4. Enter the last four digits of your Social Security Number (xxxx) and Date of Birth (mm/dd/yyyy) as indicated and click Submit. You will be taken to the next sign-up page. 5. Create a YouNoodle ID. This will be your YouNoodle login ID and cannot be changed, so think of one that is secure and easy to remember. 6. Create a YouNoodle password. You can change your password at any time. 7. Enter your Password Reset Question and Answer. This can be used at a later time if you forget your password. 8. Enter your e-mail address. You will receive e-mail notification when new information is available in [address] 9. Click Sign Up. You can now view and download portions of your medical record.  
10. Click the Download Summary menu link to download a portable copy of your medical information. If you have questions, please visit the Frequently Asked Questions section of the Getable website. Remember, Getable is NOT to be used for urgent needs. For medical emergencies, dial 911. Now available from your iPhone and Android! Please provide this summary of care documentation to your next provider. Your primary care clinician is listed as Sarah Davis. If you have any questions after today's visit, please call [phone].

## 2018-09-18 NOTE — PROGRESS NOTES
INR 1.9 today. Patient instructions:   Continue normal dosing Coumadin 2mg daily except 4mg M/W/F  Recheck in 2 weeks    A full discussion of the nature of anticoagulants has been carried out. A benefit risk analysis has been presented to the patient, so that they understand the justification for choosing anticoagulation at this time. The need for frequent and regular monitoring, precise dosage adjustment and compliance is stressed. Side effects of potential bleeding are discussed. The patient should avoid any OTC items containing aspirin or ibuprofen, and should avoid great swings in general diet. Avoid alcohol consumption. Call if any signs of abnormal bleeding. Patient verbalized understanding.

## 2018-09-24 ENCOUNTER — OFFICE VISIT (OUTPATIENT)
Dept: FAMILY MEDICINE CLINIC | Age: 78
End: 2018-09-24

## 2018-09-24 VITALS
DIASTOLIC BLOOD PRESSURE: 73 MMHG | TEMPERATURE: 98 F | BODY MASS INDEX: 29.78 KG/M2 | WEIGHT: 208 LBS | HEIGHT: 70 IN | SYSTOLIC BLOOD PRESSURE: 110 MMHG | HEART RATE: 67 BPM | RESPIRATION RATE: 14 BRPM | OXYGEN SATURATION: 98 %

## 2018-09-24 DIAGNOSIS — E66.9 OBESITY (BMI 30.0-34.9): ICD-10-CM

## 2018-09-24 DIAGNOSIS — I48.0 PAROXYSMAL ATRIAL FIBRILLATION (HCC): Primary | ICD-10-CM

## 2018-09-24 DIAGNOSIS — M70.61 TROCHANTERIC BURSITIS OF RIGHT HIP: ICD-10-CM

## 2018-09-24 DIAGNOSIS — Z23 ENCOUNTER FOR IMMUNIZATION: ICD-10-CM

## 2018-09-24 DIAGNOSIS — I10 ESSENTIAL HYPERTENSION: ICD-10-CM

## 2018-09-24 DIAGNOSIS — Z79.01 CHRONIC ANTICOAGULATION: ICD-10-CM

## 2018-09-24 LAB
INR BLD: 1.8
PT POC: 21.4 SECONDS
VALID INTERNAL CONTROL?: YES

## 2018-09-24 NOTE — PATIENT INSTRUCTIONS
Vaccine Information Statement    Influenza (Flu) Vaccine (Inactivated or Recombinant): What you need to know    Many Vaccine Information Statements are available in Syriac and other languages. See www.immunize.org/vis  Hojas de Información Sobre Vacunas están disponibles en Español y en muchos otros idiomas. Visite www.immunize.org/vis    1. Why get vaccinated? Influenza (flu) is a contagious disease that spreads around the [place] every year, usually between October and May. Flu is caused by influenza viruses, and is spread mainly by coughing, sneezing, and close contact. Anyone can get flu. Flu strikes suddenly and can last several days. Symptoms vary by age, but can include:   fever/chills   sore throat   muscle aches   fatigue   cough   headache    runny or stuffy nose    Flu can also lead to pneumonia and blood infections, and cause diarrhea and seizures in children. If you have a medical condition, such as heart or lung disease, flu can make it worse. Flu is more dangerous for some people. Infants and young children, people 72years of age and older, pregnant women, and people with certain health conditions or a weakened immune system are at greatest risk. Each year thousands of people in the [facility] die from flu, and many more are hospitalized. Flu vaccine can:   keep you from getting flu,   make flu less severe if you do get it, and   keep you from spreading flu to your family and other people. 2. Inactivated and recombinant flu vaccines    A dose of flu vaccine is recommended every flu season. Children 6 months through 6years of age may need two doses during the same flu season. Everyone else needs only one dose each flu season.        Some inactivated flu vaccines contain a very small amount of a mercury-based preservative called thimerosal. Studies have not shown thimerosal in vaccines to be harmful, but flu vaccines that do not contain thimerosal are available. There is no live flu virus in flu shots. They cannot cause the flu. There are many flu viruses, and they are always changing. Each year a new flu vaccine is made to protect against three or four viruses that are likely to cause disease in the upcoming flu season. But even when the vaccine doesnt exactly match these viruses, it may still provide some protection    Flu vaccine cannot prevent:   flu that is caused by a virus not covered by the vaccine, or   illnesses that look like flu but are not. It takes about 2 weeks for protection to develop after vaccination, and protection lasts through the flu season. 3. Some people should not get this vaccine    Tell the person who is giving you the vaccine:     If you have any severe, life-threatening allergies. If you ever had a life-threatening allergic reaction after a dose of flu vaccine, or have a severe allergy to any part of this vaccine, you may be advised not to get vaccinated. Most, but not all, types of flu vaccine contain a small amount of egg protein.  If you ever had Guillain-Barré Syndrome (also called GBS). Some people with a history of GBS should not get this vaccine. This should be discussed with your doctor.  If you are not feeling well. It is usually okay to get flu vaccine when you have a mild illness, but you might be asked to come back when you feel better. 4. Risks of a vaccine reaction    With any medicine, including vaccines, there is a chance of reactions. These are usually mild and go away on their own, but serious reactions are also possible. Most people who get a flu shot do not have any problems with it.      Minor problems following a flu shot include:    soreness, redness, or swelling where the shot was given     hoarseness   sore, red or itchy eyes   cough   fever   aches   headache   itching   fatigue  If these problems occur, they usually begin soon after the shot and last 1 or 2 days. More serious problems following a flu shot can include the following:     There may be a small increased risk of Guillain-Barré Syndrome (GBS) after inactivated flu vaccine. This risk has been estimated at 1 or 2 additional cases per million people vaccinated. This is much lower than the risk of severe complications from flu, which can be prevented by flu vaccine.  Young children who get the flu shot along with pneumococcal vaccine (PCV13) and/or DTaP vaccine at the same time might be slightly more likely to have a seizure caused by fever. Ask your doctor for more information. Tell your doctor if a child who is getting flu vaccine has ever had a seizure. Problems that could happen after any injected vaccine:      People sometimes faint after a medical procedure, including vaccination. Sitting or lying down for about 15 minutes can help prevent fainting, and injuries caused by a fall. Tell your doctor if you feel dizzy, or have vision changes or ringing in the ears.  Some people get severe pain in the shoulder and have difficulty moving the arm where a shot was given. This happens very rarely.  Any medication can cause a severe allergic reaction. Such reactions from a vaccine are very rare, estimated at about 1 in a million doses, and would happen within a few minutes to a few hours after the vaccination. As with any medicine, there is a very remote chance of a vaccine causing a serious injury or death. The safety of vaccines is always being monitored. For more information, visit: www.cdc.gov/vaccinesafety/    5. What if there is a serious reaction? What should I look for?  Look for anything that concerns you, such as signs of a severe allergic reaction, very high fever, or unusual behavior.     Signs of a severe allergic reaction can include hives, swelling of the face and throat, difficulty breathing, a fast heartbeat, dizziness, and weakness - usually within a few minutes to a few hours after the vaccination. What should I do?  If you think it is a severe allergic reaction or other emergency that cant wait, call 9-1-1 and get the person to the nearest hospital. Otherwise, call your doctor.  Reactions should be reported to the Vaccine Adverse Event Reporting System (VAERS). Your doctor should file this report, or you can do it yourself through  the VAERS web site at www.vaers. [facility].gov, or by calling 8-399.725.3408. VAERS does not give medical advice. 6. The National Vaccine Injury Compensation Program    The [facility] Vaccine Injury Compensation Program (VICP) is a federal program that was created to compensate people who may have been injured by certain vaccines. Persons who believe they may have been injured by a vaccine can learn about the program and about filing a claim by calling 5-423.896.2478 or visiting the [place] website at www.[facility].gov/vaccinecompensation. There is a time limit to file a claim for compensation. 7. How can I learn more?  Ask your healthcare provider. He or she can give you the vaccine package insert or suggest other sources of information.  Call your local or state health department.  Contact the Centers for Disease Control and Prevention (CDC):  - Call 0-170.440.9219 (1-800-CDC-INFO) or  - Visit CDCs website at www.cdc.gov/flu    Vaccine Information Statement   Inactivated Influenza Vaccine   8/7/2015  42 JENNY Brock [id number]-22    Department of Health and Human Services  Centers for Disease Control and Prevention    Office Use Only

## 2018-09-24 NOTE — MR AVS SNAPSHOT
[address] [facility] [address] 
 
 
 [address] 
[phone] Patient: Dora Thurston MRN: [id number] Visit Information Date & Time Provider Department Dept. Phone Encounter #  
 9/24/2018 10:40 AM Kiana Mckeon MD [address] [facility] [phone] [id number] Your Appointments 10/2/2018  8:45 AM  
ANTICOAG NURSE with Bart Bamberger [facility] ([address]) Appt Note: inr check [address] [place] [address] [address]  
[address] [place]  
  
    
 10/2/2018  9:15 AM  
PACEMAKER with Sharon Miranda [facility] Appt Note: med icd/rc  
 [address] [place] [address] [facility] [address]  
[address]  
  
    
 3/22/2019  9:20 AM  
ESTABLISHED PATIENT with Farheen Dawson MD  
[facility] ([address]) Appt Note: 6 month follow up  
 [address]  
[address] [address] Upcoming Health Maintenance Date Due Shingrix Vaccine Age 50> (1 of 2) 8/24/1989 GLAUCOMA SCREENING Q2Y 9/1/2016 Influenza Age 5 to Adult 8/1/2018 MEDICARE YEARLY EXAM 3/24/2019 COLONOSCOPY 5/10/2022 DTaP/Tdap/Td series (2 - Td) 7/14/2025 Allergies as of 9/24/2018  Review Complete On: 9/24/2018 By: Dk Hillman Severity Noted Reaction Type Reactions Pcn [Penicillins]  03/27/2011    Unknown (comments) As a child Percocet [Oxycodone-acetaminophen]  03/27/2011    Swelling Leg swelling Current Immunizations  Reviewed on 8/28/2018 Name Date Influenza High Dose Vaccine PF 11/6/2017, 10/7/2016, 10/12/2015, 10/6/2014 Influenza Vaccine 10/1/2013 Influenza Vaccine (Tri) Adjuvanted 9/24/2018 Influenza Vaccine Split 10/15/2012, 10/20/2011 Pneumococcal Conjugate (PCV-13) 7/14/2015 Pneumococcal Polysaccharide (PPSV-23) 10/7/2016 Pneumococcal Vaccine (Pcv) 1/1/2005 Tdap 7/14/2015 Zoster Vaccine, Live 1/1/2011 Not reviewed this visit You Were Diagnosed With   
  
 Codes Comments Paroxysmal atrial fibrillation (HCC)    -  Primary ICD-10-CM: I48.0 ICD-9-CM: 427.31 Encounter for immunization     ICD-10-CM: T21 ICD-9-CM: V03.89 Vitals BP Pulse Temp Resp Height(growth percentile) Weight(growth percentile) 110/73 (BP 1 Location: Left arm, BP Patient Position: Sitting) 67 98 °F (36.7 °C) 14 5' 10\" (1.778 m) 208 lb (94.3 kg) SpO2 BMI Smoking Status 98% 29.84 kg/m2 Never Smoker BMI and BSA Data Body Mass Index Body Surface Area  
 29.84 kg/m 2 2.16 m 2 Preferred Pharmacy Pharmacy Name Phone [place] [phone] - [address], [address] AT [place] [address] [phone] Your Updated Medication List  
  
   
This list is accurate as of 9/24/18 12:18 PM.  Always use your most recent med list. amLODIPine 5 mg tablet Commonly known as:  Yvonne Faith Take 1 Tab by mouth daily. aspirin delayed-release 81 mg tablet Take  by mouth daily. COUMADIN 2 mg tablet Generic drug:  warfarin Take 2 mg by mouth daily. Except 4mg on M/W/F  
  
 cranberry extract 450 mg Tab tablet Take 450 mg by mouth.  
  
 enalapril 20 mg tablet Commonly known as:  VASOTEC  
TAKE 1 TABLET TWICE DAILY  
  
 fiber Cap Take 2 Caps by mouth two (2) times a day. FOLIC ACID PO Take 400 mcg by mouth daily. metoprolol succinate 25 mg XL tablet Commonly known as:  TOPROL-XL  
TAKE 1 TABLET EVERY DAY  
  
 MULTI-VIT 55 PLUS PO Take 1 Tab by mouth daily. OTHER  
L-Arginine with L-Citrulline, 1000 mg twice a day. potassium chloride 10 mEq tablet Commonly known as:  KLOR-CON  
TAKE 1 TABLET THREE TIMES DAILY pravastatin 10 mg tablet Commonly known as:  PRAVACHOL  
TAKE 1 TABLET EVERY NIGHT  
  
 sildenafil (antihypertensive) 20 mg tablet Commonly known as:  REVATIO Take 1-5 Tabs by mouth daily as needed. terazosin 10 mg capsule Commonly known as:  HYTRIN  
TAKE 1 CAPSULE EVERY NIGHT  
  
 thioctic acid 50 mg Tab Generic drug:  Alpha Lipoic Acid Take 1 Tab by mouth daily. traMADol 50 mg tablet Commonly known as:  ULTRAM  
Take 50 mg by mouth every eight (8) hours as needed for Pain. TYLENOL 325 mg tablet Generic drug:  acetaminophen Take  by mouth every four (4) hours as needed for Pain. Description Continue normal dosing Coumadin 2mg daily except 4mg M/W/F Recheck in 2 weeks September 2018 Details Sun Mon Tue Wed Thu Fri Sat  
        1  
  
  
  
  
  2  
  
  
  
   3  
  
  
  
   4  
  
  
  
   5  
  
  
  
   6  
  
  
  
   7  
  
  
  
   8  
  
  
  
  
  9  
  
  
  
   10  
  
  
  
   11  
  
  
  
   12  
  
  
  
   13  
  
  
  
   14  
  
  
  
   15  
  
  
  
  
  16  
  
  
  
   17  
  
  
  
   18  
  
  
  
   19  
  
  
  
   20  
  
  
  
   21  
  
  
  
   22  
  
  
  
  
  23  
  
  
  
   24  
  
4 mg See details 25  
  
2 mg  
  
   26  
  
4 mg  
  
   27  
  
2 mg 28  
  
4 mg  
  
   29  
  
4 mg  
  
  
  30  
  
2 mg Date Details 09/24 This INR check INR: 1.8! How to take your warfarin dose To take:  2 mg Take one of the 2 mg tablets. To take:  4 mg Take two of the 2 mg tablets. October 2018 Details Alessandra Poolebrant Tue Wed Thu Fri Sat 1  
  
4 mg  
  
   2  
  
  
  
   3  
  
  
  
   4  
  
  
  
   5  
  
  
  
   6  
  
  
  
  
  7  
  
  
  
   8  
  
  
  
   9  
  
  
  
   10  
  
  
  
   11  
  
  
  
   12  
  
  
  
   13 14  
  
  
  
   15  
  
  
  
   16  
  
  
  
   17  
  
  
  
   18  
  
  
  
   19  
  
  
  
   20  
  
  
  
  
  21  
  
  
  
   22  
  
  
  
   23  
  
  
  
   24  
  
  
  
   25  
  
  
  
   26  
  
  
  
   27  
  
  
  
  
  28  
  
  
  
   29  
  
  
  
   30  
  
  
  
   31  
  
  
  
     
 Date Details No additional details Date of next INR:  10/1/2018 How to take your warfarin dose To take:  4 mg Take two of the 2 mg tablets. We Performed the Following ADMIN INFLUENZA VIRUS VAC [ [facility]] AMB POC PT/INR [87660 CPT(R)] INFLUENZA VACCINE INACTIVATED (IIV), SUBUNIT, ADJUVANTED, IM [id number] CPT(R)] Patient Instructions Vaccine Information Statement Influenza (Flu) Vaccine (Inactivated or Recombinant): What you need to know Many Vaccine Information Statements are available in [place]ian and other languages. See www.immunize.org/vis Hojas de Información Sobre Vacunas están disponibles en Español y en muchos otros idiomas. Visite www.immunize.org/vis 1. Why get vaccinated? Influenza (flu) is a contagious disease that spreads around the [place] every year, usually between October and May. Flu is caused by influenza viruses, and is spread mainly by coughing, sneezing, and close contact. Anyone can get flu. Flu strikes suddenly and can last several days. Symptoms vary by age, but can include: 
 fever/chills  sore throat  muscle aches  fatigue  cough  headache  runny or stuffy nose Flu can also lead to pneumonia and blood infections, and cause diarrhea and seizures in children. If you have a medical condition, such as heart or lung disease, flu can make it worse. Flu is more dangerous for some people. Infants and young children, people 72years of age and older, pregnant women, and people with certain health conditions or a weakened immune system are at greatest risk. Each year thousands of people in the [facility] die from flu, and many more are hospitalized. Flu vaccine can: 
 keep you from getting flu, 
 make flu less severe if you do get it, and 
 keep you from spreading flu to your family and other people. 2. Inactivated and recombinant flu vaccines A dose of flu vaccine is recommended every flu season. Children 6 months through 6years of age may need two doses during the same flu season. Everyone else needs only one dose each flu season. Some inactivated flu vaccines contain a very small amount of a mercury-based preservative called thimerosal. Studies have not shown thimerosal in vaccines to be harmful, but flu vaccines that do not contain thimerosal are available. There is no live flu virus in flu shots. They cannot cause the flu. There are many flu viruses, and they are always changing. Each year a new flu vaccine is made to protect against three or four viruses that are likely to cause disease in the upcoming flu season. But even when the vaccine doesnt exactly match these viruses, it may still provide some protection Flu vaccine cannot prevent: 
 flu that is caused by a virus not covered by the vaccine, or 
 illnesses that look like flu but are not. It takes about 2 weeks for protection to develop after vaccination, and protection lasts through the flu season. 3. Some people should not get this vaccine Tell the person who is giving you the vaccine:  If you have any severe, life-threatening allergies. If you ever had a life-threatening allergic reaction after a dose of flu vaccine, or have a severe allergy to any part of this vaccine, you may be advised not to get vaccinated. Most, but not all, types of flu vaccine contain a small amount of egg protein.  If you ever had Guillain-Barré Syndrome (also called GBS). Some people with a history of GBS should not get this vaccine.  This should be discussed with your doctor.  If you are not feeling well. It is usually okay to get flu vaccine when you have a mild illness, but you might be asked to come back when you feel better. 4. Risks of a vaccine reaction With any medicine, including vaccines, there is a chance of reactions. These are usually mild and go away on their own, but serious reactions are also possible. Most people who get a flu shot do not have any problems with it. Minor problems following a flu shot include:  
 soreness, redness, or swelling where the shot was given  hoarseness  sore, red or itchy eyes  cough  fever  aches  headache  itching  fatigue If these problems occur, they usually begin soon after the shot and last 1 or 2 days. More serious problems following a flu shot can include the following:  There may be a small increased risk of Guillain-Barré Syndrome (GBS) after inactivated flu vaccine. This risk has been estimated at 1 or 2 additional cases per million people vaccinated. This is much lower than the risk of severe complications from flu, which can be prevented by flu vaccine.  Young children who get the flu shot along with pneumococcal vaccine (PCV13) and/or DTaP vaccine at the same time might be slightly more likely to have a seizure caused by fever. Ask your doctor for more information. Tell your doctor if a child who is getting flu vaccine has ever had a seizure. Problems that could happen after any injected vaccine:  People sometimes faint after a medical procedure, including vaccination. Sitting or lying down for about 15 minutes can help prevent fainting, and injuries caused by a fall. Tell your doctor if you feel dizzy, or have vision changes or ringing in the ears.  Some people get severe pain in the shoulder and have difficulty moving the arm where a shot was given. This happens very rarely.  Any medication can cause a severe allergic reaction. Such reactions from a vaccine are very rare, estimated at about 1 in a million doses, and would happen within a few minutes to a few hours after the vaccination. As with any medicine, there is a very remote chance of a vaccine causing a serious injury or death. The safety of vaccines is always being monitored. For more information, visit: www.cdc.gov/vaccinesafety/ 
 
5. What if there is a serious reaction? What should I look for?  Look for anything that concerns you, such as signs of a severe allergic reaction, very high fever, or unusual behavior. Signs of a severe allergic reaction can include hives, swelling of the face and throat, difficulty breathing, a fast heartbeat, dizziness, and weakness  usually within a few minutes to a few hours after the vaccination. What should I do?  If you think it is a severe allergic reaction or other emergency that cant wait, call 9-1-1 and get the person to the nearest hospital. Otherwise, call your doctor.  Reactions should be reported to the Vaccine Adverse Event Reporting System (VAERS). Your doctor should file this report, or you can do it yourself through  the VAERS web site at www.vaers. [facility].gov, or by calling 6-224.408.7917. VAERS does not give medical advice. 6. The National Vaccine Injury Compensation Program 
 
The [facility] Vaccine Injury Compensation Program (VICP) is a federal program that was created to compensate people who may have been injured by certain vaccines. Persons who believe they may have been injured by a vaccine can learn about the program and about filing a claim by calling 0-577.445.9658 or visiting the [employer] website at www.[place].gov/vaccinecompensation. There is a time limit to file a claim for compensation. 7. How can I learn more?  Ask your healthcare provider. He or she can give you the vaccine package insert or suggest other sources of information.  Call your local or state health department.  Contact the Centers for Disease Control and Prevention (CDC): 
- Call 8-969.100.5433 (1-800-CDC-INFO) or 
- Visit CDCs website at www.cdc.gov/flu Vaccine Information Statement Inactivated Influenza Vaccine 8/7/2015 
42 JENNY Echevarria [id number] [facility] and [facility] Centers for Disease Control and Prevention Office Use Only Introducing [facility]! [facility] introduces [employer] patient portal. Now you can access parts of your medical record, email your doctor's office, and request medication refills online. 1. In your internet browser, go to [email or web address] 2. Click on the First Time User? Click Here link in the Sign In box. You will see the New Member Sign Up page. 3. Enter your [employer] Access Code exactly as it appears below. You will not need to use this code after youve completed the sign-up process. If you do not sign up before the expiration date, you must request a new code. · [employer] Access Code: [email or web address] Expires: 12/23/2018 12:18 PM 
 
4. Enter the last four digits of your Social Security Number (xxxx) and Date of Birth (mm/dd/yyyy) as indicated and click Submit. You will be taken to the next sign-up page. 5. Create a [employer] ID. This will be your [employer] login ID and cannot be changed, so think of one that is secure and easy to remember. 6. Create a [employer] password. You can change your password at any time. 7. Enter your Password Reset Question and Answer. This can be used at a later time if you forget your password. 8. Enter your e-mail address. You will receive e-mail notification when new information is available in [address] 9. Click Sign Up. You can now view and download portions of your medical record. 10. Click the Download Summary menu link to download a portable copy of your medical information. If you have questions, please visit the Frequently Asked Questions section of the [employer] website. Remember, [employer] is NOT to be used for urgent needs. For medical emergencies, dial 911. Now available from your iPhone and Android! Please provide this summary of care documentation to your next provider. Your primary care clinician is listed as Marcio Alvarez. If you have any questions after today's visit, please call [phone].
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
## 2018-09-24 NOTE — PROGRESS NOTES
Manju Herrera is a 77 y.o. male who presents for routine immunizations. He denies any symptoms , reactions or allergies that would exclude them from being immunized today. Risks and adverse reactions were discussed and the VIS was given to them. All questions were addressed. He was observed for 5 min post injection. There were no reactions observed.     VORB received by Dr. Nabila Richard

## 2018-09-24 NOTE — PROGRESS NOTES
.  Chief Complaint   [facility] with   [facility].     . Rosa Henning

## 2018-10-01 ENCOUNTER — CLINICAL SUPPORT (OUTPATIENT)
Dept: FAMILY MEDICINE CLINIC | Age: 78
End: 2018-10-01

## 2018-10-01 VITALS
SYSTOLIC BLOOD PRESSURE: 123 MMHG | WEIGHT: 208 LBS | OXYGEN SATURATION: 98 % | TEMPERATURE: 98.5 F | BODY MASS INDEX: 29.78 KG/M2 | HEIGHT: 70 IN | HEART RATE: 78 BPM | RESPIRATION RATE: 16 BRPM | DIASTOLIC BLOOD PRESSURE: 76 MMHG

## 2018-10-01 DIAGNOSIS — I48.0 PAROXYSMAL ATRIAL FIBRILLATION (HCC): Primary | ICD-10-CM

## 2018-10-01 LAB
INR BLD: 1.9 (ref 1–1.5)
PT POC: 22.9 SECONDS (ref 9.1–12)
VALID INTERNAL CONTROL?: YES

## 2018-10-01 NOTE — MR AVS SNAPSHOT
[address] [facility] 
 
 
 [address] 
[phone] Patient: Rneetta St MRN: [id number] Visit Information Date & Time Provider Department Dept. Phone Encounter #  
 10/1/2018  9:10 AM Melony Fuentes MD [address] [facility] [phone] [id number] Your Appointments 10/2/2018  9:15 AM  
PACEMAKER with PACEMAKER3 [facility] ([facility] SCHEDULING) Appt Note: med icd/rc  
 [address] [facility] [address] Replaced by [facility] [address]  
[address] [facility]  
  
    
 3/22/2019  9:20 AM  
ESTABLISHED PATIENT with Kat Kenney MD  
[facility] ([facility]) Appt Note: 6 month follow up  
 [address]  
[address] [address] Upcoming Health Maintenance Date Due Shingrix Vaccine Age 50> (1 of 2) 8/24/1989 GLAUCOMA SCREENING Q2Y 9/1/2016 MEDICARE YEARLY EXAM 3/24/2019 COLONOSCOPY 5/10/2022 DTaP/Tdap/Td series (2 - Td) 7/14/2025 Allergies as of 10/1/2018  Review Complete On: 9/24/2018 By: Aki Salinas Severity Noted Reaction Type Reactions Pcn [Penicillins]  03/27/2011    Unknown (comments) As a child Percocet [Oxycodone-acetaminophen]  03/27/2011    Swelling Leg swelling Current Immunizations  Reviewed on 8/28/2018 Name Date Influenza High Dose Vaccine PF 11/6/2017, 10/7/2016, 10/12/2015, 10/6/2014 Influenza Vaccine 10/1/2013 Influenza Vaccine (Tri) Adjuvanted 9/24/2018 Influenza Vaccine Split 10/15/2012, 10/20/2011 Pneumococcal Conjugate (PCV-13) 7/14/2015 Pneumococcal Polysaccharide (PPSV-23) 10/7/2016 Pneumococcal Vaccine (Pcv) 1/1/2005 Tdap 7/14/2015 Zoster Vaccine, Live 1/1/2011 Not reviewed this visit You Were Diagnosed With   
  
 Codes Comments Paroxysmal atrial fibrillation (HCC)    -  Primary ICD-10-CM: I48.0 ICD-9-CM: 427.31 Vitals BP Pulse Temp Resp Height(growth percentile) Weight(growth percentile) 123/76 (BP 1 Location: Right arm, BP Patient Position: Sitting) 78 98.5 °F (36.9 °C) (Oral) 16 5' 10\" (1.778 m) 208 lb (94.3 kg) SpO2 BMI Smoking Status 98% 29.84 kg/m2 Never Smoker BMI and BSA Data Body Mass Index Body Surface Area  
 29.84 kg/m 2 2.16 m 2 Preferred Pharmacy Pharmacy Name Phone [phone], [address] AT [place] [address] [phone] Your Updated Medication List  
  
   
This list is accurate as of 10/1/18  9:39 AM.  Always use your most recent med list. amLODIPine 5 mg tablet Commonly known as:  Katie Dykes Take 1 Tab by mouth daily. aspirin delayed-release 81 mg tablet Take  by mouth daily. COUMADIN 2 mg tablet Generic drug:  warfarin Take 2 mg by mouth daily. Except 4mg on M/W/F  
  
 cranberry extract 450 mg Tab tablet Take 450 mg by mouth.  
  
 enalapril 20 mg tablet Commonly known as:  VASOTEC  
TAKE 1 TABLET TWICE DAILY  
  
 fiber Cap Take 2 Caps by mouth two (2) times a day. FOLIC ACID PO Take 400 mcg by mouth daily. metoprolol succinate 25 mg XL tablet Commonly known as:  TOPROL-XL  
TAKE 1 TABLET EVERY DAY  
  
 MULTI-VIT 55 PLUS PO Take 1 Tab by mouth daily. OTHER  
L-Arginine with L-Citrulline, 1000 mg twice a day. potassium chloride 10 mEq tablet Commonly known as:  KLOR-CON  
TAKE 1 TABLET THREE TIMES DAILY pravastatin 10 mg tablet Commonly known as:  PRAVACHOL  
TAKE 1 TABLET EVERY NIGHT  
  
 sildenafil (antihypertensive) 20 mg tablet Commonly known as:  REVATIO Take 1-5 Tabs by mouth daily as needed. terazosin 10 mg capsule Commonly known as:  HYTRIN  
TAKE 1 CAPSULE EVERY NIGHT  
  
 thioctic acid 50 mg Tab Generic drug:  Alpha Lipoic Acid Take 1 Tab by mouth daily. traMADol 50 mg tablet Commonly known as:  ULTRAM  
Take 50 mg by mouth every eight (8) hours as needed for Pain. TYLENOL 325 mg tablet Generic drug:  acetaminophen Take  by mouth every four (4) hours as needed for Pain. Description Continue normal dosing Coumadin 2mg daily except 4mg M/W/F Recheck in 2 weeks October 2018 Details [place] Tue Wed Thu Fri Sat 1  
  
4 mg See details 2  
  
2 mg 3  
  
4 mg 4  
  
2 mg 5  
  
4 mg 6  
  
4 mg 7  
  
4 mg 8  
  
4 mg 9  
  
2 mg  
  
   10  
  
4 mg  
  
   11  
  
2 mg  
  
   12  
  
4 mg  
  
   13  
  
4 mg  
  
  
  14  
  
4 mg  
  
   15  
  
4 mg  
  
   16  
  
  
  
   17  
  
  
  
   18  
  
  
  
   19  
  
  
  
   20  
  
  
  
  
  21  
  
  
  
   22  
  
  
  
   23  
  
  
  
   24  
  
  
  
   25  
  
  
  
   26  
  
  
  
   27  
  
  
  
  
  28  
  
  
  
   29  
  
  
  
   30  
  
  
  
   31  
  
  
  
     
 Date Details 10/01 This INR check INR: 1.9! Date of next INR:  10/15/2018 How to take your warfarin dose To take:  2 mg Take one of the 2 mg tablets. To take:  4 mg Take two of the 2 mg tablets. We Performed the Following AMB POC PT/INR [14026 CPT(R)] Introducing \[facility]\"" & HEALTH SERVICES! Leticia Esparza introduces TimeGenius patient portal. Now you can access parts of your medical record, email your doctor's office, and request medication refills online. 1. In your internet browser, go to [email or web address] 2. Click on the First Time User? Click Here link in the Sign In box. You will see the New Member Sign Up page. 3. Enter your TimeGenius Access Code exactly as it appears below.  You will not need to use this code after youve completed the sign-up process. If you do not sign up before the expiration date, you must request a new code. · PetroDE Access Code: [email or web address] Expires: 12/23/2018 12:18 PM 
 
4. Enter the last four digits of your Social Security Number (xxxx) and Date of Birth (mm/dd/yyyy) as indicated and click Submit. You will be taken to the next sign-up page. 5. Create a PetroDE ID. This will be your PetroDE login ID and cannot be changed, so think of one that is secure and easy to remember. 6. Create a PetroDE password. You can change your password at any time. 7. Enter your Password Reset Question and Answer. This can be used at a later time if you forget your password. 8. Enter your e-mail address. You will receive e-mail notification when new information is available in [address]. 9. Click Sign Up. You can now view and download portions of your medical record. 10. Click the Download Summary menu link to download a portable copy of your medical information. If you have questions, please visit the Frequently Asked Questions section of the PetroDE website. Remember, PetroDE is NOT to be used for urgent needs. For medical emergencies, dial 911. Now available from your iPhone and Android! Please provide this summary of care documentation to your next provider. Your primary care clinician is listed as Fifi Buckner. If you have any questions after today's visit, please call [phone].

## 2018-10-02 ENCOUNTER — CLINICAL SUPPORT (OUTPATIENT)
Dept: CARDIOLOGY CLINIC | Age: 78
End: 2018-10-02

## 2018-10-02 DIAGNOSIS — Z95.810 CARDIAC DEFIBRILLATOR IN SITU: Primary | ICD-10-CM

## 2018-10-02 NOTE — PROGRESS NOTES
Don't take it right now and cancel the prescription if she can otherwise, I will see what the numbers are when she comes to see me in the end of the month   Scheduled ICD check scanned into CC. Chargeable visit.

## 2018-10-15 ENCOUNTER — CLINICAL SUPPORT (OUTPATIENT)
Dept: FAMILY MEDICINE CLINIC | Age: 78
End: 2018-10-15

## 2018-10-15 VITALS
DIASTOLIC BLOOD PRESSURE: 80 MMHG | SYSTOLIC BLOOD PRESSURE: 127 MMHG | HEIGHT: 70 IN | TEMPERATURE: 98.2 F | WEIGHT: 208 LBS | HEART RATE: 74 BPM | BODY MASS INDEX: 29.78 KG/M2 | OXYGEN SATURATION: 98 % | RESPIRATION RATE: 12 BRPM

## 2018-10-15 DIAGNOSIS — I48.0 PAROXYSMAL ATRIAL FIBRILLATION (HCC): Primary | ICD-10-CM

## 2018-10-15 DIAGNOSIS — Z79.01 CHRONIC ANTICOAGULATION: ICD-10-CM

## 2018-10-15 LAB
INR BLD: 1.9
PT POC: 22.2 SECONDS
VALID INTERNAL CONTROL?: YES

## 2018-10-15 NOTE — PROGRESS NOTES
Chief Complaint   Patient presents with    Anticoagulation     Patient is here for a recheck of his PT/INR. Patient states he is taking Coumadin 2mg on Tuesday and Thursday. Taking 4 mg all other days. .   Visit Vitals    /80 (BP 1 Location: Right arm, BP Patient Position: Sitting)    Pulse 74    Temp 98.2 °F (36.8 °C) (Oral)    Resp 12    Ht 5' 10\" (1.778 m)    Wt 208 lb (94.3 kg)    SpO2 98%    BMI 29.84 kg/m2     Results for orders placed or performed in visit on 10/15/18   AMB POC PT/INR   Result Value Ref Range    VALID INTERNAL CONTROL POC Yes     Prothrombin time (POC) 22.2 seconds    INR POC 1.9      Per Dr. Anny Farnsworth patient advised to Continue taking coumadin 4 mg daily. Except take 2 mg on Tuesday and Thursday. Patient states he has increased his vitamin k in his diet gradually over the last 2 weeks.

## 2018-10-15 NOTE — PATIENT INSTRUCTIONS
Continue current dose of coumadin. Take 4 mg daily. Except take 2 mg on Tuesday and Thursday. Return for recheck in 2 weeks.

## 2018-10-15 NOTE — MR AVS SNAPSHOT
[address] [facility] 
 
 
 [address] 
[phone] Patient: Dk Guzman MRN: [id number] Visit Information Date & Time Provider Department Dept. Phone Encounter #  
 10/15/2018  9:10 AM Richie Juarez MD [address] [facility] [phone] [id number] Your Appointments 1/3/2019 10:45 AM  
PACEMAKER with PACEMAKER3 [facility] ([facility] SCHEDULING) Appt Note: MDT/ ICD/ RC/  CL b 10/2/18  
 [address] [facility] [address] [place] [address]  
[address] [facility]  
  
    
 3/22/2019  9:20 AM  
ESTABLISHED PATIENT with Chao Billy MD  
[facility] ([address]) Appt Note: 6 month follow up  
 [address] [place] [address]  
[address] [facility] [address] Upcoming Health Maintenance Date Due Shingrix Vaccine Age 50> (1 of 2) 8/24/1989 GLAUCOMA SCREENING Q2Y 9/1/2016 MEDICARE YEARLY EXAM 3/24/2019 COLONOSCOPY 5/10/2022 DTaP/Tdap/Td series (2 - Td) 7/14/2025 Allergies as of 10/15/2018  Review Complete On: 10/15/2018 By: Mariia Mabry Severity Noted Reaction Type Reactions Pcn [Penicillins]  03/27/2011    Unknown (comments) As a child Percocet [Oxycodone-acetaminophen]  03/27/2011    Swelling Leg swelling Current Immunizations  Reviewed on 8/28/2018 Name Date Influenza High Dose Vaccine PF 11/6/2017, 10/7/2016, 10/12/2015, 10/6/2014 Influenza Vaccine 10/1/2013 Influenza Vaccine (Tri) Adjuvanted 9/24/2018 Influenza Vaccine Split 10/15/2012, 10/20/2011 Pneumococcal Conjugate (PCV-13) 7/14/2015 Pneumococcal Polysaccharide (PPSV-23) 10/7/2016 Pneumococcal Vaccine (Pcv) 1/1/2005 Tdap 7/14/2015 Zoster Vaccine, Live 1/1/2011 Not reviewed this visit You Were Diagnosed With   
  
 Codes Comments Paroxysmal atrial fibrillation (HCC)    -  Primary ICD-10-CM: I48.0 ICD-9-CM: 427.31 Chronic anticoagulation     ICD-10-CM: Z79.01 
ICD-9-CM: V58.61 Vitals BP Pulse Temp Resp Height(growth percentile) Weight(growth percentile) 127/80 (BP 1 Location: Right arm, BP Patient Position: Sitting) 74 98.2 °F (36.8 °C) (Oral) 12 5' 10\" (1.778 m) 208 lb (94.3 kg) SpO2 BMI Smoking Status 98% 29.84 kg/m2 Never Smoker Vitals History BMI and BSA Data Body Mass Index Body Surface Area  
 29.84 kg/m 2 2.16 m 2 Preferred Pharmacy Pharmacy Name Phone [facility] [phone], [address] AT [place] [address] [phone] Your Updated Medication List  
  
   
This list is accurate as of 10/15/18  9:50 AM.  Always use your most recent med list. amLODIPine 5 mg tablet Commonly known as:  Eloise Paget Take 1 Tab by mouth daily. aspirin delayed-release 81 mg tablet Take  by mouth daily. COUMADIN 2 mg tablet Generic drug:  warfarin Take 2 mg by mouth daily. Take 4 mg daily. Except take 2 mg on Tuesday and Thursday. cranberry extract 450 mg Tab tablet Take 450 mg by mouth.  
  
 enalapril 20 mg tablet Commonly known as:  VASOTEC  
TAKE 1 TABLET TWICE DAILY  
  
 fiber Cap Take 2 Caps by mouth two (2) times a day. FOLIC ACID PO Take 400 mcg by mouth daily. metoprolol succinate 25 mg XL tablet Commonly known as:  TOPROL-XL  
TAKE 1 TABLET EVERY DAY  
  
 MULTI-VIT 55 PLUS PO Take 1 Tab by mouth daily. OTHER  
L-Arginine with L-Citrulline, 1000 mg twice a day. potassium chloride 10 mEq tablet Commonly known as:  KLOR-CON  
TAKE 1 TABLET THREE TIMES DAILY pravastatin 10 mg tablet Commonly known as:  PRAVACHOL  
TAKE 1 TABLET EVERY NIGHT  
  
 sildenafil (antihypertensive) 20 mg tablet Commonly known as:  REVATIO Take 1-5 Tabs by mouth daily as needed. terazosin 10 mg capsule Commonly known as:  HYTRIN  
TAKE 1 CAPSULE EVERY NIGHT  
  
 thioctic acid 50 mg Tab Generic drug:  Alpha Lipoic Acid Take 1 Tab by mouth daily. traMADol 50 mg tablet Commonly known as:  ULTRAM  
Take 50 mg by mouth every eight (8) hours as needed for Pain. TYLENOL 325 mg tablet Generic drug:  acetaminophen Take  by mouth every four (4) hours as needed for Pain. Description Continue normal dosing Coumadin 2mg daily except 4mg M/W/F Recheck in 2 weeks October 2018 Details Sun Mon Tue Wed Thu Fri Sat  
   1  
  
  
  
   2  
  
  
  
   3  
  
  
  
   4  
  
  
  
   5  
  
  
  
   6  
  
  
  
  
  7  
  
  
  
   8  
  
  
  
   9  
  
  
  
   10  
  
  
  
   11  
  
  
  
   12  
  
  
  
   13  
  
  
  
  
  14  
  
  
  
   15  
  
4 mg See details 16  
  
2 mg  
  
   17  
  
4 mg  
  
   18  
  
2 mg  
  
   19  
  
4 mg  
  
   20  
  
4 mg  
  
  
  21  
  
4 mg  
  
   22  
  
4 mg  
  
   23  
  
2 mg 24  
  
4 mg  
  
   25  
  
2 mg  
  
   26  
  
4 mg  
  
   27  
  
4 mg  
  
  
  28  
  
4 mg  
  
   29  
  
4 mg  
  
   30  
  
  
  
   31  
  
  
  
     
 Date Details 10/15 This INR check INR: 1.9! Date of next INR:  10/29/2018 How to take your warfarin dose To take:  2 mg Take one of the 2 mg tablets. To take:  4 mg Take two of the 2 mg tablets. We Performed the Following AMB POC PT/INR [23637 CPT(R)] Patient Instructions Continue current dose of coumadin. Take 4 mg daily. Except take 2 mg on Tuesday and Thursday. Return for recheck in 2 weeks. Introducing [facility] & HEALTH SERVICES! [facility] introduces Momail patient portal. Now you can access parts of your medical record, email your doctor's office, and request medication refills online.    
 
1. In your internet browser, go to [email or web address] 2. Click on the First Time User? Click Here link in the Sign In box. You will see the New Member Sign Up page. 3. Enter your TutorDudes Access Code exactly as it appears below. You will not need to use this code after youve completed the sign-up process. If you do not sign up before the expiration date, you must request a new code. · TutorDudes Access Code: [email or web address] Expires: 12/23/2018 12:18 PM 
 
4. Enter the last four digits of your Social Security Number (xxxx) and Date of Birth (mm/dd/yyyy) as indicated and click Submit. You will be taken to the next sign-up page. 5. Create a Swiftpaget ID. This will be your TutorDudes login ID and cannot be changed, so think of one that is secure and easy to remember. 6. Create a TutorDudes password. You can change your password at any time. 7. Enter your Password Reset Question and Answer. This can be used at a later time if you forget your password. 8. Enter your e-mail address. You will receive e-mail notification when new information is available in [address] 9. Click Sign Up. You can now view and download portions of your medical record. 10. Click the Download Summary menu link to download a portable copy of your medical information. If you have questions, please visit the Frequently Asked Questions section of the TutorDudes website. Remember, TutorDudes is NOT to be used for urgent needs. For medical emergencies, dial 911. Now available from your iPhone and Android! Please provide this summary of care documentation to your next provider. Your primary care clinician is listed as Jose Eckert. If you have any questions after today's visit, please call [phone].

## 2018-10-29 ENCOUNTER — CLINICAL SUPPORT (OUTPATIENT)
Dept: FAMILY MEDICINE CLINIC | Age: 78
End: 2018-10-29

## 2018-10-29 VITALS
DIASTOLIC BLOOD PRESSURE: 83 MMHG | TEMPERATURE: 97.8 F | SYSTOLIC BLOOD PRESSURE: 133 MMHG | BODY MASS INDEX: 29.92 KG/M2 | HEIGHT: 70 IN | HEART RATE: 79 BPM | RESPIRATION RATE: 12 BRPM | WEIGHT: 209 LBS | OXYGEN SATURATION: 98 %

## 2018-10-29 DIAGNOSIS — Z79.01 CHRONIC ANTICOAGULATION: Primary | ICD-10-CM

## 2018-10-29 LAB
INR BLD: 1.8
PT POC: 21.1 SECONDS
VALID INTERNAL CONTROL?: YES

## 2018-10-29 NOTE — PROGRESS NOTES
IMAN Cardoza is a 77 y.o. male who presents for anticoagulation visit    PMHx:  Past Medical History:   Diagnosis Date    Arthritis     knees    CAD (coronary artery disease)     stent x3 approx 2001    Chronic atrial fibrillation ([facility] [address]     ED (erectile dysfunction)     Hypertension     Kidney stone on right side 10/30/2011    JAMEY (obstructive sleep apnea) 4/25/2012    Skin cancer     BCC, s/p Mohs on L flank    Sun-damaged skin     TIA (transient ischemic attack)     6/12 - seen at [facility]       Meds:   Current Outpatient Medications   Medication Sig Dispense Refill    traMADol (ULTRAM) 50 mg tablet Take 50 mg by mouth every eight (8) hours as needed for Pain.  cranberry extract 450 mg tab tablet Take 450 mg by mouth.  warfarin (COUMADIN) 2 mg tablet Take 2 mg by mouth daily. Take 4 mg daily. Except take 2 mg on Tuesday and Thursday.  OTHER L-Arginine with L-Citrulline, 1000 mg twice a day.  potassium chloride (KLOR-CON) 10 mEq tablet TAKE 1 TABLET THREE TIMES DAILY 270 Tab 1    metoprolol succinate (TOPROL-XL) 25 mg XL tablet TAKE 1 TABLET EVERY DAY 90 Tab 1    amLODIPine (NORVASC) 5 mg tablet Take 1 Tab by mouth daily. 90 Tab 1    enalapril (VASOTEC) 20 mg tablet TAKE 1 TABLET TWICE DAILY 180 Tab 1    pravastatin (PRAVACHOL) 10 mg tablet TAKE 1 TABLET EVERY NIGHT 90 Tab 1    terazosin (HYTRIN) 10 mg capsule TAKE 1 CAPSULE EVERY NIGHT 90 Cap 1    sildenafil, antihypertensive, (REVATIO) 20 mg tablet Take 1-5 Tabs by mouth daily as needed. 90 Tab 1    aspirin delayed-release 81 mg tablet Take  by mouth daily.  acetaminophen (TYLENOL) 325 mg tablet Take  by mouth every four (4) hours as needed for Pain.  FOLIC ACID PO Take 535 mcg by mouth daily.  thioctic acid (ALPHA LIPOIC ACID) 50 mg tab Take 1 Tab by mouth daily.  fiber Cap Take 2 Caps by mouth two (2) times a day.  MULTIVITAMIN WITH MINERALS (MULTI-VIT 55 PLUS PO) Take 1 Tab by mouth daily. Allergies: Allergies   Allergen Reactions    Pcn [Penicillins] Unknown (comments)     As a child    Percocet [Oxycodone-Acetaminophen] Swelling     Leg swelling       Smoker:  Social History     Tobacco Use   Smoking Status Never Smoker   Smokeless Tobacco Never Used       ETOH:   Social History     Substance and Sexual Activity   Alcohol Use No    Alcohol/week: 0.0 oz       FH:   Family History   Problem Relation Age of Onset    Stroke Father     Heart Attack Father     Heart Disease Father     Cancer Father 80        colon cancer    Heart Disease Mother     Arthritis-osteo Sister         s/p bilateral knee replacements    No Known Problems Daughter     Anesth Problems Neg Hx        ROS:   As listed in HPI. In addition:  Constitutional:   No headache, fever, fatigue, weight loss or weight gain      Cardiac:    No chest pain      Resp:   No cough or shortness of breath      Neuro   No loss of consciousness, dizziness, seizures      Physical Exam:  Blood pressure 133/83, pulse 79, temperature 97.8 °F (36.6 °C), resp. rate 12, height 5' 10\" (1.778 m), weight 209 lb (94.8 kg), SpO2 98 %. GEN: No apparent distress. Alert and oriented and responds to all questions appropriately. NEUROLOGIC:  No focal neurologic deficits. Strength and sensation grossly intact. Coordination and gait grossly intact. EXT: Well perfused. No edema. SKIN: No obvious rashes. Assessment and Plan     Has been very consistent with his INR. His wife has been slowly increasing his vitamin K intake and we have been slowly increasing his Coumadin to match. She did an increase in his vitamin K this past week, his INR has been stable. We will do a 10% increase in his Coumadin 10 from 4 mg every day except 2 mg Tuesday Thursday, increase this to 4 mg every day except 2 mg Thursday    2-week follow-up      ICD-10-CM ICD-9-CM    1. Chronic anticoagulation Z79.01 V58.61 AMB POC PT/INR       AVS given.  Pt expressed understanding of instructions

## 2018-11-12 ENCOUNTER — CLINICAL SUPPORT (OUTPATIENT)
Dept: FAMILY MEDICINE CLINIC | Age: 78
End: 2018-11-12

## 2018-11-12 VITALS
HEART RATE: 75 BPM | TEMPERATURE: 98 F | OXYGEN SATURATION: 98 % | WEIGHT: 216 LBS | RESPIRATION RATE: 12 BRPM | HEIGHT: 70 IN | SYSTOLIC BLOOD PRESSURE: 131 MMHG | BODY MASS INDEX: 30.92 KG/M2 | DIASTOLIC BLOOD PRESSURE: 78 MMHG

## 2018-11-12 DIAGNOSIS — I48.0 PAROXYSMAL ATRIAL FIBRILLATION (HCC): Primary | ICD-10-CM

## 2018-11-12 NOTE — PROGRESS NOTES
Chief Complaint   Patient presents with    Anticoagulation         Patient is here for a recheck of his INR. Patient is taking 4 mg of coumadin every day except taking 2 mg on Thursday. 1. Have you been to the ER, urgent care clinic since your last visit? Hospitalized since your last visit? No    2. Have you seen or consulted any other health care providers outside of the [address] [place] [address] since your last visit? Include any pap smears or colon screening. Physical Therapy, [facility] Carnes    Per Dr. Jackelyn Rosales pt/inr ordered and drawn. Sent to lab [facility].   Patient notified he will receive results via phone with further coumadin instructions. Patient verbalized understanding.

## 2018-11-13 LAB
INR PPP: 2 (ref 0.8–1.2)
PROTHROMBIN TIME: 19.9 SEC (ref 9.1–12)

## 2018-11-14 NOTE — PROGRESS NOTES
Mr. Radha Whelan notified Dr Mcelroy Bolus said Recheck INR in 1 month he voiced understanding and apt set up

## 2018-11-21 RX ORDER — PRAVASTATIN SODIUM 10 MG/1
10 TABLET ORAL
Qty: 90 TAB | Refills: 3 | Status: SHIPPED | OUTPATIENT
Start: 2018-11-21 | End: 2018-11-27 | Stop reason: SDUPTHER

## 2018-11-22 RX ORDER — PRAVASTATIN SODIUM 10 MG/1
TABLET ORAL
Qty: 90 TAB | Refills: 1 | OUTPATIENT
Start: 2018-11-22

## 2018-11-23 RX ORDER — AMLODIPINE BESYLATE 5 MG/1
5 TABLET ORAL DAILY
Qty: 90 TAB | Refills: 3 | Status: SHIPPED | OUTPATIENT
Start: 2018-11-23 | End: 2019-11-26 | Stop reason: SDUPTHER

## 2018-11-26 RX ORDER — WARFARIN 4 MG/1
4 TABLET ORAL DAILY
Qty: 90 TAB | Refills: 3 | Status: SHIPPED | OUTPATIENT
Start: 2018-11-26 | End: 2019-06-05

## 2018-11-26 RX ORDER — WARFARIN 2 MG/1
2 TABLET ORAL 2 TIMES WEEKLY
Qty: 30 TAB | Refills: 3 | Status: SHIPPED | OUTPATIENT
Start: 2018-11-26 | End: 2019-06-05

## 2018-11-26 RX ORDER — POTASSIUM CHLORIDE 750 MG/1
TABLET, EXTENDED RELEASE ORAL
Qty: 270 TAB | Refills: 1 | Status: SHIPPED | OUTPATIENT
Start: 2018-11-26 | End: 2019-09-27 | Stop reason: SDUPTHER

## 2018-11-26 RX ORDER — METOPROLOL SUCCINATE 25 MG/1
TABLET, EXTENDED RELEASE ORAL
Qty: 90 TAB | Refills: 1 | Status: SHIPPED | OUTPATIENT
Start: 2018-11-26 | End: 2019-07-31 | Stop reason: SDUPTHER

## 2018-11-26 RX ORDER — TERAZOSIN 10 MG/1
CAPSULE ORAL
Qty: 90 CAP | Refills: 1 | Status: SHIPPED | OUTPATIENT
Start: 2018-11-26 | End: 2019-07-31 | Stop reason: SDUPTHER

## 2018-11-26 NOTE — TELEPHONE ENCOUNTER
Sent in both size warfarin pills. Looks like we also received request for hydrochlorthiazide. As far as I know patient is not taking that medication.   Please clarify

## 2018-11-26 NOTE — TELEPHONE ENCOUNTER
Called pt and verified name and . Pt verbalized that he was not taking HCTZ. Pt had no questions at this time.

## 2018-11-27 RX ORDER — PRAVASTATIN SODIUM 10 MG/1
10 TABLET ORAL
Qty: 90 TAB | Refills: 3 | Status: SHIPPED | OUTPATIENT
Start: 2018-11-27 | End: 2019-09-11 | Stop reason: SDUPTHER

## 2018-11-28 ENCOUNTER — OFFICE VISIT (OUTPATIENT)
Dept: FAMILY MEDICINE CLINIC | Age: 78
End: 2018-11-28

## 2018-11-28 VITALS
HEART RATE: 78 BPM | RESPIRATION RATE: 20 BRPM | TEMPERATURE: 98.5 F | SYSTOLIC BLOOD PRESSURE: 124 MMHG | OXYGEN SATURATION: 97 % | BODY MASS INDEX: 29.98 KG/M2 | HEIGHT: 70 IN | DIASTOLIC BLOOD PRESSURE: 78 MMHG | WEIGHT: 209.4 LBS

## 2018-11-28 DIAGNOSIS — M54.50 ACUTE RIGHT-SIDED LOW BACK PAIN WITHOUT SCIATICA: Primary | ICD-10-CM

## 2018-11-28 NOTE — PROGRESS NOTES
IMAN St is a 77 y.o. male who presents with back pain. Has been going on for about 2 months. 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 [place] up the hill he felt something in his back snap and immediately experienced right-sided back pain. Thought this might be his hip so saw his orthopedist.  He was then referred to spine. X-ray showed some DJD in the lumbar but when he went to see the spine surgeon he was having a good day with no pain so sounds like nothing was planned. He has been doing physical therapy ever since. Has good days and bad days. 2 good days with absolutely no pain in that third day will have significant pain. All the bad pain days he lies around on the couch all day and tries not to move. Does not do any exercises, does not use heat. Tries Tylenol with some relief. At no point does this cause him to have trouble sleeping. for the last 2 months he is not doing as much exercise as he used to. He used to ride his bike over to the gym and it worked out but has not been doing this since the back has been bothering him    PMHx:  Past Medical History:   Diagnosis Date    Arthritis     knees    CAD (coronary artery disease)     stent x3 approx 2001    Chronic atrial fibrillation ([address]     ED (erectile dysfunction)     Hypertension     Kidney stone on right side 10/30/2011    JAMEY (obstructive sleep apnea) 4/25/2012    Skin cancer     BCC, s/p Mohs on L flank    Sun-damaged skin     TIA (transient ischemic attack)     6/12 - seen at [facility]       Meds:   Current Outpatient Medications   Medication Sig Dispense Refill    pravastatin (PRAVACHOL) 10 mg tablet Take 1 Tab by mouth nightly. 90 Tab 3    potassium chloride (KLOR-CON) 10 mEq tablet TAKE 1 TABLET THREE TIMES DAILY 270 Tab 1    warfarin (COUMADIN) 2 mg tablet Take 1 Tab by mouth two (2) times a week.  30 Tab 3    terazosin (HYTRIN) 10 mg capsule TAKE 1 CAPSULE EVERY NIGHT 90 Cap 1    metoprolol succinate (TOPROL-XL) 25 mg XL tablet TAKE 1 TABLET EVERY DAY 90 Tab 1    warfarin (COUMADIN) 4 mg tablet Take 1 Tab by mouth daily. 90 Tab 3    amLODIPine (NORVASC) 5 mg tablet Take 1 Tab by mouth daily. 90 Tab 3    cranberry extract 450 mg tab tablet Take 450 mg by mouth.  OTHER L-Arginine with L-Citrulline, 1000 mg twice a day.  enalapril (VASOTEC) 20 mg tablet TAKE 1 TABLET TWICE DAILY 180 Tab 1    aspirin delayed-release 81 mg tablet Take  by mouth daily.  acetaminophen (TYLENOL) 325 mg tablet Take  by mouth every four (4) hours as needed for Pain.  FOLIC ACID PO Take 521 mcg by mouth daily.  thioctic acid (ALPHA LIPOIC ACID) 50 mg tab Take 1 Tab by mouth daily.  fiber Cap Take 2 Caps by mouth two (2) times a day.  MULTIVITAMIN WITH MINERALS (MULTI-VIT 55 PLUS PO) Take 1 Tab by mouth daily.  sildenafil, antihypertensive, (REVATIO) 20 mg tablet Take 1-5 Tabs by mouth daily as needed. 90 Tab 1       Allergies: Allergies   Allergen Reactions    Pcn [Penicillins] Unknown (comments)     As a child    Percocet [Oxycodone-Acetaminophen] Swelling     Leg swelling       Smoker:  Social History     Tobacco Use   Smoking Status Never Smoker   Smokeless Tobacco Never Used       ETOH:   Social History     Substance and Sexual Activity   Alcohol Use No    Alcohol/week: 0.0 oz       FH:   Family History   Problem Relation Age of Onset    Stroke Father     Heart Attack Father     Heart Disease Father     Cancer Father 80        colon cancer    Heart Disease Mother     Arthritis-osteo Sister         s/p bilateral knee replacements    No Known Problems Daughter     Anesth Problems Neg Hx        ROS:   As listed in HPI.  In addition:  Constitutional:   No headache, fever, fatigue, weight loss or weight gain      Cardiac:    No chest pain      Resp:   No cough or shortness of breath      Neuro   No loss of consciousness, dizziness, seizures Physical Exam:  Blood pressure 124/78, pulse 78, temperature 98.5 °F (36.9 °C), temperature source Oral, resp. rate 20, height 5' 10\" (1.778 m), weight 209 lb 6.4 oz (95 kg), SpO2 97 %. GEN: No apparent distress. Alert and oriented and responds to all questions appropriately. NEUROLOGIC:  No focal neurologic deficits. Strength and sensation grossly intact. Coordination and gait grossly intact. EXT: Well perfused. No edema. SKIN: No obvious rashes. Low back examined. Right erector spinae muscle spasm at the level of L5 2 inches to the right of the spine. To a lesser extent pain directly above at the level of L4. No pain above this. Not a true myofascial point, no radiation. No pain on light palpation. Legs were not examined in detail but gracilis muscle is tight and a little tender to palpation       Assessment and Plan     Low back pain with large muscle pain generator   known DJD, known scoliosis curving to the right, known leg length discrepancy with right leg longer than left. He is seeing orthopedics for this, came in today because he happened to have pain today and wanted me to poke him while he was having pain. Encouraged that he is having good days and bad days, discouraged that this has been going on for 2 months with no change in frequency but with physical therapy. Optimistic that he will improve if he gets off the couch on his bad days, do the exercises. Use heating pad, hot shower, Tylenol as tools to help you do the exercises. Try to get back into normal activities. We talked briefly about pain medication. He is not having any trouble sleeping. I discouraged the use of opiate pain medication because I am concerned that they would encourage him to spend all day on the couch. Need to avoid NSAIDs because of Coumadin. Has follow-up with Ortho next week  Continue physical therapy      ICD-10-CM ICD-9-CM    1.  Acute right-sided low back pain without sciatica M54.5 724.2 AVS given. Pt expressed understanding of instructions  This was a 25-minute visit. Greater than 50% of the time was spent counseling the patient on back pain.

## 2018-11-29 RX ORDER — ENALAPRIL MALEATE 20 MG/1
TABLET ORAL
Qty: 180 TAB | Refills: 3 | Status: SHIPPED | OUTPATIENT
Start: 2018-11-29 | End: 2020-02-13 | Stop reason: SDUPTHER

## 2018-12-03 ENCOUNTER — TELEPHONE (OUTPATIENT)
Dept: FAMILY MEDICINE CLINIC | Age: 78
End: 2018-12-03

## 2018-12-03 RX ORDER — HYDROCHLOROTHIAZIDE 25 MG/1
25 TABLET ORAL DAILY
Qty: 90 TAB | Refills: 3 | Status: SHIPPED | OUTPATIENT
Start: 2018-12-03 | End: 2020-01-10 | Stop reason: SDUPTHER

## 2018-12-03 NOTE — TELEPHONE ENCOUNTER
Returned pt's call. Asked him if he is taking hydrochlorothiazide 25mg and he stated that he is taking it once daily. Told him that I will forward to Dr. Libra Clemente for a refill.

## 2018-12-10 ENCOUNTER — CLINICAL SUPPORT (OUTPATIENT)
Dept: FAMILY MEDICINE CLINIC | Age: 78
End: 2018-12-10

## 2018-12-10 VITALS
DIASTOLIC BLOOD PRESSURE: 73 MMHG | RESPIRATION RATE: 12 BRPM | SYSTOLIC BLOOD PRESSURE: 124 MMHG | WEIGHT: 206.8 LBS | OXYGEN SATURATION: 97 % | HEIGHT: 70 IN | TEMPERATURE: 97.6 F | HEART RATE: 77 BPM | BODY MASS INDEX: 29.6 KG/M2

## 2018-12-10 DIAGNOSIS — I48.0 PAROXYSMAL ATRIAL FIBRILLATION (HCC): Primary | ICD-10-CM

## 2018-12-10 LAB
INR BLD: 2.3
PT POC: 27.7 SECONDS
VALID INTERNAL CONTROL?: YES

## 2018-12-10 NOTE — PROGRESS NOTES
Chief Complaint   Patient presents with    Anticoagulation     Patient is here for a recheck of his INR. Taking two 2 mg (4 mg) tablets daily except taking one 2 mg tablet on Thursday. Results for orders placed or performed in visit on 12/10/18   AMB POC PT/INR   Result Value Ref Range    VALID INTERNAL CONTROL POC Yes     Prothrombin time (POC) 27.7 seconds    INR POC 2.3      Visit Vitals  /73 (BP 1 Location: Left arm, BP Patient Position: Sitting)   Pulse 77   Temp 97.6 °F (36.4 °C) (Oral)   Resp 12   Ht 5' 10\" (1.778 m)   Wt 206 lb 12.8 oz (93.8 kg)   SpO2 97%   BMI 29.67 kg/m²     Current Outpatient Medications on File Prior to Visit   Medication Sig Dispense Refill    hydroCHLOROthiazide (HYDRODIURIL) 25 mg tablet Take 1 Tab by mouth daily. 90 Tab 3    enalapril (VASOTEC) 20 mg tablet TAKE 1 TABLET TWICE DAILY 180 Tab 3    pravastatin (PRAVACHOL) 10 mg tablet Take 1 Tab by mouth nightly. 90 Tab 3    potassium chloride (KLOR-CON) 10 mEq tablet TAKE 1 TABLET THREE TIMES DAILY 270 Tab 1    warfarin (COUMADIN) 2 mg tablet Take 1 Tab by mouth two (2) times a week. (Patient taking differently: Take 2 mg by mouth two (2) times a week. Taking two 2 mg (4 mg) tablets daily except taking one 2 mg tablet on Thursday.) 30 Tab 3    terazosin (HYTRIN) 10 mg capsule TAKE 1 CAPSULE EVERY NIGHT 90 Cap 1    metoprolol succinate (TOPROL-XL) 25 mg XL tablet TAKE 1 TABLET EVERY DAY 90 Tab 1    warfarin (COUMADIN) 4 mg tablet Take 1 Tab by mouth daily. (Patient taking differently: Take 4 mg by mouth daily. Taking two 2 mg (4 mg) tablets daily except taking one 2 mg tablet on Thursday.) 90 Tab 3    amLODIPine (NORVASC) 5 mg tablet Take 1 Tab by mouth daily. 90 Tab 3    cranberry extract 450 mg tab tablet Take 450 mg by mouth.  sildenafil, antihypertensive, (REVATIO) 20 mg tablet Take 1-5 Tabs by mouth daily as needed. 90 Tab 1    aspirin delayed-release 81 mg tablet Take  by mouth daily.       acetaminophen (TYLENOL) 325 mg tablet Take  by mouth every four (4) hours as needed for Pain.  FOLIC ACID PO Take 174 mcg by mouth daily.  fiber Cap Take 2 Caps by mouth two (2) times a day.  MULTIVITAMIN WITH MINERALS (MULTI-VIT 55 PLUS PO) Take 1 Tab by mouth daily. Taking multivitamins every morning.  OTHER L-Arginine with L-Citrulline, 1000 mg twice a day.  thioctic acid (ALPHA LIPOIC ACID) 50 mg tab Take 1 Tab by mouth daily. No current facility-administered medications on file prior to visit. Patient advised to continue taking current dose of coumadin. Return in one month for recheck of your Pt/INR. Patient given coumadin calendar.

## 2018-12-24 ENCOUNTER — TELEPHONE (OUTPATIENT)
Dept: CARDIOLOGY CLINIC | Age: 78
End: 2018-12-24

## 2018-12-24 NOTE — TELEPHONE ENCOUNTER
Received request from [facility] for clearance and holding coumadin for CT Myleogram.  Placed in 's box.

## 2019-01-03 ENCOUNTER — CLINICAL SUPPORT (OUTPATIENT)
Dept: CARDIOLOGY CLINIC | Age: 79
End: 2019-01-03

## 2019-01-03 DIAGNOSIS — Z95.810 CARDIAC DEFIBRILLATOR IN SITU: Primary | ICD-10-CM

## 2019-01-07 ENCOUNTER — CLINICAL SUPPORT (OUTPATIENT)
Dept: FAMILY MEDICINE CLINIC | Age: 79
End: 2019-01-07

## 2019-01-07 VITALS
HEART RATE: 86 BPM | SYSTOLIC BLOOD PRESSURE: 145 MMHG | DIASTOLIC BLOOD PRESSURE: 86 MMHG | TEMPERATURE: 97.8 F | WEIGHT: 218 LBS | HEIGHT: 70 IN | RESPIRATION RATE: 14 BRPM | OXYGEN SATURATION: 98 % | BODY MASS INDEX: 31.21 KG/M2

## 2019-01-07 DIAGNOSIS — Z79.01 CHRONIC ANTICOAGULATION: Primary | ICD-10-CM

## 2019-01-07 LAB
INR BLD: 2.1
PT POC: 25.7 SECONDS
VALID INTERNAL CONTROL?: YES

## 2019-01-07 NOTE — PROGRESS NOTES
PT/INR done per Dr. Audrey Bridges. Prior to Admission medications    Medication Sig Start Date End Date Taking? Authorizing Provider   hydroCHLOROthiazide (HYDRODIURIL) 25 mg tablet Take 1 Tab by mouth daily. 12/3/18  Yes Lonny Myers MD   enalapril (VASOTEC) 20 mg tablet TAKE 1 TABLET TWICE DAILY 11/29/18  Yes Lonny Myers MD   pravastatin (PRAVACHOL) 10 mg tablet Take 1 Tab by mouth nightly. 11/27/18  Yes Lonny Myers MD   potassium chloride (KLOR-CON) 10 mEq tablet TAKE 1 TABLET THREE TIMES DAILY 11/26/18  Yes Lonny Myers MD   warfarin (COUMADIN) 2 mg tablet Take 1 Tab by mouth two (2) times a week. Patient taking differently: Take 2 mg by mouth two (2) times a week. Taking two 2 mg (4 mg) tablets daily except taking one 2 mg tablet on Thursday. 11/26/18  Yes Lonny Myers MD   terazosin (HYTRIN) 10 mg capsule TAKE 1 CAPSULE EVERY NIGHT 11/26/18  Yes Lonny Myers MD   metoprolol succinate (TOPROL-XL) 25 mg XL tablet TAKE 1 TABLET EVERY DAY 11/26/18  Yes Lonny Myers MD   warfarin (COUMADIN) 4 mg tablet Take 1 Tab by mouth daily. Patient taking differently: Take 4 mg by mouth daily. Taking two 2 mg (4 mg) tablets daily except taking one 2 mg tablet on Thursday. 11/26/18  Yes Lonny Myers MD   amLODIPine (NORVASC) 5 mg tablet Take 1 Tab by mouth daily. 11/23/18  Yes Lonny Myers MD   cranberry extract 450 mg tab tablet Take 450 mg by mouth. Yes Provider, Historical   OTHER L-Arginine with L-Citrulline, 1000 mg twice a day. Yes Provider, Historical   sildenafil, antihypertensive, (REVATIO) 20 mg tablet Take 1-5 Tabs by mouth daily as needed. 4/30/18  Yes Urbano Pandya MD   aspirin delayed-release 81 mg tablet Take  by mouth daily. Yes Provider, Historical   acetaminophen (TYLENOL) 325 mg tablet Take  by mouth every four (4) hours as needed for Pain. Yes Provider, Historical   FOLIC ACID PO Take 740 mcg by mouth daily.    Yes Provider, Historical   thioctic acid (ALPHA LIPOIC ACID) 50 mg tab Take 1 Tab by mouth daily. Yes Provider, Historical   fiber Cap Take 2 Caps by mouth two (2) times a day. Yes Provider, Historical   MULTIVITAMIN WITH MINERALS (MULTI-VIT 55 PLUS PO) Take 1 Tab by mouth daily. Taking multivitamins every morning. 7/8/11  Yes Provider, Historical     Results for orders placed or performed in visit on 01/07/19   AMB POC PT/INR   Result Value Ref Range    VALID INTERNAL CONTROL POC Yes     Prothrombin time (POC) 25.7 seconds    INR POC 2.1      No medication changes at this time.  Return in one month for PT/INR recheck

## 2019-01-10 ENCOUNTER — TELEPHONE (OUTPATIENT)
Dept: FAMILY MEDICINE CLINIC | Age: 79
End: 2019-01-10

## 2019-01-10 NOTE — TELEPHONE ENCOUNTER
Wilberto Barnett notified Dr. Liya Haddad said No, he does not need to change his diet if he has stopped taking his Coumadin.      He should follow-up for a Coumadin check 1 week after restarting his Coumadin.   She voiced understanding

## 2019-01-10 NOTE — TELEPHONE ENCOUNTER
No, he does not need to change his diet if he has stopped taking his Coumadin.      He should follow-up for a Coumadin check 1 week after restarting his Coumadin

## 2019-01-15 ENCOUNTER — TELEPHONE (OUTPATIENT)
Dept: CARDIOLOGY CLINIC | Age: 79
End: 2019-01-15

## 2019-01-15 ENCOUNTER — HOSPITAL ENCOUNTER (OUTPATIENT)
Dept: GENERAL RADIOLOGY | Age: 79
Discharge: HOME | End: 2019-01-15
Attending: ORTHOPAEDIC SURGERY
Payer: MEDICARE

## 2019-01-15 ENCOUNTER — HOSPITAL ENCOUNTER (OUTPATIENT)
Dept: CT IMAGING | Age: 79
Discharge: HOME | End: 2019-01-15
Attending: ORTHOPAEDIC SURGERY
Payer: MEDICARE

## 2019-01-15 VITALS
HEART RATE: 64 BPM | DIASTOLIC BLOOD PRESSURE: 67 MMHG | SYSTOLIC BLOOD PRESSURE: 135 MMHG | RESPIRATION RATE: 14 BRPM | TEMPERATURE: 98.1 F | OXYGEN SATURATION: 98 %

## 2019-01-15 DIAGNOSIS — M51.36 DEGENERATION OF LUMBAR INTERVERTEBRAL DISC: ICD-10-CM

## 2019-01-15 PROCEDURE — 74011636320 HC RX REV CODE- 636/320: Performed by: RADIOLOGY

## 2019-01-15 PROCEDURE — 62304 MYELOGRAPHY LUMBAR INJECTION: CPT

## 2019-01-15 PROCEDURE — 72132 CT LUMBAR SPINE W/DYE: CPT

## 2019-01-15 PROCEDURE — 74011250636 HC RX REV CODE- 250/636: Performed by: RADIOLOGY

## 2019-01-15 RX ORDER — LIDOCAINE HYDROCHLORIDE 10 MG/ML
4 INJECTION, SOLUTION EPIDURAL; INFILTRATION; INTRACAUDAL; PERINEURAL
Status: COMPLETED | OUTPATIENT
Start: 2019-01-15 | End: 2019-01-15

## 2019-01-15 RX ADMIN — IOHEXOL 10 ML: 180 INJECTION INTRAVENOUS at 09:45

## 2019-01-15 RX ADMIN — LIDOCAINE HYDROCHLORIDE 4 ML: 10 INJECTION, SOLUTION EPIDURAL; INFILTRATION; INTRACAUDAL; PERINEURAL at 09:40

## 2019-01-15 NOTE — DISCHARGE INSTRUCTIONS
[facility]  Special Procedures/Radiology Department    Radiologist: Manish Valle    Date: January 15, 2019       Myelogram Discharge Instructions    Restrict your activity for the next 48 hours. You may get up and sit in the chair or get up and go to the bathroom with slow movements. You must keep your head above your chest until 8 a.m. tomorrow. Rest as much as possible tonight and tomorrow. Resume your previous diet and follow the medication reconciliation form. Drink plenty of water. Avoid products with caffeine. You may take Tylenol, as directed on the label, for pain or discomfort. Avoid ibuprofen (Advil, Motrin) and aspirin as they may cause bleeding. Avoid lifting anything heavier than 5 pounds. Avoid excessive bending and lifting as this may increase the chance of having a headache. Be sure to follow up with your physician. Take your CD disk with you. If you have severe pain, or if you have any questions or concerns, please call [phone] and ask to speak to the nurse on-call.

## 2019-01-15 NOTE — TELEPHONE ENCOUNTER
Pt wife called to inform you of myelogram and ct scan. She stated she has the disk for that as well. She has other concerns as well.   Phone #[phone]  Thanks

## 2019-01-15 NOTE — TELEPHONE ENCOUNTER
Returned spouse call, 2 pt identifiers used  Discussed when patient should restart Coumadin after today's testing, XR Myelo Lumbar and Ct Spine Lumbo w/ contrast.    Per Dr. Porter Dense he would have patient continue to hold Coumadin for 48 hours post procedure.

## 2019-01-24 ENCOUNTER — CLINICAL SUPPORT (OUTPATIENT)
Dept: FAMILY MEDICINE CLINIC | Age: 79
End: 2019-01-24

## 2019-01-24 VITALS
WEIGHT: 218 LBS | SYSTOLIC BLOOD PRESSURE: 123 MMHG | RESPIRATION RATE: 16 BRPM | BODY MASS INDEX: 31.21 KG/M2 | HEART RATE: 86 BPM | OXYGEN SATURATION: 97 % | TEMPERATURE: 97.9 F | HEIGHT: 70 IN | DIASTOLIC BLOOD PRESSURE: 80 MMHG

## 2019-01-24 DIAGNOSIS — I48.0 PAROXYSMAL ATRIAL FIBRILLATION (HCC): Primary | ICD-10-CM

## 2019-01-24 DIAGNOSIS — Z79.01 CHRONIC ANTICOAGULATION: ICD-10-CM

## 2019-01-24 LAB
INR BLD: 1.6
PT POC: 18.6 SECONDS
VALID INTERNAL CONTROL?: YES

## 2019-01-24 RX ORDER — GABAPENTIN 100 MG/1
CAPSULE ORAL 3 TIMES DAILY
COMMUNITY
End: 2019-05-31

## 2019-01-24 NOTE — PROGRESS NOTES
Patient presents for a PT/INR  See med list and patient instructions along with results for verbal orders by Dr. Dustin Nelson  Results for orders placed or performed in visit on 01/24/19   AMB POC PT/INR   Result Value Ref Range    VALID INTERNAL CONTROL POC Yes     Prothrombin time (POC) 18.6 seconds    INR POC 1.6         Vitals:    01/24/19 0933   BP: 123/80   Pulse: 86   Resp: 16   Temp: 97.9 °F (36.6 °C)   TempSrc: Oral   SpO2: 97%   Weight: 218 lb (98.9 kg)   Height: 5' 10\" (1.778 m)     Prior to Admission medications    Medication Sig Start Date End Date Taking? Authorizing Provider   gabapentin (NEURONTIN) 100 mg capsule Take  by mouth three (3) times daily. Yes Provider, Historical   hydroCHLOROthiazide (HYDRODIURIL) 25 mg tablet Take 1 Tab by mouth daily. 12/3/18  Yes Griselda Conradi, MD   enalapril (VASOTEC) 20 mg tablet TAKE 1 TABLET TWICE DAILY 11/29/18  Yes Griselda Conradi, MD   pravastatin (PRAVACHOL) 10 mg tablet Take 1 Tab by mouth nightly. 11/27/18  Yes Griselda Conradi, MD   potassium chloride (KLOR-CON) 10 mEq tablet TAKE 1 TABLET THREE TIMES DAILY 11/26/18  Yes Griselda Conradi, MD   warfarin (COUMADIN) 2 mg tablet Take 1 Tab by mouth two (2) times a week. Patient taking differently: Take 2 mg by mouth two (2) times a week. Taking two 2 mg (4 mg) tablets daily except taking one 2 mg tablet on Thursday. 11/26/18  Yes Griselda Conradi, MD   terazosin (HYTRIN) 10 mg capsule TAKE 1 CAPSULE EVERY NIGHT 11/26/18  Yes Griselda Conradi, MD   metoprolol succinate (TOPROL-XL) 25 mg XL tablet TAKE 1 TABLET EVERY DAY 11/26/18  Yes Griselda Conradi, MD   warfarin (COUMADIN) 4 mg tablet Take 1 Tab by mouth daily. Patient taking differently: Take 4 mg by mouth daily. Taking two 2 mg (4 mg) tablets daily except taking one 2 mg tablet on Thursday. 11/26/18  Yes Griselda Conradi, MD   amLODIPine (NORVASC) 5 mg tablet Take 1 Tab by mouth daily.  11/23/18  Yes Griselda Conradi, MD   cranberry extract 450 mg tab tablet Take 450 mg by mouth.   Yes Provider, Historical   OTHER L-Arginine with L-Citrulline, 1000 mg twice a day. Yes Provider, Historical   sildenafil, antihypertensive, (REVATIO) 20 mg tablet Take 1-5 Tabs by mouth daily as needed. 4/30/18  Yes Froylan Read MD   aspirin delayed-release 81 mg tablet Take  by mouth daily. Yes Provider, Historical   acetaminophen (TYLENOL) 325 mg tablet Take  by mouth every four (4) hours as needed for Pain. Yes Provider, Historical   FOLIC ACID PO Take 561 mcg by mouth daily. Yes Provider, Historical   thioctic acid (ALPHA LIPOIC ACID) 50 mg tab Take 1 Tab by mouth daily. Yes Provider, Historical   fiber Cap Take 2 Caps by mouth two (2) times a day. Yes Provider, Historical   MULTIVITAMIN WITH MINERALS (MULTI-VIT 55 PLUS PO) Take 1 Tab by mouth daily. Taking multivitamins every morning. 7/8/11  Yes Provider, Historical     Per Dr. Fidel Moss same dose of coumadin 2 mg on Thursdays and 4 mg the rest of the week. Return in one week  See anti-coag episode for instructions which are on AVS.    AVS and verbal instructions given he and wife voiced understanding of all instructions.

## 2019-01-31 ENCOUNTER — CLINICAL SUPPORT (OUTPATIENT)
Dept: FAMILY MEDICINE CLINIC | Age: 79
End: 2019-01-31

## 2019-01-31 VITALS
DIASTOLIC BLOOD PRESSURE: 78 MMHG | TEMPERATURE: 98 F | HEART RATE: 86 BPM | RESPIRATION RATE: 20 BRPM | BODY MASS INDEX: 30.92 KG/M2 | WEIGHT: 216 LBS | SYSTOLIC BLOOD PRESSURE: 129 MMHG | OXYGEN SATURATION: 97 % | HEIGHT: 70 IN

## 2019-01-31 DIAGNOSIS — Z79.01 CHRONIC ANTICOAGULATION: ICD-10-CM

## 2019-01-31 DIAGNOSIS — I48.0 PAROXYSMAL ATRIAL FIBRILLATION (HCC): Primary | ICD-10-CM

## 2019-01-31 LAB
INR BLD: 1.6
PT POC: 19.6 SECONDS
VALID INTERNAL CONTROL?: YES

## 2019-01-31 NOTE — PROGRESS NOTES
Patient presents for a PT/INR  See med list and patient instructions along with results for verbal orders by Dr. Monae Hilliard  Results for orders placed or performed in visit on 01/31/19   AMB POC PT/INR   Result Value Ref Range    VALID INTERNAL CONTROL POC Yes     Prothrombin time (POC) 19.6 seconds    INR POC 1.6         Vitals:    01/31/19 0756   BP: 129/78   Pulse: 86   Resp: 20   Temp: 98 °F (36.7 °C)   TempSrc: Oral   SpO2: 97%   Weight: 216 lb (98 kg)   Height: 5' 10\" (1.778 m)     Prior to Admission medications    Medication Sig Start Date End Date Taking? Authorizing Provider   gabapentin (NEURONTIN) 100 mg capsule Take  by mouth three (3) times daily. Yes Provider, Historical   hydroCHLOROthiazide (HYDRODIURIL) 25 mg tablet Take 1 Tab by mouth daily. 12/3/18  Yes Jc Hernandez MD   enalapril (VASOTEC) 20 mg tablet TAKE 1 TABLET TWICE DAILY 11/29/18  Yes Jc Hernandez MD   pravastatin (PRAVACHOL) 10 mg tablet Take 1 Tab by mouth nightly. 11/27/18  Yes Jc Hernandez MD   potassium chloride (KLOR-CON) 10 mEq tablet TAKE 1 TABLET THREE TIMES DAILY 11/26/18  Yes Jc Hernandez MD   warfarin (COUMADIN) 2 mg tablet Take 1 Tab by mouth two (2) times a week. Patient taking differently: Take 2 mg by mouth two (2) times a week. Taking two 2 mg (4 mg) tablets daily except taking one 2 mg tablet on Thursday. 11/26/18  Yes Jc Hernandez MD   terazosin (HYTRIN) 10 mg capsule TAKE 1 CAPSULE EVERY NIGHT 11/26/18  Yes Jc Hernandez MD   metoprolol succinate (TOPROL-XL) 25 mg XL tablet TAKE 1 TABLET EVERY DAY 11/26/18  Yes Jc Hernandez MD   warfarin (COUMADIN) 4 mg tablet Take 1 Tab by mouth daily. Patient taking differently: Take 4 mg by mouth daily. Taking two 2 mg (4 mg) tablets daily except taking one 2 mg tablet on Thursday. 11/26/18  Yes Jc Hernandez MD   amLODIPine (NORVASC) 5 mg tablet Take 1 Tab by mouth daily. 11/23/18  Yes Jc Hernandez MD   cranberry extract 450 mg tab tablet Take 450 mg by mouth. Yes Provider, Historical   OTHER L-Arginine with L-Citrulline, 1000 mg twice a day. Yes Provider, Historical   sildenafil, antihypertensive, (REVATIO) 20 mg tablet Take 1-5 Tabs by mouth daily as needed. 4/30/18  Yes Jody Berumen MD   aspirin delayed-release 81 mg tablet Take  by mouth daily. Yes Provider, Historical   acetaminophen (TYLENOL) 325 mg tablet Take  by mouth every four (4) hours as needed for Pain. Yes Provider, Historical   FOLIC ACID PO Take 135 mcg by mouth daily. Yes Provider, Historical   thioctic acid (ALPHA LIPOIC ACID) 50 mg tab Take 1 Tab by mouth daily. Yes Provider, Historical   fiber Cap Take 2 Caps by mouth two (2) times a day. Yes Provider, Historical   MULTIVITAMIN WITH MINERALS (MULTI-VIT 55 PLUS PO) Take 1 Tab by mouth daily. Taking multivitamins every morning. 7/8/11  Yes Provider, Historical     Per Dr. Urias Samples he is to increase dose of coumadin to 4 mg daily. See anti-coag episode for instructions which are on AVS.    AVS and verbal instructions given he and his wife voiced understanding of all instructions.

## 2019-02-14 ENCOUNTER — CLINICAL SUPPORT (OUTPATIENT)
Dept: FAMILY MEDICINE CLINIC | Age: 79
End: 2019-02-14

## 2019-02-14 VITALS
OXYGEN SATURATION: 98 % | HEIGHT: 70 IN | RESPIRATION RATE: 12 BRPM | HEART RATE: 79 BPM | DIASTOLIC BLOOD PRESSURE: 79 MMHG | WEIGHT: 214 LBS | SYSTOLIC BLOOD PRESSURE: 147 MMHG | TEMPERATURE: 98.1 F | BODY MASS INDEX: 30.64 KG/M2

## 2019-02-14 DIAGNOSIS — I48.0 PAROXYSMAL ATRIAL FIBRILLATION (HCC): Primary | ICD-10-CM

## 2019-02-14 LAB
INR BLD: 1.8
PT POC: 21.2 SECONDS
VALID INTERNAL CONTROL?: YES

## 2019-02-14 NOTE — PROGRESS NOTES
Chief Complaint   Patient presents with    Anticoagulation     Jacqueline Rodriguez is a 77 y.o. male who presents today for Anticoagulation monitoring. Indication: A-Fib  INR Goal: 2.0-3.0  Current dose:  Coumadin 4mg daily. Patient states he was taking 2 mg by mistake for a few days. Missed Coumadin Doses: No  Medication Changes:No  Dietary Changes: No    Symptoms: None    Latest INRs:  Lab Results   Component Value Date/Time    INR 2.0 (H) 11/12/2018 09:18 AM    INR 1.1 12/06/2013 04:21 AM    INR 1.2 (H) 12/05/2013 03:40 AM    INR (POC) 2.4 (H) 05/15/2018 09:21 AM    INR (POC) 4.4 (HH) 05/10/2018 11:46 AM    INR POC 1.6 01/31/2019 08:05 AM    INR POC 1.6 01/24/2019 09:31 AM    INR POC 2.1 01/07/2019 08:33 AM    Prothrombin time 19.9 (H) 11/12/2018 09:18 AM    Prothrombin time 11.2 12/06/2013 04:21 AM    Prothrombin time 12.0 (H) 12/05/2013 03:40 AM        New Coumadin dose:. Per Dr. Iglesia Genao patient instructed to take correct dose of 4 mg daily.      Next check to be scheduled 2/21/19 at 8:00am.

## 2019-02-21 ENCOUNTER — CLINICAL SUPPORT (OUTPATIENT)
Dept: FAMILY MEDICINE CLINIC | Age: 79
End: 2019-02-21

## 2019-02-21 VITALS
SYSTOLIC BLOOD PRESSURE: 117 MMHG | TEMPERATURE: 98.1 F | DIASTOLIC BLOOD PRESSURE: 78 MMHG | RESPIRATION RATE: 12 BRPM | WEIGHT: 213 LBS | OXYGEN SATURATION: 98 % | BODY MASS INDEX: 30.49 KG/M2 | HEIGHT: 70 IN | HEART RATE: 83 BPM

## 2019-02-21 DIAGNOSIS — I48.0 PAROXYSMAL ATRIAL FIBRILLATION (HCC): Primary | ICD-10-CM

## 2019-02-21 LAB
INR BLD: 2.2
PT POC: 26.2 SECONDS
VALID INTERNAL CONTROL?: YES

## 2019-02-21 NOTE — PROGRESS NOTES
Chief Complaint   Patient presents with    Anticoagulation     Hellen Delarosa is a 77 y.o. male who presents today for Anticoagulation monitoring. Indication: A-Fib  INR Goal: 2.0-3.0  Current dose:  Coumadin 4 mg daily. Missed Coumadin Doses: None   Medication Changes: None  Dietary Changes:  None    Symptoms: taking coumadin appropriately. Results for orders placed or performed in visit on 02/21/19   AMB POC PT/INR   Result Value Ref Range    VALID INTERNAL CONTROL POC Yes     Prothrombin time (POC) 26.2 seconds    INR POC 2.2        Latest INRs:  Lab Results   Component Value Date/Time    INR 2.0 (H) 11/12/2018 09:18 AM    INR 1.1 12/06/2013 04:21 AM    INR 1.2 (H) 12/05/2013 03:40 AM    INR (POC) 2.4 (H) 05/15/2018 09:21 AM    INR (POC) 4.4 (HH) 05/10/2018 11:46 AM    INR POC 1.8 02/14/2019 08:10 AM    INR POC 1.6 01/31/2019 08:05 AM    INR POC 1.6 01/24/2019 09:31 AM    Prothrombin time 19.9 (H) 11/12/2018 09:18 AM    Prothrombin time 11.2 12/06/2013 04:21 AM    Prothrombin time 12.0 (H) 12/05/2013 03:40 AM          Next check to be scheduled for 3/21/19.

## 2019-03-21 ENCOUNTER — CLINICAL SUPPORT (OUTPATIENT)
Dept: FAMILY MEDICINE CLINIC | Age: 79
End: 2019-03-21

## 2019-03-21 VITALS
HEIGHT: 70 IN | TEMPERATURE: 98 F | OXYGEN SATURATION: 98 % | DIASTOLIC BLOOD PRESSURE: 88 MMHG | BODY MASS INDEX: 31.35 KG/M2 | HEART RATE: 85 BPM | RESPIRATION RATE: 12 BRPM | SYSTOLIC BLOOD PRESSURE: 136 MMHG | WEIGHT: 219 LBS

## 2019-03-21 DIAGNOSIS — I48.0 PAROXYSMAL ATRIAL FIBRILLATION (HCC): Primary | ICD-10-CM

## 2019-03-21 LAB
INR BLD: 1.9
PT POC: 23.3 SECONDS
VALID INTERNAL CONTROL?: YES

## 2019-03-21 NOTE — PROGRESS NOTES
Chief Complaint   Patient presents with    Anticoagulation     Subhash Vera is a 77 y.o. male who presents today for Anticoagulation monitoring. Patient states current dose coumadin is:   Missed Coumadin Doses:  None  Medication Changes:  no  Dietary Changes:  no    Symptoms: without any bleeding. Anticoagulation Summary  As of 3/21/2019    INR goal:   2.0-3.0   TTR:   58.0 % (1.2 y)   INR used for dosin.9! (3/21/2019)   Warfarin maintenance plan:   4 mg (2 mg x 2) every day   Weekly warfarin total:   28 mg   Plan last modified:   Kayley Crowley LPN ()   Next INR check:   2019   Target end date:            Anticoagulation Episode Summary     INR check location:       Preferred lab:       Send INR reminders to:   [address] [place] [address]    Comments:             Description    Take 4 mg of coumadin daily. Return in one month on 2019 for a recheck. Verbal and written instructions given to pt, pt voiced understanding.

## 2019-03-21 NOTE — PROGRESS NOTES
Subhash Vera is a 77 y.o. male who presents today for Anticoagulation monitoring. Indication: A-Fib  INR Goal: 2-3  Current dose:  Coumadin 4 mg daily.   Missed Coumadin Doses:  None  Medication Changes:  None  Dietary Changes:None      Symptoms: taking coumadin appropriately   Results for orders placed or performed in visit on 03/21/19   AMB POC PT/INR   Result Value Ref Range    VALID INTERNAL CONTROL POC Yes     Prothrombin time (POC) 23.3 seconds    INR POC 1.9        Latest INRs:  Lab Results   Component Value Date/Time    INR 2.0 (H) 11/12/2018 09:18 AM    INR 1.1 12/06/2013 04:21 AM    INR 1.2 (H) 12/05/2013 03:40 AM    INR (POC) 2.4 (H) 05/15/2018 09:21 AM    INR (POC) 4.4 (HH) 05/10/2018 11:46 AM    INR POC 2.2 02/21/2019 08:00 AM    INR POC 1.8 02/14/2019 08:10 AM    INR POC 1.6 01/31/2019 08:05 AM    Prothrombin time 19.9 (H) 11/12/2018 09:18 AM    Prothrombin time 11.2 12/06/2013 04:21 AM    Prothrombin time 12.0 (H) 12/05/2013 03:40 AM        New Coumadin dose:    Next check to be scheduled for

## 2019-03-25 ENCOUNTER — OFFICE VISIT (OUTPATIENT)
Dept: FAMILY MEDICINE CLINIC | Age: 79
End: 2019-03-25

## 2019-03-25 VITALS
TEMPERATURE: 97.8 F | HEART RATE: 71 BPM | BODY MASS INDEX: 31.07 KG/M2 | SYSTOLIC BLOOD PRESSURE: 121 MMHG | DIASTOLIC BLOOD PRESSURE: 81 MMHG | RESPIRATION RATE: 14 BRPM | HEIGHT: 70 IN | WEIGHT: 217 LBS | OXYGEN SATURATION: 99 %

## 2019-03-25 DIAGNOSIS — N42.9 PROSTATE DISORDER: ICD-10-CM

## 2019-03-25 DIAGNOSIS — I48.0 PAROXYSMAL ATRIAL FIBRILLATION (HCC): ICD-10-CM

## 2019-03-25 DIAGNOSIS — E66.9 OBESITY (BMI 30.0-34.9): ICD-10-CM

## 2019-03-25 DIAGNOSIS — N52.9 ERECTILE DYSFUNCTION, UNSPECIFIED ERECTILE DYSFUNCTION TYPE: ICD-10-CM

## 2019-03-25 DIAGNOSIS — I25.10 CORONARY ARTERY DISEASE INVOLVING NATIVE CORONARY ARTERY OF NATIVE HEART WITHOUT ANGINA PECTORIS: ICD-10-CM

## 2019-03-25 DIAGNOSIS — Z79.01 CHRONIC ANTICOAGULATION: ICD-10-CM

## 2019-03-25 DIAGNOSIS — Z00.00 ROUTINE GENERAL MEDICAL EXAMINATION AT A HEALTH CARE FACILITY: Primary | ICD-10-CM

## 2019-03-25 DIAGNOSIS — I10 ESSENTIAL HYPERTENSION: ICD-10-CM

## 2019-03-25 NOTE — PROGRESS NOTES
Medicare Annual Wellness Visit     I have reviewed the patient's medical history in detail and updated the computerized patient record. History   Wilfredo Beck is a 77 y.o. male who presents for a wellness visit. He feels that he is doing well. He has chronic back pain but seems to be doing well as long as he is getting up and exercising. He is lifting weights. He has gained 10 pounds since I saw him last but has lost an inch around his waist.    He plans to be a  for an old lady for the summer. He will bring his teenage grandchild along and teach him how to work and money management etc.    He has gabapentin that he was taking 3 times daily. Recently only taking it once a day.   He could take or leave this medication, did not realize he could stop it    He is taking potassium 3 times daily but does not remember why    He is tolerating his blood pressure medication    Past Medical History:   Diagnosis Date    Arthritis     knees    CAD (coronary artery disease)     stent x3 approx 2001    Chronic atrial fibrillation ([address]     ED (erectile dysfunction)     Hypertension     Kidney stone on right side 10/30/2011    JAMEY (obstructive sleep apnea) 4/25/2012    Skin cancer     BCC, s/p Mohs on L flank    Sun-damaged skin     TIA (transient ischemic attack)     6/12 - seen at [facility]      Past Surgical History:   Procedure Laterality Date    CARDIAC SURG PROCEDURE UNLIST  2006    stents x 3     HX APPENDECTOMY  age 15   Seleta Cross CATARACT REMOVAL Bilateral     HX COLONOSCOPY  05/10/2012    hyperplastic    HX CYSTOSTOMY  1/23/14    diffuse erythema    HX HERNIA REPAIR Bilateral     inguinal hernia    HX HERNIA REPAIR  09/12/2017    L inguinal hernia repair    HX HIP REPLACEMENT Bilateral     HX HIP REPLACEMENT Right 12/4/13    REVISION     HX IMPLANTABLE CARDIOVERTER DEFIBRILLATOR  11/15/13    + PM    HX KNEE REPLACEMENT Right 7/2011    HX LUMBAR DISKECTOMY  1970's    HX MOHS PROCEDURES 02/21/2017    BCC left lateral cheek by Dr. Valencia Wakefield [address]X [id number]  01/05/2015    [place], BCC    HX ORTHOPAEDIC Left     LEFT HAND SURGERY    HX PACEMAKER  11/15/2013    AICD    INS PPM/ICD LED SING ONLY  11/15/2013          Current Outpatient Medications   Medication Sig Dispense Refill    gabapentin (NEURONTIN) 100 mg capsule Take  by mouth three (3) times daily.  hydroCHLOROthiazide (HYDRODIURIL) 25 mg tablet Take 1 Tab by mouth daily. 90 Tab 3    enalapril (VASOTEC) 20 mg tablet TAKE 1 TABLET TWICE DAILY 180 Tab 3    pravastatin (PRAVACHOL) 10 mg tablet Take 1 Tab by mouth nightly. 90 Tab 3    potassium chloride (KLOR-CON) 10 mEq tablet TAKE 1 TABLET THREE TIMES DAILY 270 Tab 1    warfarin (COUMADIN) 2 mg tablet Take 1 Tab by mouth two (2) times a week. (Patient taking differently: Take 2 mg by mouth two (2) times a week. Taking two 2 mg (4 mg) tablets daily.) 30 Tab 3    terazosin (HYTRIN) 10 mg capsule TAKE 1 CAPSULE EVERY NIGHT 90 Cap 1    metoprolol succinate (TOPROL-XL) 25 mg XL tablet TAKE 1 TABLET EVERY DAY 90 Tab 1    warfarin (COUMADIN) 4 mg tablet Take 1 Tab by mouth daily. (Patient taking differently: Take 4 mg by mouth daily. Taking two 2 mg (4 mg) tablets daily.) 90 Tab 3    amLODIPine (NORVASC) 5 mg tablet Take 1 Tab by mouth daily. 90 Tab 3    cranberry extract 450 mg tab tablet Take 450 mg by mouth.  OTHER L-Arginine with L-Citrulline, 1000 mg twice a day.  sildenafil, antihypertensive, (REVATIO) 20 mg tablet Take 1-5 Tabs by mouth daily as needed. 90 Tab 1    aspirin delayed-release 81 mg tablet Take  by mouth daily.  acetaminophen (TYLENOL) 325 mg tablet Take  by mouth every four (4) hours as needed for Pain.  FOLIC ACID PO Take 266 mcg by mouth daily.  thioctic acid (ALPHA LIPOIC ACID) 50 mg tab Take 1 Tab by mouth daily.  fiber Cap Take 2 Caps by mouth two (2) times a day.       MULTIVITAMIN WITH MINERALS (MULTI-VIT 55 PLUS PO) Take 1 Tab by mouth daily. Taking multivitamins every morning. Allergies   Allergen Reactions    Pcn [Penicillins] Unknown (comments)     As a child    Percocet [Oxycodone-Acetaminophen] Swelling     Leg swelling     Family History   Problem Relation Age of Onset    Stroke Father     Heart Attack Father     Heart Disease Father     Cancer Father 80        colon cancer    Heart Disease Mother    Ricardo Raines Arthritis-osteo Sister         s/p bilateral knee replacements    No Known Problems Daughter     Anesth Problems Neg Hx      Social History     Tobacco Use    Smoking status: Never Smoker    Smokeless tobacco: Never Used   Substance Use Topics    Alcohol use: No     Alcohol/week: 0.0 oz     Patient Active Problem List   Diagnosis Code    DJD (degenerative joint disease) of knee M17.10    ED (erectile dysfunction) N52.9    Hypertension I10    CAD (coronary artery disease) I25.10    Paroxysmal atrial fibrillation (HCC) I48.0    JAMEY (obstructive sleep apnea) G47.33    Colon polyp K63.5    TIA (transient ischemic attack) G45.9    BCC (basal cell carcinoma), face C44.310    S/P ICD (internal cardiac defibrillator) procedure Z95.810    Recurrent UTI N39.0    Chronic anticoagulation Z79.01    Obesity (BMI 30.0-34. 9) E66.9       Depression Risk Factor Screening:     3 most recent PHQ Screens 3/25/2019   Little interest or pleasure in doing things Not at all   Feeling down, depressed, irritable, or hopeless Not at all   Total Score PHQ 2 0     Alcohol Risk Factor Screening: On any occasion during the past 3 months, have you had more than 4 drinks containing alcohol? No    Do you average more than 14 drinks per week? No      Functional Ability and Level of Safety:     Hearing Loss   none    Activities of Daily Living   Self-care. Requires assistance with: no ADLs    Fall Risk     Fall Risk Assessment, last 12 mths 3/25/2019   Able to walk? Yes   Fall in past 12 months? Yes   Fall with injury?  No   Number of falls in past 12 months 1   Fall Risk Score 1     Abuse Screen   Patient is not abused    Review of Systems   ROS:  As listed in HPI. In addition:  Constitutional:   No headache, fever, fatigue, weight loss or weight gain      Eyes:   No redness, pruritis, pain, visual changes, swelling, or discharge      Ears:    No pain, loss or changes in hearing     Cardiac:    No chest pain      Resp:   No cough or shortness of breath      Neuro   No loss of consciousness, dizziness, seizure    Physical Examination     Evaluation of Cognitive Function:  Mood/affect:  happy  Appearance: age appropriate  Family member/caregiver input:     Physical Exam:  Blood pressure 121/81, pulse 71, temperature 97.8 °F (36.6 °C), resp. rate 14, height 5' 10\" (1.778 m), weight 217 lb (98.4 kg), SpO2 99 %. GEN: No apparent distress. Alert and oriented and responds to all questions appropriately. EYES:  Conjunctiva clear; pupils round and reactive to light; extraocular movements are intact. EAR: External ears are normal.  Tympanic membranes are clear and without effusion. NOSE: Turbinates are within normal limits. No drainage  OROPHYARYNX: No oral lesions or exudates. NECK:  Supple; no masses; thyroid normal           LUNGS: Respirations unlabored; clear to auscultation bilaterally  CARDIOVASCULAR: Regular, rate, and rhythm without murmurs   ABDOMEN: Soft; nontender; nondistended; normoactive bowel sounds; no masses or organomegaly  NEUROLOGIC:  No focal neurologic deficits. Strength and sensation grossly intact. Coordination and gait grossly intact. EXT: Well perfused. No edema. SKIN: No obvious rashes.     Patient Care Team:  Dudley Montes De Oca MD as PCP - General (Family Practice)  Sebastien Rubio MD (Orthopedic Surgery)  Monica Rehman MD (Dermatology)  Marian Johnson MD (Urology)  Shayla Homans, MD (Neurology)  Bartolo Alomdovar MD as Consulting Provider (Cardiology)  Hernandez Jorge MD as Consulting Provider (Orthopedic Surgery)  Grayson Broussard MD (Cardiology)  Aldo Carrizales MD as Physician (Optometry)  Ela Arguello MD as Surgeon (Surgery)    Advice/Referrals/Counseling   Education and counseling provided:    ACP. His girlfriend Ivelisse Grimaldo has medical power of . This is on file. Had a brief discussion with him. He would like full measures but no futile measures    Assessment/Plan     Hypertension  HCTZ  Enalapril  Amlodipine  Metoprolol  Blood pressure is well controlled, not currently having any signs of hypotension. As he exercises and loses weight may need to decrease medications    Back pain  Managed by Ortho  Taking gabapentin 100 mg once a day on most days. Does not really notice a difference when he forgets to take this medication. Suggested he avoid taking this if he can    Taking potassium 10 mEq 3 times daily. Historically on the lower side. Suggested he look into potassium rich foods. May back off of potassium based on levels    Anticoagulation  On Coumadin  Asking if there might be a price change in the alternatives. Previously on them but stopped because of cost.  Girlfriend keeping track of his vitamin K intake with a detailed record. [employer] drug insurance. Xarelto and Eliquis are tier 3      ICD-10-CM ICD-9-CM    1. Routine general medical examination at a health care facility Z00.00 V70.0    2. Coronary artery disease involving native coronary artery of native heart without angina pectoris I25.10 414.01    3. Essential hypertension C29 486.1 METABOLIC PANEL, COMPREHENSIVE      CBC WITH AUTOMATED DIFF      LIPID PANEL   4. Paroxysmal atrial fibrillation (HCC) I48.0 427.31    5. Erectile dysfunction, unspecified erectile dysfunction type N52.9 607.84 PSA W/ REFLX FREE PSA   6. Chronic anticoagulation Z79.01 V58.61    7. Obesity (BMI 30.0-34. 9) E66.9 278.00    8.  Prostate disorder N42.9 602.9 PSA W/ REFLX FREE PSA

## 2019-03-25 NOTE — PROGRESS NOTES
.  Chief Complaint   Patient presents with   [facility]       . 1. Have you been to the ER, urgent care clinic since your last visit? Hospitalized since your last visit? no    2. Have you seen or consulted any other health care providers outside of the [address] [place] [address] since your last visit? Include any pap smears or colon screening. No    .Olya Zamora  .   Health Maintenance Due   Topic Date Due    Shingrix Vaccine Age 49> (1 of 2) 08/24/1989    GLAUCOMA SCREENING Q2Y  09/01/2016    MEDICARE YEARLY EXAM  03/24/2019

## 2019-03-26 LAB
ALBUMIN SERPL-MCNC: 4 G/DL (ref 3.5–4.8)
ALBUMIN/GLOB SERPL: 1.7 {RATIO} (ref 1.2–2.2)
ALP SERPL-CCNC: 99 IU/L (ref 39–117)
ALT SERPL-CCNC: 11 IU/L (ref 0–44)
AST SERPL-CCNC: 19 IU/L (ref 0–40)
BASOPHILS # BLD AUTO: 0 X10E3/UL (ref 0–0.2)
BASOPHILS NFR BLD AUTO: 0 %
BILIRUB SERPL-MCNC: 0.4 MG/DL (ref 0–1.2)
BUN SERPL-MCNC: 20 MG/DL (ref 8–27)
BUN/CREAT SERPL: 22 (ref 10–24)
CALCIUM SERPL-MCNC: 9 MG/DL (ref 8.6–10.2)
CHLORIDE SERPL-SCNC: 106 MMOL/L (ref 96–106)
CHOLEST SERPL-MCNC: 100 MG/DL (ref 100–199)
CO2 SERPL-SCNC: 27 MMOL/L (ref 20–29)
CREAT SERPL-MCNC: 0.92 MG/DL (ref 0.76–1.27)
EOSINOPHIL # BLD AUTO: 0.2 X10E3/UL (ref 0–0.4)
EOSINOPHIL NFR BLD AUTO: 5 %
ERYTHROCYTE [DISTWIDTH] IN BLOOD BY AUTOMATED COUNT: 14.6 % (ref 12.3–15.4)
GLOBULIN SER CALC-MCNC: 2.3 G/DL (ref 1.5–4.5)
GLUCOSE SERPL-MCNC: 92 MG/DL (ref 65–99)
HCT VFR BLD AUTO: 41.8 % (ref 37.5–51)
HDLC SERPL-MCNC: 25 MG/DL
HGB BLD-MCNC: 13.7 G/DL (ref 13–17.7)
IMM GRANULOCYTES # BLD AUTO: 0 X10E3/UL (ref 0–0.1)
IMM GRANULOCYTES NFR BLD AUTO: 0 %
INTERPRETATION, 910389: NORMAL
LDLC SERPL CALC-MCNC: 41 MG/DL (ref 0–99)
LYMPHOCYTES # BLD AUTO: 1.4 X10E3/UL (ref 0.7–3.1)
LYMPHOCYTES NFR BLD AUTO: 28 %
MCH RBC QN AUTO: 31.3 PG (ref 26.6–33)
MCHC RBC AUTO-ENTMCNC: 32.8 G/DL (ref 31.5–35.7)
MCV RBC AUTO: 95 FL (ref 79–97)
MONOCYTES # BLD AUTO: 0.4 X10E3/UL (ref 0.1–0.9)
MONOCYTES NFR BLD AUTO: 9 %
NEUTROPHILS # BLD AUTO: 2.8 X10E3/UL (ref 1.4–7)
NEUTROPHILS NFR BLD AUTO: 58 %
PLATELET # BLD AUTO: 158 X10E3/UL (ref 150–379)
POTASSIUM SERPL-SCNC: 3.9 MMOL/L (ref 3.5–5.2)
PROT SERPL-MCNC: 6.3 G/DL (ref 6–8.5)
PSA SERPL-MCNC: 1.9 NG/ML (ref 0–4)
RBC # BLD AUTO: 4.38 X10E6/UL (ref 4.14–5.8)
REFLEX CRITERIA: NORMAL
SODIUM SERPL-SCNC: 146 MMOL/L (ref 134–144)
TRIGL SERPL-MCNC: 172 MG/DL (ref 0–149)
VLDLC SERPL CALC-MCNC: 34 MG/DL (ref 5–40)
WBC # BLD AUTO: 4.8 X10E3/UL (ref 3.4–10.8)

## 2019-04-10 ENCOUNTER — CLINICAL SUPPORT (OUTPATIENT)
Dept: FAMILY MEDICINE CLINIC | Age: 79
End: 2019-04-10

## 2019-04-10 VITALS
HEART RATE: 80 BPM | BODY MASS INDEX: 30.64 KG/M2 | OXYGEN SATURATION: 99 % | WEIGHT: 214 LBS | RESPIRATION RATE: 12 BRPM | DIASTOLIC BLOOD PRESSURE: 72 MMHG | HEIGHT: 70 IN | TEMPERATURE: 98 F | SYSTOLIC BLOOD PRESSURE: 128 MMHG

## 2019-04-10 DIAGNOSIS — I48.0 PAROXYSMAL ATRIAL FIBRILLATION (HCC): Primary | ICD-10-CM

## 2019-04-10 LAB
INR BLD: 1.9
PT POC: 23.4 SECONDS
VALID INTERNAL CONTROL?: YES

## 2019-04-10 NOTE — PROGRESS NOTES
Manju Herrera is a 77 y.o. male who presents today for Anticoagulation monitoring. Indication: A-Fib  INR Goal: 2-3  Current dose:  Coumadin 4 mg daily  Missed Coumadin Doses: None  Medication Changes: None  Dietary Changes: None     Symptoms: taking coumadin appropriately     Latest INRs:  Lab Results   Component Value Date/Time    INR 2.0 (H) 11/12/2018 09:18 AM    INR 1.1 12/06/2013 04:21 AM    INR 1.2 (H) 12/05/2013 03:40 AM    INR (POC) 2.4 (H) 05/15/2018 09:21 AM    INR (POC) 4.4 (HH) 05/10/2018 11:46 AM    INR POC 1.9 03/21/2019 07:59 AM    INR POC 2.2 02/21/2019 08:00 AM    INR POC 1.8 02/14/2019 08:10 AM    Prothrombin time 19.9 (H) 11/12/2018 09:18 AM    Prothrombin time 11.2 12/06/2013 04:21 AM    Prothrombin time 12.0 (H) 12/05/2013 03:40 AM        New Coumadin dose: continue taking current dose of coumadin. Next check to be scheduled for 5/8/19.

## 2019-04-16 ENCOUNTER — CLINICAL SUPPORT (OUTPATIENT)
Dept: CARDIOLOGY CLINIC | Age: 79
End: 2019-04-16

## 2019-04-16 ENCOUNTER — OFFICE VISIT (OUTPATIENT)
Dept: CARDIOLOGY CLINIC | Age: 79
End: 2019-04-16

## 2019-04-16 VITALS
HEART RATE: 68 BPM | WEIGHT: 213 LBS | HEIGHT: 70 IN | BODY MASS INDEX: 30.49 KG/M2 | OXYGEN SATURATION: 97 % | SYSTOLIC BLOOD PRESSURE: 130 MMHG | DIASTOLIC BLOOD PRESSURE: 84 MMHG | RESPIRATION RATE: 14 BRPM

## 2019-04-16 DIAGNOSIS — I48.0 PAROXYSMAL ATRIAL FIBRILLATION (HCC): Primary | ICD-10-CM

## 2019-04-16 DIAGNOSIS — Z95.810 CARDIAC DEFIBRILLATOR IN SITU: ICD-10-CM

## 2019-04-16 DIAGNOSIS — Z95.810 S/P ICD (INTERNAL CARDIAC DEFIBRILLATOR) PROCEDURE: ICD-10-CM

## 2019-04-16 DIAGNOSIS — I25.10 CORONARY ARTERY DISEASE INVOLVING NATIVE CORONARY ARTERY OF NATIVE HEART WITHOUT ANGINA PECTORIS: ICD-10-CM

## 2019-04-16 DIAGNOSIS — Z95.810 CARDIAC DEFIBRILLATOR IN SITU: Primary | ICD-10-CM

## 2019-04-16 NOTE — LETTER
2019 11:34 AM 
 
Patient:  Ralph Duarte YOB: 1940 Date of Visit: 2019 Dear Diane Lynn MD 
[address] [facility] [address] VIA In Basket 
 : 
Mr. Ashu Cintron is a 78 yo M with h/o CAD s/p PCI in , patent stent with no significant CAD on  cath, afib on metoprolol for rate control and coumadin for anticoagulation, apical variant of a hypertrophic cardiomyopathy with deep T wave inversions on ECG, sick sinus with NSVT noted on 13 holter with multiple > 3 sec pauses s/p Medtronic ICD on 11/15/13 with Dr. Marisol Triana, s/p right hip revision on 13. MARIBEL in  was normal.  2011 nuclear stress test was normal.  2012 echo noted EF of 50-55% and apical hypertrophy. Followed by ortho for right hip Dr. Bouchar Shannon Since his last visit, he continues to do well. He denies any chest pain, shortness of breath, lightheadedness, dizziness or syncope. He did have issues with chronic back pain, but he has been taking medication and this has been better now. He wants to avoid surgery as much as possible. He is exercising very regularly 30 minutes a day on a bike and elliptical.  He recently found a job to be a  for someone with an estate. He is also able to bring his teenage grandson with him and this has been a great way for them to bond together. He is compensated on exam with clear lungs and no lower extremity edema. His weight is overall stable. His blood pressure is 130/84 with a heart rate of 68. No bleeding issues. Assessment and Plan: 1. Atrial fibrillation with sick sinus syndrome, status post ICD. Nonsustained ventricular tachycardia noted in the past.  He is stable and compensated. No bleeding issues on Coumadin. Xarelto was cost prohibitive. 2. Coronary artery disease. Stable and compensated. Continue aspirin, statin, beta-blocker and ACE inhibitor. Will have him follow back in six months. 3. Hypertrophic cardiomyopathy. Stable on beta-blocker. 4. Mixed hyperlipidemia. Tolerating statin. 5. Essential hypertension. Blood pressure is controlled. 6. Erectile dysfunction. Cialis or Viagra prn. Cautioned in the past about avoiding Nitroglycerin concurrently. If you have questions, please do not hesitate to call me. Sincerely, Bre Patel MD

## 2019-04-16 NOTE — PROGRESS NOTES
[place]: [place]  ([phone]    HPI:   Mr. Symone Hopper is a 76 yo M with h/o CAD s/p PCI in 2003, patent stent with no significant CAD on 2008 cath, afib on metoprolol for rate control and coumadin for anticoagulation, apical variant of a hypertrophic cardiomyopathy with deep T wave inversions on ECG, sick sinus with NSVT noted on 11/14/13 holter with multiple > 3 sec pauses s/p Medtronic ICD on 11/15/13 with Dr. Juany Wallace, s/p right hip revision on 12/5/13. MARIBEL in 2012 was normal.  7/2011 nuclear stress test was normal.  5/2012 echo noted EF of 50-55% and apical hypertrophy. Followed by ortho for right hip Dr. Mirian Garza    Since his last visit, he continues to do well. He denies any chest pain, shortness of breath, lightheadedness, dizziness or syncope. He did have issues with chronic back pain, but he has been taking medication and this has been better now. He wants to avoid surgery as much as possible. He is exercising very regularly 30 minutes a day on a bike and elliptical.  He recently found a job to be a  for someone with an estate. He is also able to bring his teenage grandson with him and this has been a great way for them to bond together. He is compensated on exam with clear lungs and no lower extremity edema. His weight is overall stable. His blood pressure is 130/84 with a heart rate of 68. No bleeding issues. Assessment and Plan:   1. Atrial fibrillation with sick sinus syndrome, status post ICD. Nonsustained ventricular tachycardia noted in the past.  He is stable and compensated. No bleeding issues on Coumadin. Xarelto was cost prohibitive. 2. Coronary artery disease. Stable and compensated. Continue aspirin, statin, beta-blocker and ACE inhibitor. Will have him follow back in six months. 3. Hypertrophic cardiomyopathy. Stable on beta-blocker. 4. Mixed hyperlipidemia. Tolerating statin. 5. Essential hypertension. Blood pressure is controlled. 6. Erectile dysfunction. Cialis or Viagra prn. Cautioned in the past about avoiding Nitroglycerin concurrently. He  has a past medical history of Arthritis, CAD (coronary artery disease), Chronic atrial fibrillation [facility]), ED (erectile dysfunction), Hypertension, Kidney stone on right side (10/30/2011), JAMEY (obstructive sleep apnea) (4/25/2012), Skin cancer, Sun-damaged skin, and TIA (transient ischemic attack). He also has no past medical history of Arsenic suspected exposure, Family history of skin cancer, Radiation exposure, Sunburn, blistering, or Tanning bed exposure. All other systems negative except as above. PE  Vitals:    04/16/19 1327   BP: 130/84   Pulse: 68   Resp: 14   SpO2: 97%   Weight: 213 lb (96.6 kg)   Height: 5' 10\" (1.778 m)    Body mass index is 30.56 kg/m². General appearance - alert, well appearing, and in no distress  Mental status - affect appropriate to mood  Neck - supple, no JVD. No bruits present.    Chest - clear to auscultation, no wheezes, rales or rhonchi  Heart - Regular rate, regular rhythm, normal S1, S2, I/VI systolic murmur LUSB  Abdomen - soft, nontender, nondistended, no masses or organomegaly  Extremities - peripheral pulses normal, no pedal edema        Recent Labs:  Lab Results   Component Value Date/Time    Cholesterol, total 100 03/25/2019 08:54 AM    HDL Cholesterol 25 (L) 03/25/2019 08:54 AM    LDL, calculated 41 03/25/2019 08:54 AM    Triglyceride 172 (H) 03/25/2019 08:54 AM     Lab Results   Component Value Date/Time    Creatinine 0.92 03/25/2019 08:54 AM     Lab Results   Component Value Date/Time    BUN 20 03/25/2019 08:54 AM    BUN (POC) 16 12/04/2013 06:46 AM     Lab Results   Component Value Date/Time    Potassium 3.9 03/25/2019 08:54 AM     Lab Results   Component Value Date/Time    Hemoglobin A1c 5.3 11/19/2013 08:45 AM     Lab Results   Component Value Date/Time    HGB 13.7 03/25/2019 08:54 AM     Lab Results   Component Value Date/Time PLATELET 558 54/95/7243 08:54 AM       Reviewed:  Past Medical History:   Diagnosis Date    Arthritis     knees    CAD (coronary artery disease)     stent x3 approx 2001    Chronic atrial [facility] ([facility] [address]     ED (erectile dysfunction)     Hypertension     Kidney stone on right side 10/30/2011    JAMEY (obstructive sleep apnea) 4/25/2012    Skin cancer     BCC, s/p Mohs on L flank    Sun-damaged skin     TIA (transient ischemic attack)     6/12 - [facility]     Social History     Tobacco Use   Smoking Status Never Smoker   Smokeless Tobacco Never Used     Social History     Substance and Sexual Activity   Alcohol Use No    Alcohol/week: 0.0 oz     Allergies   Allergen Reactions    Pcn [Penicillins] Unknown (comments)     As a child    Percocet [Oxycodone-Acetaminophen] Swelling     Leg swelling       Current Outpatient Medications   Medication Sig    gabapentin (NEURONTIN) 100 mg capsule Take  by mouth three (3) times daily.  hydroCHLOROthiazide (HYDRODIURIL) 25 mg tablet Take 1 Tab by mouth daily.  enalapril (VASOTEC) 20 mg tablet TAKE 1 TABLET TWICE DAILY    pravastatin (PRAVACHOL) 10 mg tablet Take 1 Tab by mouth nightly.  potassium chloride (KLOR-CON) 10 mEq tablet TAKE 1 TABLET THREE TIMES DAILY    warfarin (COUMADIN) 2 mg tablet Take 1 Tab by mouth two (2) times a week. (Patient taking differently: Take 2 mg by mouth two (2) times a week. Taking two 2 mg (4 mg) tablets daily.)    terazosin (HYTRIN) 10 mg capsule TAKE 1 CAPSULE EVERY NIGHT    metoprolol succinate (TOPROL-XL) 25 mg XL tablet TAKE 1 TABLET EVERY DAY    warfarin (COUMADIN) 4 mg tablet Take 1 Tab by mouth daily. (Patient taking differently: Take 4 mg by mouth daily. Taking two 2 mg (4 mg) tablets daily.)    amLODIPine (NORVASC) 5 mg tablet Take 1 Tab by mouth daily.  cranberry extract 450 mg tab tablet Take 450 mg by mouth.  OTHER L-Arginine with L-Citrulline, 1000 mg twice a day.     sildenafil, antihypertensive, (REVATIO) 20 mg tablet Take 1-5 Tabs by mouth daily as needed.  aspirin delayed-release 81 mg tablet Take  by mouth daily.  acetaminophen (TYLENOL) 325 mg tablet Take  by mouth every four (4) hours as needed for Pain.  FOLIC ACID PO Take 884 mcg by mouth daily.  thioctic acid (ALPHA LIPOIC ACID) 50 mg tab Take 1 Tab by mouth daily.  fiber Cap Take 2 Caps by mouth two (2) times a day.  MULTIVITAMIN WITH MINERALS (MULTI-VIT 55 PLUS PO) Take 1 Tab by mouth daily. Taking multivitamins every morning. No current facility-administered medications for this visit.         Ric Huddleston MD  [address] [facility] [place]  [address] [facility] [address]  [address]

## 2019-05-08 ENCOUNTER — CLINICAL SUPPORT (OUTPATIENT)
Dept: FAMILY MEDICINE CLINIC | Age: 79
End: 2019-05-08

## 2019-05-08 VITALS
HEIGHT: 70 IN | RESPIRATION RATE: 16 BRPM | WEIGHT: 213 LBS | HEART RATE: 65 BPM | SYSTOLIC BLOOD PRESSURE: 118 MMHG | OXYGEN SATURATION: 98 % | BODY MASS INDEX: 30.49 KG/M2 | TEMPERATURE: 97.8 F | DIASTOLIC BLOOD PRESSURE: 73 MMHG

## 2019-05-08 DIAGNOSIS — Z79.01 CHRONIC ANTICOAGULATION: Primary | ICD-10-CM

## 2019-05-08 LAB
INR BLD: 2.6
PT POC: 31.5 SECONDS
VALID INTERNAL CONTROL?: YES

## 2019-05-08 NOTE — PROGRESS NOTES
Chief Complaint   Patient presents with    Anticoagulation     ptinr         Angela Kramer is a 77 y.o. male who presents today for Anticoagulation monitoring. Patient states current dose coumadin is:   Missed Coumadin Doses:  None  Medication Changes:  no  Dietary Changes:  no    Symptoms: without any bleeding. Anticoagulation Summary  As of 2019    INR goal:   2.0-3.0   TTR:   57.2 % (1.3 y)   INR used for dosin.6 (2019)   Warfarin maintenance plan:   4 mg (2 mg x 2) every day   Weekly warfarin total:   28 mg   Plan last modified:   Cari Reza LPN ()   Next INR check:   2019   Target end date:            Anticoagulation Episode Summary     INR check location:       Preferred lab:       Send INR reminders to:   [address] [place] [address]    Comments:             Description    Continue taking 4 mg daily. Return in 1 month around 19. Verbal and written instructions given to pt, he voiced understanding.

## 2019-05-31 ENCOUNTER — TELEPHONE (OUTPATIENT)
Dept: CARDIOLOGY CLINIC | Age: 79
End: 2019-05-31

## 2019-05-31 ENCOUNTER — OFFICE VISIT (OUTPATIENT)
Dept: CARDIOLOGY CLINIC | Age: 79
End: 2019-05-31

## 2019-05-31 VITALS
BODY MASS INDEX: 30.35 KG/M2 | RESPIRATION RATE: 12 BRPM | HEIGHT: 70 IN | WEIGHT: 212 LBS | DIASTOLIC BLOOD PRESSURE: 70 MMHG | SYSTOLIC BLOOD PRESSURE: 110 MMHG | HEART RATE: 68 BPM | OXYGEN SATURATION: 95 %

## 2019-05-31 DIAGNOSIS — E78.2 MIXED HYPERLIPIDEMIA: ICD-10-CM

## 2019-05-31 DIAGNOSIS — I10 BENIGN ESSENTIAL HTN: ICD-10-CM

## 2019-05-31 DIAGNOSIS — I48.0 PAROXYSMAL ATRIAL FIBRILLATION (HCC): ICD-10-CM

## 2019-05-31 DIAGNOSIS — I25.10 CORONARY ARTERY DISEASE INVOLVING NATIVE CORONARY ARTERY OF NATIVE HEART WITHOUT ANGINA PECTORIS: Primary | ICD-10-CM

## 2019-05-31 NOTE — PROGRESS NOTES
[facility] [place]: Elsie Rodney  [phone]    HPI:   Mr. Joe Moeller is a 76 yo M with h/o CAD s/p PCI in 2003, patent stent with no significant CAD on 2008 cath, afib on metoprolol for rate control and coumadin for anticoagulation, apical variant of a hypertrophic cardiomyopathy with deep T wave inversions on ECG, sick sinus with NSVT noted on 11/14/13 holter with multiple > 3 sec pauses s/p [employer] ICD on 11/15/13 with Dr. Aisha Jaime, s/p right hip revision on 12/5/13. MARIBEL in 2012 was normal.  7/2011 nuclear stress test was normal.  5/2012 echo noted EF of 50-55% and apical hypertrophy. Followed by ortho for right hip Dr. Parish Yost    Over the last few months, he has had some dizziness and less energy. He has been weaning off Gabapentin and his dizziness is now resolving and he is feeling better. He denies any chest pain. His breathing has been stable. No significant palpitations. No bleeding issues on a blood thinner. He does want to look into switching the blood thinner given the dietary restriction. He is compensated on exam with clear lungs and no lower extremity edema. His blood pressure is 110/70 with a heart rate of 68. Assessment and Plan:    1. Atrial fibrillation with sick sinus, status post ICD. Stable and compensated. No bleeding issues on Coumadin. Xarelto in the past was cost prohibitive, but he does want to retry a novel agent again due to the dietary restriction and will give him a prescription for Eliquis to try. Based on his age, weight and kidney function, he would still be on 5 mg twice a day. 2. Coronary artery disease. Continue aspirin, statin, beta blocker and ACE inhibitor. Will have him follow back in six months. 3. Hypertrophic cardiomyopathy. Stable on beta blocker. 4. Mixed hyperlipidemia. Tolerating statin. 5. Essential hypertension. Blood pressure is controlled. 6. Erectile dysfunction. Cialis or Viagra prn.   Cautioned in the past about avoiding Nitroglycerin concurrently. He  has a past medical history of Arthritis, CAD (coronary artery disease), Chronic atrial fibrillation [facility]), ED (erectile dysfunction), Hypertension, Kidney stone on right side (10/30/2011), JAMEY (obstructive sleep apnea) (4/25/2012), Skin cancer, Sun-damaged skin, and TIA (transient ischemic attack). He also has no past medical history of Arsenic suspected exposure, Family history of skin cancer, Radiation exposure, Sunburn, blistering, or Tanning bed exposure. All other systems negative except as above. PE  Vitals:    05/31/19 1021   BP: 110/70   Pulse: 68   Resp: 12   SpO2: 95%   Weight: 212 lb (96.2 kg)   Height: 5' 10\" (1.778 m)    Body mass index is 30.42 kg/m². General appearance - alert, well appearing, and in no distress  Mental status - affect appropriate to mood  Neck - supple, no JVD. No bruits present.    Chest - clear to auscultation, no wheezes, rales or rhonchi  Heart - Regular rate, regular rhythm, normal S1, S2, I/VI systolic murmur LUSB  Abdomen - soft, nontender, nondistended, no masses or organomegaly  Extremities - peripheral pulses normal, no pedal edema        Recent Labs:  Lab Results   Component Value Date/Time    Cholesterol, total 100 03/25/2019 08:54 AM    HDL Cholesterol 25 (L) 03/25/2019 08:54 AM    LDL, calculated 41 03/25/2019 08:54 AM    Triglyceride 172 (H) 03/25/2019 08:54 AM     Lab Results   Component Value Date/Time    Creatinine 0.92 03/25/2019 08:54 AM     Lab Results   Component Value Date/Time    BUN 20 03/25/2019 08:54 AM    BUN (POC) 16 12/04/2013 06:46 AM     Lab Results   Component Value Date/Time    Potassium 3.9 03/25/2019 08:54 AM     Lab Results   Component Value Date/Time    Hemoglobin A1c 5.3 11/19/2013 08:45 AM     Lab Results   Component Value Date/Time    HGB 13.7 03/25/2019 08:54 AM     Lab Results   Component Value Date/Time    PLATELET 971 65/06/3888 08:54 AM       Reviewed:  Past Medical History:   Diagnosis Date    Arthritis     knees    CAD (coronary artery disease)     stent x3 approx 2001    Chronic atrial fibrillation ([facility] [address]     ED (erectile dysfunction)     Hypertension     Kidney stone on right side 10/30/2011    JAMEY (obstructive sleep apnea) 4/25/2012    Skin cancer     BCC, s/p Mohs on L flank    Sun-damaged skin     TIA (transient ischemic attack)     6/12 - seen at [facility]     Social History     Tobacco Use   Smoking Status Never Smoker   Smokeless Tobacco Never Used     Social History     Substance and Sexual Activity   Alcohol Use No    Alcohol/week: 0.0 oz     Allergies   Allergen Reactions    Pcn [Penicillins] Unknown (comments)     As a child    Percocet [Oxycodone-Acetaminophen] Swelling     Leg swelling       Current Outpatient Medications   Medication Sig    hydroCHLOROthiazide (HYDRODIURIL) 25 mg tablet Take 1 Tab by mouth daily.  enalapril (VASOTEC) 20 mg tablet TAKE 1 TABLET TWICE DAILY    pravastatin (PRAVACHOL) 10 mg tablet Take 1 Tab by mouth nightly.  potassium chloride (KLOR-CON) 10 mEq tablet TAKE 1 TABLET THREE TIMES DAILY    warfarin (COUMADIN) 2 mg tablet Take 1 Tab by mouth two (2) times a week. (Patient taking differently: Take 2 mg by mouth two (2) times a week. Taking two 2 mg (4 mg) tablets daily. Dr. Silvano Khoury follows INR.)   [facility] terazosin (HYTRIN) 10 mg capsule TAKE 1 CAPSULE EVERY NIGHT    metoprolol succinate (TOPROL-XL) 25 mg XL tablet TAKE 1 TABLET EVERY DAY    warfarin (COUMADIN) 4 mg tablet Take 1 Tab by mouth daily. (Patient taking differently: Take 4 mg by mouth daily. Taking two 2 mg (4 mg) tablets daily. Dr. Silvano Khoury follows INR.)    amLODIPine (NORVASC) 5 mg tablet Take 1 Tab by mouth daily.  cranberry extract 450 mg tab tablet Take 450 mg by mouth daily.  OTHER L-Arginine with L-Citrulline, 1000 mg twice a day.  sildenafil, antihypertensive, (REVATIO) 20 mg tablet Take 1-5 Tabs by mouth daily as needed.     aspirin delayed-release 81 mg tablet Take by mouth daily.  acetaminophen (TYLENOL) 325 mg tablet Take  by mouth every four (4) hours as needed for Pain.  FOLIC ACID PO Take 651 mcg by mouth daily.  thioctic acid (ALPHA LIPOIC ACID) 50 mg tab Take 1 Tab by mouth daily.  fiber Cap Take 2 Caps by mouth two (2) times a day.  MULTIVITAMIN WITH MINERALS (MULTI-VIT 55 PLUS PO) Take 1 Tab by mouth daily. Taking multivitamins every morning.  gabapentin (NEURONTIN) 100 mg capsule Take  by mouth three (3) times daily. No current facility-administered medications for this visit.         Anthony Beach MD  [facility]  [address] [facility] [address]  [address]

## 2019-05-31 NOTE — PROGRESS NOTES
Chief Complaint   Patient presents with    Dizziness     thinks that it has stopped since stopping gabapentin. Denies chest pain/shortness of breath/swelling.  Fatigue    Follow-up     6 month. Had been prescribed gabapentin by Dr. Vadim Zendejas at [facility] due to possible \"pinched nerve\". Thought that it has been making him tired and dizzy.

## 2019-05-31 NOTE — TELEPHONE ENCOUNTER
Spoke with Dr. Tammy Best hold coumadin x 3 days and then start Eliquis. Pt verbalized understanding and denies any further questions at this time. Pt stated it will be $47 a month.

## 2019-06-05 ENCOUNTER — CLINICAL SUPPORT (OUTPATIENT)
Dept: FAMILY MEDICINE CLINIC | Age: 79
End: 2019-06-05

## 2019-06-05 VITALS
SYSTOLIC BLOOD PRESSURE: 121 MMHG | HEIGHT: 70 IN | RESPIRATION RATE: 14 BRPM | BODY MASS INDEX: 30.21 KG/M2 | DIASTOLIC BLOOD PRESSURE: 76 MMHG | HEART RATE: 72 BPM | WEIGHT: 211 LBS | OXYGEN SATURATION: 97 %

## 2019-06-05 DIAGNOSIS — I10 ESSENTIAL HYPERTENSION: ICD-10-CM

## 2019-06-05 DIAGNOSIS — Z95.810 S/P ICD (INTERNAL CARDIAC DEFIBRILLATOR) PROCEDURE: ICD-10-CM

## 2019-06-05 DIAGNOSIS — Z79.01 CHRONIC ANTICOAGULATION: ICD-10-CM

## 2019-06-05 DIAGNOSIS — I48.0 PAROXYSMAL ATRIAL FIBRILLATION (HCC): Primary | ICD-10-CM

## 2019-06-05 LAB
INR BLD: 1.6
PT POC: 19.2 SECONDS
VALID INTERNAL CONTROL?: YES

## 2019-06-05 NOTE — PROGRESS NOTES
IMAN Crespo is a 77 y.o. male who presents for anticoagulation visit. He was switched from Coumadin over to Eliquis on Friday by his cardiologist.  Has been taking Eliquis for 3 days, INR is coming down appropriately. He did not realize that he did not need Coumadin checks any longer. He is tried Xarelto in the past but it was cost prohibitive around $400, the Eliquis is $20 per month which he finds satisfactory for the convenience of not coming to the doctor and able to eat whatever he wants. PMHx:  Past Medical History:   Diagnosis Date    Arthritis     knees    CAD (coronary artery disease)     stent x3 approx 2001    Chronic atrial fibrillation ([facility] [address]     ED (erectile dysfunction)     Hypertension     Kidney stone on right side 10/30/2011    JAMEY (obstructive sleep apnea) 4/25/2012    Skin cancer     BCC, s/p Mohs on L flank    Sun-damaged skin     TIA (transient ischemic attack)     6/12 - seen at [facility]       Meds:   Current Outpatient Medications   Medication Sig Dispense Refill    apixaban (ELIQUIS) 5 mg tablet Take 1 Tab by mouth two (2) times a day. 60 Tab 5    hydroCHLOROthiazide (HYDRODIURIL) 25 mg tablet Take 1 Tab by mouth daily. 90 Tab 3    enalapril (VASOTEC) 20 mg tablet TAKE 1 TABLET TWICE DAILY 180 Tab 3    pravastatin (PRAVACHOL) 10 mg tablet Take 1 Tab by mouth nightly. 90 Tab 3    potassium chloride (KLOR-CON) 10 mEq tablet TAKE 1 TABLET THREE TIMES DAILY 270 Tab 1    terazosin (HYTRIN) 10 mg capsule TAKE 1 CAPSULE EVERY NIGHT 90 Cap 1    metoprolol succinate (TOPROL-XL) 25 mg XL tablet TAKE 1 TABLET EVERY DAY 90 Tab 1    amLODIPine (NORVASC) 5 mg tablet Take 1 Tab by mouth daily. 90 Tab 3    cranberry extract 450 mg tab tablet Take 450 mg by mouth daily.  OTHER L-Arginine with L-Citrulline, 1000 mg twice a day.  sildenafil, antihypertensive, (REVATIO) 20 mg tablet Take 1-5 Tabs by mouth daily as needed.  90 Tab 1    aspirin delayed-release 81 mg tablet Take  by mouth daily.  acetaminophen (TYLENOL) 325 mg tablet Take  by mouth every four (4) hours as needed for Pain.  FOLIC ACID PO Take 220 mcg by mouth daily.  thioctic acid (ALPHA LIPOIC ACID) 50 mg tab Take 1 Tab by mouth daily.  fiber Cap Take 2 Caps by mouth two (2) times a day.  MULTIVITAMIN WITH MINERALS (MULTI-VIT 55 PLUS PO) Take 1 Tab by mouth daily. Taking multivitamins every morning. Allergies: Allergies   Allergen Reactions    Pcn [Penicillins] Unknown (comments)     As a child    Percocet [Oxycodone-Acetaminophen] Swelling     Leg swelling       Smoker:  Social History     Tobacco Use   Smoking Status Never Smoker   Smokeless Tobacco Never Used       ETOH:   Social History     Substance and Sexual Activity   Alcohol Use No    Alcohol/week: 0.0 oz       FH:   Family History   Problem Relation Age of Onset    Stroke Father     Heart Attack Father     Heart Disease Father     Cancer Father 80        colon cancer    Heart Disease Mother     Arthritis-osteo Sister         s/p bilateral knee replacements    No Known Problems Daughter     Anesth Problems Neg Hx        ROS:   As listed in HPI. In addition:  Constitutional:   No headache, fever, fatigue, weight loss or weight gain      Cardiac:    No chest pain      Resp:   No cough or shortness of breath      Neuro   No loss of consciousness, dizziness, seizures      Physical Exam:  Blood pressure 121/76, pulse 72, resp. rate 14, height 5' 10\" (1.778 m), weight 211 lb (95.7 kg), SpO2 97 %. GEN: No apparent distress. Alert and oriented and responds to all questions appropriately. NEUROLOGIC:  No focal neurologic deficits. Strength and sensation grossly intact. Coordination and gait grossly intact. EXT: Well perfused. No edema. SKIN: No obvious rashes. Assessment and Plan     A. fib, sick sinus, ICD  Started on Eliquis. Likes the price. Wonder if he is using a coupon. He is not sure.   My research suggested Eliquis and Xarelto would be in the same tier with his drug insurance. Has 5 refills  Does not need any more anticoagulation encounters. We will close this episode    CAD  Cardiologist wants him to stay on aspirin  Statin  Beta-blocker  ACE inhibitor    Hypertension  HCTZ  Enalapril  Amlodipine  Metoprolol  Blood pressure is well controlled, not currently having any signs of hypotension. As he exercises and loses weight may need to decrease medications    Recall that he is going to be a  for a woman this summer, bring his grandson along and teach him money management    Follow-up in September for q. 6-month checkups      ICD-10-CM ICD-9-CM    1. Paroxysmal atrial fibrillation (HCC) I48.0 427.31    2. Chronic anticoagulation Z79.01 V58.61 AMB POC PT/INR   3. S/P ICD (internal cardiac defibrillator) procedure Z95.810 V45.02    4. Essential hypertension I10 401.9        AVS given.  Pt expressed understanding of instructions

## 2019-06-05 NOTE — ACP (ADVANCE CARE PLANNING)
Advance Care Planning (ACP) Provider Conversation Snapshot    Date of ACP Conversation: 06/05/19  On file

## 2019-06-24 NOTE — TELEPHONE ENCOUNTER
Requested Prescriptions     Pending Prescriptions Disp Refills    apixaban (ELIQUIS) 5 mg tablet 60 Tab 5     Sig: Take 1 Tab by mouth two (2) times a day. Requested by pharmacy.

## 2019-07-17 ENCOUNTER — OFFICE VISIT (OUTPATIENT)
Dept: FAMILY MEDICINE CLINIC | Age: 79
End: 2019-07-17

## 2019-07-17 VITALS
TEMPERATURE: 98.2 F | HEART RATE: 79 BPM | BODY MASS INDEX: 30.35 KG/M2 | WEIGHT: 212 LBS | DIASTOLIC BLOOD PRESSURE: 75 MMHG | SYSTOLIC BLOOD PRESSURE: 122 MMHG | RESPIRATION RATE: 16 BRPM | OXYGEN SATURATION: 96 % | HEIGHT: 70 IN

## 2019-07-17 DIAGNOSIS — E86.0 DEHYDRATION: Primary | ICD-10-CM

## 2019-07-17 DIAGNOSIS — R35.1 BENIGN PROSTATIC HYPERPLASIA WITH NOCTURIA: ICD-10-CM

## 2019-07-17 DIAGNOSIS — N40.1 BENIGN PROSTATIC HYPERPLASIA WITH NOCTURIA: ICD-10-CM

## 2019-07-17 RX ORDER — FINASTERIDE 5 MG/1
5 TABLET, FILM COATED ORAL DAILY
Qty: 30 TAB | Refills: 3 | Status: SHIPPED | OUTPATIENT
Start: 2019-07-17 | End: 2021-12-01 | Stop reason: SDUPTHER

## 2019-07-17 NOTE — PROGRESS NOTES
HPI  Julieta Barnett is a 77 y.o. male who presents with a concern about a dizzy spell he on last week. Lasted for a minute. Was out of the heat. This felt a little off balance. Sat down and felt better. Has been feeling a little off and sluggish for the last few weeks in the heat. Thinks that he might not be getting enough water or salt. Tried Gatorade that a friend gave him and felt like that really helped. This is separate problem where he is getting up 3 times a night urinate every 2 hours. Problem has been going on for months. No worsening recently. Drinks a glass of water within 2 hours of bedtime and leaves the TV on for part of the night. Has been taking tamsulosin for years    PMHx:  Past Medical History:   Diagnosis Date    Arthritis     knees    CAD (coronary artery disease)     stent x3 approx 2001    Chronic atrial fibrillation ([facility] [address]     ED (erectile dysfunction)     Hypertension     Kidney stone on right side 10/30/2011    JAMEY (obstructive sleep apnea) 4/25/2012    Skin cancer     BCC, s/p Mohs on L flank    Sun-damaged skin     TIA (transient ischemic attack)     6/12 - [facility]       Meds:   Current Outpatient Medications   Medication Sig Dispense Refill    finasteride (PROSCAR) 5 mg tablet Take 1 Tab by mouth daily. 30 Tab 3    apixaban (ELIQUIS) 5 mg tablet Take 1 Tab by mouth two (2) times a day. 180 Tab 3    hydroCHLOROthiazide (HYDRODIURIL) 25 mg tablet Take 1 Tab by mouth daily. 90 Tab 3    enalapril (VASOTEC) 20 mg tablet TAKE 1 TABLET TWICE DAILY 180 Tab 3    pravastatin (PRAVACHOL) 10 mg tablet Take 1 Tab by mouth nightly. 90 Tab 3    potassium chloride (KLOR-CON) 10 mEq tablet TAKE 1 TABLET THREE TIMES DAILY 270 Tab 1    terazosin (HYTRIN) 10 mg capsule TAKE 1 CAPSULE EVERY NIGHT 90 Cap 1    metoprolol succinate (TOPROL-XL) 25 mg XL tablet TAKE 1 TABLET EVERY DAY 90 Tab 1    amLODIPine (NORVASC) 5 mg tablet Take 1 Tab by mouth daily.  90 Tab 3    cranberry extract 450 mg tab tablet Take 450 mg by mouth daily.  OTHER L-Arginine with L-Citrulline, 1000 mg twice a day.  sildenafil, antihypertensive, (REVATIO) 20 mg tablet Take 1-5 Tabs by mouth daily as needed. 90 Tab 1    aspirin delayed-release 81 mg tablet Take  by mouth daily.  acetaminophen (TYLENOL) 325 mg tablet Take  by mouth every four (4) hours as needed for Pain.  FOLIC ACID PO Take 554 mcg by mouth daily.  thioctic acid (ALPHA LIPOIC ACID) 50 mg tab Take 1 Tab by mouth daily.  fiber Cap Take 2 Caps by mouth two (2) times a day.  MULTIVITAMIN WITH MINERALS (MULTI-VIT 55 PLUS PO) Take 1 Tab by mouth daily. Taking multivitamins every morning. Allergies: Allergies   Allergen Reactions    Pcn [Penicillins] Unknown (comments)     As a child    Percocet [Oxycodone-Acetaminophen] Swelling     Leg swelling       Smoker:  Social History     Tobacco Use   Smoking Status Never Smoker   Smokeless Tobacco Never Used       ETOH:   Social History     Substance and Sexual Activity   Alcohol Use No    Alcohol/week: 0.0 standard drinks       FH:   Family History   Problem Relation Age of Onset    Stroke Father     Heart Attack Father     Heart Disease Father     Cancer Father 80        colon cancer    Heart Disease Mother     Arthritis-osteo Sister         s/p bilateral knee replacements    No Known Problems Daughter     Anesth Problems Neg Hx        ROS:   As listed in HPI. In addition:  Constitutional:   No headache, fever, fatigue, weight loss or weight gain      Cardiac:    No chest pain      Resp:   No cough or shortness of breath      Neuro   No loss of consciousness, dizziness, seizures      Physical Exam:  Blood pressure 122/75, pulse 79, temperature 98.2 °F (36.8 °C), temperature source Oral, resp. rate 16, height 5' 10\" (1.778 m), weight 212 lb (96.2 kg), SpO2 96 %. GEN: No apparent distress.  Alert and oriented and responds to all questions appropriately. NEUROLOGIC:  No focal neurologic deficits. Strength and sensation grossly intact. Coordination and gait grossly intact. EXT: Well perfused. No edema. SKIN: No obvious rashes. Orthostatic vitals remarkable for  Blood pressure 368 systolic while seated and 122 while supine       Assessment and Plan     Positive orthostatic vitals. No change in the pulse because beta-blockade. Suspect dehydration in the 100 degree heat and inability to compensate because of the cardiac medication that he is taking. Told him to take it easy, listen to his body, maintain hydration. Gave him leave to drink Gatorade. Educated on signs symptoms of heat exhaustion    BPH, nocturia  Taking tamsulosin  Start Proscar. Explained might take several months for this to start working      Is working as a  in the heat    Follow-up in September for his q. 6-month checkup. PSA at that time      ICD-10-CM ICD-9-CM    1. Benign prostatic hyperplasia with nocturia N40.1 600.01     R35.1 788.43    2. Dehydration E86.0 276.51        AVS given.  Pt expressed understanding of instructions

## 2019-07-17 NOTE — PROGRESS NOTES
.  Chief Complaint   Patient presents with    Dizziness     last week    Sleep Problem     . 1. Have you been to the ER, urgent care clinic since your last visit? Hospitalized since your last visit? no    2. Have you seen or consulted any other health care providers outside of the [address] [place] [address] since your last visit? Include any pap smears or colon screening. No  .Felipe Martinez      .   Health Maintenance Due   Topic Date Due    Shingrix Vaccine Age 49> (1 of 2) 08/24/1989    GLAUCOMA SCREENING Q2Y  09/01/2016

## 2019-08-01 ENCOUNTER — CLINICAL SUPPORT (OUTPATIENT)
Dept: CARDIOLOGY CLINIC | Age: 79
End: 2019-08-01

## 2019-08-01 DIAGNOSIS — Z95.810 AUTOMATIC IMPLANTABLE CARDIAC DEFIBRILLATOR IN SITU: Primary | ICD-10-CM

## 2019-08-01 RX ORDER — TERAZOSIN 10 MG/1
CAPSULE ORAL
Qty: 90 CAP | Refills: 1 | Status: SHIPPED | OUTPATIENT
Start: 2019-08-01 | End: 2020-01-24 | Stop reason: SDUPTHER

## 2019-08-01 RX ORDER — METOPROLOL SUCCINATE 25 MG/1
TABLET, EXTENDED RELEASE ORAL
Qty: 90 TAB | Refills: 1 | Status: SHIPPED | OUTPATIENT
Start: 2019-08-01 | End: 2020-01-31 | Stop reason: SDUPTHER

## 2019-09-11 RX ORDER — PRAVASTATIN SODIUM 10 MG/1
TABLET ORAL
Qty: 90 TAB | Refills: 3 | Status: SHIPPED | OUTPATIENT
Start: 2019-09-11 | End: 2019-12-04 | Stop reason: SDUPTHER

## 2019-09-18 ENCOUNTER — OFFICE VISIT (OUTPATIENT)
Dept: FAMILY MEDICINE CLINIC | Age: 79
End: 2019-09-18

## 2019-09-18 VITALS
BODY MASS INDEX: 30.49 KG/M2 | TEMPERATURE: 97.8 F | WEIGHT: 213 LBS | SYSTOLIC BLOOD PRESSURE: 116 MMHG | OXYGEN SATURATION: 97 % | HEIGHT: 70 IN | DIASTOLIC BLOOD PRESSURE: 74 MMHG | RESPIRATION RATE: 16 BRPM | HEART RATE: 62 BPM

## 2019-09-18 DIAGNOSIS — I25.10 CORONARY ARTERY DISEASE INVOLVING NATIVE CORONARY ARTERY OF NATIVE HEART WITHOUT ANGINA PECTORIS: ICD-10-CM

## 2019-09-18 DIAGNOSIS — Z23 ENCOUNTER FOR IMMUNIZATION: ICD-10-CM

## 2019-09-18 DIAGNOSIS — R35.1 BENIGN PROSTATIC HYPERPLASIA WITH NOCTURIA: ICD-10-CM

## 2019-09-18 DIAGNOSIS — N40.1 BENIGN PROSTATIC HYPERPLASIA WITH NOCTURIA: ICD-10-CM

## 2019-09-18 DIAGNOSIS — I10 ESSENTIAL HYPERTENSION: Primary | ICD-10-CM

## 2019-09-18 DIAGNOSIS — Z79.01 CHRONIC ANTICOAGULATION: ICD-10-CM

## 2019-09-18 DIAGNOSIS — N42.9 PROSTATE DISORDER: ICD-10-CM

## 2019-09-18 NOTE — PATIENT INSTRUCTIONS
Learning About Dietary Guidelines  What are the Dietary Guidelines for Americans? Dietary Guidelines for Americans provide tips for eating well and staying healthy. This helps reduce the risk for long-term (chronic) diseases. These adult guidelines from the [place] recommend that you:  · Eat lots of fruits, vegetables, whole grains, and low-fat or nonfat dairy products. · Try to balance your eating with your activity. This helps you stay at a healthy weight. · Drink alcohol in moderation, if at all. · Limit foods high in salt, saturated fat, trans fat, and added sugar. What is MyPlate? MyPlate is the U.[place] government's food guide. It can help you make your own well-balanced eating plan. A balanced eating plan means that you eat enough, but not too much, and that your food gives you the nutrients you need to stay healthy. MyPlate focuses on eating plenty of whole grains, fruits, and vegetables, and on limiting fat and sugar. It is available online at www. ChooseMyPlate.gov. How can you get started? MyPlate suggests that most adults eat certain amounts from the different food groups:  Grains  Eat 5 to 8 ounces of grains each day. Half of those should be whole grains. Choose whole-grain breads, cold and cooked cereals and grains, pasta (without creamy sauces), hard rolls, or low-fat or fat-free crackers. Vegetables  Eat 2 to 3 cups of vegetables every day. They contain little if any fat. And they have lots of nutrients that help protect against heart disease. Fruits  Eat 1½ to 2 cups of fruits every day. Fruits contain very little fat but lots of nutrients. Protein foods  Most adults need 5 to 6½ ounces each day. Choose fish and lean poultry more often. Eat red meat and fried meats less often. Dried beans, tofu, and nuts are also good sources of protein. Dairy  Most adults need 3 cups of milk and milk products a day. Choose low-fat or fat-free products from this food group.  If you have problems digesting milk, try eating cheese or yogurt instead. Limit fats and oils, including those used in cooking. When you do use fats, choose oils that are liquid at room temperature (unsaturated fats). These include canola oil and olive oil. Avoid foods with trans fats, such as many fried foods, cookies, and snack foods. Where can you learn more? Go to http://marquis-thomas.info/. Enter C733 in the search box to learn more about \"Learning About Dietary Guidelines. \"  Current as of: November 7, 2018  Content Version: 12.1  © 4320-3094 [employer]. Care instructions adapted under license by [employer] (which disclaims liability or warranty for this information). If you have questions about a medical condition or this instruction, always ask your healthcare professional. [address] any warranty or liability for your use of this information. Vaccine Information Statement    Influenza (Flu) Vaccine (Inactivated or Recombinant): What You Need to Know    Many Vaccine Information Statements are available in [place] and other languages. See www.immunize.org/vis  Hojas de información sobre vacunas están disponibles en español y en muchos otros idiomas. Visite www.immunize.org/vis    1. Why get vaccinated? Influenza vaccine can prevent influenza (flu). Flu is a contagious disease that spreads around the [place] every year, usually between October and May. Anyone can get the flu, but it is more dangerous for some people. Infants and young children, people 72years of age and older, pregnant women, and people with certain health conditions or a weakened immune system are at greatest risk of flu complications. Pneumonia, bronchitis, sinus infections and ear infections are examples of flu-related complications. If you have a medical condition, such as heart disease, cancer or diabetes, flu can make it worse.     Flu can cause fever and chills, sore throat, muscle aches, fatigue, cough, headache, and runny or stuffy nose. Some people may have vomiting and diarrhea, though this is more common in children than adults. Each year thousands of people in the [facility] die from flu, and many more are hospitalized. Flu vaccine prevents millions of illnesses and flu-related visits to the doctor each year. 2. Influenza vaccines     CDC recommends everyone 10months of age and older get vaccinated every flu season. Children 6 months through 6years of age may need 2 doses during a single flu season. Everyone else needs only 1 dose each flu season. It takes about 2 weeks for protection to develop after vaccination. There are many flu viruses, and they are always changing. Each year a new flu vaccine is made to protect against three or four viruses that are likely to cause disease in the upcoming flu season. Even when the vaccine doesnt exactly match these viruses, it may still provide some protection. Influenza vaccine does not cause flu. Influenza vaccine may be given at the same time as other vaccines. 3. Talk with your health care provider    Tell your vaccine provider if the person getting the vaccine:   Has had an allergic reaction after a previous dose of influenza vaccine, or has any severe, life-threatening allergies.  Has ever had Guillain-Barré Syndrome (also called GBS). In some cases, your health care provider may decide to postpone influenza vaccination to a future visit. People with minor illnesses, such as a cold, may be vaccinated. People who are moderately or severely ill should usually wait until they recover before getting influenza vaccine. Your health care provider can give you more information. 4. Risks of a reaction     Soreness, redness, and swelling where shot is given, fever, muscle aches, and headache can happen after influenza vaccine.    There may be a very small increased risk of Guillain-Barré Syndrome (GBS) after inactivated influenza vaccine (the flu shot). Donna Rodney children who get the flu shot along with pneumococcal vaccine (PCV13), and/or DTaP vaccine at the same time might be slightly more likely to have a seizure caused by fever. Tell your health care provider if a child who is getting flu vaccine has ever had a seizure. People sometimes faint after medical procedures, including vaccination. Tell your provider if you feel dizzy or have vision changes or ringing in the ears. As with any medicine, there is a very remote chance of a vaccine causing a severe allergic reaction, other serious injury, or death. 5. What if there is a serious problem? An allergic reaction could occur after the vaccinated person leaves the clinic. If you see signs of a severe allergic reaction (hives, swelling of the face and throat, difficulty breathing, a fast heartbeat, dizziness, or weakness), call 9-1-1 and get the person to the nearest hospital.    For other signs that concern you, call your health care provider. Adverse reactions should be reported to the Vaccine Adverse Event Reporting System (VAERS). Your health care provider will usually file this report, or you can do it yourself. Visit the VAERS website at www.vaers. hhs.gov or call 2-452.735.8737. VAERS is only for reporting reactions, and VAERS staff do not give medical advice. 6. The National Vaccine Injury Compensation Program    The [place] [place] Vaccine Injury Compensation Program (VICP) is a federal program that was created to compensate people who may have been injured by certain vaccines. Visit the VICP website at www.hrsa.gov/vaccinecompensation or call 2-668.910.2129 to learn about the program and about filing a claim. There is a time limit to file a claim for compensation. 7. How can I learn more?  Ask your health care provider.  Call your local or state health department.    Contact the Centers for Disease Control and Prevention (CDC):  - Call 0-540.240.5669 (0-092-YPD-INFO) or  - Visit CDCs influenza website at www.cdc.gov/flu    Vaccine Information Statement (Interim)  Inactivated Influenza Vaccine   8/15/2019  42 JENNY Mittal [id number]   Department of Health and Human Services  Centers for Disease Control and Prevention    Office Use Only

## 2019-09-18 NOTE — PROGRESS NOTES
Chief Complaint   Patient presents with   [place] Annual Wellness Visit     1. Have you been to the ER, urgent care clinic since your last visit? Hospitalized since your last visit? No    2. Have you seen or consulted any other health care providers outside of the [address] [place] [address] since your last visit? Include any pap smears or colon screening. No    Health Maintenance Due   Topic Date Due    Shingrix Vaccine Age 49> (1 of 2) 08/24/1989    GLAUCOMA SCREENING Q2Y  09/01/2016    Influenza Age 5 to Adult  08/01/2019     Sherice Vasquez is a [de-identified] y.o. male who presents for routine immunizations. He denies any symptoms , reactions or allergies that would exclude them from being immunized today. Risks and adverse reactions were discussed and the VIS was given to them. All questions were addressed. He was observed for 10 min post injection. There were no reactions observed.     Eileen Veras

## 2019-09-18 NOTE — PROGRESS NOTES
HPI  Remy Vazquez is a [de-identified] y.o. male who presents for follow-up on chronic medical issues. Has been working as a  for this past summer and that has been extended to a year-round job. He is riding a bike 3 miles per day. Was switched from warfarin to Eliquis and he has broadened his diet to include more vegetables. Feels a lot better    PMHx:  Past Medical History:   Diagnosis Date    Arthritis     knees    CAD (coronary artery disease)     stent x3 approx 2001    Chronic atrial fibrillation ([address]     ED (erectile dysfunction)     Hypertension     Kidney stone on right side 10/30/2011    JAMEY (obstructive sleep apnea) 4/25/2012    Skin cancer     BCC, s/p Mohs on L flank    Sun-damaged skin     TIA (transient ischemic attack)     6/12 - [facility]       Meds:   Current Outpatient Medications   Medication Sig Dispense Refill    pravastatin (PRAVACHOL) 10 mg tablet TAKE 1 TABLET EVERY NIGHT 90 Tab 3    terazosin (HYTRIN) 10 mg capsule TAKE 1 CAPSULE EVERY NIGHT 90 Cap 1    metoprolol succinate (TOPROL-XL) 25 mg XL tablet TAKE 1 TABLET EVERY DAY 90 Tab 1    finasteride (PROSCAR) 5 mg tablet Take 1 Tab by mouth daily. 30 Tab 3    apixaban (ELIQUIS) 5 mg tablet Take 1 Tab by mouth two (2) times a day. 180 Tab 3    hydroCHLOROthiazide (HYDRODIURIL) 25 mg tablet Take 1 Tab by mouth daily. 90 Tab 3    enalapril (VASOTEC) 20 mg tablet TAKE 1 TABLET TWICE DAILY 180 Tab 3    potassium chloride (KLOR-CON) 10 mEq tablet TAKE 1 TABLET THREE TIMES DAILY (Patient taking differently: two (2) times a day. TAKE 1 TABLET THREE TIMES DAILY) 270 Tab 1    amLODIPine (NORVASC) 5 mg tablet Take 1 Tab by mouth daily. 90 Tab 3    cranberry extract 450 mg tab tablet Take 450 mg by mouth daily.  OTHER L-Arginine with L-Citrulline, 1000 mg twice a day.  sildenafil, antihypertensive, (REVATIO) 20 mg tablet Take 1-5 Tabs by mouth daily as needed.  90 Tab 1    aspirin delayed-release 81 mg tablet Take by mouth daily.  FOLIC ACID PO Take 010 mcg by mouth daily.  thioctic acid (ALPHA LIPOIC ACID) 50 mg tab Take 1 Tab by mouth daily.  fiber Cap Take 2 Caps by mouth two (2) times a day.  MULTIVITAMIN WITH MINERALS (MULTI-VIT 55 PLUS PO) Take 1 Tab by mouth daily. Taking multivitamins every morning. Allergies: Allergies   Allergen Reactions    Pcn [Penicillins] Unknown (comments)     As a child    Percocet [Oxycodone-Acetaminophen] Swelling     Leg swelling       Smoker:  Social History     Tobacco Use   Smoking Status Never Smoker   Smokeless Tobacco Never Used       ETOH:   Social History     Substance and Sexual Activity   Alcohol Use No    Alcohol/week: 0.0 standard drinks       FH:   Family History   Problem Relation Age of Onset    Stroke Father     Heart Attack Father     Heart Disease Father     Cancer Father 80        colon cancer    Heart Disease Mother     Arthritis-osteo Sister         s/p bilateral knee replacements    No Known Problems Daughter     Anesth Problems Neg Hx        ROS:   As listed in HPI. In addition:  Constitutional:   No headache, fever, fatigue, weight loss or weight gain      Cardiac:    No chest pain      Resp:   No cough or shortness of breath      Neuro   No loss of consciousness, dizziness, seizures      Physical Exam:  Blood pressure 116/74, pulse 62, temperature 97.8 °F (36.6 °C), temperature source Oral, resp. rate 16, height 5' 10\" (1.778 m), weight 213 lb (96.6 kg), SpO2 97 %. GEN: No apparent distress. Alert and oriented and responds to all questions appropriately. NEUROLOGIC:  No focal neurologic deficits. Strength and sensation grossly intact. Coordination and gait grossly intact. EXT: Well perfused. No edema. SKIN: No obvious rashes.   Lungs clear to auscultation bilaterally  CV regular rate rhythm no murmur  HEENT clear to TM       Assessment and Plan     Hypertension  Well-controlled  Metoprolol 25 mg  HCTZ 25 mg  Enalapril 20 mg  Norvasc 5 mg    CAD, history TIA, paroxysmal atrial fibrillation  Eliquis 5 mg  Metoprolol 25 mg  Pravastatin 10 mg  Aspirin 81 mg    BPH  Had hesitancy and nocturia every 2 hours. Was taking Flomax with minimal relief. Added Proscar last visit. He is down to 1-2 trips to the bathroom at night  PSA today    Erectile dysfunction, capable of achieving erection but not maintaining it. Viagra working well    Hypokalemia  Taking 20 mEq of potassium per day. Go ahead and wean off of this. Take 10 mEq for 1 week then stop    Is taking several multivitamins but is not sure which. Suggested that he bring these in if you would like to discuss them      ICD-10-CM ICD-9-CM    1. Essential hypertension A92 895.8 METABOLIC PANEL, COMPREHENSIVE      CBC WITH AUTOMATED DIFF      LIPID PANEL   2. Encounter for immunization Z23 V03.89 INFLUENZA VACCINE INACTIVATED (IIV), SUBUNIT, ADJUVANTED, IM      ADMIN INFLUENZA VIRUS VAC   3. Coronary artery disease involving native coronary artery of native heart without angina pectoris I25.10 414.01    4. Chronic anticoagulation Z79.01 V58.61    5. Benign prostatic hyperplasia with nocturia N40.1 600.01 PSA, DIAGNOSTIC (PROSTATE SPECIFIC AG)    R35.1 788.43    6. Prostate disorder N42.9 602.9 PSA, DIAGNOSTIC (PROSTATE SPECIFIC AG)       AVS given.  Pt expressed understanding of instructions

## 2019-09-19 LAB
ALBUMIN SERPL-MCNC: 3.9 G/DL (ref 3.5–4.7)
ALBUMIN/GLOB SERPL: 1.6 {RATIO} (ref 1.2–2.2)
ALP SERPL-CCNC: 95 IU/L (ref 39–117)
ALT SERPL-CCNC: 10 IU/L (ref 0–44)
AST SERPL-CCNC: 17 IU/L (ref 0–40)
BASOPHILS # BLD AUTO: 0 X10E3/UL (ref 0–0.2)
BASOPHILS NFR BLD AUTO: 1 %
BILIRUB SERPL-MCNC: 0.4 MG/DL (ref 0–1.2)
BUN SERPL-MCNC: 19 MG/DL (ref 8–27)
BUN/CREAT SERPL: 23 (ref 10–24)
CALCIUM SERPL-MCNC: 8.9 MG/DL (ref 8.6–10.2)
CHLORIDE SERPL-SCNC: 104 MMOL/L (ref 96–106)
CHOLEST SERPL-MCNC: 97 MG/DL (ref 100–199)
CO2 SERPL-SCNC: 29 MMOL/L (ref 20–29)
CREAT SERPL-MCNC: 0.84 MG/DL (ref 0.76–1.27)
EOSINOPHIL # BLD AUTO: 0.2 X10E3/UL (ref 0–0.4)
EOSINOPHIL NFR BLD AUTO: 4 %
ERYTHROCYTE [DISTWIDTH] IN BLOOD BY AUTOMATED COUNT: 13.2 % (ref 12.3–15.4)
GLOBULIN SER CALC-MCNC: 2.5 G/DL (ref 1.5–4.5)
GLUCOSE SERPL-MCNC: 99 MG/DL (ref 65–99)
HCT VFR BLD AUTO: 40.8 % (ref 37.5–51)
HDLC SERPL-MCNC: 27 MG/DL
HGB BLD-MCNC: 13.5 G/DL (ref 13–17.7)
IMM GRANULOCYTES # BLD AUTO: 0 X10E3/UL (ref 0–0.1)
IMM GRANULOCYTES NFR BLD AUTO: 0 %
INTERPRETATION, 910389: NORMAL
LDLC SERPL CALC-MCNC: 40 MG/DL (ref 0–99)
LYMPHOCYTES # BLD AUTO: 1.2 X10E3/UL (ref 0.7–3.1)
LYMPHOCYTES NFR BLD AUTO: 21 %
MCH RBC QN AUTO: 31.9 PG (ref 26.6–33)
MCHC RBC AUTO-ENTMCNC: 33.1 G/DL (ref 31.5–35.7)
MCV RBC AUTO: 97 FL (ref 79–97)
MONOCYTES # BLD AUTO: 0.5 X10E3/UL (ref 0.1–0.9)
MONOCYTES NFR BLD AUTO: 9 %
NEUTROPHILS # BLD AUTO: 3.6 X10E3/UL (ref 1.4–7)
NEUTROPHILS NFR BLD AUTO: 65 %
PLATELET # BLD AUTO: 162 X10E3/UL (ref 150–450)
POTASSIUM SERPL-SCNC: 4 MMOL/L (ref 3.5–5.2)
PROT SERPL-MCNC: 6.4 G/DL (ref 6–8.5)
PSA SERPL-MCNC: 0.9 NG/ML (ref 0–4)
RBC # BLD AUTO: 4.23 X10E6/UL (ref 4.14–5.8)
SODIUM SERPL-SCNC: 147 MMOL/L (ref 134–144)
TRIGL SERPL-MCNC: 148 MG/DL (ref 0–149)
VLDLC SERPL CALC-MCNC: 30 MG/DL (ref 5–40)
WBC # BLD AUTO: 5.6 X10E3/UL (ref 3.4–10.8)

## 2019-09-24 RX ORDER — POTASSIUM CHLORIDE 750 MG/1
TABLET, FILM COATED, EXTENDED RELEASE ORAL
Qty: 270 TAB | Refills: 1 | OUTPATIENT
Start: 2019-09-24

## 2019-09-27 RX ORDER — POTASSIUM CHLORIDE 750 MG/1
10 TABLET, EXTENDED RELEASE ORAL DAILY
Qty: 90 TAB | Refills: 0 | Status: SHIPPED | OUTPATIENT
Start: 2019-09-27 | End: 2020-03-18

## 2019-09-27 NOTE — TELEPHONE ENCOUNTER
Pt is calling requesting his med states he didn't realize you were stopping it completely he thought he would get and take if needed      Requested Prescriptions     Pending Prescriptions Disp Refills    potassium chloride (KLOR-CON) 10 mEq tablet 270 Tab 1     Sig: TAKE 1 TABLET THREE TIMES DAILY

## 2019-11-05 ENCOUNTER — CLINICAL SUPPORT (OUTPATIENT)
Dept: CARDIOLOGY CLINIC | Age: 79
End: 2019-11-05

## 2019-11-05 DIAGNOSIS — Z95.810 AUTOMATIC IMPLANTABLE CARDIAC DEFIBRILLATOR IN SITU: Primary | ICD-10-CM

## 2019-11-26 RX ORDER — AMLODIPINE BESYLATE 5 MG/1
TABLET ORAL
Qty: 90 TAB | Refills: 3 | Status: SHIPPED | OUTPATIENT
Start: 2019-11-26 | End: 2019-12-04 | Stop reason: SDUPTHER

## 2019-12-04 RX ORDER — PRAVASTATIN SODIUM 10 MG/1
TABLET ORAL
Qty: 90 TAB | Refills: 3 | Status: SHIPPED | OUTPATIENT
Start: 2019-12-04 | End: 2020-05-18 | Stop reason: SDUPTHER

## 2019-12-04 RX ORDER — AMLODIPINE BESYLATE 5 MG/1
TABLET ORAL
Qty: 90 TAB | Refills: 3 | Status: SHIPPED | OUTPATIENT
Start: 2019-12-04 | End: 2020-06-08 | Stop reason: SDUPTHER

## 2019-12-04 NOTE — TELEPHONE ENCOUNTER
Requested Prescriptions     Pending Prescriptions Disp Refills    pravastatin (PRAVACHOL) 10 mg tablet 90 Tab 3    amLODIPine (NORVASC) 5 mg tablet 90 Tab 3     Patient stated that he has run out and does not have enough for this month. He would like for it to be sent to [facility] in [place].

## 2019-12-18 ENCOUNTER — OFFICE VISIT (OUTPATIENT)
Dept: CARDIOLOGY CLINIC | Age: 79
End: 2019-12-18

## 2019-12-18 VITALS
DIASTOLIC BLOOD PRESSURE: 80 MMHG | HEART RATE: 65 BPM | WEIGHT: 190 LBS | HEIGHT: 70 IN | BODY MASS INDEX: 27.2 KG/M2 | OXYGEN SATURATION: 97 % | SYSTOLIC BLOOD PRESSURE: 130 MMHG | RESPIRATION RATE: 16 BRPM

## 2019-12-18 DIAGNOSIS — I25.10 CORONARY ARTERY DISEASE INVOLVING NATIVE CORONARY ARTERY OF NATIVE HEART WITHOUT ANGINA PECTORIS: ICD-10-CM

## 2019-12-18 DIAGNOSIS — Z95.810 S/P ICD (INTERNAL CARDIAC DEFIBRILLATOR) PROCEDURE: ICD-10-CM

## 2019-12-18 DIAGNOSIS — I48.20 CHRONIC A-FIB (HCC): Primary | ICD-10-CM

## 2019-12-18 DIAGNOSIS — I10 ESSENTIAL HYPERTENSION: ICD-10-CM

## 2019-12-18 DIAGNOSIS — E78.2 MIXED HYPERLIPIDEMIA: ICD-10-CM

## 2019-12-18 NOTE — PROGRESS NOTES
[facility]: Sravanthi Mckeon  [phone]    HPI:   Mr. Eros Bowers is a [de-identified] yo M with h/o CAD s/p PCI in 2003, patent stent with no significant CAD on 2008 cath, afib on metoprolol for rate control and coumadin for anticoagulation, apical variant of a hypertrophic cardiomyopathy with deep T wave inversions on ECG, sick sinus with NSVT noted on 11/14/13 holter with multiple > 3 sec pauses s/p [employer] ICD on 11/15/13 with Dr. Sushila Titus, s/p right hip revision on 12/5/13. MARIBEL in 2012 was normal.  7/2011 nuclear stress test was normal.  5/2012 echo noted EF of 50-55% and apical hypertrophy. Followed by ortho for right hip Dr. Shelia Montanez    Since his last visit, he has been doing excellent. He has been exercising regular and losing weight. He is down from 212 to 190 pounds. He walks about four miles a day without any restriction. No chest pain, shortness of breath or palpitations. He has been compliant with his medications. No bleeding issues on Eliquis. His EKG is ventricular paced. Assessment and Plan:    1. Atrial fibrillation with sick sinus, status post ICD. Stable and compensated. No bleeding issues on Eliquis. Will have him follow back in six months. 2. Coronary artery disease. Continue aspirin, statin, beta blocker and ACE inhibitor. No changes were made. 3. Hypertrophic cardiomyopathy. Stable on beta blocker. 4. Mixed hyperlipidemia. Tolerating statin. 5. Essential hypertension. Blood pressure is controlled. 6. Erectile dysfunction. He takes Cialis or Viagra prn. Cautioned in the past about avoiding Nitroglycerin concurrently. He  has a past medical history of Arthritis, CAD (coronary artery disease), Chronic atrial fibrillation, ED (erectile dysfunction), Hypertension, Kidney stone on right side (10/30/2011), JAMYE (obstructive sleep apnea) (4/25/2012), Skin cancer, Sun-damaged skin, and TIA (transient ischemic attack).  He also has no past medical history of Arsenic suspected exposure, Family history of skin cancer, Radiation exposure, Sunburn, blistering, or Tanning bed exposure. All other systems negative except as above. PE  Vitals:    12/18/19 0917   BP: 130/80   Pulse: 65   Resp: 16   SpO2: 97%   Weight: 190 lb (86.2 kg)   Height: 5' 10\" (1.778 m)    Body mass index is 27.26 kg/m². General appearance - alert, well appearing, and in no distress  Mental status - affect appropriate to mood  Neck - supple, no JVD. No bruits present.    Chest - clear to auscultation, no wheezes, rales or rhonchi  Heart - Regular rate, regular rhythm, normal S1, S2, I/VI systolic murmur LUSB  Abdomen - soft, nontender, nondistended, no masses or organomegaly  Extremities - peripheral pulses normal, no pedal edema        Recent Labs:  Lab Results   Component Value Date/Time    Cholesterol, total 97 (L) 09/18/2019 08:42 AM    HDL Cholesterol 27 (L) 09/18/2019 08:42 AM    LDL, calculated 40 09/18/2019 08:42 AM    Triglyceride 148 09/18/2019 08:42 AM     Lab Results   Component Value Date/Time    Creatinine 0.84 09/18/2019 08:42 AM     Lab Results   Component Value Date/Time    BUN 19 09/18/2019 08:42 AM    BUN (POC) 16 12/04/2013 06:46 AM     Lab Results   Component Value Date/Time    Potassium 4.0 09/18/2019 08:42 AM     Lab Results   Component Value Date/Time    Hemoglobin A1c 5.3 11/19/2013 08:45 AM     Lab Results   Component Value Date/Time    HGB 13.5 09/18/2019 08:42 AM     Lab Results   Component Value Date/Time    PLATELET 457 54/39/1448 08:42 AM       Reviewed:  Past Medical History:   Diagnosis Date    Arthritis     knees    CAD (coronary artery disease)     stent x3 approx 2001    Chronic atrial fibrillation     ED (erectile dysfunction)     Hypertension     Kidney stone on right side 10/30/2011    JAMEY (obstructive sleep apnea) 4/25/2012    Skin cancer     BCC, s/p Mohs on L flank    Sun-damaged skin     TIA (transient ischemic attack)     6/12 - seen at [facility] Social History     Tobacco Use   Smoking Status Never Smoker   Smokeless Tobacco Never Used     Social History     Substance and Sexual Activity   Alcohol Use No    Alcohol/week: 0.0 standard drinks     Allergies   Allergen Reactions    Pcn [Penicillins] Unknown (comments)     As a child    Percocet [Oxycodone-Acetaminophen] Swelling     Leg swelling       Current Outpatient Medications   Medication Sig    pravastatin (PRAVACHOL) 10 mg tablet TAKE 1 TABLET EVERY NIGHT    amLODIPine (NORVASC) 5 mg tablet TAKE 1 TABLET EVERY DAY    potassium chloride (KLOR-CON) 10 mEq tablet Take 1 Tab by mouth daily.  terazosin (HYTRIN) 10 mg capsule TAKE 1 CAPSULE EVERY NIGHT    metoprolol succinate (TOPROL-XL) 25 mg XL tablet TAKE 1 TABLET EVERY DAY    finasteride (PROSCAR) 5 mg tablet Take 1 Tab by mouth daily.  apixaban (ELIQUIS) 5 mg tablet Take 1 Tab by mouth two (2) times a day.  hydroCHLOROthiazide (HYDRODIURIL) 25 mg tablet Take 1 Tab by mouth daily.  enalapril (VASOTEC) 20 mg tablet TAKE 1 TABLET TWICE DAILY    cranberry extract 450 mg tab tablet Take 450 mg by mouth daily.  OTHER L-Arginine with L-Citrulline, 1000 mg twice a day.  sildenafil, antihypertensive, (REVATIO) 20 mg tablet Take 1-5 Tabs by mouth daily as needed.  aspirin delayed-release 81 mg tablet Take  by mouth daily.  FOLIC ACID PO Take 522 mcg by mouth daily.  thioctic acid (ALPHA LIPOIC ACID) 50 mg tab Take 1 Tab by mouth daily.  fiber Cap Take 2 Caps by mouth two (2) times a day.  MULTIVITAMIN WITH MINERALS (MULTI-VIT 55 PLUS PO) Take 1 Tab by mouth daily. Taking multivitamins every morning. No current facility-administered medications for this visit.         Joe Jackman MD  [facility] [place]  [address] [facility] [address]  [address]

## 2019-12-18 NOTE — PROGRESS NOTES
All health maintenance and other pertinent information has been reviewed in preparation for today's office visit. Patient presents in the office today for:    Chief Complaint   Patient presents with    Follow-up     6 mo f/u -A-fib     1. Have you been to the ER, urgent care clinic since your last visit? Hospitalized since your last visit? No    2. Have you seen or consulted any other health care providers outside of the [address] [place] [address] since your last visit? Include any pap smears or colon screening.  No

## 2020-01-06 ENCOUNTER — OFFICE VISIT (OUTPATIENT)
Dept: FAMILY MEDICINE CLINIC | Age: 80
End: 2020-01-06

## 2020-01-06 VITALS
BODY MASS INDEX: 28.32 KG/M2 | SYSTOLIC BLOOD PRESSURE: 112 MMHG | TEMPERATURE: 97.9 F | WEIGHT: 197.8 LBS | RESPIRATION RATE: 18 BRPM | HEIGHT: 70 IN | HEART RATE: 61 BPM | OXYGEN SATURATION: 98 % | DIASTOLIC BLOOD PRESSURE: 74 MMHG

## 2020-01-06 DIAGNOSIS — N52.9 ERECTILE DYSFUNCTION, UNSPECIFIED ERECTILE DYSFUNCTION TYPE: ICD-10-CM

## 2020-01-06 DIAGNOSIS — I10 ESSENTIAL HYPERTENSION: ICD-10-CM

## 2020-01-06 DIAGNOSIS — I25.10 CORONARY ARTERY DISEASE INVOLVING NATIVE CORONARY ARTERY OF NATIVE HEART WITHOUT ANGINA PECTORIS: ICD-10-CM

## 2020-01-06 DIAGNOSIS — I48.0 PAROXYSMAL ATRIAL FIBRILLATION (HCC): Primary | ICD-10-CM

## 2020-01-06 DIAGNOSIS — G45.9 TIA (TRANSIENT ISCHEMIC ATTACK): ICD-10-CM

## 2020-01-06 RX ORDER — TADALAFIL 20 MG/1
20 TABLET ORAL AS NEEDED
Qty: 30 TAB | Refills: 11 | Status: SHIPPED | OUTPATIENT
Start: 2020-01-06 | End: 2020-02-07 | Stop reason: SDUPTHER

## 2020-01-06 NOTE — PROGRESS NOTES
IMAN Christian is a [de-identified] y.o. male who presents for follow-up on chronic medical issues. Has been working as a  for this past summer and that has been extended to a year-round job. He is riding a bike 3 miles per day. Was switched from warfarin to Eliquis and he has broadened his diet to include more vegetables. Feels a lot better. Doing a lot more walking. Unfortunately new insurance with the new year Eliquis is $400 for a 3-month supply and he cannot afford that. Here to talk about his options    PMHx:  Past Medical History:   Diagnosis Date    Arthritis     knees    CAD (coronary artery disease)     stent x3 approx 2001    Chronic atrial fibrillation     ED (erectile dysfunction)     Hypertension     Kidney stone on right side 10/30/2011    JAMEY (obstructive sleep apnea) 4/25/2012    Skin cancer     BCC, s/p Mohs on L flank    Sun-damaged skin     TIA (transient ischemic attack)     6/12 - [facility] at [facility]       Meds:   Current Outpatient Medications   Medication Sig Dispense Refill    tadalafil (CIALIS) 20 mg tablet Take 1 Tab by mouth as needed (ED). 30 Tab 11    pravastatin (PRAVACHOL) 10 mg tablet TAKE 1 TABLET EVERY NIGHT 90 Tab 3    amLODIPine (NORVASC) 5 mg tablet TAKE 1 TABLET EVERY DAY 90 Tab 3    potassium chloride (KLOR-CON) 10 mEq tablet Take 1 Tab by mouth daily. 90 Tab 0    terazosin (HYTRIN) 10 mg capsule TAKE 1 CAPSULE EVERY NIGHT 90 Cap 1    metoprolol succinate (TOPROL-XL) 25 mg XL tablet TAKE 1 TABLET EVERY DAY 90 Tab 1    finasteride (PROSCAR) 5 mg tablet Take 1 Tab by mouth daily. 30 Tab 3    apixaban (ELIQUIS) 5 mg tablet Take 1 Tab by mouth two (2) times a day. 180 Tab 3    hydroCHLOROthiazide (HYDRODIURIL) 25 mg tablet Take 1 Tab by mouth daily. 90 Tab 3    enalapril (VASOTEC) 20 mg tablet TAKE 1 TABLET TWICE DAILY 180 Tab 3    aspirin delayed-release 81 mg tablet Take  by mouth daily.  FOLIC ACID PO Take 683 mcg by mouth daily.       MULTIVITAMIN WITH MINERALS (MULTI-VIT 55 PLUS PO) Take 1 Tab by mouth daily. Taking multivitamins every morning. Allergies: Allergies   Allergen Reactions    Pcn [Penicillins] Unknown (comments)     As a child    Percocet [Oxycodone-Acetaminophen] Swelling     Leg swelling       Smoker:  Social History     Tobacco Use   Smoking Status Never Smoker   Smokeless Tobacco Never Used       ETOH:   Social History     Substance and Sexual Activity   Alcohol Use No    Alcohol/week: 0.0 standard drinks       FH:   Family History   Problem Relation Age of Onset    Stroke Father     Heart Attack Father     Heart Disease Father     Cancer Father 80        colon cancer    Heart Disease Mother     Arthritis-osteo Sister         s/p bilateral knee replacements    No Known Problems Daughter     Anesth Problems Neg Hx        ROS:   As listed in HPI. In addition:  Constitutional:   No headache, fever, fatigue, weight loss or weight gain      Cardiac:    No chest pain      Resp:   No cough or shortness of breath      Neuro   No loss of consciousness, dizziness, seizures      Physical Exam:  Blood pressure 112/74, pulse 61, temperature 97.9 °F (36.6 °C), temperature source Oral, resp. rate 18, height 5' 10\" (1.778 m), weight 197 lb 12.8 oz (89.7 kg), SpO2 98 %. GEN: No apparent distress. Alert and oriented and responds to all questions appropriately. NEUROLOGIC:  No focal neurologic deficits. Strength and sensation grossly intact. Coordination and gait grossly intact. EXT: Well perfused. No edema. SKIN: No obvious rashes. Lungs clear to auscultation bilaterally  CV regular rate rhythm no murmur  HEENT clear to TM       Assessment and Plan     Hypertension  Well-controlled  Metoprolol 25 mg  HCTZ 25 mg  Enalapril 20 mg  Norvasc 5 mg    CAD, history TIA, paroxysmal atrial fibrillation  Eliquis 5 mg-too expensive with his new insurance. Here to discuss his options. Asked him to look into Xarelto and Pradaxa. If these are too expensive we can go back to Coumadin. He was on this as recently as 6/2019  Metoprolol 25 mg  Pravastatin 10 mg  Aspirin 81 mg    BPH  Had hesitancy and nocturia every 2 hours. Was taking Flomax with minimal relief. Added Proscar last visit. He is down to 1-2 trips to the bathroom at night  Consider chronic low-dose Cialis    Erectile dysfunction, capable of achieving erection but not maintaining it. Viagra working well  Requesting switch to Cialis    Hypokalemia  Taking 10 mEq of potassium per day. Go ahead and wean off of this. Was taking several multivitamins but came down to just 1 multivitamin and folic acid. Probably does not need both    Springtime for labs and wellness-March      ICD-10-CM ICD-9-CM    1. Paroxysmal atrial fibrillation (HCC) I48.0 427.31    2. Erectile dysfunction, unspecified erectile dysfunction type N52.9 607.84    3. Coronary artery disease involving native coronary artery of native heart without angina pectoris I25.10 414.01    4. Essential hypertension I10 401.9    5. TIA (transient ischemic attack) G45.9 435.9        AVS given.  Pt expressed understanding of instructions

## 2020-01-06 NOTE — PROGRESS NOTES
1. Have you been to the ER, urgent care clinic since your last visit? Hospitalized since your last visit? No    2. Have you seen or consulted any other health care providers outside of the [address] [place] [address] since your last visit? Include any pap smears or colon screening.  Yes, Ophthalmology    Health Maintenance Due   Topic Date Due    Shingrix Vaccine Age 49> (1 of 2) 08/24/1989    GLAUCOMA SCREENING Q2Y  09/01/2016     Eye exam Vincent Rodriguez[facility])

## 2020-01-10 RX ORDER — HYDROCHLOROTHIAZIDE 25 MG/1
25 TABLET ORAL DAILY
Qty: 90 TAB | Refills: 3 | Status: SHIPPED | OUTPATIENT
Start: 2020-01-10 | End: 2020-03-20 | Stop reason: SDUPTHER

## 2020-01-27 RX ORDER — WARFARIN 2 MG/1
2 TABLET ORAL 2 TIMES WEEKLY
Qty: 30 TAB | Refills: 3 | Status: CANCELLED | OUTPATIENT
Start: 2020-01-27

## 2020-01-27 RX ORDER — WARFARIN 2.5 MG/1
2.5 TABLET ORAL DAILY
Qty: 90 TAB | Refills: 1 | Status: SHIPPED | OUTPATIENT
Start: 2020-01-27 | End: 2020-03-25 | Stop reason: SDUPTHER

## 2020-01-27 NOTE — TELEPHONE ENCOUNTER
Requested Prescriptions     Pending Prescriptions Disp Refills    terazosin (HYTRIN) 10 mg capsule 90 Cap 1     Future Appointments:  3/18/2020  8:40 AM Mikhail Marley MD      Last Appointment With Me:  1/6/2020

## 2020-01-27 NOTE — TELEPHONE ENCOUNTER
We had discussed switching back to Coumadin from the more expensive Eliquis. He has been on Coumadin in the past.    We will call in Coumadin 2.5 mg to be taken daily. He will need an INR check in 1 week after starting the medicine.   Please explain this to patient and go ahead and set up an appointment

## 2020-01-28 RX ORDER — TERAZOSIN 10 MG/1
CAPSULE ORAL
Qty: 90 CAP | Refills: 3 | Status: SHIPPED | OUTPATIENT
Start: 2020-01-28 | End: 2020-05-01 | Stop reason: SDUPTHER

## 2020-01-31 RX ORDER — METOPROLOL SUCCINATE 25 MG/1
TABLET, EXTENDED RELEASE ORAL
Qty: 90 TAB | Refills: 3 | Status: SHIPPED | OUTPATIENT
Start: 2020-01-31 | End: 2020-03-20 | Stop reason: SDUPTHER

## 2020-01-31 NOTE — TELEPHONE ENCOUNTER
Pt came into office wanting refill    Requested Prescriptions     Pending Prescriptions Disp Refills    metoprolol succinate (TOPROL-XL) 25 mg XL tablet 90 Tab 1       Pharmacy: [facility] in [place]

## 2020-02-05 ENCOUNTER — CLINICAL SUPPORT (OUTPATIENT)
Dept: FAMILY MEDICINE CLINIC | Age: 80
End: 2020-02-05

## 2020-02-05 VITALS
OXYGEN SATURATION: 98 % | HEART RATE: 63 BPM | DIASTOLIC BLOOD PRESSURE: 70 MMHG | SYSTOLIC BLOOD PRESSURE: 114 MMHG | HEIGHT: 70 IN | WEIGHT: 197.8 LBS | BODY MASS INDEX: 28.32 KG/M2 | TEMPERATURE: 97.7 F | RESPIRATION RATE: 16 BRPM

## 2020-02-05 DIAGNOSIS — I48.0 PAROXYSMAL ATRIAL FIBRILLATION (HCC): Primary | ICD-10-CM

## 2020-02-05 LAB
INR BLD: 2
PT POC: 23.7 SECONDS
VALID INTERNAL CONTROL?: YES

## 2020-02-05 NOTE — PROGRESS NOTES
Monet Lindsey is a [de-identified] y.o. male who presents today for Anticoagulation monitoring. Indication: Atrial Fibrillation  INR Goal: 2.0-3.0. Current dose:  Coumadin 2.5 mg daily. Missed Coumadin Doses:  None  Medication Changes:  no  Dietary Changes:  no    Symptoms: taking coumadin appropriately without any bleeding. Latest INRs:  Lab Results   Component Value Date/Time    INR 2.0 (H) 11/12/2018 09:18 AM    INR 1.1 12/06/2013 04:21 AM    INR 1.2 (H) 12/05/2013 03:40 AM    INR (POC) 2.4 (H) 05/15/2018 09:21 AM    INR (POC) 4.4 (HH) 05/10/2018 11:46 AM    INR POC 1.6 06/05/2019 08:31 AM    INR POC 2.6 05/08/2019 08:09 AM    INR POC 1.9 04/10/2019 08:26 AM    Prothrombin time 19.9 (H) 11/12/2018 09:18 AM    Prothrombin time 11.2 12/06/2013 04:21 AM    Prothrombin time 12.0 (H) 12/05/2013 03:40 AM        New Coumadin dose:.current treatment plan is effective, no change in therapy per Dr. Rhiannon Nunes. Next check to be scheduled for  1 week per Dr. Rhiannon Nunes.

## 2020-02-07 RX ORDER — TADALAFIL 20 MG/1
20 TABLET ORAL AS NEEDED
Qty: 30 TAB | Refills: 11 | Status: SHIPPED | OUTPATIENT
Start: 2020-02-07 | End: 2020-12-01 | Stop reason: SDUPTHER

## 2020-02-07 NOTE — TELEPHONE ENCOUNTER
Patient called for refill     Call back at [phone]    Pharm[facility]: [facility]    Requested Prescriptions     Pending Prescriptions Disp Refills    tadalafil (CIALIS) 20 mg tablet 30 Tab 11     Sig: Take 1 Tab by mouth as needed (ED).

## 2020-02-11 ENCOUNTER — CLINICAL SUPPORT (OUTPATIENT)
Dept: CARDIOLOGY CLINIC | Age: 80
End: 2020-02-11

## 2020-02-11 DIAGNOSIS — Z95.810 AUTOMATIC IMPLANTABLE CARDIAC DEFIBRILLATOR IN SITU: Primary | ICD-10-CM

## 2020-02-12 ENCOUNTER — CLINICAL SUPPORT (OUTPATIENT)
Dept: FAMILY MEDICINE CLINIC | Age: 80
End: 2020-02-12

## 2020-02-12 VITALS
HEIGHT: 70 IN | DIASTOLIC BLOOD PRESSURE: 64 MMHG | OXYGEN SATURATION: 98 % | BODY MASS INDEX: 28.63 KG/M2 | TEMPERATURE: 98.2 F | HEART RATE: 67 BPM | RESPIRATION RATE: 18 BRPM | SYSTOLIC BLOOD PRESSURE: 100 MMHG | WEIGHT: 200 LBS

## 2020-02-12 DIAGNOSIS — J06.9 URI WITH COUGH AND CONGESTION: Primary | ICD-10-CM

## 2020-02-12 DIAGNOSIS — R68.89 FLU-LIKE SYMPTOMS: ICD-10-CM

## 2020-02-12 DIAGNOSIS — I48.0 PAROXYSMAL ATRIAL FIBRILLATION (HCC): ICD-10-CM

## 2020-02-12 LAB
FLUAV+FLUBV AG NOSE QL IA.RAPID: NEGATIVE POS/NEG
FLUAV+FLUBV AG NOSE QL IA.RAPID: NEGATIVE POS/NEG
INR BLD: 2.7
PT POC: 32.1 SECONDS
VALID INTERNAL CONTROL?: YES
VALID INTERNAL CONTROL?: YES

## 2020-02-12 NOTE — PROGRESS NOTES
Luz Buck is a [de-identified] y.o. male who presents today for Anticoagulation monitoring. Indication: Atrial Fibrillation  INR Goal: 2.0-3.0. Current dose:  Coumadin 2.5mg daily. Missed Coumadin Doses:  None  Medication Changes:  no  Dietary Changes:  no    Symptoms: taking coumadin appropriately without any bleeding. Latest INRs:  Lab Results   Component Value Date/Time    INR 2.0 (H) 11/12/2018 09:18 AM    INR 1.1 12/06/2013 04:21 AM    INR 1.2 (H) 12/05/2013 03:40 AM    INR (POC) 2.4 (H) 05/15/2018 09:21 AM    INR (POC) 4.4 (HH) 05/10/2018 11:46 AM    INR POC 2.0 02/05/2020 08:06 AM    INR POC 1.6 06/05/2019 08:31 AM    INR POC 2.6 05/08/2019 08:09 AM    Prothrombin time 19.9 (H) 11/12/2018 09:18 AM    Prothrombin time 11.2 12/06/2013 04:21 AM    Prothrombin time 12.0 (H) 12/05/2013 03:40 AM        New Coumadin dose:.current treatment plan is effective, no change in therapy per Dr. Nidhi Ervin. Next check to be scheduled for  2 weeks per Dr. Nidhi Ervin.

## 2020-02-12 NOTE — PATIENT INSTRUCTIONS

## 2020-02-12 NOTE — PROGRESS NOTES
HPI  Deepak Eddy is a [de-identified] y.o. male who presents with congestion, cough. Started 2 days ago. ok sleep, drinking fluids. Other symptoms include none. Denies fever, wheezing. Tried vicks with some relief    PMHx:  Past Medical History:   Diagnosis Date    Arthritis     knees    CAD (coronary artery disease)     stent x3 approx 2001    Chronic atrial fibrillation     ED (erectile dysfunction)     Hypertension     Kidney stone on right side 10/30/2011    JAMEY (obstructive sleep apnea) 4/25/2012    Skin cancer     BCC, s/p Mohs on L flank    Sun-damaged skin     TIA (transient ischemic attack)     6/12 - [facility] at [facility]       Meds:   Current Outpatient Medications   Medication Sig Dispense Refill    tadalafil (CIALIS) 20 mg tablet Take 1 Tab by mouth as needed (ED). 30 Tab 11    metoprolol succinate (TOPROL-XL) 25 mg XL tablet TAKE 1 TABLET EVERY DAY 90 Tab 3    terazosin (HYTRIN) 10 mg capsule TAKE 1 CAPSULE EVERY NIGHT 90 Cap 3    warfarin (COUMADIN) 2.5 mg tablet Take 1 Tab by mouth daily. 90 Tab 1    hydroCHLOROthiazide (HYDRODIURIL) 25 mg tablet Take 1 Tab by mouth daily. 90 Tab 3    pravastatin (PRAVACHOL) 10 mg tablet TAKE 1 TABLET EVERY NIGHT 90 Tab 3    amLODIPine (NORVASC) 5 mg tablet TAKE 1 TABLET EVERY DAY 90 Tab 3    finasteride (PROSCAR) 5 mg tablet Take 1 Tab by mouth daily. 30 Tab 3    enalapril (VASOTEC) 20 mg tablet TAKE 1 TABLET TWICE DAILY 180 Tab 3    aspirin delayed-release 81 mg tablet Take  by mouth daily.  FOLIC ACID PO Take 464 mcg by mouth daily.  MULTIVITAMIN WITH MINERALS (MULTI-VIT 55 PLUS PO) Take 1 Tab by mouth daily. Taking multivitamins every morning.  potassium chloride (KLOR-CON) 10 mEq tablet Take 1 Tab by mouth daily. 90 Tab 0       Allergies:    Allergies   Allergen Reactions    Pcn [Penicillins] Unknown (comments)     As a child    Percocet [Oxycodone-Acetaminophen] Swelling     Leg swelling       Smoker:  Social History     Tobacco Use Smoking Status Never Smoker   Smokeless Tobacco Never Used       ETOH:   Social History     Substance and Sexual Activity   Alcohol Use No    Alcohol/week: 0.0 standard drinks       FH:   Family History   Problem Relation Age of Onset    Stroke Father     Heart Attack Father     Heart Disease Father     Cancer Father 80        colon cancer    Heart Disease Mother    [place] Arthritis-osteo Sister         s/p bilateral knee replacements    No Known Problems Daughter     Anesth Problems Neg Hx         ROS:  As listed in HPI. In addition:  Constitutional:   No headache, fever, fatigue, weight loss or weight gain      Eyes:   No redness, pruritis, pain, visual changes, swelling, or discharge      Ears:    No pain, loss or changes in hearing     Cardiac:    No chest pain      Resp:   No shortness of breath or wheeze     Neuro   No loss of consciousness, dizziness, seizure    Physical Exam:  Blood pressure 100/64, pulse 67, temperature 98.2 °F (36.8 °C), temperature source Oral, resp. rate 18, height 5' 10\" (1.778 m), weight 200 lb (90.7 kg), SpO2 98 %. GEN: No apparent distress. EYES:  Conjunctiva clear  EAR: TM are clear and without effusion. NOSE: Turbinates are congested  OROPHYARYNX: No erythema or tonsilar exudates  NECK:  Submandibular LAD. Non tender         LUNGS: Respirations unlabored; clear to auscultation bilaterally. No wheeze  CARDIOVASCULAR: Regular, rate, and rhythm  ABDOMEN: Soft; nontender;nl BS  SKIN: No obvious rashes. Assessment and Plan     Viral URI  Expect ssx to last 6-7 days  Supportive care is best, provided a handout on available OTC remedies  Nasal steroid OTC for congestion  Honey in warm water for cough    RTC if ssx worsen or fail to improve as expected      ICD-10-CM ICD-9-CM    1. URI with cough and congestion J06.9 465.9    2.  Paroxysmal atrial fibrillation (HCC) I48.0 427.31 AMB POC PT/INR   3. Flu-like symptoms R68.89 780.99 AMB POC VIJAYA INFLUENZA A/B TEST       AVS given. Pt expressed understanding of instructions

## 2020-02-13 NOTE — TELEPHONE ENCOUNTER
Pt is requesting a refill on his med    Requested Prescriptions     Pending Prescriptions Disp Refills    enalapril (VASOTEC) 20 mg tablet 180 Tab 3     Sig: TAKE 1 TABLET TWICE DAILY

## 2020-02-14 RX ORDER — ENALAPRIL MALEATE 20 MG/1
TABLET ORAL
Qty: 180 TAB | Refills: 3 | Status: SHIPPED | OUTPATIENT
Start: 2020-02-14 | End: 2020-05-14 | Stop reason: SDUPTHER

## 2020-02-26 ENCOUNTER — CLINICAL SUPPORT (OUTPATIENT)
Dept: FAMILY MEDICINE CLINIC | Age: 80
End: 2020-02-26

## 2020-02-26 VITALS
TEMPERATURE: 97.9 F | OXYGEN SATURATION: 97 % | DIASTOLIC BLOOD PRESSURE: 73 MMHG | RESPIRATION RATE: 16 BRPM | WEIGHT: 198.6 LBS | SYSTOLIC BLOOD PRESSURE: 121 MMHG | HEART RATE: 73 BPM | BODY MASS INDEX: 28.43 KG/M2 | HEIGHT: 70 IN

## 2020-02-26 DIAGNOSIS — I48.0 PAROXYSMAL ATRIAL FIBRILLATION (HCC): ICD-10-CM

## 2020-02-26 LAB
INR BLD: 1.9
PT POC: 22.3 SECONDS
VALID INTERNAL CONTROL?: YES

## 2020-02-26 NOTE — PROGRESS NOTES
Chief Complaint   Patient presents with    Coagulation disorder     Pt here for PT/INR.     Visit Vitals  /73 (BP 1 Location: Left arm, BP Patient Position: Sitting) Comment (BP 1 Location): left arm   Pulse 73   Temp 97.9 °F (36.6 °C) (Oral)   Resp 16   Ht 5' 10\" (1.778 m)   Wt 198 lb 9.6 oz (90.1 kg)   SpO2 97%   BMI 28.50 kg/m²

## 2020-03-11 ENCOUNTER — CLINICAL SUPPORT (OUTPATIENT)
Dept: FAMILY MEDICINE CLINIC | Age: 80
End: 2020-03-11

## 2020-03-11 VITALS
RESPIRATION RATE: 15 BRPM | BODY MASS INDEX: 28.06 KG/M2 | DIASTOLIC BLOOD PRESSURE: 73 MMHG | SYSTOLIC BLOOD PRESSURE: 109 MMHG | HEART RATE: 80 BPM | TEMPERATURE: 98 F | HEIGHT: 70 IN | OXYGEN SATURATION: 97 % | WEIGHT: 196 LBS

## 2020-03-11 DIAGNOSIS — Z79.01 CHRONIC ANTICOAGULATION: Primary | ICD-10-CM

## 2020-03-11 DIAGNOSIS — I48.0 PAROXYSMAL ATRIAL FIBRILLATION (HCC): ICD-10-CM

## 2020-03-11 LAB
INR BLD: 1.6
PT POC: 19.2 SECONDS
VALID INTERNAL CONTROL?: YES

## 2020-03-18 ENCOUNTER — OFFICE VISIT (OUTPATIENT)
Dept: FAMILY MEDICINE CLINIC | Age: 80
End: 2020-03-18

## 2020-03-18 VITALS
OXYGEN SATURATION: 98 % | SYSTOLIC BLOOD PRESSURE: 101 MMHG | TEMPERATURE: 97.9 F | HEIGHT: 70 IN | DIASTOLIC BLOOD PRESSURE: 63 MMHG | WEIGHT: 195.6 LBS | BODY MASS INDEX: 28 KG/M2 | HEART RATE: 71 BPM | RESPIRATION RATE: 16 BRPM

## 2020-03-18 DIAGNOSIS — I25.10 CORONARY ARTERY DISEASE INVOLVING NATIVE CORONARY ARTERY OF NATIVE HEART WITHOUT ANGINA PECTORIS: ICD-10-CM

## 2020-03-18 DIAGNOSIS — I10 ESSENTIAL HYPERTENSION: ICD-10-CM

## 2020-03-18 DIAGNOSIS — I48.0 PAROXYSMAL ATRIAL FIBRILLATION (HCC): ICD-10-CM

## 2020-03-18 DIAGNOSIS — Z79.01 CHRONIC ANTICOAGULATION: ICD-10-CM

## 2020-03-18 DIAGNOSIS — Z00.00 ROUTINE GENERAL MEDICAL EXAMINATION AT A HEALTH CARE FACILITY: Primary | ICD-10-CM

## 2020-03-18 LAB
INR BLD: 1.7
PT POC: 20.7 SECONDS
VALID INTERNAL CONTROL?: YES

## 2020-03-18 NOTE — PROGRESS NOTES
1. Have you been to the ER, urgent care clinic since your last visit? Hospitalized since your last visit? No    2. Have you seen or consulted any other health care providers outside of the [address] [place] [address] since your last visit? Include any pap smears or colon screening. No     Health Maintenance Due   Topic Date Due    Shingrix Vaccine Age 49> (1 of 2) 08/24/1989    GLAUCOMA SCREENING Q2Y  09/01/2016    Medicare Yearly Exam  03/25/2020     José Luis Plasencia is a [de-identified] y.o. male who presents today for Anticoagulation monitoring. Indication: Atrial Fibrillation  INR Goal: 2.0-3.0. Current dose:  Coumadin 2.5 mg daily. Missed Coumadin Doses:  None  Medication Changes:  Potassium D/C'd  Dietary Changes:  no    Symptoms: taking coumadin appropriately without any bleeding.     Latest INRs:  Lab Results   Component Value Date/Time    INR 2.0 (H) 11/12/2018 09:18 AM    INR 1.1 12/06/2013 04:21 AM    INR 1.2 (H) 12/05/2013 03:40 AM    INR (POC) 2.4 (H) 05/15/2018 09:21 AM    INR (POC) 4.4 (HH) 05/10/2018 11:46 AM    INR POC 1.6 03/11/2020 08:18 AM    INR POC 1.9 02/26/2020 08:32 AM    INR POC 2.7 02/12/2020 09:13 AM    Prothrombin time 19.9 (H) 11/12/2018 09:18 AM    Prothrombin time 11.2 12/06/2013 04:21 AM    Prothrombin time 12.0 (H) 12/05/2013 03:40 AM

## 2020-03-18 NOTE — PROGRESS NOTES
Medicare Annual Wellness Visit     I have reviewed the patient's medical history in detail and updated the computerized patient record. History   Gustavo Christian is a [de-identified] y.o. male who presents to follow-up on chronic medical issues. He had been working as a  for the last year and staying very active. He does not think he will have a job this year because his employer is not doing too well medically. He is keeping himself busy working on a car for his grandson. Got a call this morning from a partially estranged daughter who wants her to come and stay with her. He is in a pretty good mood as a result.         Past Medical History:   Diagnosis Date    Arthritis     knees    CAD (coronary artery disease)     stent x3 approx 2001    Chronic atrial fibrillation     ED (erectile dysfunction)     Hypertension     Kidney stone on right side 10/30/2011    JAMEY (obstructive sleep apnea) 4/25/2012    Skin cancer     BCC, s/p Mohs on L flank    Sun-damaged skin     TIA (transient ischemic attack)     6/12 - [facility]      Past Surgical History:   Procedure Laterality Date    CARDIAC SURG PROCEDURE UNLIST  2006    stents x 3     HX APPENDECTOMY  age 15    HX CATARACT REMOVAL Bilateral     HX COLONOSCOPY  05/10/2012    hyperplastic    HX CYSTOSTOMY  1/23/14    diffuse erythema    HX HERNIA REPAIR Bilateral     inguinal hernia    HX HERNIA REPAIR  09/12/2017    L inguinal hernia repair    HX HIP REPLACEMENT Bilateral     HX HIP REPLACEMENT Right 12/4/13    REVISION     HX IMPLANTABLE CARDIOVERTER DEFIBRILLATOR  11/15/13    + PM    HX KNEE REPLACEMENT Right 7/2011    HX LUMBAR DISKECTOMY  1970's    HX MOHS PROCEDURES  02/21/2017    BCC left lateral cheek by Dr. Adelita Mari  01/05/2015    [facility] [address] [employer]    HX ORTHOPAEDIC Left     LEFT HAND SURGERY    HX PACEMAKER  11/15/2013    AICD    INS PPM/ICD LED SING ONLY  11/15/2013          Current Outpatient Medications Medication Sig Dispense Refill    enalapril (VASOTEC) 20 mg tablet TAKE 1 TABLET TWICE DAILY 180 Tab 3    tadalafil (CIALIS) 20 mg tablet Take 1 Tab by mouth as needed (ED). 30 Tab 11    metoprolol succinate (TOPROL-XL) 25 mg XL tablet TAKE 1 TABLET EVERY DAY 90 Tab 3    terazosin (HYTRIN) 10 mg capsule TAKE 1 CAPSULE EVERY NIGHT 90 Cap 3    warfarin (COUMADIN) 2.5 mg tablet Take 1 Tab by mouth daily. 90 Tab 1    hydroCHLOROthiazide (HYDRODIURIL) 25 mg tablet Take 1 Tab by mouth daily. 90 Tab 3    pravastatin (PRAVACHOL) 10 mg tablet TAKE 1 TABLET EVERY NIGHT 90 Tab 3    amLODIPine (NORVASC) 5 mg tablet TAKE 1 TABLET EVERY DAY 90 Tab 3    finasteride (PROSCAR) 5 mg tablet Take 1 Tab by mouth daily. 30 Tab 3    aspirin delayed-release 81 mg tablet Take  by mouth daily.  FOLIC ACID PO Take 969 mcg by mouth daily.  MULTIVITAMIN WITH MINERALS (MULTI-VIT 55 PLUS PO) Take 1 Tab by mouth daily. Taking multivitamins every morning.  potassium chloride (KLOR-CON) 10 mEq tablet Take 1 Tab by mouth daily.  90 Tab 0     Allergies   Allergen Reactions    Pcn [Penicillins] Unknown (comments)     As a child    Percocet [Oxycodone-Acetaminophen] Swelling     Leg swelling     Family History   Problem Relation Age of Onset   Gabi Tirado Stroke Father     Heart Attack Father     Heart Disease Father     Cancer Father 80        colon cancer    Heart Disease Mother    Gabi Tirado Arthritis-osteo Sister         s/p bilateral knee replacements    No Known Problems Daughter     Anesth Problems Neg Hx      Social History     Tobacco Use    Smoking status: Never Smoker    Smokeless tobacco: Never Used   Substance Use Topics    Alcohol use: No     Alcohol/week: 0.0 standard drinks     Patient Active Problem List   Diagnosis Code    DJD (degenerative joint disease) of knee M17.10    ED (erectile dysfunction) N52.9    Hypertension I10    CAD (coronary artery disease) I25.10    Paroxysmal atrial fibrillation ([facility] [address] I48.0    JAMEY (obstructive sleep apnea) G47.33    Colon polyp K63.5    TIA (transient ischemic attack) G45.9    BCC (basal cell carcinoma), face C44.310    S/P ICD (internal cardiac defibrillator) procedure Z95.810    Recurrent UTI N39.0    Chronic anticoagulation Z79.01    Obesity (BMI 30.0-34. 9) E66.9       Depression Risk Factor Screening:     3 most recent PHQ Screens 3/18/2020   Little interest or pleasure in doing things Not at all   Feeling down, depressed, irritable, or hopeless Not at all   Total Score PHQ 2 0     Alcohol Risk Factor Screening: On any occasion during the past 3 months, have you had more than 4 drinks containing alcohol? No    Do you average more than 14 drinks per week? No      Functional Ability and Level of Safety:     Hearing Loss   none    Activities of Daily Living   Self-care. Requires assistance with: no ADLs    Fall Risk     Fall Risk Assessment, last 12 mths 3/18/2020   Able to walk? Yes   Fall in past 12 months? No   Fall with injury? -   Number of falls in past 12 months -   Fall Risk Score -     Abuse Screen   Patient is not abused    Review of Systems   ROS:  As listed in HPI. In addition:  Constitutional:   No headache, fever, fatigue, weight loss or weight gain      Eyes:   No redness, pruritis, pain, visual changes, swelling, or discharge      Ears:    No pain, loss or changes in hearing     Cardiac:    No chest pain      Resp:   No cough or shortness of breath      Neuro   No loss of consciousness, dizziness, seizure    Physical Examination     Evaluation of Cognitive Function:  Mood/affect:  happy  Appearance: age appropriate  Family member/caregiver input:     Physical Exam:  Blood pressure 101/63, pulse 71, temperature 97.9 °F (36.6 °C), temperature source Oral, resp. rate 16, height 5' 10\" (1.778 m), weight 195 lb 9.6 oz (88.7 kg), SpO2 98 %. GEN: No apparent distress. Alert and oriented and responds to all questions appropriately.    EYES:  Conjunctiva clear; pupils round and reactive to light; extraocular movements are intact. EAR: External ears are normal.  Tympanic membranes are clear and without effusion. NOSE: Turbinates are within normal limits. No drainage  OROPHYARYNX: No oral lesions or exudates. NECK:  Supple; no masses; thyroid normal           LUNGS: Respirations unlabored; clear to auscultation bilaterally  CARDIOVASCULAR: Regular, rate, and rhythm without murmurs   ABDOMEN: Soft; nontender; nondistended; normoactive bowel sounds; no masses or organomegaly  NEUROLOGIC:  No focal neurologic deficits. Strength and sensation grossly intact. Coordination and gait grossly intact. EXT: Well perfused. No edema. SKIN: No obvious rashes. Patient Care Team:  Rajni Olsen MD as PCP - General (Family Practice)  Rajni Olsen MD as PCP - [facility]  Ken Parker MD (Orthopedic Surgery)  Leena Uribe MD (Dermatology)  Snow Culp MD (Urology)  Ezio Vela MD (Neurology)  Sarah Smalls MD as Consulting Provider (Cardiology)  Vincent Greene MD as Consulting Provider (Orthopedic Surgery)  Harriet Mcdonald MD (Cardiology)  Smita Mcnamara MD as Physician (Optometry)  Lupe Pierre MD as Surgeon (Surgery)    Advice/Referrals/Counseling   Education and counseling provided:    Vaccines up-to-date,    ACP on file    Assessment/Plan     Hypertension  Well-controlled  Metoprolol 25 mg  HCTZ 25 mg  Enalapril 20 mg  Norvasc 5 mg     CAD, history TIA, paroxysmal atrial fibrillation   Metoprolol 25 mg  Pravastatin 10 mg  Aspirin 81 mg    Chronic anticoagulation  Previously 5 mg x 2 days, 2.5 mg all other days  10% increase is 5 mg x 3 days, 2.5 mg all other days  Follow-up 1 week     BPH  Had hesitancy and nocturia every 2 hours. Was taking Flomax with minimal relief. Added Proscar last visit.   He is down to 1-2 trips to the bathroom at night  Consider chronic low-dose Cialis     Erectile dysfunction, capable of achieving erection but not maintaining it. Viagra working well  Requesting switch to Cialis     Hypokalemia  Weaned off of potassium supplement  Eating a banana a day  CMP           ICD-10-CM ICD-9-CM    1. Routine general medical examination at a health care facility Z00.00 V70.0    2. Paroxysmal atrial fibrillation (HCC) I48.0 427.31 AMB POC PT/INR   3. Coronary artery disease involving native coronary artery of native heart without angina pectoris I25.10 414.01 LIPID PANEL   4. Essential hypertension I10 401.9 CBC WITH AUTOMATED DIFF      METABOLIC PANEL, COMPREHENSIVE      TSH 3RD GENERATION   5.  Chronic anticoagulation Z79.01 V58.61

## 2020-03-19 LAB
ALBUMIN SERPL-MCNC: 3.7 G/DL (ref 3.7–4.7)
ALBUMIN/GLOB SERPL: 1.6 {RATIO} (ref 1.2–2.2)
ALP SERPL-CCNC: 80 IU/L (ref 39–117)
ALT SERPL-CCNC: 10 IU/L (ref 0–44)
AST SERPL-CCNC: 16 IU/L (ref 0–40)
BASOPHILS # BLD AUTO: 0 X10E3/UL (ref 0–0.2)
BASOPHILS NFR BLD AUTO: 1 %
BILIRUB SERPL-MCNC: 0.4 MG/DL (ref 0–1.2)
BUN SERPL-MCNC: 15 MG/DL (ref 8–27)
BUN/CREAT SERPL: 17 (ref 10–24)
CALCIUM SERPL-MCNC: 8.9 MG/DL (ref 8.6–10.2)
CHLORIDE SERPL-SCNC: 104 MMOL/L (ref 96–106)
CHOLEST SERPL-MCNC: 92 MG/DL (ref 100–199)
CO2 SERPL-SCNC: 27 MMOL/L (ref 20–29)
CREAT SERPL-MCNC: 0.9 MG/DL (ref 0.76–1.27)
EOSINOPHIL # BLD AUTO: 0.3 X10E3/UL (ref 0–0.4)
EOSINOPHIL NFR BLD AUTO: 4 %
ERYTHROCYTE [DISTWIDTH] IN BLOOD BY AUTOMATED COUNT: 13 % (ref 11.6–15.4)
GLOBULIN SER CALC-MCNC: 2.3 G/DL (ref 1.5–4.5)
GLUCOSE SERPL-MCNC: 84 MG/DL (ref 65–99)
HCT VFR BLD AUTO: 37.7 % (ref 37.5–51)
HDLC SERPL-MCNC: 27 MG/DL
HGB BLD-MCNC: 13 G/DL (ref 13–17.7)
IMM GRANULOCYTES # BLD AUTO: 0 X10E3/UL (ref 0–0.1)
IMM GRANULOCYTES NFR BLD AUTO: 0 %
INTERPRETATION, 910389: NORMAL
LDLC SERPL CALC-MCNC: 35 MG/DL (ref 0–99)
LYMPHOCYTES # BLD AUTO: 1.1 X10E3/UL (ref 0.7–3.1)
LYMPHOCYTES NFR BLD AUTO: 18 %
MCH RBC QN AUTO: 32.7 PG (ref 26.6–33)
MCHC RBC AUTO-ENTMCNC: 34.5 G/DL (ref 31.5–35.7)
MCV RBC AUTO: 95 FL (ref 79–97)
MONOCYTES # BLD AUTO: 0.5 X10E3/UL (ref 0.1–0.9)
MONOCYTES NFR BLD AUTO: 8 %
NEUTROPHILS # BLD AUTO: 4.2 X10E3/UL (ref 1.4–7)
NEUTROPHILS NFR BLD AUTO: 69 %
PLATELET # BLD AUTO: 141 X10E3/UL (ref 150–450)
POTASSIUM SERPL-SCNC: 3.5 MMOL/L (ref 3.5–5.2)
PROT SERPL-MCNC: 6 G/DL (ref 6–8.5)
RBC # BLD AUTO: 3.98 X10E6/UL (ref 4.14–5.8)
SODIUM SERPL-SCNC: 144 MMOL/L (ref 134–144)
TRIGL SERPL-MCNC: 152 MG/DL (ref 0–149)
TSH SERPL DL<=0.005 MIU/L-ACNC: 1.85 UIU/ML (ref 0.45–4.5)
VLDLC SERPL CALC-MCNC: 30 MG/DL (ref 5–40)
WBC # BLD AUTO: 6.1 X10E3/UL (ref 3.4–10.8)

## 2020-03-20 RX ORDER — HYDROCHLOROTHIAZIDE 25 MG/1
25 TABLET ORAL DAILY
Qty: 30 TAB | Refills: 3 | Status: SHIPPED | OUTPATIENT
Start: 2020-03-20 | End: 2020-05-14 | Stop reason: SDUPTHER

## 2020-03-20 RX ORDER — METOPROLOL SUCCINATE 25 MG/1
TABLET, EXTENDED RELEASE ORAL
Qty: 30 TAB | Refills: 3 | Status: SHIPPED | OUTPATIENT
Start: 2020-03-20 | End: 2020-05-22 | Stop reason: SDUPTHER

## 2020-03-20 NOTE — TELEPHONE ENCOUNTER
Requested Prescriptions     Pending Prescriptions Disp Refills    hydroCHLOROthiazide (HYDRODIURIL) 25 mg tablet 90 Tab 3     Sig: Take 1 Tab by mouth daily.  metoprolol succinate (TOPROL-XL) 25 mg XL tablet 90 Tab 3     Sig: TAKE 1 TABLET EVERY DAY     Need a 30 day supply. Patient states that there is a 30 day lockdown in the [facility] area.

## 2020-03-25 ENCOUNTER — CLINICAL SUPPORT (OUTPATIENT)
Dept: FAMILY MEDICINE CLINIC | Age: 80
End: 2020-03-25

## 2020-03-25 VITALS
HEART RATE: 67 BPM | WEIGHT: 198.8 LBS | SYSTOLIC BLOOD PRESSURE: 121 MMHG | RESPIRATION RATE: 16 BRPM | BODY MASS INDEX: 28.46 KG/M2 | HEIGHT: 70 IN | OXYGEN SATURATION: 98 % | DIASTOLIC BLOOD PRESSURE: 76 MMHG | TEMPERATURE: 97.8 F

## 2020-03-25 DIAGNOSIS — I48.0 PAROXYSMAL ATRIAL FIBRILLATION (HCC): Primary | ICD-10-CM

## 2020-03-25 LAB
INR BLD: 2.2
PT POC: 26.6 SECONDS
VALID INTERNAL CONTROL?: YES

## 2020-03-25 RX ORDER — WARFARIN 2.5 MG/1
2.5 TABLET ORAL DAILY
Qty: 90 TAB | Refills: 1 | Status: SHIPPED | OUTPATIENT
Start: 2020-03-25 | End: 2020-04-03 | Stop reason: SDUPTHER

## 2020-03-25 NOTE — PROGRESS NOTES
Macy Germain is a [de-identified] y.o. male who presents today for Anticoagulation monitoring. Indication: Atrial Fibrillation  INR Goal: 2.0-3.0. Current dose:  Coumadin 2.5 mg daily, except Mon/Wed/Fri 5 mg. Missed Coumadin Doses:  None  Medication Changes:  no  Dietary Changes:  no    Symptoms: taking coumadin appropriately without any bleeding. Latest INRs:  Lab Results   Component Value Date/Time    INR 2.0 (H) 11/12/2018 09:18 AM    INR 1.1 12/06/2013 04:21 AM    INR 1.2 (H) 12/05/2013 03:40 AM    INR (POC) 2.4 (H) 05/15/2018 09:21 AM    INR (POC) 4.4 (HH) 05/10/2018 11:46 AM    INR POC 1.7 03/18/2020 08:58 AM    INR POC 1.6 03/11/2020 08:18 AM    INR POC 1.9 02/26/2020 08:32 AM    Prothrombin time 19.9 (H) 11/12/2018 09:18 AM    Prothrombin time 11.2 12/06/2013 04:21 AM    Prothrombin time 12.0 (H) 12/05/2013 03:40 AM        New Coumadin dose:.current treatment plan is effective, no change in therapy per Dr. Delphine Shah. Next check to be scheduled for  2 weeks per Dr. Delphine Shah.

## 2020-03-25 NOTE — TELEPHONE ENCOUNTER
Pt is requesting a refill on his med    Requested Prescriptions     Pending Prescriptions Disp Refills    warfarin (COUMADIN) 2.5 mg tablet 90 Tab 1     Sig: Take 1 Tab by mouth daily.

## 2020-04-03 ENCOUNTER — CLINICAL SUPPORT (OUTPATIENT)
Dept: FAMILY MEDICINE CLINIC | Age: 80
End: 2020-04-03

## 2020-04-03 VITALS — BODY MASS INDEX: 28.41 KG/M2 | WEIGHT: 198 LBS

## 2020-04-03 DIAGNOSIS — Z51.81 ANTICOAGULATION GOAL OF INR 2 TO 3: Primary | ICD-10-CM

## 2020-04-03 DIAGNOSIS — Z79.01 ANTICOAGULATION GOAL OF INR 2 TO 3: Primary | ICD-10-CM

## 2020-04-03 DIAGNOSIS — I48.0 PAROXYSMAL ATRIAL FIBRILLATION (HCC): ICD-10-CM

## 2020-04-03 LAB
INR BLD: 1.9
PT POC: 23 SECONDS
VALID INTERNAL CONTROL?: YES

## 2020-04-03 RX ORDER — POTASSIUM CHLORIDE 750 MG/1
10 TABLET, EXTENDED RELEASE ORAL DAILY
Qty: 90 TAB | Refills: 0 | Status: SHIPPED | OUTPATIENT
Start: 2020-04-03 | End: 2020-07-02 | Stop reason: SDUPTHER

## 2020-04-03 RX ORDER — WARFARIN 2.5 MG/1
2.5 TABLET ORAL DAILY
Qty: 90 TAB | Refills: 1 | Status: SHIPPED | OUTPATIENT
Start: 2020-04-03 | End: 2020-04-10 | Stop reason: SDUPTHER

## 2020-04-03 NOTE — PROGRESS NOTES
Patient notified of new dosing schedule per Dr. Tacho Olmedo. 2.5 mg Mon/Wed/Fri and 5 mg rest of the time. Refill requests sent.

## 2020-04-03 NOTE — TELEPHONE ENCOUNTER
Requested Prescriptions     Pending Prescriptions Disp Refills    warfarin (COUMADIN) 2.5 mg tablet 90 Tab 1     Sig: Take 1 Tab by mouth daily.  potassium chloride (KLOR-CON) 10 mEq tablet 90 Tab 0     Sig: Take 1 Tab by mouth daily.

## 2020-04-03 NOTE — TELEPHONE ENCOUNTER
Pt would also like to go back on his potassium because the bananas are upsetting his stomach.  I do not see this listed in his med list.

## 2020-04-03 NOTE — PROGRESS NOTES
Umair Sullivan is a [de-identified] y.o. male who presents today for Anticoagulation monitoring. Patient states current dose coumadin is:   Missed Coumadin Doses:  None  Medication Changes:  no  Dietary Changes:  no    Symptoms: without any bleeding. Anticoagulation Summary  As of 4/3/2020    INR goal:   2.0-3.0   TTR:   38.1 % (1.6 mo)   INR used for dosing:      Warfarin maintenance plan:   5 mg (2.5 mg x 2) every Mon, Wed, Fri; 2.5 mg (2.5 mg x 1) all other days   Weekly warfarin total:   25 mg   Plan last modified:   Valeri Hernandez MD (3/18/2020)   Next INR check:      Target end date:       Indications    Paroxysmal atrial fibrillation ([address] [I48.0]             Anticoagulation Episode Summary     INR check location:       Preferred lab:       Send INR reminders to:       Comments:             Description    Per Dr. Tasneem Burns, continue current dosing schedule and recheck in 2 weeks.

## 2020-04-10 ENCOUNTER — TELEPHONE (OUTPATIENT)
Dept: FAMILY MEDICINE CLINIC | Age: 80
End: 2020-04-10

## 2020-04-10 DIAGNOSIS — I48.0 PAROXYSMAL ATRIAL FIBRILLATION (HCC): Primary | ICD-10-CM

## 2020-04-10 DIAGNOSIS — I48.0 PAROXYSMAL ATRIAL FIBRILLATION (HCC): ICD-10-CM

## 2020-04-10 RX ORDER — WARFARIN 2.5 MG/1
5 TABLET ORAL DAILY
Qty: 180 TAB | Refills: 1 | Status: SHIPPED | OUTPATIENT
Start: 2020-04-10 | End: 2020-04-20 | Stop reason: SDUPTHER

## 2020-04-10 NOTE — TELEPHONE ENCOUNTER
----- [facility] SONYA Mccarthy sent at 8/9/3022  1:35 PM EDT -----  Need future orders, pt is requesting to have labs send to [address] [place] [address] in [address] [place] [address]. Will fax when ordered, thanks!

## 2020-04-10 NOTE — TELEPHONE ENCOUNTER
Pt is calling in ref to his med states he is taken more which will cause him to run out of med before time wants med written with the amount he is taking so he wont run out

## 2020-04-18 LAB
INR PPP: 2.3 (ref 0.8–1.2)
PROTHROMBIN TIME: 23.6 SEC (ref 9.1–12)

## 2020-04-20 ENCOUNTER — TELEPHONE ANTICOAG (OUTPATIENT)
Dept: FAMILY MEDICINE CLINIC | Age: 80
End: 2020-04-20

## 2020-04-20 DIAGNOSIS — I48.0 PAROXYSMAL ATRIAL FIBRILLATION (HCC): ICD-10-CM

## 2020-04-20 RX ORDER — WARFARIN 2.5 MG/1
5 TABLET ORAL DAILY
Qty: 180 TAB | Refills: 1 | Status: SHIPPED | OUTPATIENT
Start: 2020-04-20 | End: 2020-09-21 | Stop reason: SDUPTHER

## 2020-04-20 NOTE — PROGRESS NOTES
verified by patient. INR and no change in dosing schedule verbalized per Dr. Tacho Olmedo. Patient vocalized understanding.

## 2020-04-20 NOTE — TELEPHONE ENCOUNTER
Requested Prescriptions     Pending Prescriptions Disp Refills    warfarin (COUMADIN) 2.5 mg tablet 180 Tab 1     Sig: Take 2 Tabs by mouth daily.

## 2020-05-01 DIAGNOSIS — I48.0 PAROXYSMAL ATRIAL FIBRILLATION (HCC): Primary | ICD-10-CM

## 2020-05-01 RX ORDER — TERAZOSIN 10 MG/1
CAPSULE ORAL
Qty: 90 CAP | Refills: 3 | Status: SHIPPED | OUTPATIENT
Start: 2020-05-01 | End: 2020-07-30 | Stop reason: SDUPTHER

## 2020-05-01 NOTE — TELEPHONE ENCOUNTER
Refilled. Patient requested a call possibly about getting his Coumadin check.   I reordered INR to be sent to [facility]

## 2020-05-01 NOTE — TELEPHONE ENCOUNTER
Message from Celia Muse/ Refill   Received: Today   Message Contents   [place], [address] [facility] Office Pool             Caller (if not patient): N/A   Relationship of caller (if not patient): N/A   Best contact number(s): [phone]   Name of medication and dosage if known: \"Terazosin 10 mg\"   Is patient out of this medication (yes/no): No   Pharmacy name:  [facility]   Pharmacy listed in chart? (yes/no): Yes   Pharmacy phone number: N/A   Date of last visit: Friday, April 03, 2020 09:00 AM   Details to clarify the request: Pt stated that he would like a call back. Pt stated that he needs blood work done. Pt also stated that he has a few pills left.

## 2020-05-05 ENCOUNTER — TELEPHONE ANTICOAG (OUTPATIENT)
Dept: FAMILY MEDICINE CLINIC | Age: 80
End: 2020-05-05

## 2020-05-05 DIAGNOSIS — I48.0 PAROXYSMAL ATRIAL FIBRILLATION (HCC): ICD-10-CM

## 2020-05-05 LAB
INR PPP: 2.9 (ref 0.8–1.2)
PROTHROMBIN TIME: 29.2 SEC (ref 9.1–12)

## 2020-05-05 NOTE — PROGRESS NOTES
Called pt, verified name and . Informed pt that per Dr. Tacho Olmedo INR 2.9. At goal but trending up. Continue current dose and recheck in 2 weeks. Pt stated understanding.

## 2020-05-08 ENCOUNTER — TELEPHONE (OUTPATIENT)
Dept: CARDIOLOGY CLINIC | Age: 80
End: 2020-05-08

## 2020-05-08 NOTE — TELEPHONE ENCOUNTER
Left a voice message on 5/8/2020 requesting a return call to discuss re-scheduling Pacer appt.   Please pass call to [facility]

## 2020-05-14 ENCOUNTER — TELEPHONE (OUTPATIENT)
Dept: FAMILY MEDICINE CLINIC | Age: 80
End: 2020-05-14

## 2020-05-14 RX ORDER — ENALAPRIL MALEATE 20 MG/1
TABLET ORAL
Qty: 180 TAB | Refills: 3 | Status: SHIPPED | OUTPATIENT
Start: 2020-05-14 | End: 2020-08-20 | Stop reason: SDUPTHER

## 2020-05-14 RX ORDER — HYDROCHLOROTHIAZIDE 25 MG/1
25 TABLET ORAL DAILY
Qty: 90 TAB | Refills: 3 | Status: SHIPPED | OUTPATIENT
Start: 2020-05-14 | End: 2020-08-13 | Stop reason: SDUPTHER

## 2020-05-14 NOTE — TELEPHONE ENCOUNTER
Pt is calling requesting refills on his med    Requested Prescriptions     Pending Prescriptions Disp Refills    enalapril (VASOTEC) 20 mg tablet 180 Tab 3     Sig: TAKE 1 TABLET TWICE DAILY    hydroCHLOROthiazide (HYDRODIURIL) 25 mg tablet 30 Tab 3     Sig: Take 1 Tab by mouth daily.

## 2020-05-18 RX ORDER — PRAVASTATIN SODIUM 10 MG/1
TABLET ORAL
Qty: 90 TAB | Refills: 3 | Status: SHIPPED | OUTPATIENT
Start: 2020-05-18 | End: 2020-06-19 | Stop reason: SDUPTHER

## 2020-05-18 NOTE — TELEPHONE ENCOUNTER
Pt is calling stating someone has taken his med and he needs another refill on med    Requested Prescriptions     Pending Prescriptions Disp Refills    pravastatin (PRAVACHOL) 10 mg tablet 90 Tab 3     Sig: TAKE 1 TABLET EVERY NIGHT

## 2020-05-19 ENCOUNTER — TELEPHONE ANTICOAG (OUTPATIENT)
Dept: FAMILY MEDICINE CLINIC | Age: 80
End: 2020-05-19

## 2020-05-19 DIAGNOSIS — I48.0 PAROXYSMAL ATRIAL FIBRILLATION (HCC): ICD-10-CM

## 2020-05-19 LAB
INR PPP: 2.5 (ref 0.8–1.2)
PROTHROMBIN TIME: 25.4 SEC (ref 9.1–12)

## 2020-05-19 NOTE — TELEPHONE ENCOUNTER
----- Message from Mojgan Kane sent at 5/18/2020  5:42 PM EDT -----  Regarding: Dr Tidwell Loges first and last name:      Reason for call: following up on message left regarding the bottle of his pravastatin was missing and he does not have anymore left      Callback required yes/no and why:yes      Best contact number(s):[phone]      Details to clarify the request: for his [address]

## 2020-05-19 NOTE — TELEPHONE ENCOUNTER
Left message for pt to return my call. Pravastatin called in yesterday. Pt will need to contact pharmacy.

## 2020-05-19 NOTE — PROGRESS NOTES
Called pt, verified name and . Informed pt that per Dr Shyam Ortiz INR 2.5. At goal.  Continue current dose and recheck in 1 month. Pt stated understanding.

## 2020-05-22 RX ORDER — METOPROLOL SUCCINATE 25 MG/1
TABLET, EXTENDED RELEASE ORAL
Qty: 30 TAB | Refills: 3 | Status: SHIPPED | OUTPATIENT
Start: 2020-05-22 | End: 2020-06-19 | Stop reason: SDUPTHER

## 2020-06-08 RX ORDER — AMLODIPINE BESYLATE 5 MG/1
TABLET ORAL
Qty: 90 TAB | Refills: 3 | Status: SHIPPED | OUTPATIENT
Start: 2020-06-08 | End: 2020-11-27 | Stop reason: SDUPTHER

## 2020-06-08 NOTE — TELEPHONE ENCOUNTER
From envera: Giana Jones, [address] [facility] Office Pool         Medication Refill     Caller (if not patient):  Patient       Relationship of caller (if not patient): Self       Best contact number(s): [phone]       Name of medication and dosage if known: amlodipine 5mg       Is patient out of this medication (yes/no): 1 pill left       Pharmacy name: [facility] [address] listed in chart? (yes/no): Yes   Pharmacy phone number:       Details to clarify the request:Patient would liek to refill the Rx above and have it sent to the [facility] on fill.        Trina Guo

## 2020-06-17 ENCOUNTER — TELEPHONE ANTICOAG (OUTPATIENT)
Dept: FAMILY MEDICINE CLINIC | Age: 80
End: 2020-06-17

## 2020-06-17 DIAGNOSIS — I48.0 PAROXYSMAL ATRIAL FIBRILLATION (HCC): ICD-10-CM

## 2020-06-17 LAB
INR PPP: 3 (ref 0.8–1.2)
PROTHROMBIN TIME: 29.7 SEC (ref 9.1–12)

## 2020-06-17 NOTE — PROGRESS NOTES
Called pt, verified name and . Informed pt that per Dr. Michelle Bauman INR 3.0. At goal.  Continue current dose and recheck in 1 month. Pt stated understanding.

## 2020-06-19 RX ORDER — METOPROLOL SUCCINATE 25 MG/1
TABLET, EXTENDED RELEASE ORAL
Qty: 90 TAB | Refills: 3 | Status: SHIPPED | OUTPATIENT
Start: 2020-06-19 | End: 2020-09-21 | Stop reason: SDUPTHER

## 2020-06-19 RX ORDER — PRAVASTATIN SODIUM 10 MG/1
TABLET ORAL
Qty: 90 TAB | Refills: 3 | Status: SHIPPED | OUTPATIENT
Start: 2020-06-19 | End: 2020-11-13 | Stop reason: SDUPTHER

## 2020-07-02 RX ORDER — POTASSIUM CHLORIDE 750 MG/1
10 TABLET, EXTENDED RELEASE ORAL DAILY
Qty: 90 TAB | Refills: 0 | Status: SHIPPED
Start: 2020-07-02 | End: 2021-06-04

## 2020-07-02 NOTE — TELEPHONE ENCOUNTER
Requested Prescriptions     Pending Prescriptions Disp Refills    potassium chloride (KLOR-CON) 10 mEq tablet 90 Tab 0     Sig: Take 1 Tab by mouth daily.

## 2020-07-21 ENCOUNTER — TELEPHONE ANTICOAG (OUTPATIENT)
Dept: FAMILY MEDICINE CLINIC | Age: 80
End: 2020-07-21

## 2020-07-21 DIAGNOSIS — I48.0 PAROXYSMAL ATRIAL FIBRILLATION (HCC): ICD-10-CM

## 2020-07-21 LAB
INR PPP: 0.9 (ref 0.8–1.2)
PROTHROMBIN TIME: 9.8 SEC (ref 9.1–12)

## 2020-07-21 NOTE — PROGRESS NOTES
verified. Patient states no change in dosage. Denies eating any leafy greens, except for Friday. Not taking any ABX. Lost 70 lbs since [place] by walking.

## 2020-07-21 NOTE — PROGRESS NOTES
Called pt, verified name and . Informed pt that per  [facility] We will continue current dose 2.5 mg , 5 mg other days. Pt stated that he has about a 1/2 bottle remaining. He agrees to recheck in one week.

## 2020-07-21 NOTE — PROGRESS NOTES
We will continue current dose 2.5 mg Monday Wednesday Friday, 5 mg other days. Please have patient find his Coumadin and confirm that he has an adequate supply.     Recheck 1 week

## 2020-07-22 ENCOUNTER — OFFICE VISIT (OUTPATIENT)
Dept: CARDIOLOGY CLINIC | Age: 80
End: 2020-07-22

## 2020-07-22 VITALS
DIASTOLIC BLOOD PRESSURE: 78 MMHG | HEIGHT: 70 IN | WEIGHT: 186.4 LBS | SYSTOLIC BLOOD PRESSURE: 118 MMHG | OXYGEN SATURATION: 98 % | BODY MASS INDEX: 26.69 KG/M2 | HEART RATE: 61 BPM

## 2020-07-22 DIAGNOSIS — I10 ESSENTIAL HYPERTENSION: ICD-10-CM

## 2020-07-22 DIAGNOSIS — E78.2 MIXED HYPERLIPIDEMIA: ICD-10-CM

## 2020-07-22 DIAGNOSIS — Z95.810 AUTOMATIC IMPLANTABLE CARDIAC DEFIBRILLATOR IN SITU: ICD-10-CM

## 2020-07-22 DIAGNOSIS — I48.20 CHRONIC A-FIB (HCC): Primary | ICD-10-CM

## 2020-07-22 DIAGNOSIS — I25.10 CORONARY ARTERY DISEASE INVOLVING NATIVE CORONARY ARTERY OF NATIVE HEART WITHOUT ANGINA PECTORIS: ICD-10-CM

## 2020-07-22 NOTE — PROGRESS NOTES
[place]: Esperanza Nguyen  [phone]    HPI:   Mr. Juve Trujillo is a [de-identified] yo M with h/o CAD s/p PCI in 2003, patent stent with no significant CAD on 2008 cath, afib on metoprolol for rate control and coumadin for anticoagulation, apical variant of a hypertrophic cardiomyopathy with deep T wave inversions on ECG, sick sinus with NSVT noted on 11/14/13 holter with multiple > 3 sec pauses s/p [employer] ICD on 11/15/13 with Dr. Michelle Fan, s/p right hip revision on 12/5/13. MARIBEL in 2012 was normal.  7/2011 nuclear stress test was normal.  5/2012 echo noted EF of 50-55% and apical hypertrophy. Followed by ortho for right hip Dr. Crista Morales    Since his last visit, he continues to do well. He walks three to four miles a day with his friends. He denies any exertional chest pain, shortness of breath, palpitations, lightheadedness or dizziness. He has been compliant with his medications. He is compensated on exam with clear lungs and no lower extremity edema. His blood pressure is 118/78 and heart rate of 61. Assessment and Plan:    1. Atrial fibrillation with sick sinus, status post ICD. Continues stable and compensated. No bleeding issues on Eliquis. Will have him follow back in six months. 2. Coronary artery disease. Continue aspirin, statin, beta blocker and ACE inhibitor. 3. Hypertrophic cardiomyopathy. Stable on beta blocker. 4. Mixed hyperlipidemia. Tolerating statin. 5. Essential hypertension. Blood pressure is controlled. 6. Erectile dysfunction. Cialis or Viagra prn. Cautioned in the past about avoiding concurrent Nitroglycerin. He  has a past medical history of Arthritis, CAD (coronary artery disease), Chronic atrial fibrillation [facility]), ED (erectile dysfunction), Hypertension, Kidney stone on right side (10/30/2011), JAMEY (obstructive sleep apnea) (4/25/2012), Skin cancer, Sun-damaged skin, and TIA (transient ischemic attack).  He also has no past medical history of Arsenic suspected exposure, Family history of skin cancer, Radiation exposure, Sunburn, blistering, or Tanning bed exposure. All other systems negative except as above. PE  Vitals:    07/22/20 1304   BP: 118/78   Pulse: 61   SpO2: 98%   Weight: 186 lb 6.4 oz (84.6 kg)   Height: 5' 10\" (1.778 m)    Body mass index is 26.75 kg/m². General appearance - alert, well appearing, and in no distress  Mental status - affect appropriate to mood  Neck - supple, no JVD. No bruits present.    Chest - clear to auscultation, no wheezes, rales or rhonchi  Heart - Regular rate, regular rhythm, normal S1, S2, I/VI systolic murmur LUSB  Abdomen - soft, nontender, nondistended, no masses or organomegaly  Extremities - peripheral pulses normal, no pedal edema        Recent Labs:  Lab Results   Component Value Date/Time    Cholesterol, total 92 (L) 03/18/2020 09:30 AM    HDL Cholesterol 27 (L) 03/18/2020 09:30 AM    LDL, calculated 35 03/18/2020 09:30 AM    Triglyceride 152 (H) 03/18/2020 09:30 AM     Lab Results   Component Value Date/Time    Creatinine 0.90 03/18/2020 09:30 AM     Lab Results   Component Value Date/Time    BUN 15 03/18/2020 09:30 AM    BUN (POC) 16 12/04/2013 06:46 AM     Lab Results   Component Value Date/Time    Potassium 3.5 03/18/2020 09:30 AM     Lab Results   Component Value Date/Time    Hemoglobin A1c 5.3 11/19/2013 08:45 AM     Lab Results   Component Value Date/Time    HGB 13.0 03/18/2020 09:30 AM     Lab Results   Component Value Date/Time    PLATELET 364 (L) 37/48/6618 09:30 AM       Reviewed:  Past Medical History:   Diagnosis Date    Arthritis     knees    CAD (coronary artery disease)     stent x3 approx 2001    Chronic atrial fibrillation ([facility] [address]     ED (erectile dysfunction)     Hypertension     Kidney stone on right side 10/30/2011    JAMEY (obstructive sleep apnea) 4/25/2012    Skin cancer     BCC, s/p Mohs on L flank    Sun-damaged skin     TIA (transient ischemic attack)     6/12 - [facility] Social History     Tobacco Use   Smoking Status Never Smoker   Smokeless Tobacco Never Used     Social History     Substance and Sexual Activity   Alcohol Use No    Alcohol/week: 0.0 standard drinks     Allergies   Allergen Reactions    Pcn [Penicillins] Unknown (comments)     As a child    Percocet [Oxycodone-Acetaminophen] Swelling     Leg swelling       Current Outpatient Medications   Medication Sig    potassium chloride (KLOR-CON) 10 mEq tablet Take 1 Tab by mouth daily.  pravastatin (PRAVACHOL) 10 mg tablet TAKE 1 TABLET EVERY NIGHT    metoprolol succinate (TOPROL-XL) 25 mg XL tablet TAKE 1 TABLET EVERY DAY    amLODIPine (NORVASC) 5 mg tablet TAKE 1 TABLET EVERY DAY    enalapril (VASOTEC) 20 mg tablet TAKE 1 TABLET TWICE DAILY    hydroCHLOROthiazide (HYDRODIURIL) 25 mg tablet Take 1 Tab by mouth daily.  terazosin (HYTRIN) 10 mg capsule TAKE 1 CAPSULE EVERY NIGHT    warfarin (COUMADIN) 2.5 mg tablet Take 2 Tabs by mouth daily. (Patient taking differently: Take 5 mg by mouth daily. 1 tab on Sunday, Wednesday, and Friday, 2 tabs other days)    tadalafil (CIALIS) 20 mg tablet Take 1 Tab by mouth as needed (ED).  finasteride (PROSCAR) 5 mg tablet Take 1 Tab by mouth daily.  aspirin delayed-release 81 mg tablet Take  by mouth daily.  FOLIC ACID PO Take 429 mcg by mouth daily.  MULTIVITAMIN WITH MINERALS (MULTI-VIT 55 PLUS PO) Take 1 Tab by mouth daily. Taking multivitamins every morning. No current facility-administered medications for this visit.         Shanda Campbell MD  [facility] [place]  [address] [facility] [address]  [address]

## 2020-07-28 LAB
INR PPP: 2.1 (ref 0.8–1.2)
PROTHROMBIN TIME: 22.2 SEC (ref 9.1–12)

## 2020-07-29 NOTE — PROGRESS NOTES
Called pt, verified name and . Informed pt that per Nohelia   At goal.  No change in therapy. Recheck one month. Pt stated understanding.

## 2020-07-30 RX ORDER — TERAZOSIN 10 MG/1
CAPSULE ORAL
Qty: 90 CAP | Refills: 3 | Status: SHIPPED | OUTPATIENT
Start: 2020-07-30 | End: 2020-09-15

## 2020-08-13 ENCOUNTER — CLINICAL SUPPORT (OUTPATIENT)
Dept: CARDIOLOGY CLINIC | Age: 80
End: 2020-08-13
Payer: MEDICARE

## 2020-08-13 DIAGNOSIS — Z95.810 AUTOMATIC IMPLANTABLE CARDIAC DEFIBRILLATOR IN SITU: Primary | ICD-10-CM

## 2020-08-13 PROCEDURE — 93282 PRGRMG EVAL IMPLANTABLE DFB: CPT | Performed by: INTERNAL MEDICINE

## 2020-08-13 RX ORDER — HYDROCHLOROTHIAZIDE 25 MG/1
25 TABLET ORAL DAILY
Qty: 90 TAB | Refills: 3 | Status: SHIPPED | OUTPATIENT
Start: 2020-08-13 | End: 2020-11-02 | Stop reason: SDUPTHER

## 2020-08-13 NOTE — TELEPHONE ENCOUNTER
From Leonides Lao [facility]           Medication Refill     Caller (if not patient):       Relationship of caller (if not patient):       Best contact number(s):[phone]       Name of medication and dosage if known: Hydrochlorothiazide 25 mg's.  Pravastatin Sodium 10 mg's and Enalapril 20 mg's       Is patient out of this medication (yes/no):       Pharmacy name[facility] [address] [place] [address] listed in chart? (yes/no):yes   Pharmacy phone number:       Details to clarify the request:refill of Pradastatin Sodium 10 mgs Hydrochorothiazide 25 mgs and Enalatial 20 mgs       Jade Bending

## 2020-08-20 ENCOUNTER — TELEPHONE (OUTPATIENT)
Dept: INTERNAL MEDICINE CLINIC | Age: 80
End: 2020-08-20

## 2020-08-20 RX ORDER — ENALAPRIL MALEATE 20 MG/1
TABLET ORAL
Qty: 180 TAB | Refills: 1 | Status: SHIPPED | OUTPATIENT
Start: 2020-08-20 | End: 2020-11-13 | Stop reason: SDUPTHER

## 2020-08-21 ENCOUNTER — TELEPHONE (OUTPATIENT)
Dept: FAMILY MEDICINE CLINIC | Age: 80
End: 2020-08-21

## 2020-08-21 ENCOUNTER — VIRTUAL VISIT (OUTPATIENT)
Dept: FAMILY MEDICINE CLINIC | Age: 80
End: 2020-08-21
Payer: MEDICARE

## 2020-08-21 DIAGNOSIS — I10 ESSENTIAL HYPERTENSION: ICD-10-CM

## 2020-08-21 DIAGNOSIS — I48.0 PAROXYSMAL ATRIAL FIBRILLATION (HCC): Primary | ICD-10-CM

## 2020-08-21 PROCEDURE — 99443 PR PHYS/QHP TELEPHONE EVALUATION 21-30 MIN: CPT | Performed by: FAMILY MEDICINE

## 2020-08-21 NOTE — PROGRESS NOTES
Ngoc Hall is a [de-identified] y.o. male evaluated via audio only technology on 8/21/2020. Consent: He and/or his health care decision maker is aware that he may receive a bill for this audio only encounter, depending on his insurance coverage, and has provided verbal consent to proceed: Yes    I communicated with the patient and/or health care decision maker about the nature and details of the following:  Assessment & Plan:     Dizziness  Describing a vague sense of dizziness that is not necessarily orthostatics. Sounds like probable drug side effect. We went over medicines that could make this more likely: Any of his blood pressure medications particularly HCTZ, terazosin, Cialis. He does not want to make any changes since he feels good today    Hypertension  Does not have the means to check his blood pressure at home Cardiology appointment 7/22 blood pressure 118/78. Reports he weighs 170 (intentional weight loss) which may mean he needs less blood pressure medicine  He will ask some of the nurses in his neighborhood to check his blood pressure    Getting regular exercise, riding his bike 3 miles every day. He is happy with his weight. Feels really good in general    Suggested that he come to our office for blood pressure check if necessary    Would consider a trial off terazosin or dose reduction of HCTZ if he has recurrent symptoms    12  Subjective:   Ngoc Hall is a [de-identified] y.o. male who was seen for Dizziness    Presents with a complaint of dizziness yesterday. He had a mild headache and a vague sense of unsteadiness. Took an Excedrin and went to bed woke up feeling fine this morning. Did not feel dizzy upon standing. Did not feel like the room was spinning. Took his prescribed medicines with the exception of enalapril which he has been out of for 2 weeks. Did not take any other medicines. Started taking Cialis again a few weeks ago with good effect.   Not clear whether he took it a few days before the symptoms started. Prior to Admission medications    Medication Sig Start Date End Date Taking? Authorizing Provider   hydroCHLOROthiazide (HYDRODIURIL) 25 mg tablet Take 1 Tab by mouth daily. 8/13/20  Yes Marlene Waggoner MD   terazosin (HYTRIN) 10 mg capsule TAKE 1 CAPSULE EVERY NIGHT 7/30/20  Yes Tonya Jarvis MD   potassium chloride (KLOR-CON) 10 mEq tablet Take 1 Tab by mouth daily. 7/2/20  Yes Marlene Waggoner MD   pravastatin (PRAVACHOL) 10 mg tablet TAKE 1 TABLET EVERY NIGHT 6/19/20  Yes Marlene Waggoner MD   metoprolol succinate (TOPROL-XL) 25 mg XL tablet TAKE 1 TABLET EVERY DAY 6/19/20  Yes Marlene Waggoner MD   amLODIPine (NORVASC) 5 mg tablet TAKE 1 TABLET EVERY DAY 6/8/20  Yes Marlene Waggoner MD   warfarin (COUMADIN) 2.5 mg tablet Take 2 Tabs by mouth daily. Patient taking differently: Take 5 mg by mouth daily. 1 tab on Sunday, Wednesday, and Friday, 2 tabs other days 4/20/20  Yes Marlene Waggoner MD   tadalafil (CIALIS) 20 mg tablet Take 1 Tab by mouth as needed (ED). 2/7/20  Yes Marlene Waggoner MD   finasteride (PROSCAR) 5 mg tablet Take 1 Tab by mouth daily. 7/17/19  Yes Marlene Waggoner MD   aspirin delayed-release 81 mg tablet Take  by mouth daily. Yes Provider, Historical   FOLIC ACID PO Take 358 mcg by mouth daily. Yes Provider, Historical   MULTIVITAMIN WITH MINERALS (MULTI-VIT 55 PLUS PO) Take 1 Tab by mouth daily. Taking multivitamins every morning. 7/8/11  Yes Provider, Historical   enalapril (VASOTEC) 20 mg tablet TAKE 1 TABLET TWICE DAILY 8/20/20   Venkata Moseley MD     Allergies   Allergen Reactions    Pcn [Penicillins] Unknown (comments)     As a child    Percocet [Oxycodone-Acetaminophen] Swelling     Leg swelling         I affirm this is a Patient-Initiated Episode with a Patient who has not had a related appointment within my department in the past 7 days or scheduled within the next 24 hours.     Total Time: minutes: 21-30 minutes    Note: not billable if this call serves to triage the patient into an appointment for the relevant concern      Trevor Junior MD

## 2020-08-21 NOTE — TELEPHONE ENCOUNTER
C/O dizziness x this pm. No focal weakness, out of enalapril x 2 weeks. Decreased exercise tolerance. RF enalapril Call in AM for appt to be seen by PCP.

## 2020-08-21 NOTE — TELEPHONE ENCOUNTER
Message from [facility]    Dr. Italo Mart   Received: Yesterday   Message Contents   [place], [address] [facility] Office Pool               Medication Refill     Caller (if not patient):  Giancarlo Patten       Relationship of caller (if not patient):  Self       Best contact number(s):  [phone]       Name of medication and dosage if known:  Blood pressure medication       Is patient out of this medication (yes/no):  Yes       Pharmacy name:     Pharmacy listed in chart? (yes/no): [address] phone number:       Details to clarify the request:  Pt states he called a couple weeks ago about a refill for his blood pressure medication but the pharmacy haven't received the request and he's having dizziness for the past few days.  Pt was transferred to the answering service for assistance.  Please contact the Pt.      Thanks,   Patricia Rosa

## 2020-08-21 NOTE — PROGRESS NOTES
Chief Complaint   Patient presents with    Dizziness     Health Maintenance reviewed     1. Have you been to the ER, urgent care clinic since your last visit? Hospitalized since your last visit? No     2. Have you seen or consulted any other health care providers outside of the [address] [place] [address] since your last visit? Include any pap smears or colon screening.   No Patient advised appointment needed for letter.  Scheduled for 8/31/17 with Dr Ericka Pendleton

## 2020-08-21 NOTE — TELEPHONE ENCOUNTER
Pt called again wanting to know if issues he is experiencing could be coming from him not on med for the last week he did speak with Dr Anselmo Mendiola on call last night and he refilled his bp med but he is still worried about the dizziness and off balance

## 2020-08-27 ENCOUNTER — TELEPHONE ANTICOAG (OUTPATIENT)
Dept: FAMILY MEDICINE CLINIC | Age: 80
End: 2020-08-27

## 2020-08-27 DIAGNOSIS — I48.0 PAROXYSMAL ATRIAL FIBRILLATION (HCC): ICD-10-CM

## 2020-08-27 LAB
INR PPP: 3.6 (ref 0.8–1.2)
PROTHROMBIN TIME: 36.8 SEC (ref 9.1–12)

## 2020-08-27 NOTE — PROGRESS NOTES
INR 3.6, above goal.  Reduce dose to 5 mg Monday Wednesday Friday, 2.5 mg all other days.   Recheck 2 weeks

## 2020-08-27 NOTE — PROGRESS NOTES
INR 3.6, above goal.  Reduce dose to 5 mg Monday Wednesday Friday, 2.5 mg all other days. Recheck 2 weeks. Pt stated understanding.

## 2020-09-11 ENCOUNTER — TELEPHONE ANTICOAG (OUTPATIENT)
Dept: FAMILY MEDICINE CLINIC | Age: 80
End: 2020-09-11

## 2020-09-11 DIAGNOSIS — I48.0 PAROXYSMAL ATRIAL FIBRILLATION (HCC): ICD-10-CM

## 2020-09-11 LAB
INR PPP: 2.3 (ref 0.8–1.2)
PROTHROMBIN TIME: 23.7 SEC (ref 9.1–12)

## 2020-09-14 NOTE — PROGRESS NOTES
Called pt, verified name and . Informed pt that per Dr. Radha Camara INR 2.3. At goal, continue current dose recheck in 2 weeks. Pt stated understanding.

## 2020-09-15 ENCOUNTER — TELEPHONE (OUTPATIENT)
Dept: FAMILY MEDICINE CLINIC | Age: 80
End: 2020-09-15

## 2020-09-15 NOTE — TELEPHONE ENCOUNTER
Has been feeling a little off balance at various times the day. Similar dizziness to what he was experiencing last month. He has lost a substantial amount of weight so he probably does not need as much blood pressure medicine. He also takes  Terazosin and it does not sound like he knows what benefit he is getting out of this medicine.     He would like to try off of terazosin

## 2020-09-15 NOTE — TELEPHONE ENCOUNTER
Message from [address] / Erwin Person   Received: Today   Message Contents   Jaxson Magaña [facility]               General Message/Vendor Calls       Pt reports being off balance x 2 days, requesting to speak with nurse in regard to new medications.        Callback required     Best contact number(s)    [phone]             [facility] Alert

## 2020-09-21 RX ORDER — METOPROLOL SUCCINATE 25 MG/1
TABLET, EXTENDED RELEASE ORAL
Qty: 90 TAB | Refills: 3 | Status: SHIPPED | OUTPATIENT
Start: 2020-09-21 | End: 2021-03-19 | Stop reason: SDUPTHER

## 2020-09-21 RX ORDER — WARFARIN 2.5 MG/1
5 TABLET ORAL DAILY
Qty: 180 TAB | Refills: 1 | Status: SHIPPED | OUTPATIENT
Start: 2020-09-21 | End: 2021-02-15 | Stop reason: ALTCHOICE

## 2020-09-21 NOTE — TELEPHONE ENCOUNTER
Message from Greer Garcia   Received: Today   Message Contents   Dafne Oconnell, [address] [facility] Office Pool               Medication Refill     Caller (if not patient):       Relationship of caller (if not patient):       Best contact number(s): [phone]       Name of medication and dosage if known: Coumadin, Metoprolol 25mg       Is patient out of this medication (yes/no): No       Pharmacy name: [facility] listed in chart? (yes/no):   Pharmacy phone number: [phone]       Details to clarify the request: Pt needs a Rx refill on Coumadin and Metoprolol       Arden Lawrence      Requested Prescriptions     Pending Prescriptions Disp Refills    warfarin (COUMADIN) 2.5 mg tablet 180 Tab 1     Sig: Take 2 Tabs by mouth daily.     metoprolol succinate (TOPROL-XL) 25 mg XL tablet 90 Tab 3     Sig: TAKE 1 TABLET EVERY DAY

## 2020-09-30 LAB
INR PPP: 1.8 (ref 0.8–1.2)
PROTHROMBIN TIME: 19.3 SEC (ref 9.1–12)

## 2020-10-01 ENCOUNTER — TELEPHONE ANTICOAG (OUTPATIENT)
Dept: FAMILY MEDICINE CLINIC | Age: 80
End: 2020-10-01

## 2020-10-01 DIAGNOSIS — I48.0 PAROXYSMAL ATRIAL FIBRILLATION (HCC): ICD-10-CM

## 2020-10-05 RX ORDER — POTASSIUM CHLORIDE 750 MG/1
TABLET, FILM COATED, EXTENDED RELEASE ORAL
Qty: 90 TAB | Refills: 3 | Status: SHIPPED | OUTPATIENT
Start: 2020-10-05 | End: 2020-11-09 | Stop reason: SDUPTHER

## 2020-10-14 LAB
INR PPP: 2.1 (ref 0.9–1.2)
PROTHROMBIN TIME: 21.7 SEC (ref 9.1–12)

## 2020-10-15 ENCOUNTER — TELEPHONE ANTICOAG (OUTPATIENT)
Dept: FAMILY MEDICINE CLINIC | Age: 80
End: 2020-10-15

## 2020-10-15 DIAGNOSIS — I48.0 PAROXYSMAL ATRIAL FIBRILLATION (HCC): ICD-10-CM

## 2020-10-16 ENCOUNTER — TELEPHONE (OUTPATIENT)
Dept: FAMILY MEDICINE CLINIC | Age: 80
End: 2020-10-16

## 2020-11-02 NOTE — TELEPHONE ENCOUNTER
Message from [facility]    /Refill   Received: 2 days ago   Message Contents   [address], [address] Office Pool               Medication Refill     Caller (if not patient):       Relationship of caller (if not patient):       Best contact number(s): [phone]       Name of medication and dosage if known: pravastatin sodium 10mg,    hydrochlorothiazide     Is patient out of this medication (yes/no):yes       Pharmacy name: [facility] listed in chart? (yes/no): yes   Pharmacy phone number:       Details to clarify the request:       Travis Valdez            Requested Prescriptions     Pending Prescriptions Disp Refills    hydroCHLOROthiazide (HYDRODIURIL) 25 mg tablet 90 Tab 3     Sig: Take 1 Tab by mouth daily.

## 2020-11-03 RX ORDER — HYDROCHLOROTHIAZIDE 25 MG/1
25 TABLET ORAL DAILY
Qty: 90 TAB | Refills: 3 | Status: SHIPPED | OUTPATIENT
Start: 2020-11-03 | End: 2020-11-13 | Stop reason: SDUPTHER

## 2020-11-09 ENCOUNTER — OFFICE VISIT (OUTPATIENT)
Dept: FAMILY MEDICINE CLINIC | Age: 80
End: 2020-11-09
Payer: MEDICARE

## 2020-11-09 VITALS
OXYGEN SATURATION: 98 % | SYSTOLIC BLOOD PRESSURE: 122 MMHG | HEIGHT: 70 IN | HEART RATE: 69 BPM | TEMPERATURE: 97 F | WEIGHT: 186.8 LBS | DIASTOLIC BLOOD PRESSURE: 77 MMHG | BODY MASS INDEX: 26.74 KG/M2 | RESPIRATION RATE: 16 BRPM

## 2020-11-09 DIAGNOSIS — Z79.01 ANTICOAGULATION GOAL OF INR 2 TO 3: ICD-10-CM

## 2020-11-09 DIAGNOSIS — I25.10 CORONARY ARTERY DISEASE INVOLVING NATIVE CORONARY ARTERY OF NATIVE HEART WITHOUT ANGINA PECTORIS: ICD-10-CM

## 2020-11-09 DIAGNOSIS — Z79.01 CHRONIC ANTICOAGULATION: ICD-10-CM

## 2020-11-09 DIAGNOSIS — I10 ESSENTIAL HYPERTENSION: ICD-10-CM

## 2020-11-09 DIAGNOSIS — Z51.81 ANTICOAGULATION GOAL OF INR 2 TO 3: ICD-10-CM

## 2020-11-09 DIAGNOSIS — R26.89 BALANCE PROBLEM: Primary | ICD-10-CM

## 2020-11-09 DIAGNOSIS — I48.0 PAROXYSMAL ATRIAL FIBRILLATION (HCC): ICD-10-CM

## 2020-11-09 LAB
INR BLD: 2.1
PT POC: 25.6 SECONDS
VALID INTERNAL CONTROL?: YES

## 2020-11-09 PROCEDURE — G8536 NO DOC ELDER MAL SCRN: HCPCS | Performed by: FAMILY MEDICINE

## 2020-11-09 PROCEDURE — 99214 OFFICE O/P EST MOD 30 MIN: CPT | Performed by: FAMILY MEDICINE

## 2020-11-09 PROCEDURE — G8752 SYS BP LESS 140: HCPCS | Performed by: FAMILY MEDICINE

## 2020-11-09 PROCEDURE — 85610 PROTHROMBIN TIME: CPT | Performed by: FAMILY MEDICINE

## 2020-11-09 PROCEDURE — G8432 DEP SCR NOT DOC, RNG: HCPCS | Performed by: FAMILY MEDICINE

## 2020-11-09 PROCEDURE — 1101F PT FALLS ASSESS-DOCD LE1/YR: CPT | Performed by: FAMILY MEDICINE

## 2020-11-09 PROCEDURE — G8754 DIAS BP LESS 90: HCPCS | Performed by: FAMILY MEDICINE

## 2020-11-09 PROCEDURE — G8419 CALC BMI OUT NRM PARAM NOF/U: HCPCS | Performed by: FAMILY MEDICINE

## 2020-11-09 PROCEDURE — G8427 DOCREV CUR MEDS BY ELIG CLIN: HCPCS | Performed by: FAMILY MEDICINE

## 2020-11-09 RX ORDER — TERAZOSIN 10 MG/1
10 CAPSULE ORAL
COMMUNITY
Start: 2020-10-25 | End: 2021-06-04

## 2020-11-09 NOTE — PROGRESS NOTES
HPI  Justin Boyle is a 79 y.o. male who presents with dizziness. He has complained about this a few times over the last few visits. Today he reports that it only seems to happen when he turns quickly i.e. when his dog jerks him one way or the other. He feels like he is falling to the right for about a minute. Does not describe this as room spinning. He has not fallen    He is walking 4 to 6 miles a day. He has lost 10 pounds intentionally. Feels like he is sleeping a lot better    PMHx:  Past Medical History:   Diagnosis Date    Arthritis     knees    CAD (coronary artery disease)     stent x3 approx 2001    Chronic atrial fibrillation ([address]     ED (erectile dysfunction)     Hypertension     Kidney stone on right side 10/30/2011    JAMEY (obstructive sleep apnea) 4/25/2012    Skin cancer     BCC, s/p Mohs on L flank    Sun-damaged skin     TIA (transient ischemic attack)     6/12 - seen at [facility]       Meds:   Current Outpatient Medications   Medication Sig Dispense Refill    terazosin (HYTRIN) 10 mg capsule Take 10 mg by mouth nightly.  hydroCHLOROthiazide (HYDRODIURIL) 25 mg tablet Take 1 Tab by mouth daily. 90 Tab 3    warfarin (COUMADIN) 2.5 mg tablet Take 2 Tabs by mouth daily. 180 Tab 1    metoprolol succinate (TOPROL-XL) 25 mg XL tablet TAKE 1 TABLET EVERY DAY 90 Tab 3    enalapril (VASOTEC) 20 mg tablet TAKE 1 TABLET TWICE DAILY 180 Tab 1    pravastatin (PRAVACHOL) 10 mg tablet TAKE 1 TABLET EVERY NIGHT 90 Tab 3    amLODIPine (NORVASC) 5 mg tablet TAKE 1 TABLET EVERY DAY 90 Tab 3    tadalafil (CIALIS) 20 mg tablet Take 1 Tab by mouth as needed (ED). 30 Tab 11    finasteride (PROSCAR) 5 mg tablet Take 1 Tab by mouth daily. 30 Tab 3    aspirin delayed-release 81 mg tablet Take  by mouth daily.  FOLIC ACID PO Take 584 mcg by mouth daily.  MULTIVITAMIN WITH MINERALS (MULTI-VIT 55 PLUS PO) Take 1 Tab by mouth daily. Taking multivitamins every morning.       potassium chloride (KLOR-CON) 10 mEq tablet Take 1 Tab by mouth daily. 90 Tab 0       Allergies: Allergies   Allergen Reactions    Acetaminophen Other (comments)    Oxycodone Hcl Other (comments)    Pcn [Penicillins] Unknown (comments)     As a child    Percocet [Oxycodone-Acetaminophen] Swelling     Leg swelling       Smoker:  Social History     Tobacco Use   Smoking Status Never Smoker   Smokeless Tobacco Never Used       ETOH:   Social History     Substance and Sexual Activity   Alcohol Use No    Alcohol/week: 0.0 standard drinks       FH:   Family History   Problem Relation Age of Onset    Stroke Father     Heart Attack Father     Heart Disease Father     Cancer Father 80        colon cancer    Heart Disease Mother    [facility] Arthritis-osteo Sister         s/p bilateral knee replacements    No Known Problems Daughter     Anesth Problems Neg Hx        ROS:   As listed in HPI. In addition:  Constitutional:   No headache, fever, fatigue, weight loss or weight gain      Cardiac:    No chest pain      Resp:   No cough or shortness of breath      Neuro   No loss of consciousness, dizziness, seizures      Physical Exam:  Blood pressure 122/77, pulse 69, temperature 97 °F (36.1 °C), temperature source Temporal, resp. rate 16, height 5' 10\" (1.778 m), weight 186 lb 12.8 oz (84.7 kg), SpO2 98 %. GEN: No apparent distress. Alert and oriented and responds to all questions appropriately. NEUROLOGIC:  No focal neurologic deficits. Strength and sensation grossly intact. Coordination and gait grossly intact. EXT: Well perfused. No edema. SKIN: No obvious rashes. Lungs clear to auscultation bilaterally  CV regular rate rhythm no murmur  HEENT clear to [facility] membranes with cerumen debris on the tympanic membrane. Otherwise devoid of wax       Assessment and Plan     Balance problem  Feels like he is falling to the right  Consider drug side effect versus ear issue  Going to stop Terazosin for 1 week.   If no improvement he can consider restarting  Next would cut HCTZ in half to 12.5 mg  If those 2 drug changes had no effect would recommend he see ENT    He is over cleaning his ears. It seems that he could be pushing the Q-tip so deep and almost contact his eardrum. Please stop    Follow-up in spring for wellness  Sooner if symptoms persist      ICD-10-CM ICD-9-CM    1. Balance problem  R26.89 781.99    2. Paroxysmal atrial fibrillation (HCC)  I48.0 427.31 AMB POC PT/INR   3. Anticoagulation goal of INR 2 to 3  Z51.81 V58.83 AMB POC PT/INR    Z79.01 V58.61    4. Coronary artery disease involving native coronary artery of native heart without angina pectoris  I25.10 414.01    5. Essential hypertension  I10 401.9    6. Chronic anticoagulation  Z79.01 V58.61        AVS given.  Pt expressed understanding of instructions

## 2020-11-13 RX ORDER — ENALAPRIL MALEATE 20 MG/1
TABLET ORAL
Qty: 180 TAB | Refills: 1 | Status: SHIPPED | OUTPATIENT
Start: 2020-11-13 | End: 2021-02-05 | Stop reason: SDUPTHER

## 2020-11-13 RX ORDER — PRAVASTATIN SODIUM 10 MG/1
TABLET ORAL
Qty: 90 TAB | Refills: 3 | Status: SHIPPED | OUTPATIENT
Start: 2020-11-13 | End: 2021-02-05 | Stop reason: SDUPTHER

## 2020-11-13 RX ORDER — HYDROCHLOROTHIAZIDE 25 MG/1
25 TABLET ORAL DAILY
Qty: 90 TAB | Refills: 3 | Status: SHIPPED
Start: 2020-11-13 | End: 2021-06-04

## 2020-11-13 NOTE — TELEPHONE ENCOUNTER
----- Message from [place] sent at 11/13/2020  8:00 AM EST -----  Regarding: Dr. Sunshine Fairly  Pt is requesting Rx refills for \"Hydrochlorothiazide 25 MG\", \"Pravastatin 10 MG\", and \"Enalatril 20 MG [place]\". Pt would like all Rx's sent to [facility] (on file). Pt is completely out of medications and this is the 2nd attempt to refill. Best contact number [phone].

## 2020-11-16 ENCOUNTER — TELEPHONE (OUTPATIENT)
Dept: FAMILY MEDICINE CLINIC | Age: 80
End: 2020-11-16

## 2020-11-16 NOTE — TELEPHONE ENCOUNTER
Message from Marlene Miranda   Received: 3 days ago   Message Contents   Kaya Barreto [address]               General Message/Vendor Calls     Caller's first and last name:   pt     Reason for call:   Status of refills     Callback required yes/no and why:   yes     Best contact number(s):   [phone]     Details to clarify the request:   Inquiring the status of medication refills. Pt stated, he is out of medications now and this was suppose to be sent to [address] [place] [address] in pt's file this morning. Requesting to speak with the nurse in regards to this matter   Pt has been trying to get refills for 2 weeks with no response.    Carlton Tom

## 2020-11-17 ENCOUNTER — CLINICAL SUPPORT (OUTPATIENT)
Dept: CARDIOLOGY CLINIC | Age: 80
End: 2020-11-17
Payer: MEDICARE

## 2020-11-17 DIAGNOSIS — Z95.810 AUTOMATIC IMPLANTABLE CARDIAC DEFIBRILLATOR IN SITU: Primary | ICD-10-CM

## 2020-11-17 PROCEDURE — 93282 PRGRMG EVAL IMPLANTABLE DFB: CPT | Performed by: INTERNAL MEDICINE

## 2020-11-17 NOTE — TELEPHONE ENCOUNTER
Spoke with pharmacist and she states the rxs that was sent on 11/13 was picked up on 11/14.  Tried calling pt to see if needs another rx filled but I had to leave a voicemail

## 2020-11-17 NOTE — TELEPHONE ENCOUNTER
I sent hydrochlorothiazide, pravastatin, enalapril to [facility] on 11/13. Each time I send this to pharmacy I prescribe 90 days with 3 refills (1 year supply).   He can contact pharmacy if he needs to know if his refill is ready to be picked up

## 2020-11-18 ENCOUNTER — TELEPHONE (OUTPATIENT)
Dept: FAMILY MEDICINE CLINIC | Age: 80
End: 2020-11-18

## 2020-11-18 NOTE — TELEPHONE ENCOUNTER
----- Message from Sourav Teton sent at 11/17/2020  5:03 PM EST -----  Regarding: Dr. Jammie Dodd telephone  Patient return call    Caller's first and last name and relationship (if not the patient): N/A      Best contact number(s):[phone]      Whose call is being returned: unknown      Details to clarify the request: Pt was told to call Dr. Sexton Pleasant

## 2020-11-23 ENCOUNTER — DOCUMENTATION ONLY (OUTPATIENT)
Dept: CARDIOLOGY CLINIC | Age: 80
End: 2020-11-23

## 2020-11-23 NOTE — PROGRESS NOTES
ICD with 90% RV pacing  AFIB   Hypertrophic cardiomyopathy    No recent echo  Consider with next visit   Future Appointments   Date Time Provider Claudia Wang   1/22/2021 11:00 AM MD GERARD Cesar   2/23/2021  9:15 AM PACEMAKER3, BRITNEY AMOS AMB

## 2020-11-24 DIAGNOSIS — I48.0 PAROXYSMAL ATRIAL FIBRILLATION (HCC): Primary | ICD-10-CM

## 2020-11-27 RX ORDER — AMLODIPINE BESYLATE 5 MG/1
TABLET ORAL
Qty: 90 TAB | Refills: 3 | Status: SHIPPED | OUTPATIENT
Start: 2020-11-27 | End: 2021-03-08 | Stop reason: SDUPTHER

## 2020-12-01 RX ORDER — TADALAFIL 20 MG/1
20 TABLET ORAL AS NEEDED
Qty: 30 TAB | Refills: 11 | Status: SHIPPED | OUTPATIENT
Start: 2020-12-01 | End: 2021-01-19 | Stop reason: SDUPTHER

## 2020-12-01 NOTE — TELEPHONE ENCOUNTER
Pt is calling requesting a refill on his med    Requested Prescriptions     Pending Prescriptions Disp Refills    tadalafiL (Cialis) 20 mg tablet 30 Tab 11     Sig: Take 1 Tab by mouth as needed (ED).

## 2020-12-28 ENCOUNTER — TELEPHONE (OUTPATIENT)
Dept: FAMILY MEDICINE CLINIC | Age: 80
End: 2020-12-28

## 2020-12-28 DIAGNOSIS — Z51.81 ANTICOAGULATION GOAL OF INR 2 TO 3: Primary | ICD-10-CM

## 2020-12-28 DIAGNOSIS — Z79.01 ANTICOAGULATION GOAL OF INR 2 TO 3: Primary | ICD-10-CM

## 2020-12-29 ENCOUNTER — TELEPHONE ANTICOAG (OUTPATIENT)
Dept: FAMILY MEDICINE CLINIC | Age: 80
End: 2020-12-29

## 2020-12-29 DIAGNOSIS — I48.0 PAROXYSMAL ATRIAL FIBRILLATION (HCC): ICD-10-CM

## 2020-12-29 LAB
INR PPP: 2.3 (ref 0.9–1.2)
PROTHROMBIN TIME: 23.5 SEC (ref 9.1–12)

## 2021-01-07 NOTE — PROGRESS NOTES
Elly Cox is a 65 y.o. male who was seen in clinic today (5/11/2018). Assessment & Plan:   Diagnoses and all orders for this visit:    1. LLQ abdominal pain- improved but still symptomatic, reviewed differential of abd pain and fluid collection (hematoma is possible due to being on blood thinner) but also worrisome of abscess (due to fevers, but unclear source). Will plan for IR drainage on Tuesday. Will have him RTC on Monday for NV and INR check. Red flags and expectations were reviewed & discussed with the him. They verbalized understanding. CT scan images reviewed with them both. 2. Elevated INR- will restart coumadin but at lower dose (2mg daily), will RTC on Monday for INR check. We reviewed his issues w/ keeping INR at goal, they will look into out of pocket cost of NOAC, coupon cards provided. Will plan on continuing w/ coumadin for now. -     AMB POC PT/INR         Follow-up Disposition: Not on File      Subjective:   Earl Conner was seen today for Abnormal Lab Results    Patient RTC with his girlfriend. He was seen in clinic on 5/9. CT scan of the abdomen shows a fluid collect (abscess vs hematoma). He went to IR yesterday to have it drained by postponed due to elevated INR. He reports abdominal pain is slightly better but still an issue. Worse w/ palpitations. Bowel habits are returning to normal.  No diarrhea, constipation, melena, or brbpr. Denies any nausea or vomiting. He did have any temperature on Wed night but did have a 100.6 last night. He denies any other significant changes. Still reports some fatigue. Prior to Admission medications    Medication Sig Start Date End Date Taking? Authorizing Provider   sildenafil, antihypertensive, (REVATIO) 20 mg tablet Take 1-5 Tabs by mouth daily as needed.  4/30/18  Yes Michelet Traylor MD   terazosin (HYTRIN) 10 mg capsule TAKE 1 CAPSULE EVERY NIGHT 1/5/18  Yes Michelet Traylor MD   hydroCHLOROthiazide (HYDRODIURIL) 25 mg tablet TAKE 1 TABLET EVERY DAY 1/5/18  Yes Armando Mcnair MD   potassium chloride (KLOR-CON) 10 mEq tablet TAKE 1 TABLET THREE TIMES DAILY 1/5/18  Yes Armando Mcnair MD   metoprolol succinate (TOPROL-XL) 25 mg XL tablet TAKE 1 TABLET EVERY DAY 1/5/18  Yes Armando Mcnair MD   enalapril (VASOTEC) 20 mg tablet TAKE 1 TABLET TWICE DAILY 12/25/17  Yes Armando Mcnair MD   pravastatin (PRAVACHOL) 10 mg tablet TAKE 1 TABLET EVERY NIGHT 11/13/17  Yes Armando Mcnair MD   amLODIPine (NORVASC) 5 mg tablet Take 5 mg by mouth daily. Yes Historical Provider   aspirin delayed-release 81 mg tablet Take  by mouth daily. Yes Historical Provider   acetaminophen (TYLENOL) 325 mg tablet Take  by mouth every four (4) hours as needed for Pain. Yes Historical Provider   FOLIC ACID PO Take 263 mcg by mouth daily. Yes Historical Provider   thioctic acid (ALPHA LIPOIC ACID) 50 mg tab Take 1 Tab by mouth daily. Yes Historical Provider   fiber Cap Take 2 Caps by mouth two (2) times a day. Yes Historical Provider   MULTIVITAMIN WITH MINERALS (MULTI-VIT 55 PLUS PO) Take 1 Tab by mouth daily. 7/8/11  Yes Historical Provider   warfarin (COUMADIN) 2 mg tablet Take 2 Tabs by mouth daily. Patient taking differently: Take 4 mg by mouth daily. Coumadin 4mg daily except 2mg on Tues and Thurs 12/19/17   Armando Mcnair MD          Allergies   Allergen Reactions    Pcn [Penicillins] Unknown (comments)     As a child    Percocet [Oxycodone-Acetaminophen] Swelling     Leg swelling           ROS - per HPI        Objective:   Physical Exam   Constitutional: No distress. Cardiovascular: Regular rhythm and normal heart sounds. No murmur heard. Pulmonary/Chest: Effort normal and breath sounds normal. He has no wheezes. He has no rales. Abdominal: Soft. He exhibits no mass. Bowel sounds are decreased. There is no hepatosplenomegaly. There is tenderness in the left lower quadrant.  There is no rigidity, no rebound and no guarding. Psychiatric: He has a normal mood and affect. His behavior is normal.         Visit Vitals    BP 96/62    Pulse 78    Temp 97.8 °F (36.6 °C) (Oral)    Resp 16    Ht 5' 10\" (1.778 m)    Wt 215 lb (97.5 kg)    SpO2 97%    BMI 30.85 kg/m2         Disclaimer:  Advised him to call back or return to office if symptoms worsen/change/persist.  Discussed expected course/resolution/complications of diagnosis in detail with patient. Medication risks/benefits/costs/interactions/alternatives discussed with patient. He was given an after visit summary which includes diagnoses, current medications, & vitals. He expressed understanding with the diagnosis and plan. Aspects of this note may have been generated using voice recognition software. Despite editing, there may be some syntax errors.        Minda Hook MD 07-Jan-2021 14:50

## 2021-01-12 ENCOUNTER — TELEPHONE ANTICOAG (OUTPATIENT)
Dept: FAMILY MEDICINE CLINIC | Age: 81
End: 2021-01-12

## 2021-01-12 DIAGNOSIS — I48.0 PAROXYSMAL ATRIAL FIBRILLATION (HCC): ICD-10-CM

## 2021-01-12 LAB
INR PPP: 2.2 (ref 0.9–1.2)
PROTHROMBIN TIME: 23.3 SEC (ref 9.1–12)

## 2021-01-19 RX ORDER — TADALAFIL 20 MG/1
20 TABLET ORAL AS NEEDED
Qty: 30 TAB | Refills: 11 | Status: SHIPPED | OUTPATIENT
Start: 2021-01-19 | End: 2021-02-05 | Stop reason: SDUPTHER

## 2021-01-19 NOTE — TELEPHONE ENCOUNTER
Requested Prescriptions     Pending Prescriptions Disp Refills    tadalafiL (Cialis) 20 mg tablet 30 Tab 11     Sig: Take 1 Tab by mouth as needed (ED).

## 2021-01-19 NOTE — TELEPHONE ENCOUNTER
----- Message from Angela Bishop sent at 1/19/2021  9:04 AM EST -----  Regarding: /refill  Contact: [phone]  Medication Refill    Caller (if not patient):n/A      Relationship of caller (if not patient N/A      Best contact number(s [phone]      Name of medication and dosage if known: \"Tazalflafil\" 20mg      Is patient out of this medication (yes/no): No      Pharmacy name: [facility] in [place]    Pharmacy listed in chart? (yes/no): Yes  Pharmacy phone number: [phone]      Details to clarify the request: N/A      Angela Bishop

## 2021-01-22 ENCOUNTER — OFFICE VISIT (OUTPATIENT)
Dept: CARDIOLOGY CLINIC | Age: 81
End: 2021-01-22
Payer: MEDICARE

## 2021-01-22 ENCOUNTER — ANCILLARY PROCEDURE (OUTPATIENT)
Dept: CARDIOLOGY CLINIC | Age: 81
End: 2021-01-22
Payer: MEDICARE

## 2021-01-22 VITALS
BODY MASS INDEX: 27.35 KG/M2 | HEART RATE: 63 BPM | RESPIRATION RATE: 12 BRPM | WEIGHT: 191 LBS | OXYGEN SATURATION: 98 % | DIASTOLIC BLOOD PRESSURE: 90 MMHG | SYSTOLIC BLOOD PRESSURE: 140 MMHG | HEIGHT: 70 IN

## 2021-01-22 VITALS — WEIGHT: 186 LBS | HEIGHT: 70 IN | BODY MASS INDEX: 26.63 KG/M2

## 2021-01-22 DIAGNOSIS — I48.0 PAROXYSMAL ATRIAL FIBRILLATION (HCC): ICD-10-CM

## 2021-01-22 DIAGNOSIS — I48.20 CHRONIC A-FIB (HCC): Primary | ICD-10-CM

## 2021-01-22 DIAGNOSIS — I10 BENIGN ESSENTIAL HTN: ICD-10-CM

## 2021-01-22 DIAGNOSIS — I25.10 CORONARY ARTERY DISEASE INVOLVING NATIVE CORONARY ARTERY OF NATIVE HEART WITHOUT ANGINA PECTORIS: ICD-10-CM

## 2021-01-22 DIAGNOSIS — E78.2 MIXED HYPERLIPIDEMIA: ICD-10-CM

## 2021-01-22 LAB
ECHO AO ROOT DIAM: 3.75 CM
ECHO AV AREA PEAK VELOCITY: 2.31 CM2
ECHO AV AREA VTI: 2.21 CM2
ECHO AV AREA/BSA PEAK VELOCITY: 1.1 CM2/M2
ECHO AV AREA/BSA VTI: 1.1 CM2/M2
ECHO AV MEAN GRADIENT: 3.03 MMHG
ECHO AV PEAK GRADIENT: 5.84 MMHG
ECHO AV PEAK VELOCITY: 120.88 CM/S
ECHO AV VTI: 24.35 CM
ECHO IVC PROX: 2.1 CM
ECHO LA AREA 4C: 33.75 CM2
ECHO LA MAJOR AXIS: 5.3 CM
ECHO LA MINOR AXIS: 2.62 CM
ECHO LA VOL 2C: 121.63 ML (ref 18–58)
ECHO LA VOL 4C: 127.43 ML (ref 18–58)
ECHO LA VOL BP: 139.6 ML (ref 18–58)
ECHO LA VOL/BSA BIPLANE: 68.98 ML/M2 (ref 16–28)
ECHO LA VOLUME INDEX A2C: 60.1 ML/M2 (ref 16–28)
ECHO LA VOLUME INDEX A4C: 62.96 ML/M2 (ref 16–28)
ECHO LV E' LATERAL VELOCITY: 8.93 CM/S
ECHO LV E' SEPTAL VELOCITY: 6.58 CM/S
ECHO LV EDV A4C: 111.54 ML
ECHO LV EDV INDEX A4C: 55.1 ML/M2
ECHO LV EJECTION FRACTION A4C: 46 PERCENT
ECHO LV ESV A4C: 60.66 ML
ECHO LV ESV INDEX A4C: 30 ML/M2
ECHO LV GLOBAL LONGITUDINAL STRAIN (GLS): -11 PERCENT
ECHO LV INTERNAL DIMENSION DIASTOLIC: 6.88 CM (ref 4.2–5.9)
ECHO LV INTERNAL DIMENSION SYSTOLIC: 4.87 CM
ECHO LV IVSD: 1.21 CM (ref 0.6–1)
ECHO LV MASS 2D: 403.2 G (ref 88–224)
ECHO LV MASS INDEX 2D: 199.2 G/M2 (ref 49–115)
ECHO LV POSTERIOR WALL DIASTOLIC: 1.23 CM (ref 0.6–1)
ECHO LVOT DIAM: 1.92 CM
ECHO LVOT PEAK GRADIENT: 3.73 MMHG
ECHO LVOT PEAK VELOCITY: 96.63 CM/S
ECHO LVOT SV: 53.9 ML
ECHO LVOT VTI: 18.62 CM
ECHO RV TAPSE: 2.05 CM (ref 1.5–2)
ECHO TV REGURGITANT MAX VELOCITY: 235.08 CM/S
ECHO TV REGURGITANT PEAK GRADIENT: 22.1 MMHG
GLOBAL LONGITUDINAL STRAIN 2 CHAMBER: -10.3 PERCENT
GLOBAL LONGITUDINAL STRAIN 4 CHAMBER: -10.5 PERCENT
GLOBAL LONGITUDINAL STRAIN LONG AXIS: -12.1 PERCENT
LA VOL DISK BP: 131.44 ML (ref 18–58)

## 2021-01-22 PROCEDURE — 93000 ELECTROCARDIOGRAM COMPLETE: CPT | Performed by: INTERNAL MEDICINE

## 2021-01-22 PROCEDURE — G8427 DOCREV CUR MEDS BY ELIG CLIN: HCPCS | Performed by: INTERNAL MEDICINE

## 2021-01-22 PROCEDURE — 99214 OFFICE O/P EST MOD 30 MIN: CPT | Performed by: INTERNAL MEDICINE

## 2021-01-22 PROCEDURE — G8419 CALC BMI OUT NRM PARAM NOF/U: HCPCS | Performed by: INTERNAL MEDICINE

## 2021-01-22 PROCEDURE — G8510 SCR DEP NEG, NO PLAN REQD: HCPCS | Performed by: INTERNAL MEDICINE

## 2021-01-22 PROCEDURE — G8753 SYS BP > OR = 140: HCPCS | Performed by: INTERNAL MEDICINE

## 2021-01-22 PROCEDURE — G8755 DIAS BP > OR = 90: HCPCS | Performed by: INTERNAL MEDICINE

## 2021-01-22 PROCEDURE — G8536 NO DOC ELDER MAL SCRN: HCPCS | Performed by: INTERNAL MEDICINE

## 2021-01-22 PROCEDURE — 1101F PT FALLS ASSESS-DOCD LE1/YR: CPT | Performed by: INTERNAL MEDICINE

## 2021-01-22 PROCEDURE — 93306 TTE W/DOPPLER COMPLETE: CPT | Performed by: INTERNAL MEDICINE

## 2021-01-22 NOTE — PROGRESS NOTES
Chief Complaint   Patient presents with    Follow-up      6 month follow up with echo. Denies chest pain/dizziness/shortness of breath/swelling. Visit Vitals  BP (!) 140/90 (BP 1 Location: Right arm, BP Patient Position: Sitting)   Pulse 63   Resp 12   Ht 5' 10\" (1.778 m)   Wt 191 lb (86.6 kg)   SpO2 98%   BMI 27.41 kg/m²     Walks 7 miles a day.

## 2021-01-22 NOTE — PROGRESS NOTES
[facility]: Jo Iyer  [phone]    HPI:   Mr. Lupillo Ramey is a 78 yo M with h/o CAD s/p PCI in 2003, patent stent with no significant CAD on 2008 cath, afib on metoprolol for rate control and coumadin for anticoagulation, apical variant of a hypertrophic cardiomyopathy with deep T wave inversions on ECG, sick sinus with NSVT noted on 11/14/13 holter with multiple > 3 sec pauses s/p [employer] ICD on 11/15/13 with Dr. Carlos A Yee, s/p right hip revision on 12/5/13. MARIBEL in 2012 was normal.  7/2011 nuclear stress test was normal.  5/2012 echo noted EF of 50-55% and apical hypertrophy. Followed by ortho for right hip Dr. Cosme Cassidy    Since his last visit, overall he continues to do well. He walks seven miles a day and goes to the gym and has not had any restriction. He denies any exertional chest pain. His breathing has been stable. No significant palpitations. He has been compliant with his medications. He is compensated on exam with clear lungs and no lower extremity edema. His blood pressure overall has been normal.  His echocardiogram today was overall unchanged with EF of 55% and he did have mild to moderate mitral regurgitation, which had increased slightly from mild range. Assessment and Plan:    1. Atrial fibrillation with sick sinus, status post ICD. Stable and compensated. Will have him follow back in six months. 2. Chronic anticoagulation. No bleeding issues on Coumadin. Eliquis was cost prohibitive. 3. Coronary artery disease. Continue statin, beta blocker and ACE inhibitor. 4. Cardiomyopathy, apical variant. Stable on beta blocker and his echocardiogram is overall unchanged. Consider repeat echocardiogram once every few years. 5. Mixed hyperlipidemia. Tolerating statin. 6. Essential hypertension. Blood pressure is controlled. 7. Erectile dysfunction. Cialis or Viagra prn. Cautioned in the past about avoiding concurrent Nitroglycerin.         He  has a past medical history of Arthritis, CAD (coronary artery disease), Chronic atrial fibrillation [facility]), ED (erectile dysfunction), Hypertension, Kidney stone on right side (10/30/2011), AJMEY (obstructive sleep apnea) (4/25/2012), Skin cancer, Sun-damaged skin, and TIA (transient ischemic attack). He also has no past medical history of Arsenic suspected exposure, Family history of skin cancer, Radiation exposure, Sunburn, blistering, or Tanning bed exposure. All other systems negative except as above. PE  Vitals:    01/22/21 1103   BP: (!) 140/90   Pulse: 63   Resp: 12   SpO2: 98%   Weight: 191 lb (86.6 kg)   Height: 5' 10\" (1.778 m)    Body mass index is 27.41 kg/m². General appearance - alert, well appearing, and in no distress  Mental status - affect appropriate to mood  Neck - supple, no JVD. No bruits present.    Chest - clear to auscultation, no wheezes, rales or rhonchi  Heart - Regular rate, regular rhythm, normal S1, S2, I/VI systolic murmur LUSB  Abdomen - soft, nontender, nondistended, no masses or organomegaly  Extremities - peripheral pulses normal, no pedal edema        Recent Labs:  Lab Results   Component Value Date/Time    Cholesterol, total 92 (L) 03/18/2020 09:30 AM    HDL Cholesterol 27 (L) 03/18/2020 09:30 AM    LDL, calculated 35 03/18/2020 09:30 AM    Triglyceride 152 (H) 03/18/2020 09:30 AM     Lab Results   Component Value Date/Time    Creatinine 0.90 03/18/2020 09:30 AM     Lab Results   Component Value Date/Time    BUN 15 03/18/2020 09:30 AM    BUN (POC) 16 12/04/2013 06:46 AM     Lab Results   Component Value Date/Time    Potassium 3.5 03/18/2020 09:30 AM     Lab Results   Component Value Date/Time    Hemoglobin A1c 5.3 11/19/2013 08:45 AM     Lab Results   Component Value Date/Time    HGB 13.0 03/18/2020 09:30 AM     Lab Results   Component Value Date/Time    PLATELET 607 (L) 40/02/7095 09:30 AM       Reviewed:  Past Medical History:   Diagnosis Date    Arthritis     knees    CAD (coronary artery disease)     stent x3 approx 2001    Chronic [facility] fibrillation ([facility] [address]     ED (erectile dysfunction)     Hypertension     Kidney stone on right side 10/30/2011    JAMEY (obstructive sleep apnea) 4/25/2012    Skin cancer     BCC, s/p Mohs on L flank    Sun-damaged skin     TIA (transient ischemic attack)     6/12 - [facility]     Social History     Tobacco Use   Smoking Status Never Smoker   Smokeless Tobacco Never Used     Social History     Substance and Sexual Activity   Alcohol Use No    Alcohol/week: 0.0 standard drinks     Allergies   Allergen Reactions    Acetaminophen Other (comments)    Oxycodone Hcl Other (comments)    Pcn [Penicillins] Unknown (comments)     As a child    Percocet [Oxycodone-Acetaminophen] Swelling     Leg swelling       Current Outpatient Medications   Medication Sig    tadalafiL (Cialis) 20 mg tablet Take 1 Tab by mouth as needed (ED).  amLODIPine (NORVASC) 5 mg tablet TAKE 1 TABLET EVERY DAY    pravastatin (PRAVACHOL) 10 mg tablet TAKE 1 TABLET EVERY NIGHT    enalapril (VASOTEC) 20 mg tablet TAKE 1 TABLET TWICE DAILY    terazosin (HYTRIN) 10 mg capsule Take 10 mg by mouth nightly.  warfarin (COUMADIN) 2.5 mg tablet Take 2 Tabs by mouth daily. (Patient taking differently: Take 5 mg by mouth daily. INR followed by Dr. Kathlee Dance)   Liv Crisostomo metoprolol succinate (TOPROL-XL) 25 mg XL tablet TAKE 1 TABLET EVERY DAY    finasteride (PROSCAR) 5 mg tablet Take 1 Tab by mouth daily.  aspirin delayed-release 81 mg tablet Take  by mouth daily.  FOLIC ACID PO Take 687 mcg by mouth daily.  MULTIVITAMIN WITH MINERALS (MULTI-VIT 55 PLUS PO) Take 1 Tab by mouth daily. Taking multivitamins every morning.  hydroCHLOROthiazide (HYDRODIURIL) 25 mg tablet Take 1 Tab by mouth daily.  potassium chloride (KLOR-CON) 10 mEq tablet Take 1 Tab by mouth daily. No current facility-administered medications for this visit.         MD Jennifer Le[facility] [address] [facility] [address], [address] [facility] [address]  [address] [facility], [address] [place] [address]

## 2021-02-05 RX ORDER — TADALAFIL 20 MG/1
20 TABLET ORAL AS NEEDED
Qty: 30 TAB | Refills: 11 | Status: SHIPPED | OUTPATIENT
Start: 2021-02-05 | End: 2021-03-08 | Stop reason: SDUPTHER

## 2021-02-05 RX ORDER — ENALAPRIL MALEATE 20 MG/1
TABLET ORAL
Qty: 180 TAB | Refills: 1 | Status: SHIPPED | OUTPATIENT
Start: 2021-02-05 | End: 2021-11-04

## 2021-02-05 RX ORDER — PRAVASTATIN SODIUM 10 MG/1
TABLET ORAL
Qty: 90 TAB | Refills: 3 | Status: SHIPPED | OUTPATIENT
Start: 2021-02-05 | End: 2021-10-25 | Stop reason: SDUPTHER

## 2021-02-05 NOTE — TELEPHONE ENCOUNTER
Pt is calling requesting a refill on med    Requested Prescriptions     Pending Prescriptions Disp Refills    pravastatin (PRAVACHOL) 10 mg tablet 90 Tab 3     Sig: TAKE 1 TABLET EVERY NIGHT    enalapril (VASOTEC) 20 mg tablet 180 Tab 1     Sig: TAKE 1 TABLET TWICE DAILY    tadalafiL (Cialis) 20 mg tablet 30 Tab 11     Sig: Take 1 Tab by mouth as needed (ED).

## 2021-02-11 ENCOUNTER — TELEPHONE ANTICOAG (OUTPATIENT)
Dept: FAMILY MEDICINE CLINIC | Age: 81
End: 2021-02-11

## 2021-02-11 DIAGNOSIS — I48.0 PAROXYSMAL ATRIAL FIBRILLATION (HCC): ICD-10-CM

## 2021-02-11 LAB
INR PPP: 1.7 (ref 0.9–1.2)
PROTHROMBIN TIME: 18 SEC (ref 9.1–12)

## 2021-02-11 NOTE — PROGRESS NOTES
INR 1.7.   Less than goal but has been so consistent recently I suggest he continue current dose and recheck in 2 weeks

## 2021-02-15 ENCOUNTER — OFFICE VISIT (OUTPATIENT)
Dept: FAMILY MEDICINE CLINIC | Age: 81
End: 2021-02-15
Payer: MEDICARE

## 2021-02-15 VITALS
RESPIRATION RATE: 16 BRPM | BODY MASS INDEX: 27.37 KG/M2 | SYSTOLIC BLOOD PRESSURE: 136 MMHG | HEIGHT: 70 IN | HEART RATE: 77 BPM | OXYGEN SATURATION: 98 % | DIASTOLIC BLOOD PRESSURE: 83 MMHG | WEIGHT: 191.2 LBS | TEMPERATURE: 98.2 F

## 2021-02-15 DIAGNOSIS — I48.0 PAROXYSMAL ATRIAL FIBRILLATION (HCC): ICD-10-CM

## 2021-02-15 DIAGNOSIS — I10 ESSENTIAL HYPERTENSION: Primary | ICD-10-CM

## 2021-02-15 PROCEDURE — 1101F PT FALLS ASSESS-DOCD LE1/YR: CPT | Performed by: FAMILY MEDICINE

## 2021-02-15 PROCEDURE — G8754 DIAS BP LESS 90: HCPCS | Performed by: FAMILY MEDICINE

## 2021-02-15 PROCEDURE — G8419 CALC BMI OUT NRM PARAM NOF/U: HCPCS | Performed by: FAMILY MEDICINE

## 2021-02-15 PROCEDURE — G8752 SYS BP LESS 140: HCPCS | Performed by: FAMILY MEDICINE

## 2021-02-15 PROCEDURE — G8536 NO DOC ELDER MAL SCRN: HCPCS | Performed by: FAMILY MEDICINE

## 2021-02-15 PROCEDURE — 99214 OFFICE O/P EST MOD 30 MIN: CPT | Performed by: FAMILY MEDICINE

## 2021-02-15 PROCEDURE — G8427 DOCREV CUR MEDS BY ELIG CLIN: HCPCS | Performed by: FAMILY MEDICINE

## 2021-02-15 PROCEDURE — G8510 SCR DEP NEG, NO PLAN REQD: HCPCS | Performed by: FAMILY MEDICINE

## 2021-02-15 NOTE — PROGRESS NOTES
1. Have you been to the ER, urgent care clinic since your last visit? Hospitalized since your last visit? No    2. Have you seen or consulted any other health care providers outside of the [address] [place] [address] since your last visit? Include any pap smears or colon screening.  No    Health Maintenance Due   Topic Date Due    COVID-19 Vaccine (1 of 2) 08/24/1956    Shingrix Vaccine Age 50> (1 of 2) 08/24/1989    GLAUCOMA SCREENING Q2Y  09/01/2016    Flu Vaccine (1) 09/01/2020     Chief Complaint   Patient presents with    Medication Evaluation     See notes     Pt would like to discuss Eliquis and would like to discuss lab work that he had done last week at [facility].

## 2021-02-15 NOTE — PROGRESS NOTES
HPI  Riri Cheney is a 79 y.o. male who presents to follow-up on chronic medical issues. He is interested in switching from Coumadin over to Eliquis. He has been doing reasonably well on his Coumadin level but this is at great effort and he is having trouble keeping up with the diet. He has been on Eliquis in the past but stopped for cost reasons. Has some friends who have gotten co-pay cards for Eliquis and he is interested in giving that a try. Otherwise doing well. He is walking 7 miles on a treadmill every day. Has a lady friend in [place]. Is working part-time at [employer] and staying active in [employer]    PMHx:  Past Medical History:   Diagnosis Date    Arthritis     knees    CAD (coronary artery disease)     stent x3 approx 2001    Chronic atrial fibrillation ([address]     ED (erectile dysfunction)     Hypertension     Kidney stone on right side 10/30/2011    JAMEY (obstructive sleep apnea) 4/25/2012    Skin cancer     BCC, s/p Mohs on L flank    Sun-damaged skin     TIA (transient ischemic attack)     6[facility]12 - [facility]       Meds:   Current Outpatient Medications   Medication Sig Dispense Refill    apixaban (ELIQUIS) 2.5 mg tablet Take 1 Tab by mouth two (2) times a day. 180 Tab 3    pravastatin (PRAVACHOL) 10 mg tablet TAKE 1 TABLET EVERY NIGHT 90 Tab 3    enalapril (VASOTEC) 20 mg tablet TAKE 1 TABLET TWICE DAILY 180 Tab 1    tadalafiL (Cialis) 20 mg tablet Take 1 Tab by mouth as needed (ED). 30 Tab 11    amLODIPine (NORVASC) 5 mg tablet TAKE 1 TABLET EVERY DAY 90 Tab 3    hydroCHLOROthiazide (HYDRODIURIL) 25 mg tablet Take 1 Tab by mouth daily. 90 Tab 3    terazosin (HYTRIN) 10 mg capsule Take 10 mg by mouth nightly.  metoprolol succinate (TOPROL-XL) 25 mg XL tablet TAKE 1 TABLET EVERY DAY 90 Tab 3    potassium chloride (KLOR-CON) 10 mEq tablet Take 1 Tab by mouth daily. 90 Tab 0    finasteride (PROSCAR) 5 mg tablet Take 1 Tab by mouth daily.  30 Tab 3    aspirin delayed-release 81 mg tablet Take  by mouth daily.  FOLIC ACID PO Take 644 mcg by mouth daily.  MULTIVITAMIN WITH MINERALS (MULTI-VIT 55 PLUS PO) Take 1 Tab by mouth daily. Taking multivitamins every morning. Allergies: Allergies   Allergen Reactions    Acetaminophen Other (comments)    Oxycodone Hcl Other (comments)    Pcn [Penicillins] Unknown (comments)     As a child    Percocet [Oxycodone-Acetaminophen] Swelling     Leg swelling       Smoker:  Social History     Tobacco Use   Smoking Status Never Smoker   Smokeless Tobacco Never Used       ETOH:   Social History     Substance and Sexual Activity   Alcohol Use No    Alcohol/week: 0.0 standard drinks       FH:   Family History   Problem Relation Age of Onset    Stroke Father     Heart Attack Father     Heart Disease Father     Cancer Father 80        colon cancer    Heart Disease Mother    Yenifer Me Arthritis-osteo Sister         s/p bilateral knee replacements    No Known Problems Daughter     Anesth Problems Neg Hx        ROS:   As listed in HPI. In addition:  Constitutional:   No headache, fever, fatigue, weight loss or weight gain      Cardiac:    No chest pain      Resp:   No cough or shortness of breath      Neuro   No loss of consciousness, dizziness, seizures      Physical Exam:  Blood pressure 136/83, pulse 77, temperature 98.2 °F (36.8 °C), temperature source Temporal, resp. rate 16, height 5' 10\" (1.778 m), weight 191 lb 3.2 oz (86.7 kg), SpO2 98 %. GEN: No apparent distress. Alert and oriented and responds to all questions appropriately. NEUROLOGIC:  No focal neurologic deficits. Strength and sensation grossly intact. Coordination and gait grossly intact. EXT: Well perfused. No edema. SKIN: No obvious rashes.   Lungs clear to auscultation bilaterally  CV regular rate rhythm no murmur       Assessment and Plan     Hypertension  Well-controlled on recheck  Enalapril 20 mg  Norvasc 5 mg  HCTZ 25 mg    Atrial fibrillation  Care of cardiology  We have been managing his Coumadin and it has become difficult for him to maintain the diet. He would like to switch to Eliquis. We will give this a try    BPH  Terazosin  Proscar      ICD-10-CM ICD-9-CM    1. Essential hypertension  I10 401.9 LIPID PANEL      CBC WITH AUTOMATED DIFF      METABOLIC PANEL, COMPREHENSIVE   2. Paroxysmal atrial fibrillation (HCC)  I48.0 427.31 apixaban (ELIQUIS) 2.5 mg tablet       AVS given.  Pt expressed understanding of instructions

## 2021-02-16 LAB
ALBUMIN SERPL-MCNC: 4 G/DL (ref 3.6–4.6)
ALBUMIN/GLOB SERPL: 1.6 {RATIO} (ref 1.2–2.2)
ALP SERPL-CCNC: 93 IU/L (ref 39–117)
ALT SERPL-CCNC: 16 IU/L (ref 0–44)
AST SERPL-CCNC: 21 IU/L (ref 0–40)
BASOPHILS # BLD AUTO: 0 X10E3/UL (ref 0–0.2)
BASOPHILS NFR BLD AUTO: 1 %
BILIRUB SERPL-MCNC: 0.4 MG/DL (ref 0–1.2)
BUN SERPL-MCNC: 14 MG/DL (ref 8–27)
BUN/CREAT SERPL: 16 (ref 10–24)
CALCIUM SERPL-MCNC: 8.9 MG/DL (ref 8.6–10.2)
CHLORIDE SERPL-SCNC: 107 MMOL/L (ref 96–106)
CHOLEST SERPL-MCNC: 97 MG/DL (ref 100–199)
CO2 SERPL-SCNC: 28 MMOL/L (ref 20–29)
CREAT SERPL-MCNC: 0.85 MG/DL (ref 0.76–1.27)
EOSINOPHIL # BLD AUTO: 0.2 X10E3/UL (ref 0–0.4)
EOSINOPHIL NFR BLD AUTO: 4 %
ERYTHROCYTE [DISTWIDTH] IN BLOOD BY AUTOMATED COUNT: 13 % (ref 11.6–15.4)
GLOBULIN SER CALC-MCNC: 2.5 G/DL (ref 1.5–4.5)
GLUCOSE SERPL-MCNC: 78 MG/DL (ref 65–99)
HCT VFR BLD AUTO: 41 % (ref 37.5–51)
HDLC SERPL-MCNC: 32 MG/DL
HGB BLD-MCNC: 14 G/DL (ref 13–17.7)
IMM GRANULOCYTES # BLD AUTO: 0 X10E3/UL (ref 0–0.1)
IMM GRANULOCYTES NFR BLD AUTO: 0 %
INTERPRETATION, 910389: NORMAL
LDLC SERPL CALC-MCNC: 42 MG/DL (ref 0–99)
LYMPHOCYTES # BLD AUTO: 1.5 X10E3/UL (ref 0.7–3.1)
LYMPHOCYTES NFR BLD AUTO: 27 %
MCH RBC QN AUTO: 31.9 PG (ref 26.6–33)
MCHC RBC AUTO-ENTMCNC: 34.1 G/DL (ref 31.5–35.7)
MCV RBC AUTO: 93 FL (ref 79–97)
MONOCYTES # BLD AUTO: 0.4 X10E3/UL (ref 0.1–0.9)
MONOCYTES NFR BLD AUTO: 7 %
NEUTROPHILS # BLD AUTO: 3.5 X10E3/UL (ref 1.4–7)
NEUTROPHILS NFR BLD AUTO: 61 %
PLATELET # BLD AUTO: 145 X10E3/UL (ref 150–450)
POTASSIUM SERPL-SCNC: 3.9 MMOL/L (ref 3.5–5.2)
PROT SERPL-MCNC: 6.5 G/DL (ref 6–8.5)
RBC # BLD AUTO: 4.39 X10E6/UL (ref 4.14–5.8)
SODIUM SERPL-SCNC: 147 MMOL/L (ref 134–144)
TRIGL SERPL-MCNC: 127 MG/DL (ref 0–149)
VLDLC SERPL CALC-MCNC: 23 MG/DL (ref 5–40)
WBC # BLD AUTO: 5.7 X10E3/UL (ref 3.4–10.8)

## 2021-02-19 ENCOUNTER — HOSPITAL ENCOUNTER (EMERGENCY)
Age: 81
Discharge: STILL ADMITTED OR ADMINISTRATIVE | End: 2021-02-19

## 2021-02-19 ENCOUNTER — APPOINTMENT (OUTPATIENT)
Dept: GENERAL RADIOLOGY | Age: 81
End: 2021-02-19
Attending: EMERGENCY MEDICINE
Payer: MEDICARE

## 2021-02-19 ENCOUNTER — HOSPITAL ENCOUNTER (EMERGENCY)
Age: 81
Discharge: HOME | End: 2021-02-19
Attending: EMERGENCY MEDICINE
Payer: MEDICARE

## 2021-02-19 VITALS
BODY MASS INDEX: 26.7 KG/M2 | OXYGEN SATURATION: 98 % | HEIGHT: 71 IN | WEIGHT: 190.7 LBS | HEART RATE: 65 BPM | TEMPERATURE: 97.9 F | RESPIRATION RATE: 16 BRPM | DIASTOLIC BLOOD PRESSURE: 84 MMHG | SYSTOLIC BLOOD PRESSURE: 165 MMHG

## 2021-02-19 DIAGNOSIS — R07.89 ATYPICAL CHEST PAIN: Primary | ICD-10-CM

## 2021-02-19 DIAGNOSIS — M25.512 ACUTE PAIN OF LEFT SHOULDER: ICD-10-CM

## 2021-02-19 LAB
ALBUMIN SERPL-MCNC: 3.6 G/DL (ref 3.5–5)
ALBUMIN/GLOB SERPL: 1 {RATIO} (ref 1.1–2.2)
ALP SERPL-CCNC: 93 U/L (ref 45–117)
ALT SERPL-CCNC: 22 U/L (ref 12–78)
ANION GAP SERPL CALC-SCNC: 6 MMOL/L (ref 5–15)
AST SERPL-CCNC: 20 U/L (ref 15–37)
ATRIAL RATE: 67 BPM
BASOPHILS # BLD: 0 K/UL (ref 0–0.1)
BASOPHILS NFR BLD: 1 % (ref 0–1)
BILIRUB SERPL-MCNC: 0.6 MG/DL (ref 0.2–1)
BNP SERPL-MCNC: 3398 PG/ML
BUN SERPL-MCNC: 18 MG/DL (ref 6–20)
BUN/CREAT SERPL: 20 (ref 12–20)
CALCIUM SERPL-MCNC: 8.9 MG/DL (ref 8.5–10.1)
CALCULATED R AXIS, ECG10: -78 DEGREES
CALCULATED T AXIS, ECG11: 99 DEGREES
CHLORIDE SERPL-SCNC: 105 MMOL/L (ref 97–108)
CO2 SERPL-SCNC: 31 MMOL/L (ref 21–32)
CREAT SERPL-MCNC: 0.89 MG/DL (ref 0.7–1.3)
DIAGNOSIS, 93000: NORMAL
DIFFERENTIAL METHOD BLD: ABNORMAL
EOSINOPHIL # BLD: 0.2 K/UL (ref 0–0.4)
EOSINOPHIL NFR BLD: 3 % (ref 0–7)
ERYTHROCYTE [DISTWIDTH] IN BLOOD BY AUTOMATED COUNT: 13.5 % (ref 11.5–14.5)
GLOBULIN SER CALC-MCNC: 3.5 G/DL (ref 2–4)
GLUCOSE SERPL-MCNC: 82 MG/DL (ref 65–100)
HCT VFR BLD AUTO: 42 % (ref 36.6–50.3)
HGB BLD-MCNC: 13.4 G/DL (ref 12.1–17)
IMM GRANULOCYTES # BLD AUTO: 0 K/UL (ref 0–0.04)
IMM GRANULOCYTES NFR BLD AUTO: 0 % (ref 0–0.5)
LYMPHOCYTES # BLD: 1.2 K/UL (ref 0.8–3.5)
LYMPHOCYTES NFR BLD: 21 % (ref 12–49)
MCH RBC QN AUTO: 31.6 PG (ref 26–34)
MCHC RBC AUTO-ENTMCNC: 31.9 G/DL (ref 30–36.5)
MCV RBC AUTO: 99.1 FL (ref 80–99)
MONOCYTES # BLD: 0.4 K/UL (ref 0–1)
MONOCYTES NFR BLD: 7 % (ref 5–13)
NEUTS SEG # BLD: 4 K/UL (ref 1.8–8)
NEUTS SEG NFR BLD: 69 % (ref 32–75)
NRBC # BLD: 0 K/UL (ref 0–0.01)
NRBC BLD-RTO: 0 PER 100 WBC
PLATELET # BLD AUTO: 134 K/UL (ref 150–400)
PMV BLD AUTO: 9.8 FL (ref 8.9–12.9)
POTASSIUM SERPL-SCNC: 3.6 MMOL/L (ref 3.5–5.1)
PROT SERPL-MCNC: 7.1 G/DL (ref 6.4–8.2)
Q-T INTERVAL, ECG07: 480 MS
QRS DURATION, ECG06: 196 MS
QTC CALCULATION (BEZET), ECG08: 503 MS
RBC # BLD AUTO: 4.24 M/UL (ref 4.1–5.7)
SODIUM SERPL-SCNC: 142 MMOL/L (ref 136–145)
TROPONIN I SERPL-MCNC: <0.05 NG/ML
VENTRICULAR RATE, ECG03: 66 BPM
WBC # BLD AUTO: 5.8 K/UL (ref 4.1–11.1)

## 2021-02-19 PROCEDURE — 84484 ASSAY OF TROPONIN QUANT: CPT

## 2021-02-19 PROCEDURE — 93005 ELECTROCARDIOGRAM TRACING: CPT

## 2021-02-19 PROCEDURE — 83880 ASSAY OF NATRIURETIC PEPTIDE: CPT

## 2021-02-19 PROCEDURE — 74011250637 HC RX REV CODE- 250/637: Performed by: EMERGENCY MEDICINE

## 2021-02-19 PROCEDURE — 71046 X-RAY EXAM CHEST 2 VIEWS: CPT

## 2021-02-19 PROCEDURE — 74011000250 HC RX REV CODE- 250: Performed by: EMERGENCY MEDICINE

## 2021-02-19 PROCEDURE — 85025 COMPLETE CBC W/AUTO DIFF WBC: CPT

## 2021-02-19 PROCEDURE — 80053 COMPREHEN METABOLIC PANEL: CPT

## 2021-02-19 PROCEDURE — 73030 X-RAY EXAM OF SHOULDER: CPT

## 2021-02-19 PROCEDURE — 36415 COLL VENOUS BLD VENIPUNCTURE: CPT

## 2021-02-19 PROCEDURE — 99284 EMERGENCY DEPT VISIT MOD MDM: CPT

## 2021-02-19 RX ORDER — METHOCARBAMOL 750 MG/1
750 TABLET, FILM COATED ORAL
Status: COMPLETED | OUTPATIENT
Start: 2021-02-19 | End: 2021-02-19

## 2021-02-19 RX ORDER — METHOCARBAMOL 750 MG/1
750 TABLET, FILM COATED ORAL
Qty: 20 TAB | Refills: 0 | Status: SHIPPED
Start: 2021-02-19 | End: 2021-07-26

## 2021-02-19 RX ORDER — LIDOCAINE 4 G/100G
1 PATCH TOPICAL
Status: DISCONTINUED | OUTPATIENT
Start: 2021-02-19 | End: 2021-02-19 | Stop reason: HOSPADM

## 2021-02-19 RX ORDER — LIDOCAINE 4 G/100G
PATCH TOPICAL
Qty: 10 PATCH | Refills: 0 | Status: SHIPPED | OUTPATIENT
Start: 2021-02-19 | End: 2021-08-04

## 2021-02-19 RX ADMIN — METHOCARBAMOL TABLETS 750 MG: 750 TABLET, COATED ORAL at 08:27

## 2021-02-19 NOTE — ED PROVIDER NOTES
EMERGENCY DEPARTMENT HISTORY AND PHYSICAL EXAM      Date: 2/19/2021  Patient Name: Jose L Zimmerman    History of Presenting Illness     Chief Complaint   Patient presents with    Shoulder Pain     patient states for two days he has been having intermittent CP, neck pain, and L arm hortensia. pt states \"i feel like my defibrilator is poking out more than normal.\" denies any shocks given by defibrilator. pt states he has an appointment with PCP but couldnt wait.  Neck Pain       History Provided By: Patient    HPI: Jose L Zimmerman, 79 y.o. male presents to the ED with cc of left shoulder, left neck and chest pain. Patient states that his symptoms started 2 days ago. Initially, the symptoms were intermittent, but have been constant since last night. He denies trauma, fever, cough, shortness of breath. He has taken Tylenol for the pain with no relief of symptoms. He  also used a heating pad with no relief of symptoms. Pain is severe with movement. It involves the left neck and left shoulder. He states it is not really a chest pain but it feels like his left chest is more prominent than normal.  He has a defibrillator on that side and he feels like it is poking out more than it normally does. He denies any shocks from the defibrillator. He denies numbness or tingling, nausea, diaphoresis. He denies leg pain or leg edema. Patient is right-hand dominant. There are no other complaints, changes, or physical findings at this time. PCP: Mary Oviedo MD    No current facility-administered medications on file prior to encounter. Current Outpatient Medications on File Prior to Encounter   Medication Sig Dispense Refill    apixaban (ELIQUIS) 2.5 mg tablet Take 1 Tab by mouth two (2) times a day.  180 Tab 3    pravastatin (PRAVACHOL) 10 mg tablet TAKE 1 TABLET EVERY NIGHT 90 Tab 3    enalapril (VASOTEC) 20 mg tablet TAKE 1 TABLET TWICE DAILY 180 Tab 1    tadalafiL (Cialis) 20 mg tablet Take 1 Tab by mouth as needed (ED). 30 Tab 11    amLODIPine (NORVASC) 5 mg tablet TAKE 1 TABLET EVERY DAY 90 Tab 3    hydroCHLOROthiazide (HYDRODIURIL) 25 mg tablet Take 1 Tab by mouth daily. 90 Tab 3    terazosin (HYTRIN) 10 mg capsule Take 10 mg by mouth nightly.  metoprolol succinate (TOPROL-XL) 25 mg XL tablet TAKE 1 TABLET EVERY DAY 90 Tab 3    potassium chloride (KLOR-CON) 10 mEq tablet Take 1 Tab by mouth daily. 90 Tab 0    finasteride (PROSCAR) 5 mg tablet Take 1 Tab by mouth daily. 30 Tab 3    aspirin delayed-release 81 mg tablet Take  by mouth daily.  FOLIC ACID PO Take 470 mcg by mouth daily.  MULTIVITAMIN WITH MINERALS (MULTI-VIT 55 PLUS PO) Take 1 Tab by mouth daily. Taking multivitamins every morning.          Past History     Past Medical History:  Past Medical History:   Diagnosis Date    Arthritis     knees    CAD (coronary artery disease)     stent x3 approx 2001    Chronic atrial fibrillation ([address]     ED (erectile dysfunction)     Hypertension     Kidney stone on right side 10/30/2011    JAMEY (obstructive sleep apnea) 4/25/2012    Skin cancer     BCC, s/p Mohs on L flank    Sun-damaged skin     TIA (transient ischemic attack)     6/12 - [facility]       Past Surgical History:  Past Surgical History:   Procedure Laterality Date    HX APPENDECTOMY  age 15   [de-identified] CATARACT REMOVAL Bilateral     HX COLONOSCOPY  05/10/2012    hyperplastic    HX CYSTOSTOMY  1/23/14    diffuse erythema    HX HERNIA REPAIR Bilateral     inguinal hernia    HX HERNIA REPAIR  09/12/2017    L inguinal hernia repair    HX HIP REPLACEMENT Bilateral     HX HIP REPLACEMENT Right 12/4/13    REVISION     HX IMPLANTABLE CARDIOVERTER DEFIBRILLATOR  11/15/13    + PM    HX KNEE REPLACEMENT Right 7/2011    HX LUMBAR DISKECTOMY  1970's    HX MOHS PROCEDURES  02/21/2017    BCC left lateral cheek by Dr. Julissa Orlando  01/05/2015    L Anabaptism, BCC    HX ORTHOPAEDIC Left     LEFT HAND SURGERY    HX PACEMAKER  11/15/2013    AICD    INS PPM/ICD LED SING ONLY  11/15/2013         CT CARDIAC SURG PROCEDURE UNLIST  2006    stents x 3        Family History:  Family History   Problem Relation Age of Onset    Stroke Father     Heart Attack Father     Heart Disease Father     Cancer Father 80        colon cancer    Heart Disease Mother    [facility] Arthritis-osteo Sister         s/p bilateral knee replacements    No Known Problems Daughter     Anesth Problems Neg Hx        Social History:  Social History     Tobacco Use    Smoking status: Never Smoker    Smokeless tobacco: Never Used   Substance Use Topics    Alcohol use: No     Alcohol/week: 0.0 standard drinks    Drug use: No       Allergies: Allergies   Allergen Reactions    Acetaminophen Other (comments)    Oxycodone Hcl Other (comments)    Pcn [Penicillins] Unknown (comments)     As a child    Percocet [Oxycodone-Acetaminophen] Swelling     Leg swelling         Review of Systems   Review of Systems   Constitutional: Negative for fever. HENT: Negative for congestion. Eyes: Negative. Respiratory: Negative for cough and shortness of breath. Cardiovascular: Positive for chest pain. Gastrointestinal: Negative for abdominal pain. Endocrine: Negative for heat intolerance. Genitourinary: Negative. Musculoskeletal: Positive for neck pain. Negative for back pain. Skin: Negative for rash. Allergic/Immunologic: Negative for immunocompromised state. Neurological: Negative for dizziness and numbness. Hematological: Does not bruise/bleed easily. Psychiatric/Behavioral: Negative. All other systems reviewed and are negative. Physical Exam   Physical Exam  Vitals signs and nursing note reviewed. Constitutional:       General: He is not in acute distress. Appearance: He is well-developed. HENT:      Head: Normocephalic and atraumatic. Neck:      Musculoskeletal: Normal range of motion. No muscular tenderness. Cardiovascular:      Rate and Rhythm: Normal rate and regular rhythm. Pulses: Normal pulses. Heart sounds: Normal heart sounds. Pulmonary:      Effort: Pulmonary effort is normal.      Breath sounds: Normal breath sounds. Abdominal:      General: Bowel sounds are normal.      Palpations: Abdomen is soft. Tenderness: There is no abdominal tenderness. Musculoskeletal: Normal range of motion. General: Tenderness present. Comments: Left shoulder tenderness   Skin:     General: Skin is warm and dry. Neurological:      General: No focal deficit present. Mental Status: He is alert and oriented to person, place, and time. Coordination: Coordination normal.   Psychiatric:         Mood and Affect: Mood normal.         Behavior: Behavior normal.         Diagnostic Study Results     Labs -     Recent Results (from the past 12 hour(s))   EKG, 12 LEAD, INITIAL    Collection Time: 02/19/21  8:00 AM   Result Value Ref Range    Ventricular Rate 66 BPM    Atrial Rate 67 BPM    QRS Duration 196 ms    Q-T Interval 480 ms    QTC Calculation (Bezet) 503 ms    Calculated R Axis -78 degrees    Calculated T Axis 99 degrees    Diagnosis       Ventricular-paced rhythm  Confirmed by Renetta Saldaña ([id number]) on 2/19/2021 8:42:36 AM     CBC WITH AUTOMATED DIFF    Collection Time: 02/19/21  8:23 AM   Result Value Ref Range    WBC 5.8 4.1 - 11.1 K/uL    RBC 4.24 4.10 - 5.70 M/uL    HGB 13.4 12.1 - 17.0 g/dL    HCT 42.0 36.6 - 50.3 %    MCV 99.1 (H) 80.0 - 99.0 FL    MCH 31.6 26.0 - 34.0 PG    MCHC 31.9 30.0 - 36.5 g/dL    RDW 13.5 11.5 - 14.5 %    PLATELET 984 (L) 247 - 400 K/uL    MPV 9.8 8.9 - 12.9 FL    NRBC 0.0 0  WBC    ABSOLUTE NRBC 0.00 0.00 - 0.01 K/uL    NEUTROPHILS 69 32 - 75 %    LYMPHOCYTES 21 12 - 49 %    MONOCYTES 7 5 - 13 %    EOSINOPHILS 3 0 - 7 %    BASOPHILS 1 0 - 1 %    IMMATURE GRANULOCYTES 0 0.0 - 0.5 %    ABS. NEUTROPHILS 4.0 1.8 - 8.0 K/UL    ABS.  LYMPHOCYTES 1.2 0.8 - 3.5 K/UL    ABS. MONOCYTES 0.4 0.0 - 1.0 K/UL    ABS. EOSINOPHILS 0.2 0.0 - 0.4 K/UL    ABS. BASOPHILS 0.0 0.0 - 0.1 K/UL    ABS. IMM. GRANS. 0.0 0.00 - 0.04 K/UL    DF AUTOMATED     METABOLIC PANEL, COMPREHENSIVE    Collection Time: 02/19/21  8:23 AM   Result Value Ref Range    Sodium 142 136 - 145 mmol/L    Potassium 3.6 3.5 - 5.1 mmol/L    Chloride 105 97 - 108 mmol/L    CO2 31 21 - 32 mmol/L    Anion gap 6 5 - 15 mmol/L    Glucose 82 65 - 100 mg/dL    BUN 18 6 - 20 MG/DL    Creatinine 0.89 0.70 - 1.30 MG/DL    BUN/Creatinine ratio 20 12 - 20      GFR est AA >60 >60 ml/min/1.73m2    GFR est non-AA >60 >60 ml/min/1.73m2    Calcium 8.9 8.5 - 10.1 MG/DL    Bilirubin, total 0.6 0.2 - 1.0 MG/DL    ALT (SGPT) 22 12 - 78 U/L    AST (SGOT) 20 15 - 37 U/L    Alk. phosphatase 93 45 - 117 U/L    Protein, total 7.1 6.4 - 8.2 g/dL    Albumin 3.6 3.5 - 5.0 g/dL    Globulin 3.5 2.0 - 4.0 g/dL    A-G Ratio 1.0 (L) 1.1 - 2.2     TROPONIN I    Collection Time: 02/19/21  8:23 AM   Result Value Ref Range    Troponin-I, Qt. <0.05 <0.05 ng/mL   NT-PRO BNP    Collection Time: 02/19/21  8:23 AM   Result Value Ref Range    NT pro-BNP 3,398 (H) <450 PG/ML       Radiologic Studies -   XR CHEST PA LAT   Final Result   1. Mildly enlarged cardiac silhouette. No acute abnormality         XR SHOULDER LT AP/LAT MIN 2 V   Final Result   No acute abnormality. CT Results  (Last 48 hours)    None        CXR Results  (Last 48 hours)    None          Medical Decision Making   I am the first provider for this patient. I reviewed the vital signs, available nursing notes, past medical history, past surgical history, family history and social history. Vital Signs-Reviewed the patient's vital signs. Patient Vitals for the past 12 hrs:   Temp Pulse Resp BP SpO2   02/19/21 0754 97.9 °F (36.6 °C) 65 16 (!) 165/84 98 %       EKG interpretation: (Preliminary)  Rhythm: paced; and irregular.  Rate (approx.): 66; ; [place] interval: ; QRS interval: prolonged; ST/T wave: non-specific changes; Other findings: unchanged from previous ekg. Records Reviewed: Nursing Notes, Old Medical Records, Previous electrocardiograms, Previous Radiology Studies and Previous Laboratory Studies    Provider Notes (Medical Decision Making):   Musculoskeletal pain, arthritis, radiculopathy, CAD    ED Course:   Initial assessment performed. The patients presenting problems have been discussed, and they are in agreement with the care plan formulated and outlined with them. I have encouraged them to ask questions as they arise throughout their visit. Progress note:    Patient's results were reviewed. The patient is feeling better. He is advised to follow-up and return to ER if worse             Critical Care Time:   0      Disposition:  home    DISCHARGE PLAN:  1. Discharge Medication List as of 2/19/2021  9:32 AM      START taking these medications    Details   lidocaine 4 % patch Alternate and off every 12 hours as needed, Normal, Disp-10 Patch, R-0      methocarbamoL (Robaxin-750) 750 mg tablet Take 1 Tab by mouth three (3) times daily as needed for Muscle Spasm(s). , Normal, Disp-20 Tab, R-0         CONTINUE these medications which have NOT CHANGED    Details   apixaban (ELIQUIS) 2.5 mg tablet Take 1 Tab by mouth two (2) times a day., Normal, Disp-180 Tab, R-3      pravastatin (PRAVACHOL) 10 mg tablet TAKE 1 TABLET EVERY NIGHT, Normal, Disp-90 Tab, R-3      enalapril (VASOTEC) 20 mg tablet TAKE 1 TABLET TWICE DAILY, Normal, Disp-180 Tab, R-1      tadalafiL (Cialis) 20 mg tablet Take 1 Tab by mouth as needed (ED)., Normal, Disp-30 Tab, R-11      amLODIPine (NORVASC) 5 mg tablet TAKE 1 TABLET EVERY DAY, Normal, Disp-90 Tab,R-3      hydroCHLOROthiazide (HYDRODIURIL) 25 mg tablet Take 1 Tab by mouth daily. , Normal, Disp-90 Tab,R-3      terazosin (HYTRIN) 10 mg capsule Take 10 mg by mouth nightly., Historical Med      metoprolol succinate (TOPROL-XL) 25 mg XL tablet TAKE 1 TABLET EVERY DAY, Normal, Disp-90 Tab,R-3      potassium chloride (KLOR-CON) 10 mEq tablet Take 1 Tab by mouth daily., Normal, Disp-90 Tab,R-0      finasteride (PROSCAR) 5 mg tablet Take 1 Tab by mouth daily., Normal, Disp-30 Tab, R-3      aspirin delayed-release 81 mg tablet Take  by mouth daily., Historical Med      FOLIC ACID PO Take 400 mcg by mouth daily., Historical Med      MULTIVITAMIN WITH MINERALS (MULTI-VIT 55 PLUS PO) Take 1 Tab by mouth daily. Taking multivitamins every morning., Historical Med           2.   Follow-up Information     Follow up With Specialties Details Why Contact Info    Jass Muse MD Family Medicine  As needed [address]  [facility] [id number] [phone]      Vimal Osorio MD Cardiology  As scheduled [address]  [facility] [address] [phone]      [facility] EM[facility] Emergency Medicine  If symptoms worsen [address] [facility] [id number] [phone]        3.  Return to ED if worse     Diagnosis     Clinical Impression:   1. Atypical chest pain    2. Acute pain of left shoulder        Attestations:    Genny Sommer MD    Please note that this dictation was completed with [employer], the computer voice recognition software.  Quite often unanticipated grammatical, syntax, homophones, and other interpretive errors are inadvertently transcribed by the computer software.  Please disregard these errors.  Please excuse any errors that have escaped final proofreading.  Thank you.

## 2021-02-23 ENCOUNTER — CLINICAL SUPPORT (OUTPATIENT)
Dept: CARDIOLOGY CLINIC | Age: 81
End: 2021-02-23
Payer: MEDICARE

## 2021-02-23 ENCOUNTER — TELEPHONE (OUTPATIENT)
Dept: CARDIOLOGY CLINIC | Age: 81
End: 2021-02-23

## 2021-02-23 DIAGNOSIS — Z95.810 AUTOMATIC IMPLANTABLE CARDIAC DEFIBRILLATOR IN SITU: Primary | ICD-10-CM

## 2021-02-23 PROCEDURE — 93282 PRGRMG EVAL IMPLANTABLE DFB: CPT | Performed by: INTERNAL MEDICINE

## 2021-02-24 ENCOUNTER — OFFICE VISIT (OUTPATIENT)
Dept: FAMILY MEDICINE CLINIC | Age: 81
End: 2021-02-24
Payer: MEDICARE

## 2021-02-24 VITALS
HEIGHT: 71 IN | SYSTOLIC BLOOD PRESSURE: 125 MMHG | RESPIRATION RATE: 16 BRPM | TEMPERATURE: 98 F | BODY MASS INDEX: 26.6 KG/M2 | OXYGEN SATURATION: 97 % | DIASTOLIC BLOOD PRESSURE: 81 MMHG | WEIGHT: 190 LBS | HEART RATE: 63 BPM

## 2021-02-24 DIAGNOSIS — I48.0 PAROXYSMAL ATRIAL FIBRILLATION (HCC): ICD-10-CM

## 2021-02-24 DIAGNOSIS — S00.11XA BLACK EYE OF RIGHT SIDE, INITIAL ENCOUNTER: Primary | ICD-10-CM

## 2021-02-24 PROCEDURE — G8432 DEP SCR NOT DOC, RNG: HCPCS | Performed by: FAMILY MEDICINE

## 2021-02-24 PROCEDURE — G8419 CALC BMI OUT NRM PARAM NOF/U: HCPCS | Performed by: FAMILY MEDICINE

## 2021-02-24 PROCEDURE — 1101F PT FALLS ASSESS-DOCD LE1/YR: CPT | Performed by: FAMILY MEDICINE

## 2021-02-24 PROCEDURE — G8754 DIAS BP LESS 90: HCPCS | Performed by: FAMILY MEDICINE

## 2021-02-24 PROCEDURE — 99213 OFFICE O/P EST LOW 20 MIN: CPT | Performed by: FAMILY MEDICINE

## 2021-02-24 PROCEDURE — G8752 SYS BP LESS 140: HCPCS | Performed by: FAMILY MEDICINE

## 2021-02-24 PROCEDURE — G8536 NO DOC ELDER MAL SCRN: HCPCS | Performed by: FAMILY MEDICINE

## 2021-02-24 PROCEDURE — G8427 DOCREV CUR MEDS BY ELIG CLIN: HCPCS | Performed by: FAMILY MEDICINE

## 2021-02-24 RX ORDER — WARFARIN 2.5 MG/1
2.5 TABLET ORAL DAILY
COMMUNITY
End: 2021-06-21

## 2021-02-24 NOTE — PROGRESS NOTES
HPI  Giancarlo Patten is a 80 y.o. male who presents with bruising under the right eye.  Is a dog whacked him over the left eyebrow with a chew toy.  The next day he noticed in the mirror that he had a little bruising on the lateral bridge of his nose.  Rubbed some Neosporin on it and the next time he looked in the mirror he had some bruising in the bags under his eye on the right side.  There is no pain    PMHx:  Past Medical History:   Diagnosis Date   • Arthritis     knees   • CAD (coronary artery disease)     stent x3 approx 2001   • Chronic atrial fibrillation (HCC)    • ED (erectile dysfunction)    • Hypertension    • Kidney stone on right side 10/30/2011   • JAMEY (obstructive sleep apnea) 4/25/2012   • Skin cancer     BCC, s/p Mohs on L flank   • Sun-damaged skin    • TIA (transient ischemic attack)     6/12 - seen at [facility]       Meds:   Current Outpatient Medications   Medication Sig Dispense Refill   • warfarin (COUMADIN) 2.5 mg tablet Take 2.5 mg by mouth daily. Pt takes 5mg on Monday, Wednesday and Friday and the of the week 2.5mg     • lidocaine 4 % patch Alternate and off every 12 hours as needed 10 Patch 0   • methocarbamoL (Robaxin-750) 750 mg tablet Take 1 Tab by mouth three (3) times daily as needed for Muscle Spasm(s). 20 Tab 0   • pravastatin (PRAVACHOL) 10 mg tablet TAKE 1 TABLET EVERY NIGHT 90 Tab 3   • enalapril (VASOTEC) 20 mg tablet TAKE 1 TABLET TWICE DAILY 180 Tab 1   • tadalafiL (Cialis) 20 mg tablet Take 1 Tab by mouth as needed (ED). 30 Tab 11   • amLODIPine (NORVASC) 5 mg tablet TAKE 1 TABLET EVERY DAY 90 Tab 3   • hydroCHLOROthiazide (HYDRODIURIL) 25 mg tablet Take 1 Tab by mouth daily. 90 Tab 3   • terazosin (HYTRIN) 10 mg capsule Take 10 mg by mouth nightly.     • metoprolol succinate (TOPROL-XL) 25 mg XL tablet TAKE 1 TABLET EVERY DAY 90 Tab 3   • potassium chloride (KLOR-CON) 10 mEq tablet Take 1 Tab by mouth daily. 90 Tab 0   • finasteride (PROSCAR) 5 mg tablet Take 1 Tab by mouth  daily. 30 Tab 3    aspirin delayed-release 81 mg tablet Take  by mouth daily.  FOLIC ACID PO Take 950 mcg by mouth daily.  MULTIVITAMIN WITH MINERALS (MULTI-VIT 55 PLUS PO) Take 1 Tab by mouth daily. Taking multivitamins every morning.  apixaban (ELIQUIS) 2.5 mg tablet Take 1 Tab by mouth two (2) times a day. 180 Tab 3       Allergies: Allergies   Allergen Reactions    Acetaminophen Other (comments)    Oxycodone Hcl Other (comments)    Pcn [Penicillins] Unknown (comments)     As a child    Percocet [Oxycodone-Acetaminophen] Swelling     Leg swelling       Smoker:  Social History     Tobacco Use   Smoking Status Never Smoker   Smokeless Tobacco Never Used       ETOH:   Social History     Substance and Sexual Activity   Alcohol Use No    Alcohol/week: 0.0 standard drinks       FH:   Family History   Problem Relation Age of Onset    Stroke Father     Heart Attack Father     Heart Disease Father     Cancer Father 80        colon cancer    Heart Disease Mother    [facility] Arthritis-osteo Sister         s/p bilateral knee replacements    No Known Problems Daughter     Anesth Problems Neg Hx        ROS:   As listed in HPI. In addition:  Constitutional:   No headache, fever, fatigue, weight loss or weight gain      Cardiac:    No chest pain      Resp:   No cough or shortness of breath      Neuro   No loss of consciousness, dizziness, seizures      Physical Exam:  Blood pressure 125/81, pulse 63, temperature 98 °F (36.7 °C), temperature source Temporal, resp. rate 16, height 5' 11\" (1.803 m), weight 190 lb (86.2 kg), SpO2 97 %. GEN: No apparent distress. Alert and oriented and responds to all questions appropriately. NEUROLOGIC:  No focal neurologic deficits. Strength and sensation grossly intact. Coordination and gait grossly intact. EXT: Well perfused. No edema. SKIN: No obvious rashes. Bruising in the suborbital space. There is no pain to the orbit or the nose.   No pain to percussion of the sinus.  No pain along the bruising. Full range of motion of the eye       Assessment and Plan     Right-sided bruise in the suborbital space  Probably started out as minor bruise on the nose and he may have caused a little more bleeding by rubbing it. Fairly minor bruise that does not appear to portend any deeper injury    Requests INR lab be sent to [address] [place] [address]. He tried to get Eliquis discount card and was not successful. He is back on Coumadin      ICD-10-CM ICD-9-CM    1. Black eye of right side, initial encounter  S00. 11XA 921.0    2. Paroxysmal atrial fibrillation (HCC)  I48.0 427.31 PROTHROMBIN TIME + INR       AVS given.  Pt expressed understanding of instructions

## 2021-02-24 NOTE — PROGRESS NOTES
Diamond Beckman is a 79 y.o. male  Chief Complaint   Patient presents with    Bleeding/Bruising     under rt eye     Health Maintenance Due   Topic Date Due    COVID-19 Vaccine (1 of 2) 08/24/1956    Shingrix Vaccine Age 50> (1 of 2) 08/24/1989    GLAUCOMA SCREENING Q2Y  09/01/2016    Flu Vaccine (1) 09/01/2020    Medicare Yearly Exam  03/19/2021     Visit Vitals  /81 (BP 1 Location: Left upper arm, BP Patient Position: Sitting, BP Cuff Size: Large adult)   Pulse 63   Temp 98 °F (36.7 °C) (Temporal)   Resp 16   Ht 5' 11\" (1.803 m)   Wt 190 lb (86.2 kg)   SpO2 97%   BMI 26.50 kg/m²     1. Have you been to the ER, urgent care clinic since your last visit? Hospitalized since your last visit? no    2. Have you seen or consulted any other health care providers outside of the [address] [place] [address] since your last visit? Include any pap smears or colon screening.   No    .Renee Huff

## 2021-03-02 ENCOUNTER — IMMUNIZATION (OUTPATIENT)
Dept: INTERNAL MEDICINE CLINIC | Age: 81
End: 2021-03-02
Payer: MEDICARE

## 2021-03-02 DIAGNOSIS — Z23 ENCOUNTER FOR IMMUNIZATION: Primary | ICD-10-CM

## 2021-03-02 PROCEDURE — 0001A COVID-19, MRNA, LNP-S, PF, 30MCG/0.3ML DOSE(PFIZER): CPT | Performed by: FAMILY MEDICINE

## 2021-03-02 PROCEDURE — 91300 COVID-19, MRNA, LNP-S, PF, 30MCG/0.3ML DOSE(PFIZER): CPT | Performed by: FAMILY MEDICINE

## 2021-03-08 NOTE — TELEPHONE ENCOUNTER
Message from [facility]    Dr. Michelle Bauman, refill  Received: 3 days ago    [address] [facility] Office Pool   Phone Number:  [phone] (Call me)             Medication Refill     Caller (if not patient): n/a       Relationship of caller (if not patient): n/a       Best contact number(s): [phone]       Name of medication and dosage if known: amlodipine 5 mg, tadalafil 20 mg       Is patient out of this medication (yes/no): completely out of the amlodipine, a week's supply of tadalafil left       Pharmacy name:  [facility] in [address] [place] [address] listed in chart? (yes/no): Smackages   Pharmacy phone number:  [phone]       Details to clarify the request: n/a       Vince Shi          Requested Prescriptions     Pending Prescriptions Disp Refills    amLODIPine (NORVASC) 5 mg tablet 90 Tab 3     Sig: TAKE 1 TABLET EVERY DAY    tadalafiL (Cialis) 20 mg tablet 30 Tab 11     Sig: Take 1 Tab by mouth as needed (ED).

## 2021-03-09 RX ORDER — AMLODIPINE BESYLATE 5 MG/1
TABLET ORAL
Qty: 90 TAB | Refills: 3 | Status: SHIPPED | OUTPATIENT
Start: 2021-03-09 | End: 2021-03-19 | Stop reason: SDUPTHER

## 2021-03-09 RX ORDER — TADALAFIL 20 MG/1
20 TABLET ORAL AS NEEDED
Qty: 30 TAB | Refills: 11 | Status: SHIPPED | OUTPATIENT
Start: 2021-03-09 | End: 2021-03-19 | Stop reason: SDUPTHER

## 2021-03-19 RX ORDER — AMLODIPINE BESYLATE 5 MG/1
TABLET ORAL
Qty: 90 TAB | Refills: 3 | Status: SHIPPED | OUTPATIENT
Start: 2021-03-19 | End: 2022-02-23 | Stop reason: ALTCHOICE

## 2021-03-19 RX ORDER — METOPROLOL SUCCINATE 25 MG/1
TABLET, EXTENDED RELEASE ORAL
Qty: 90 TAB | Refills: 3 | Status: SHIPPED | OUTPATIENT
Start: 2021-03-19 | End: 2021-09-20 | Stop reason: SDUPTHER

## 2021-03-19 RX ORDER — TADALAFIL 20 MG/1
20 TABLET ORAL AS NEEDED
Qty: 30 TAB | Refills: 11 | Status: SHIPPED | OUTPATIENT
Start: 2021-03-19 | End: 2021-03-30 | Stop reason: SDUPTHER

## 2021-03-19 NOTE — TELEPHONE ENCOUNTER
Pt is calling requesting a refill on med    Requested Prescriptions     Pending Prescriptions Disp Refills    amLODIPine (NORVASC) 5 mg tablet 90 Tab 3     Sig: TAKE 1 TABLET EVERY DAY    metoprolol succinate (TOPROL-XL) 25 mg XL tablet 90 Tab 3     Sig: TAKE 1 TABLET EVERY DAY    tadalafiL (Cialis) 20 mg tablet 30 Tab 11     Sig: Take 1 Tab by mouth as needed (ED).

## 2021-03-23 ENCOUNTER — IMMUNIZATION (OUTPATIENT)
Dept: INTERNAL MEDICINE CLINIC | Age: 81
End: 2021-03-23
Payer: MEDICARE

## 2021-03-23 DIAGNOSIS — Z23 ENCOUNTER FOR IMMUNIZATION: Primary | ICD-10-CM

## 2021-03-23 PROCEDURE — 0002A COVID-19, MRNA, LNP-S, PF, 30MCG/0.3ML DOSE(PFIZER): CPT | Performed by: FAMILY MEDICINE

## 2021-03-23 PROCEDURE — 91300 COVID-19, MRNA, LNP-S, PF, 30MCG/0.3ML DOSE(PFIZER): CPT | Performed by: FAMILY MEDICINE

## 2021-03-30 ENCOUNTER — TELEPHONE (OUTPATIENT)
Dept: FAMILY MEDICINE CLINIC | Age: 81
End: 2021-03-30

## 2021-03-30 RX ORDER — TADALAFIL 20 MG/1
20 TABLET ORAL AS NEEDED
Qty: 30 TAB | Refills: 11 | Status: SHIPPED | OUTPATIENT
Start: 2021-03-30 | End: 2021-09-20 | Stop reason: SDUPTHER

## 2021-03-30 NOTE — TELEPHONE ENCOUNTER
Message from [facility]    Dr. Brandy Lam  Received:  Today  Message Contents   García, Leandra Harlan [facility]             General Message/Vendor Calls     Caller's first and last name: Pt       Reason for call: Calling to check the status of refill request for tadalafil 20 mg       Callback required yes/no and why: yes, to confirm       Best contact number(s): [phone]       Details to clarify the request: N/A       Lana Fleming

## 2021-03-31 LAB
INR PPP: 1.6 (ref 0.9–1.2)
PROTHROMBIN TIME: 17.3 SEC (ref 9.1–12)

## 2021-04-14 ENCOUNTER — OFFICE VISIT (OUTPATIENT)
Dept: FAMILY MEDICINE CLINIC | Age: 81
End: 2021-04-14
Payer: MEDICARE

## 2021-04-14 ENCOUNTER — TELEPHONE (OUTPATIENT)
Dept: FAMILY MEDICINE CLINIC | Age: 81
End: 2021-04-14

## 2021-04-14 VITALS
DIASTOLIC BLOOD PRESSURE: 83 MMHG | BODY MASS INDEX: 27.3 KG/M2 | HEART RATE: 80 BPM | RESPIRATION RATE: 16 BRPM | HEIGHT: 71 IN | WEIGHT: 195 LBS | TEMPERATURE: 97.4 F | SYSTOLIC BLOOD PRESSURE: 148 MMHG | OXYGEN SATURATION: 98 %

## 2021-04-14 DIAGNOSIS — I48.0 PAROXYSMAL ATRIAL FIBRILLATION (HCC): Primary | ICD-10-CM

## 2021-04-14 DIAGNOSIS — R79.1 SUPRATHERAPEUTIC INR: ICD-10-CM

## 2021-04-14 DIAGNOSIS — T14.8XXA ABRASION: ICD-10-CM

## 2021-04-14 LAB
INR BLD: 4.2
PT POC: 50.4 SECONDS
VALID INTERNAL CONTROL?: YES

## 2021-04-14 PROCEDURE — G8754 DIAS BP LESS 90: HCPCS | Performed by: FAMILY MEDICINE

## 2021-04-14 PROCEDURE — G8753 SYS BP > OR = 140: HCPCS | Performed by: FAMILY MEDICINE

## 2021-04-14 PROCEDURE — 99214 OFFICE O/P EST MOD 30 MIN: CPT | Performed by: FAMILY MEDICINE

## 2021-04-14 PROCEDURE — G8419 CALC BMI OUT NRM PARAM NOF/U: HCPCS | Performed by: FAMILY MEDICINE

## 2021-04-14 PROCEDURE — G8427 DOCREV CUR MEDS BY ELIG CLIN: HCPCS | Performed by: FAMILY MEDICINE

## 2021-04-14 PROCEDURE — G8536 NO DOC ELDER MAL SCRN: HCPCS | Performed by: FAMILY MEDICINE

## 2021-04-14 PROCEDURE — G8510 SCR DEP NEG, NO PLAN REQD: HCPCS | Performed by: FAMILY MEDICINE

## 2021-04-14 PROCEDURE — 1101F PT FALLS ASSESS-DOCD LE1/YR: CPT | Performed by: FAMILY MEDICINE

## 2021-04-14 PROCEDURE — 85610 PROTHROMBIN TIME: CPT | Performed by: FAMILY MEDICINE

## 2021-04-14 NOTE — PROGRESS NOTES
Claudette Lecher is a 79 y.o. male  Chief Complaint   Patient presents with    Abrasion     1. Have you been to the ER, urgent care clinic since your last visit? Hospitalized since your last visit?no    2. Have you seen or consulted any other health care providers outside of the [address] [place] [address] since your last visit? Include any pap smears or colon screening.  No  .  Health Maintenance   Topic Date Due    Shingrix Vaccine Age 49> (1 of 2) Never done    Medicare Yearly Exam  03/19/2021    Flu Vaccine (Season Ended) 09/01/2021    Lipid Screen  02/15/2022    DTaP/Tdap/Td series (2 - Td) 07/14/2025    COVID-19 Vaccine  Completed    Pneumococcal 65+ years  Completed     Visit Vitals  BP (!) 148/83 (BP 1 Location: Left upper arm, BP Patient Position: At rest, BP Cuff Size: Large adult)   Pulse 80   Temp 97.4 °F (36.3 °C) (Skin)   Resp 16   Ht 5' 11\" (1.803 m)   Wt 195 lb (88.5 kg)   SpO2 98%   BMI 27.20 kg/m²

## 2021-04-14 NOTE — TELEPHONE ENCOUNTER
verified. Pt called into the office requesting to be seen for scratches to his leg caused by his dog. He states that he band-aid the area and applied gauze. He states that the bleeding has not stopped. Advised him to go to nearest ER or urgent care, but he states that the costs is his reason not to go. Per Dr. Jeovanny Lama, pt can come into the office to be treated.

## 2021-04-14 NOTE — PROGRESS NOTES
IMAN Copeland is a 79 y.o. male who presents with a abrasion to the left shin that he sustained yesterday evening while playing with his dog. Felt like it would not stop bleeding so he put several Band-Aids and wrapped the whole area with tape. There is a small amount of blood trickling down his leg under the bandage that appears to have dried up. When I removed the bandage and tape there were 3 Band-Aids on a 3 inch long shallow abrasion. Interestingly they were still wet with blood and there is no obvious clot. Turns out that he has increased his Coumadin dose for the last 2 or 3 weeks. He forgot what he was supposed to be taking so he started taking the amount listed on his bottle (which is 2.5 mg tablets twice daily) which was nearly a 50% increase. INR today is 4.2    PMHx:  Past Medical History:   Diagnosis Date    Arthritis     knees    CAD (coronary artery disease)     stent x3 approx 2001    Chronic atrial fibrillation ([facility] [address]     ED (erectile dysfunction)     Hypertension     Kidney stone on right side 10/30/2011    JAMEY (obstructive sleep apnea) 4/25/2012    Skin cancer     BCC, s/p Mohs on L flank    Sun-damaged skin     TIA (transient ischemic attack)     6/12 - [facility]       Meds:   Current Outpatient Medications   Medication Sig Dispense Refill    tadalafiL (Cialis) 20 mg tablet Take 1 Tab by mouth as needed (ED). 30 Tab 11    amLODIPine (NORVASC) 5 mg tablet TAKE 1 TABLET EVERY DAY 90 Tab 3    metoprolol succinate (TOPROL-XL) 25 mg XL tablet TAKE 1 TABLET EVERY DAY 90 Tab 3    warfarin (COUMADIN) 2.5 mg tablet Take 2.5 mg by mouth daily. Pt takes 5mg on Monday, Wednesday and Friday and the of the week 2.5mg      lidocaine 4 % patch Alternate and off every 12 hours as needed 10 Patch 0    methocarbamoL (Robaxin-750) 750 mg tablet Take 1 Tab by mouth three (3) times daily as needed for Muscle Spasm(s).  20 Tab 0    apixaban (ELIQUIS) 2.5 mg tablet Take 1 Tab by mouth two (2) times a day. 180 Tab 3    pravastatin (PRAVACHOL) 10 mg tablet TAKE 1 TABLET EVERY NIGHT 90 Tab 3    enalapril (VASOTEC) 20 mg tablet TAKE 1 TABLET TWICE DAILY 180 Tab 1    hydroCHLOROthiazide (HYDRODIURIL) 25 mg tablet Take 1 Tab by mouth daily. 90 Tab 3    terazosin (HYTRIN) 10 mg capsule Take 10 mg by mouth nightly.  potassium chloride (KLOR-CON) 10 mEq tablet Take 1 Tab by mouth daily. 90 Tab 0    finasteride (PROSCAR) 5 mg tablet Take 1 Tab by mouth daily. 30 Tab 3    aspirin delayed-release 81 mg tablet Take  by mouth daily.  FOLIC ACID PO Take 244 mcg by mouth daily.  MULTIVITAMIN WITH MINERALS (MULTI-VIT 55 PLUS PO) Take 1 Tab by mouth daily. Taking multivitamins every morning. Allergies: Allergies   Allergen Reactions    Acetaminophen Other (comments)    Oxycodone Hcl Other (comments)    Pcn [Penicillins] Unknown (comments)     As a child    Percocet [Oxycodone-Acetaminophen] Swelling     Leg swelling       Smoker:  Social History     Tobacco Use   Smoking Status Never Smoker   Smokeless Tobacco Never Used       ETOH:   Social History     Substance and Sexual Activity   Alcohol Use No    Alcohol/week: 0.0 standard drinks       FH:   Family History   Problem Relation Age of Onset    Stroke Father     Heart Attack Father     Heart Disease Father     Cancer Father 80        colon cancer    Heart Disease Mother    [address] [facility] Arthritis-osteo Sister         s/p bilateral knee replacements    No Known Problems Daughter     Anesth Problems Neg Hx        ROS:   As listed in HPI. In addition:  Constitutional:   No headache, fever, fatigue, weight loss or weight gain      Cardiac:    No chest pain      Resp:   No cough or shortness of breath      Neuro   No loss of consciousness, dizziness, seizures      Physical Exam:  Blood pressure (!) 148/83, pulse 80, temperature 97.4 °F (36.3 °C), temperature source Skin, resp.  rate 16, height 5' 11\" (1.803 m), weight 195 lb (88.5 kg), SpO2 98 %. GEN: No apparent distress. Alert and oriented and responds to all questions appropriately. NEUROLOGIC:  No focal neurologic deficits. Strength and sensation grossly intact. Coordination and gait grossly intact. EXT: Well perfused. No edema. SKIN: No obvious rashes. Shallow abrasion that is not actively bleeding anterior right shin. Full abrasion is 6 inches long, the portion that is actually broken the skin to the point of bleeding is about 3 inches long       Assessment and Plan     Abrasion  Dressed with a nonadherent dressing and Ana. Educated on not disturbing the abrasion. Suggest dressing change in 24 hours    Supratherapeutic INR  Was taking higher dose of Coumadin due to forgetting his proper dose  Return to that dose (actually reduced a little bit) 2.5 mg daily, 5 mg Tuesday Thursday    Follow-up 1 week INR    Already has an appointment to come in this Friday to be seen for dizziness. No give it time to follow-up on the abrasion      ICD-10-CM ICD-9-CM    1. Paroxysmal atrial fibrillation (HCC)  I48.0 427.31 AMB POC PT/INR   2. Abrasion  T14. 8XXA 919.0    3. Supratherapeutic INR  R79.1 790.92        AVS given.  Pt expressed understanding of instructions

## 2021-04-16 ENCOUNTER — OFFICE VISIT (OUTPATIENT)
Dept: FAMILY MEDICINE CLINIC | Age: 81
End: 2021-04-16
Payer: MEDICARE

## 2021-04-16 VITALS
RESPIRATION RATE: 16 BRPM | OXYGEN SATURATION: 99 % | HEIGHT: 71 IN | TEMPERATURE: 97.5 F | WEIGHT: 192.2 LBS | HEART RATE: 72 BPM | BODY MASS INDEX: 26.91 KG/M2 | DIASTOLIC BLOOD PRESSURE: 82 MMHG | SYSTOLIC BLOOD PRESSURE: 135 MMHG

## 2021-04-16 DIAGNOSIS — I10 ESSENTIAL HYPERTENSION: ICD-10-CM

## 2021-04-16 DIAGNOSIS — R26.89 BALANCE PROBLEM: Primary | ICD-10-CM

## 2021-04-16 DIAGNOSIS — N40.0 BENIGN PROSTATIC HYPERPLASIA, UNSPECIFIED WHETHER LOWER URINARY TRACT SYMPTOMS PRESENT: ICD-10-CM

## 2021-04-16 PROCEDURE — G8752 SYS BP LESS 140: HCPCS | Performed by: FAMILY MEDICINE

## 2021-04-16 PROCEDURE — 99214 OFFICE O/P EST MOD 30 MIN: CPT | Performed by: FAMILY MEDICINE

## 2021-04-16 PROCEDURE — 1101F PT FALLS ASSESS-DOCD LE1/YR: CPT | Performed by: FAMILY MEDICINE

## 2021-04-16 PROCEDURE — G8419 CALC BMI OUT NRM PARAM NOF/U: HCPCS | Performed by: FAMILY MEDICINE

## 2021-04-16 PROCEDURE — G8754 DIAS BP LESS 90: HCPCS | Performed by: FAMILY MEDICINE

## 2021-04-16 PROCEDURE — G8536 NO DOC ELDER MAL SCRN: HCPCS | Performed by: FAMILY MEDICINE

## 2021-04-16 PROCEDURE — G8510 SCR DEP NEG, NO PLAN REQD: HCPCS | Performed by: FAMILY MEDICINE

## 2021-04-16 PROCEDURE — G8427 DOCREV CUR MEDS BY ELIG CLIN: HCPCS | Performed by: FAMILY MEDICINE

## 2021-04-16 NOTE — PROGRESS NOTES
1. Have you been to the ER, urgent care clinic since your last visit? Hospitalized since your last visit? No    2. Have you seen or consulted any other health care providers outside of the [address] [place] [address] since your last visit? Include any pap smears or colon screening.  No    Health Maintenance Due   Topic Date Due    Shingrix Vaccine Age 49> (1 of 2) Never done    Medicare Yearly Exam  03/19/2021     Chief Complaint   Patient presents with    Gait Problem     Balance problem

## 2021-04-16 NOTE — PROGRESS NOTES
IMAN Carrillo is a 79 y.o. male who presents with a balance problem. He brought a similar complaint my attention in November 2020 and at that time felt like he was falling to the right. I asked him to try stopping Terazosin and and cutting HCTZ in half. He is pretty sure that he actually stopped Terazosin and HCTZ and felt better but still has a balance problem. Today he describes his balance problem as falling to the left when he turns left quickly. This only happens once a day which apparently is an improvement. He does not fall over, he is always able to catch himself. PMHx:  Past Medical History:   Diagnosis Date    Arthritis     knees    CAD (coronary artery disease)     stent x3 approx 2001    Chronic atrial fibrillation ([address]     ED (erectile dysfunction)     Hypertension     Kidney stone on right side 10/30/2011    JAMEY (obstructive sleep apnea) 4/25/2012    Skin cancer     BCC, s/p Mohs on L flank    Sun-damaged skin     TIA (transient ischemic attack)     6/12 - seen at [facility]       Meds:   Current Outpatient Medications   Medication Sig Dispense Refill    tadalafiL (Cialis) 20 mg tablet Take 1 Tab by mouth as needed (ED). 30 Tab 11    amLODIPine (NORVASC) 5 mg tablet TAKE 1 TABLET EVERY DAY 90 Tab 3    metoprolol succinate (TOPROL-XL) 25 mg XL tablet TAKE 1 TABLET EVERY DAY 90 Tab 3    warfarin (COUMADIN) 2.5 mg tablet Take 2.5 mg by mouth daily. Pt takes 5mg on Monday, Wednesday and Friday and the of the week 2.5mg      lidocaine 4 % patch Alternate and off every 12 hours as needed 10 Patch 0    methocarbamoL (Robaxin-750) 750 mg tablet Take 1 Tab by mouth three (3) times daily as needed for Muscle Spasm(s). 20 Tab 0    apixaban (ELIQUIS) 2.5 mg tablet Take 1 Tab by mouth two (2) times a day.  180 Tab 3    pravastatin (PRAVACHOL) 10 mg tablet TAKE 1 TABLET EVERY NIGHT 90 Tab 3    enalapril (VASOTEC) 20 mg tablet TAKE 1 TABLET TWICE DAILY 180 Tab 1    hydroCHLOROthiazide (HYDRODIURIL) 25 mg tablet Take 1 Tab by mouth daily. 90 Tab 3    terazosin (HYTRIN) 10 mg capsule Take 10 mg by mouth nightly.  potassium chloride (KLOR-CON) 10 mEq tablet Take 1 Tab by mouth daily. 90 Tab 0    finasteride (PROSCAR) 5 mg tablet Take 1 Tab by mouth daily. 30 Tab 3    aspirin delayed-release 81 mg tablet Take  by mouth daily.  FOLIC ACID PO Take 135 mcg by mouth daily.  MULTIVITAMIN WITH MINERALS (MULTI-VIT 55 PLUS PO) Take 1 Tab by mouth daily. Taking multivitamins every morning. Allergies: Allergies   Allergen Reactions    Acetaminophen Other (comments)     Pt states not allergic.  Oxycodone Hcl Other (comments)    Pcn [Penicillins] Unknown (comments)     As a child    Percocet [Oxycodone-Acetaminophen] Swelling     Leg swelling       Smoker:  Social History     Tobacco Use   Smoking Status Never Smoker   Smokeless Tobacco Never Used       ETOH:   Social History     Substance and Sexual Activity   Alcohol Use No    Alcohol/week: 0.0 standard drinks       FH:   Family History   Problem Relation Age of Onset    Stroke Father     Heart Attack Father     Heart Disease Father     Cancer Father 80        colon cancer    Heart Disease Mother    [place] Arthritis-osteo Sister         s/p bilateral knee replacements    No Known Problems Daughter     Anesth Problems Neg Hx        ROS:   As listed in HPI. In addition:  Constitutional:   No headache, fever, fatigue, weight loss or weight gain      Cardiac:    No chest pain      Resp:   No cough or shortness of breath      Neuro   No loss of consciousness, dizziness, seizures      Physical Exam:  Blood pressure 135/82, pulse 72, temperature 97.5 °F (36.4 °C), temperature source Temporal, resp. rate 16, height 5' 11\" (1.803 m), weight 192 lb 3.2 oz (87.2 kg), SpO2 99 %. GEN: No apparent distress. Alert and oriented and responds to all questions appropriately. NEUROLOGIC:  No focal neurologic deficits.  Strength and sensation grossly intact. Coordination and gait grossly intact. EXT: Well perfused. No edema. SKIN: No obvious rashes. Lungs clear to auscultation bilaterally  CV regular rhythm no murmur      Orthostatics are unremarkable  Romberg is stable  Walking in the hallway he walks asymmetrically. One leg is shorter than the other and he has a lift in his left shoe. He did stumble to the right because of this asymmetric walk. He was not able to reproduce the left side balance problem       Assessment and Plan     Balance problem  He is describing an inner ear issue, turns to the left feels like he is falling to the left. He has an asymmetric gait and would definitely benefit from physical therapy. He might need special balance physical therapy so we will refer him to ENT to assess for best course forward. He thinks he stopped Terazosin and HCTZ. He is a somewhat unreliable historian for medication so I asked him to confirm this. Provided him with an updated AVS but the left hydrochlorothiazide and Terazosin on his list for my reference      ICD-10-CM ICD-9-CM    1. Balance problem  R26.89 781.99 REFERRAL TO ENT-OTOLARYNGOLOGY   2. Essential hypertension  I10 401.9    3. Benign prostatic hyperplasia, unspecified whether lower urinary tract symptoms present  N40.0 600.00        AVS given.  Pt expressed understanding of instructions

## 2021-04-21 ENCOUNTER — CLINICAL SUPPORT (OUTPATIENT)
Dept: FAMILY MEDICINE CLINIC | Age: 81
End: 2021-04-21
Payer: MEDICARE

## 2021-04-21 VITALS
WEIGHT: 185.2 LBS | HEIGHT: 71 IN | BODY MASS INDEX: 25.93 KG/M2 | DIASTOLIC BLOOD PRESSURE: 83 MMHG | RESPIRATION RATE: 16 BRPM | SYSTOLIC BLOOD PRESSURE: 153 MMHG | OXYGEN SATURATION: 97 % | TEMPERATURE: 97.7 F | HEART RATE: 75 BPM

## 2021-04-21 DIAGNOSIS — I48.0 PAROXYSMAL ATRIAL FIBRILLATION (HCC): Primary | ICD-10-CM

## 2021-04-21 LAB
INR BLD: 2.4
PT POC: 28.9 SECONDS
VALID INTERNAL CONTROL?: YES

## 2021-04-21 PROCEDURE — 85610 PROTHROMBIN TIME: CPT | Performed by: FAMILY MEDICINE

## 2021-04-21 NOTE — PROGRESS NOTES
Valerio Oviedo is a 79 y.o. male who presents today for Anticoagulation monitoring. Indication: Atrial Fibrillation  INR Goal: 2.0-3.0. Current dose:  Coumadin 2.5 mg daily, except Tues/Thurs 5 mg. Missed Coumadin Doses:  None  Medication Changes:  no  Dietary Changes:  no    Symptoms: taking coumadin appropriately without any bleeding. Latest INRs:  Lab Results   Component Value Date/Time    INR 1.6 (H) 03/30/2021 02:23 PM    INR 1.7 (H) 02/10/2021 09:00 AM    INR 2.2 (H) 01/11/2021 08:44 AM    INR POC 4.2 04/14/2021 12:10 PM    Prothrombin time 17.3 (H) 03/30/2021 02:23 PM    Prothrombin time 18.0 (H) 02/10/2021 09:00 AM    Prothrombin time 23.3 (H) 01/11/2021 08:44 AM        New Coumadin dose:.current treatment plan is effective, no change in therapy. Next check to be scheduled for  2 weeks.

## 2021-05-07 ENCOUNTER — TELEPHONE (OUTPATIENT)
Dept: FAMILY MEDICINE CLINIC | Age: 81
End: 2021-05-07

## 2021-05-07 ENCOUNTER — CLINICAL SUPPORT (OUTPATIENT)
Dept: FAMILY MEDICINE CLINIC | Age: 81
End: 2021-05-07
Payer: MEDICARE

## 2021-05-07 DIAGNOSIS — I48.0 PAROXYSMAL ATRIAL FIBRILLATION (HCC): Primary | ICD-10-CM

## 2021-05-07 LAB
INR BLD: 2.6 (ref 1–1.5)
PT POC: 31.6 SECONDS (ref 9.1–12)
VALID INTERNAL CONTROL?: YES

## 2021-05-07 PROCEDURE — 85610 PROTHROMBIN TIME: CPT | Performed by: FAMILY MEDICINE

## 2021-05-18 ENCOUNTER — TELEPHONE (OUTPATIENT)
Dept: CARDIOLOGY CLINIC | Age: 81
End: 2021-05-18

## 2021-05-18 NOTE — TELEPHONE ENCOUNTER
Patient is scheduled to have some dental work done on 5/24 and states that in the past he has had to take medication prior and would like to know if he needs to take anything beforehand. In the event that he does his pharmacy was confirmed. Please advise.     Phone: [phone]

## 2021-05-18 NOTE — TELEPHONE ENCOUNTER
Roni Casino, MD Zula Prader, RN   Caller: Unspecified (Today,  8:16 AM)             Can hold coumadin if needed. No prophylatic antibiotic indicated.  thx

## 2021-05-18 NOTE — TELEPHONE ENCOUNTER
Returned call to patient. Two patient indentifiers verified. Pt was informed of the message. Pt verbalized understanding and denies any further questions at this time.      Future Appointments   Date Time Provider Claudia Wang   5/25/2021  9:15 AM Marco A Jack BS AMB   6/4/2021  8:40 AM Raul Montes MD [facility] BS AMB   7/23/2021  9:20 AM MD GERARD Hogan BS AMB

## 2021-05-25 ENCOUNTER — CLINICAL SUPPORT (OUTPATIENT)
Dept: CARDIOLOGY CLINIC | Age: 81
End: 2021-05-25
Payer: MEDICARE

## 2021-05-25 DIAGNOSIS — Z95.810 AUTOMATIC IMPLANTABLE CARDIAC DEFIBRILLATOR IN SITU: Primary | ICD-10-CM

## 2021-05-25 PROCEDURE — 93282 PRGRMG EVAL IMPLANTABLE DFB: CPT | Performed by: INTERNAL MEDICINE

## 2021-06-04 ENCOUNTER — OFFICE VISIT (OUTPATIENT)
Dept: FAMILY MEDICINE CLINIC | Age: 81
End: 2021-06-04
Payer: MEDICARE

## 2021-06-04 VITALS
WEIGHT: 186.2 LBS | BODY MASS INDEX: 26.07 KG/M2 | HEART RATE: 62 BPM | TEMPERATURE: 97.3 F | OXYGEN SATURATION: 95 % | SYSTOLIC BLOOD PRESSURE: 124 MMHG | DIASTOLIC BLOOD PRESSURE: 76 MMHG | RESPIRATION RATE: 16 BRPM | HEIGHT: 71 IN

## 2021-06-04 DIAGNOSIS — I48.0 PAROXYSMAL ATRIAL FIBRILLATION (HCC): ICD-10-CM

## 2021-06-04 DIAGNOSIS — R26.89 BALANCE PROBLEM: Primary | ICD-10-CM

## 2021-06-04 DIAGNOSIS — I10 ESSENTIAL HYPERTENSION: ICD-10-CM

## 2021-06-04 DIAGNOSIS — Z79.01 CHRONIC ANTICOAGULATION: ICD-10-CM

## 2021-06-04 LAB
INR BLD: 2.8
PT POC: 33.6 SECONDS
VALID INTERNAL CONTROL?: YES

## 2021-06-04 PROCEDURE — G8754 DIAS BP LESS 90: HCPCS | Performed by: FAMILY MEDICINE

## 2021-06-04 PROCEDURE — G8752 SYS BP LESS 140: HCPCS | Performed by: FAMILY MEDICINE

## 2021-06-04 PROCEDURE — G8510 SCR DEP NEG, NO PLAN REQD: HCPCS | Performed by: FAMILY MEDICINE

## 2021-06-04 PROCEDURE — 85610 PROTHROMBIN TIME: CPT | Performed by: FAMILY MEDICINE

## 2021-06-04 PROCEDURE — 99214 OFFICE O/P EST MOD 30 MIN: CPT | Performed by: FAMILY MEDICINE

## 2021-06-04 PROCEDURE — 1101F PT FALLS ASSESS-DOCD LE1/YR: CPT | Performed by: FAMILY MEDICINE

## 2021-06-04 PROCEDURE — G8419 CALC BMI OUT NRM PARAM NOF/U: HCPCS | Performed by: FAMILY MEDICINE

## 2021-06-04 PROCEDURE — G8427 DOCREV CUR MEDS BY ELIG CLIN: HCPCS | Performed by: FAMILY MEDICINE

## 2021-06-04 PROCEDURE — G8536 NO DOC ELDER MAL SCRN: HCPCS | Performed by: FAMILY MEDICINE

## 2021-06-04 NOTE — PROGRESS NOTES
1. Have you been to the ER, urgent care clinic since your last visit? Hospitalized since your last visit? No    2. Have you seen or consulted any other health care providers outside of the [address] [place] [address] since your last visit? Include any pap smears or colon screening. Yes. Health Maintenance Due   Topic Date Due    Shingrix Vaccine Age 49> (1 of 2) Never done    Medicare Yearly Exam  03/19/2021     Yanet Grissom is a 79 y.o. male who presents today for Anticoagulation monitoring. Indication: Atrial Fibrillation  INR Goal: 2.0-3.0. Current dose:  Coumadin 2.5 mg daily, except 5 mg Tues/Thurs. Missed Coumadin Doses:  None  Medication Changes:  no  Dietary Changes:  no    Symptoms: taking coumadin appropriately without any bleeding. Latest INRs:  Lab Results   Component Value Date/Time    INR 1.6 (H) 03/30/2021 02:23 PM    INR 1.7 (H) 02/10/2021 09:00 AM    INR 2.2 (H) 01/11/2021 08:44 AM    INR POC 2.8 06/04/2021 08:38 AM    INR POC 2.6 (A) 05/07/2021 09:35 AM    INR POC 2.4 04/21/2021 09:40 AM    Prothrombin time 17.3 (H) 03/30/2021 02:23 PM    Prothrombin time 18.0 (H) 02/10/2021 09:00 AM    Prothrombin time 23.3 (H) 01/11/2021 08:44 AM        New Coumadin dose:.current treatment plan is effective, no change in therapy. Next check to be scheduled for  4 weeks.

## 2021-06-04 NOTE — PROGRESS NOTES
IMAN  Josie Wang is a 79 y.o. male who presents to follow-up on a balance problem. He brought this to my attention in November 2020 and at that time felt like he was falling to the right. I had stopped Terazosin and asked him to cut the HCTZ in half. He is not sure that helped then in April 2021 he described a problem with falling to the left when he turns quickly. Only happened once a day. He did not fall over. He was always able to catch himself. We stopped HCTZ. I referred him to ENT and he was referred on to physical therapy. For insurance reasons he was not able to go to many sessions but he got some homework exercises and has been doing them faithfully and feels almost 100% better    PMHx:  Past Medical History:   Diagnosis Date    Arthritis     knees    CAD (coronary artery disease)     stent x3 approx 2001    Chronic atrial fibrillation ([facility] [address]     ED (erectile dysfunction)     Hypertension     Kidney stone on right side 10/30/2011    JAMEY (obstructive sleep apnea) 4/25/2012    Skin cancer     BCC, s/p Mohs on L flank    Sun-damaged skin     TIA (transient ischemic attack)     6/12 - [facility]       Meds:   Current Outpatient Medications   Medication Sig Dispense Refill    tadalafiL (Cialis) 20 mg tablet Take 1 Tab by mouth as needed (ED). 30 Tab 11    amLODIPine (NORVASC) 5 mg tablet TAKE 1 TABLET EVERY DAY 90 Tab 3    metoprolol succinate (TOPROL-XL) 25 mg XL tablet TAKE 1 TABLET EVERY DAY 90 Tab 3    warfarin (COUMADIN) 2.5 mg tablet Take 2.5 mg by mouth daily. Pt takes 5mg on Monday, Wednesday and Friday and the of the week 2.5mg      lidocaine 4 % patch Alternate and off every 12 hours as needed 10 Patch 0    methocarbamoL (Robaxin-750) 750 mg tablet Take 1 Tab by mouth three (3) times daily as needed for Muscle Spasm(s).  20 Tab 0    pravastatin (PRAVACHOL) 10 mg tablet TAKE 1 TABLET EVERY NIGHT 90 Tab 3    enalapril (VASOTEC) 20 mg tablet TAKE 1 TABLET TWICE DAILY 180 Tab 1    finasteride (PROSCAR) 5 mg tablet Take 1 Tab by mouth daily. 30 Tab 3    aspirin delayed-release 81 mg tablet Take  by mouth daily.  FOLIC ACID PO Take 617 mcg by mouth daily.  MULTIVITAMIN WITH MINERALS (MULTI-VIT 55 PLUS PO) Take 1 Tab by mouth daily. Taking multivitamins every morning. Allergies: Allergies   Allergen Reactions    Acetaminophen Other (comments)     Pt states not allergic.  Oxycodone Hcl Other (comments)    Pcn [Penicillins] Unknown (comments)     As a child    Percocet [Oxycodone-Acetaminophen] Swelling     Leg swelling       Smoker:  Social History     Tobacco Use   Smoking Status Never Smoker   Smokeless Tobacco Never Used       ETOH:   Social History     Substance and Sexual Activity   Alcohol Use No    Alcohol/week: 0.0 standard drinks       FH:   Family History   Problem Relation Age of Onset    Stroke Father     Heart Attack Father     Heart Disease Father     Cancer Father 80        colon cancer    Heart Disease Mother    [place] Arthritis-osteo Sister         s/p bilateral knee replacements    No Known Problems Daughter     Anesth Problems Neg Hx        ROS:   As listed in HPI. In addition:  Constitutional:   No headache, fever, fatigue, weight loss or weight gain      Cardiac:    No chest pain      Resp:   No cough or shortness of breath      Neuro   No loss of consciousness, dizziness, seizures      Physical Exam:  Blood pressure 124/76, pulse 62, temperature 97.3 °F (36.3 °C), temperature source Temporal, resp. rate 16, height 5' 11\" (1.803 m), weight 186 lb 3.2 oz (84.5 kg), SpO2 95 %. GEN: No apparent distress. Alert and oriented and responds to all questions appropriately. NEUROLOGIC:  No focal neurologic deficits. Strength and sensation grossly intact. Coordination and gait grossly intact. EXT: Well perfused. No edema. SKIN: No obvious rashes.   Lungs clear to auscultation bilaterally  CV regular rate rhythm no murmur       Assessment and Plan     A. fib  On Coumadin 10, INR 2.8  Continue current dose follow-up 1 month. Could not afford Eliquis. Hypertension, well controlled  No longer taking HCTZ 25 mg  No longer taking Terazosin 10 mg  Enalapril 20 mg  Amlodipine 5 mg    Lost 10 pounds in the last month. He is happy with this weight and wants to maintain it    Is doing a lot of walking, feels stronger. His balance is improved. Balance problem  Vestibular problem, responded very well to exercises. Follow-up in August or so for 6-month labs      ICD-10-CM ICD-9-CM    1. Balance problem  R26.89 781.99    2. Paroxysmal atrial fibrillation (HCC)  I48.0 427.31 AMB POC PT/INR   3. Essential hypertension  I10 401.9    4. Chronic anticoagulation  Z79.01 V58.61        AVS given.  Pt expressed understanding of instructions

## 2021-06-17 ENCOUNTER — TELEPHONE (OUTPATIENT)
Dept: CARDIOLOGY CLINIC | Age: 81
End: 2021-06-17

## 2021-06-17 NOTE — TELEPHONE ENCOUNTER
LVM of appointment time/date change from 7/23/21 to 8/4/21 with Dr. Pratima Esquivel due to hospital coverage.

## 2021-06-21 RX ORDER — WARFARIN 2.5 MG/1
TABLET ORAL
Qty: 180 TABLET | Refills: 0 | Status: SHIPPED
Start: 2021-06-21 | End: 2021-08-17

## 2021-07-01 ENCOUNTER — TELEPHONE (OUTPATIENT)
Dept: FAMILY MEDICINE CLINIC | Age: 81
End: 2021-07-01

## 2021-07-01 NOTE — TELEPHONE ENCOUNTER
Patient states he has been approved for Eliquis. He would like to know if he should start taking it today.      Phone: [phone] Xolair Pregnancy And Lactation Text: This medication is Pregnancy Category B and is considered safe during pregnancy. This medication is excreted in breast milk.

## 2021-07-01 NOTE — TELEPHONE ENCOUNTER
Patient needs to apply for this himself.     Can be done on [employer] website or by calling  [phone]

## 2021-07-14 ENCOUNTER — OFFICE VISIT (OUTPATIENT)
Dept: FAMILY MEDICINE CLINIC | Age: 81
End: 2021-07-14
Payer: MEDICARE

## 2021-07-14 VITALS
WEIGHT: 184 LBS | TEMPERATURE: 98.1 F | RESPIRATION RATE: 14 BRPM | OXYGEN SATURATION: 98 % | DIASTOLIC BLOOD PRESSURE: 72 MMHG | SYSTOLIC BLOOD PRESSURE: 115 MMHG | HEIGHT: 71 IN | HEART RATE: 68 BPM | BODY MASS INDEX: 25.76 KG/M2

## 2021-07-14 DIAGNOSIS — I10 ESSENTIAL HYPERTENSION: ICD-10-CM

## 2021-07-14 DIAGNOSIS — I48.0 PAROXYSMAL ATRIAL FIBRILLATION (HCC): Primary | ICD-10-CM

## 2021-07-14 DIAGNOSIS — R26.89 BALANCE PROBLEM: ICD-10-CM

## 2021-07-14 LAB
INR BLD: 2.8
PT POC: 33.6 SECONDS
VALID INTERNAL CONTROL?: YES

## 2021-07-14 PROCEDURE — G8752 SYS BP LESS 140: HCPCS | Performed by: FAMILY MEDICINE

## 2021-07-14 PROCEDURE — G8427 DOCREV CUR MEDS BY ELIG CLIN: HCPCS | Performed by: FAMILY MEDICINE

## 2021-07-14 PROCEDURE — 85610 PROTHROMBIN TIME: CPT | Performed by: FAMILY MEDICINE

## 2021-07-14 PROCEDURE — G8536 NO DOC ELDER MAL SCRN: HCPCS | Performed by: FAMILY MEDICINE

## 2021-07-14 PROCEDURE — 1101F PT FALLS ASSESS-DOCD LE1/YR: CPT | Performed by: FAMILY MEDICINE

## 2021-07-14 PROCEDURE — G8419 CALC BMI OUT NRM PARAM NOF/U: HCPCS | Performed by: FAMILY MEDICINE

## 2021-07-14 PROCEDURE — 99214 OFFICE O/P EST MOD 30 MIN: CPT | Performed by: FAMILY MEDICINE

## 2021-07-14 PROCEDURE — G8754 DIAS BP LESS 90: HCPCS | Performed by: FAMILY MEDICINE

## 2021-07-14 PROCEDURE — G8510 SCR DEP NEG, NO PLAN REQD: HCPCS | Performed by: FAMILY MEDICINE

## 2021-07-14 NOTE — PROGRESS NOTES
HPI  Sukumar Lama is a 79 y.o. male who presents to discuss switching from Coumadin over to Eliquis. Has not been able to afford the medicine in the past.  Needs help filling out patient assistance form. He appears to qualify on the basis of income    PMHx:  Past Medical History:   Diagnosis Date    Arthritis     knees    CAD (coronary artery disease)     stent x3 approx 2001    Chronic atrial fibrillation ([address]     ED (erectile dysfunction)     Hypertension     Kidney stone on right side 10/30/2011    JAMEY (obstructive sleep apnea) 4/25/2012    Skin cancer     BCC, s/p Mohs on L flank    Sun-damaged skin     TIA (transient ischemic attack)     6/12 - [facility] at [facility]       Meds:   Current Outpatient Medications   Medication Sig Dispense Refill    apixaban (ELIQUIS) 2.5 mg tablet Take 1 Tablet by mouth two (2) times a day. 180 Tablet 3    warfarin (COUMADIN) 2.5 mg tablet TAKE TWO TABLETS BY MOUTH DAILY 180 Tablet 0    tadalafiL (Cialis) 20 mg tablet Take 1 Tab by mouth as needed (ED). 30 Tab 11    amLODIPine (NORVASC) 5 mg tablet TAKE 1 TABLET EVERY DAY 90 Tab 3    metoprolol succinate (TOPROL-XL) 25 mg XL tablet TAKE 1 TABLET EVERY DAY 90 Tab 3    lidocaine 4 % patch Alternate and off every 12 hours as needed 10 Patch 0    methocarbamoL (Robaxin-750) 750 mg tablet Take 1 Tab by mouth three (3) times daily as needed for Muscle Spasm(s). 20 Tab 0    pravastatin (PRAVACHOL) 10 mg tablet TAKE 1 TABLET EVERY NIGHT 90 Tab 3    enalapril (VASOTEC) 20 mg tablet TAKE 1 TABLET TWICE DAILY 180 Tab 1    finasteride (PROSCAR) 5 mg tablet Take 1 Tab by mouth daily. 30 Tab 3    aspirin delayed-release 81 mg tablet Take  by mouth daily.  FOLIC ACID PO Take 840 mcg by mouth daily.  MULTIVITAMIN WITH MINERALS (MULTI-VIT 55 PLUS PO) Take 1 Tab by mouth daily. Taking multivitamins every morning. Allergies:    Allergies   Allergen Reactions    Acetaminophen Other (comments)     Pt states not allergic.  Oxycodone Hcl Other (comments)    Pcn [Penicillins] Unknown (comments)     As a child    Percocet [Oxycodone-Acetaminophen] Swelling     Leg swelling       Smoker:  Social History     Tobacco Use   Smoking Status Never Smoker   Smokeless Tobacco Never Used       ETOH:   Social History     Substance and Sexual Activity   Alcohol Use No    Alcohol/week: 0.0 standard drinks       FH:   Family History   Problem Relation Age of Onset    Stroke Father     Heart Attack Father     Heart Disease Father     Cancer Father 80        colon cancer    Heart Disease Mother    [employer] Arthritis-osteo Sister         s/p bilateral knee replacements    No Known Problems Daughter     Anesth Problems Neg Hx        ROS:   As listed in HPI. In addition:  Constitutional:   No headache, fever, fatigue, weight loss or weight gain      Cardiac:    No chest pain      Resp:   No cough or shortness of breath      Neuro   No loss of consciousness, dizziness, seizures      Physical Exam:  Blood pressure 115/72, pulse 68, temperature 98.1 °F (36.7 °C), temperature source Oral, resp. rate 14, height 5' 11\" (1.803 m), weight 184 lb (83.5 kg), SpO2 98 %. GEN: No apparent distress. Alert and oriented and responds to all questions appropriately. NEUROLOGIC:  No focal neurologic deficits. Strength and sensation grossly intact. Coordination and gait grossly intact. EXT: Well perfused. No edema. SKIN: No obvious rashes. Assessment and Plan     A. fib  Currently anticoagulated with Coumadin, INR 2.8. Stable. Continue current dose recheck 1 month  Finds INR checks burdensome and would like to switch to NOAC  Helped him fill out his Eliquis form    Balance problem. Intermittent  Plans to seek physical therapy. Is not sure when he will do this. When he chooses to make an appointment just request a referral.  In the recent past [facility] did not take his insurance. ICD-10-CM ICD-9-CM    1. Paroxysmal atrial fibrillation (HCC)  I48.0 427.31 apixaban (ELIQUIS) 2.5 mg tablet      AMB POC PT/INR   2. Balance problem  R26.89 781.99        AVS given.  Pt expressed understanding of instructions

## 2021-07-21 ENCOUNTER — TELEPHONE (OUTPATIENT)
Dept: FAMILY MEDICINE CLINIC | Age: 81
End: 2021-07-21

## 2021-07-21 NOTE — TELEPHONE ENCOUNTER
verified. Pt called to state that with his balance problems, that he has also been intermittently has dizzy spells x 1 week. Denies falls. His dizziness comes about when he stands from a sitting position. Pt has no way to check his BP at home. He has not started PT, because it is located in [facility]. Pt requests call back.

## 2021-07-21 NOTE — TELEPHONE ENCOUNTER
Returned call to patient. He felt okay this morning but then around lunchtime felt dizzy. This only seemed to happen when he stood up and would only happen if he stood up too quickly. I asked about HCTZ, we received a refill request and I wondered if this was a medicine he had restarted. Evidently he started a week ago because he thought it would help out with nocturia. We had stopped months ago because it seemed to be causing some dizziness. Of note the nocturia got better but at the same time he reports he cut back on [employer]. \"I was drinking a lot\"    Stop the HCTZ. Will take a few days to recover.   Monitor symptoms

## 2021-07-23 ENCOUNTER — TELEPHONE (OUTPATIENT)
Dept: FAMILY MEDICINE CLINIC | Age: 81
End: 2021-07-23

## 2021-07-23 NOTE — TELEPHONE ENCOUNTER
Patient was told by nurses to make appt for dizziness, I scheduled him for Wednesday August 4, please call if he needs to be sooner.

## 2021-07-26 ENCOUNTER — OFFICE VISIT (OUTPATIENT)
Dept: FAMILY MEDICINE CLINIC | Age: 81
End: 2021-07-26
Payer: MEDICARE

## 2021-07-26 VITALS
DIASTOLIC BLOOD PRESSURE: 72 MMHG | WEIGHT: 183.8 LBS | BODY MASS INDEX: 25.73 KG/M2 | RESPIRATION RATE: 15 BRPM | OXYGEN SATURATION: 96 % | HEART RATE: 65 BPM | SYSTOLIC BLOOD PRESSURE: 119 MMHG | HEIGHT: 71 IN | TEMPERATURE: 97.1 F

## 2021-07-26 DIAGNOSIS — I10 ESSENTIAL HYPERTENSION: ICD-10-CM

## 2021-07-26 DIAGNOSIS — R42 DIZZY: Primary | ICD-10-CM

## 2021-07-26 DIAGNOSIS — E55.9 VITAMIN D DEFICIENCY: ICD-10-CM

## 2021-07-26 DIAGNOSIS — R26.89 BALANCE PROBLEM: ICD-10-CM

## 2021-07-26 DIAGNOSIS — I48.0 PAROXYSMAL ATRIAL FIBRILLATION (HCC): ICD-10-CM

## 2021-07-26 DIAGNOSIS — E61.1 IRON DEFICIENCY: ICD-10-CM

## 2021-07-26 DIAGNOSIS — E53.8 B12 DEFICIENCY: ICD-10-CM

## 2021-07-26 PROCEDURE — G8510 SCR DEP NEG, NO PLAN REQD: HCPCS | Performed by: FAMILY MEDICINE

## 2021-07-26 PROCEDURE — G8752 SYS BP LESS 140: HCPCS | Performed by: FAMILY MEDICINE

## 2021-07-26 PROCEDURE — G8754 DIAS BP LESS 90: HCPCS | Performed by: FAMILY MEDICINE

## 2021-07-26 PROCEDURE — G8427 DOCREV CUR MEDS BY ELIG CLIN: HCPCS | Performed by: FAMILY MEDICINE

## 2021-07-26 PROCEDURE — 99214 OFFICE O/P EST MOD 30 MIN: CPT | Performed by: FAMILY MEDICINE

## 2021-07-26 PROCEDURE — 1101F PT FALLS ASSESS-DOCD LE1/YR: CPT | Performed by: FAMILY MEDICINE

## 2021-07-26 PROCEDURE — G8419 CALC BMI OUT NRM PARAM NOF/U: HCPCS | Performed by: FAMILY MEDICINE

## 2021-07-26 PROCEDURE — G8536 NO DOC ELDER MAL SCRN: HCPCS | Performed by: FAMILY MEDICINE

## 2021-07-26 NOTE — PROGRESS NOTES
1. Have you been to the ER, urgent care clinic since your last visit? Hospitalized since your last visit? No    2. Have you seen or consulted any other health care providers outside of the [address] [place] [address] since your last visit? Include any pap smears or colon screening.  No    Health Maintenance Due   Topic Date Due    Shingrix Vaccine Age 49> (1 of 2) Never done    Medicare Yearly Exam  03/19/2021

## 2021-07-26 NOTE — PROGRESS NOTES
IMAN Tee is a 79 y.o. male who presents with dizziness, unsteadiness on his feet. He has had this complaint for over a year now. It seems to take a fluctuating course but he is unable to give me a firm timeline. He thinks this most recent round of dizziness got worse as of 3 weeks ago. We discussed this over the phone last week this seemed to correlate with him restarting hydrochlorothiazide because he thought this would help with nocturia. He has not made an appointment to see physical therapy as I have recommended in the past.    He has not seen ENT. PMHx:  Past Medical History:   Diagnosis Date    Arthritis     knees    CAD (coronary artery disease)     stent x3 approx 2001    Chronic [address] [address]     ED (erectile dysfunction)     Hypertension     Kidney stone on right side 10/30/2011    JAMEY (obstructive sleep apnea) 4/25/2012    Skin cancer     BCC, s/p Mohs on L flank    Sun-damaged skin     TIA (transient ischemic attack)     6/12 - seen at [facility]       Meds:   Current Outpatient Medications   Medication Sig Dispense Refill    warfarin (COUMADIN) 2.5 mg tablet TAKE TWO TABLETS BY MOUTH DAILY 180 Tablet 0    tadalafiL (Cialis) 20 mg tablet Take 1 Tab by mouth as needed (ED). 30 Tab 11    amLODIPine (NORVASC) 5 mg tablet TAKE 1 TABLET EVERY DAY 90 Tab 3    metoprolol succinate (TOPROL-XL) 25 mg XL tablet TAKE 1 TABLET EVERY DAY 90 Tab 3    lidocaine 4 % patch Alternate and off every 12 hours as needed 10 Patch 0    pravastatin (PRAVACHOL) 10 mg tablet TAKE 1 TABLET EVERY NIGHT 90 Tab 3    enalapril (VASOTEC) 20 mg tablet TAKE 1 TABLET TWICE DAILY 180 Tab 1    finasteride (PROSCAR) 5 mg tablet Take 1 Tab by mouth daily. 30 Tab 3    aspirin delayed-release 81 mg tablet Take  by mouth daily.  FOLIC ACID PO Take 004 mcg by mouth daily.  MULTIVITAMIN WITH MINERALS (MULTI-VIT 55 PLUS PO) Take 1 Tab by mouth daily. Taking multivitamins every morning.       apixaban (ELIQUIS) 2.5 mg tablet Take 1 Tablet by mouth two (2) times a day. (Patient not taking: Reported on 7/26/2021) 180 Tablet 3    methocarbamoL (Robaxin-750) 750 mg tablet Take 1 Tab by mouth three (3) times daily as needed for Muscle Spasm(s). (Patient not taking: Reported on 7/26/2021) 20 Tab 0       Allergies: Allergies   Allergen Reactions    Acetaminophen Other (comments)     Pt states not allergic.  Oxycodone Hcl Other (comments)    Pcn [Penicillins] Unknown (comments)     As a child    Percocet [Oxycodone-Acetaminophen] Swelling     Leg swelling       Smoker:  Social History     Tobacco Use   Smoking Status Never Smoker   Smokeless Tobacco Never Used       ETOH:   Social History     Substance and Sexual Activity   Alcohol Use No    Alcohol/week: 0.0 standard drinks       FH:   Family History   Problem Relation Age of Onset    Stroke Father     Heart Attack Father     Heart Disease Father     Cancer Father 80        colon cancer    Heart Disease Mother    Krystin [employer] Arthritis-osteo Sister         s/p bilateral knee replacements    No Known Problems Daughter     Anesth Problems Neg Hx        ROS:   As listed in HPI. In addition:  Constitutional:   No headache, fever, fatigue, weight loss or weight gain      Cardiac:    No chest pain      Resp:   No cough or shortness of breath      Neuro   No loss of consciousness, dizziness, seizures      Physical Exam:  Blood pressure 119/72, pulse 65, temperature 97.1 °F (36.2 °C), temperature source Temporal, resp. rate 15, height 5' 11\" (1.803 m), weight 183 lb 12.8 oz (83.4 kg), SpO2 96 %. GEN: No apparent distress. Alert and oriented and responds to all questions appropriately. NEUROLOGIC:  No focal neurologic deficits. Strength and sensation grossly intact. Coordination and gait grossly intact. EXT: Well perfused. No edema. SKIN: No obvious rashes. Stands from chair without using armrest.  Appears to have his balance as soon as he stands.   With eyes closed and standing still he reports a perception that he is about to fall over but he is rock steady. Walking down the hines he will take about 4 steps and then stutter step. He usually he will bring his left foot from behind up level with his right foot and stopped there for a second [place] before continuing. He seems to be leaning to the right and occasionally his right foot will swing out to the right and he will drift that direction. Was able to turn without issue. Examined his feet. Left foot is more sensitive to microfiber than the right foot. Says that it feels like he is being poked but it is more intense and he is jerking his foot every time I poke       Assessment and Plan     Balance problem  There is a discrepancy in his perception of microfiber between the right and left foot  He is not describing vertigo  His blood pressure is fine today although he is randomly taking extra blood pressure medication and is not a good historian for this pattern. Favored drug side effect versus neuropathy    Labs including B12 vitamin D and iron. Physical therapy. Neurology referral.    In the meantime recommend cane or walker to prevent falls      ICD-10-CM ICD-9-CM    1. Dizzy  R42 780.4 REFERRAL TO NEUROLOGY      VITAMIN D, 25 HYDROXY      VITAMIN D, 25 HYDROXY   2. Paroxysmal atrial fibrillation (HCC)  I48.0 427.31    3. Balance problem  R26.89 781.99    4. Essential hypertension  I10 401.9 CBC WITH AUTOMATED DIFF      METABOLIC PANEL, COMPREHENSIVE      IRON PROFILE      FERRITIN      TSH 3RD GENERATION      VITAMIN D, 25 HYDROXY      CBC WITH AUTOMATED DIFF      METABOLIC PANEL, COMPREHENSIVE      IRON PROFILE      FERRITIN      TSH 3RD GENERATION      VITAMIN D, 25 HYDROXY   5. Vitamin D deficiency  E55.9 268.9 VITAMIN D, 25 HYDROXY      VITAMIN D, 25 HYDROXY   6. B12 deficiency  E53.8 266.2 VITAMIN B12 & FOLATE      VITAMIN B12 & FOLATE   7.  Iron deficiency  E61.1 280.9 IRON PROFILE FERRITIN      IRON PROFILE      FERRITIN       AVS given.  Pt expressed understanding of instructions

## 2021-07-27 LAB
25(OH)D3+25(OH)D2 SERPL-MCNC: 34.9 NG/ML (ref 30–100)
ALBUMIN SERPL-MCNC: 3.9 G/DL (ref 3.6–4.6)
ALBUMIN/GLOB SERPL: 1.6 {RATIO} (ref 1.2–2.2)
ALP SERPL-CCNC: 95 IU/L (ref 48–121)
ALT SERPL-CCNC: 10 IU/L (ref 0–44)
AST SERPL-CCNC: 20 IU/L (ref 0–40)
BASOPHILS # BLD AUTO: 0 X10E3/UL (ref 0–0.2)
BASOPHILS NFR BLD AUTO: 1 %
BILIRUB SERPL-MCNC: 0.5 MG/DL (ref 0–1.2)
BUN SERPL-MCNC: 16 MG/DL (ref 8–27)
BUN/CREAT SERPL: 18 (ref 10–24)
CALCIUM SERPL-MCNC: 8.8 MG/DL (ref 8.6–10.2)
CHLORIDE SERPL-SCNC: 103 MMOL/L (ref 96–106)
CO2 SERPL-SCNC: 24 MMOL/L (ref 20–29)
CREAT SERPL-MCNC: 0.91 MG/DL (ref 0.76–1.27)
EOSINOPHIL # BLD AUTO: 0.3 X10E3/UL (ref 0–0.4)
EOSINOPHIL NFR BLD AUTO: 4 %
ERYTHROCYTE [DISTWIDTH] IN BLOOD BY AUTOMATED COUNT: 13 % (ref 11.6–15.4)
FERRITIN SERPL-MCNC: 364 NG/ML (ref 30–400)
FOLATE SERPL-MCNC: >20 NG/ML
GLOBULIN SER CALC-MCNC: 2.4 G/DL (ref 1.5–4.5)
GLUCOSE SERPL-MCNC: 71 MG/DL (ref 65–99)
HCT VFR BLD AUTO: 40.9 % (ref 37.5–51)
HGB BLD-MCNC: 13.7 G/DL (ref 13–17.7)
IMM GRANULOCYTES # BLD AUTO: 0 X10E3/UL (ref 0–0.1)
IMM GRANULOCYTES NFR BLD AUTO: 0 %
IRON SATN MFR SERPL: 27 % (ref 15–55)
IRON SERPL-MCNC: 65 UG/DL (ref 38–169)
LYMPHOCYTES # BLD AUTO: 1.4 X10E3/UL (ref 0.7–3.1)
LYMPHOCYTES NFR BLD AUTO: 22 %
MCH RBC QN AUTO: 32.2 PG (ref 26.6–33)
MCHC RBC AUTO-ENTMCNC: 33.5 G/DL (ref 31.5–35.7)
MCV RBC AUTO: 96 FL (ref 79–97)
MONOCYTES # BLD AUTO: 0.6 X10E3/UL (ref 0.1–0.9)
MONOCYTES NFR BLD AUTO: 9 %
NEUTROPHILS # BLD AUTO: 4 X10E3/UL (ref 1.4–7)
NEUTROPHILS NFR BLD AUTO: 64 %
PLATELET # BLD AUTO: 175 X10E3/UL (ref 150–450)
POTASSIUM SERPL-SCNC: 3.6 MMOL/L (ref 3.5–5.2)
PROT SERPL-MCNC: 6.3 G/DL (ref 6–8.5)
RBC # BLD AUTO: 4.26 X10E6/UL (ref 4.14–5.8)
SODIUM SERPL-SCNC: 141 MMOL/L (ref 134–144)
TIBC SERPL-MCNC: 242 UG/DL (ref 250–450)
TSH SERPL DL<=0.005 MIU/L-ACNC: 1.8 UIU/ML (ref 0.45–4.5)
UIBC SERPL-MCNC: 177 UG/DL (ref 111–343)
VIT B12 SERPL-MCNC: 710 PG/ML (ref 232–1245)
WBC # BLD AUTO: 6.2 X10E3/UL (ref 3.4–10.8)

## 2021-08-02 ENCOUNTER — TELEPHONE (OUTPATIENT)
Dept: FAMILY MEDICINE CLINIC | Age: 81
End: 2021-08-02

## 2021-08-02 NOTE — TELEPHONE ENCOUNTER
Patient says he \"drilled something\" in his finger and wants to know if he needs to get a tetanus shot

## 2021-08-02 NOTE — TELEPHONE ENCOUNTER
Yes, he should probably get a Tdap shot. Last one was 2015.  For an injury should have it updated within 5 years    We can do that at our office

## 2021-08-03 ENCOUNTER — TELEPHONE (OUTPATIENT)
Dept: FAMILY MEDICINE CLINIC | Age: 81
End: 2021-08-03

## 2021-08-03 ENCOUNTER — CLINICAL SUPPORT (OUTPATIENT)
Dept: FAMILY MEDICINE CLINIC | Age: 81
End: 2021-08-03
Payer: MEDICARE

## 2021-08-03 DIAGNOSIS — Z23 ENCOUNTER FOR IMMUNIZATION: ICD-10-CM

## 2021-08-03 DIAGNOSIS — T14.8XXA PUNCTURE WOUND: Primary | ICD-10-CM

## 2021-08-03 PROCEDURE — 90471 IMMUNIZATION ADMIN: CPT | Performed by: FAMILY MEDICINE

## 2021-08-03 PROCEDURE — 90715 TDAP VACCINE 7 YRS/> IM: CPT | Performed by: FAMILY MEDICINE

## 2021-08-03 NOTE — PROGRESS NOTES
Immunization/s administered 8/3/2021 by Gianni Gomez LPN. Patient tolerated procedure well. No reactions noted.

## 2021-08-03 NOTE — TELEPHONE ENCOUNTER
Recv'd required info and application faxed to [phone] with Attn: Bhargavi Thomas and confirmation page recv'd.

## 2021-08-04 ENCOUNTER — OFFICE VISIT (OUTPATIENT)
Dept: CARDIOLOGY CLINIC | Age: 81
End: 2021-08-04
Payer: MEDICARE

## 2021-08-04 VITALS
OXYGEN SATURATION: 97 % | HEART RATE: 72 BPM | DIASTOLIC BLOOD PRESSURE: 90 MMHG | BODY MASS INDEX: 25.48 KG/M2 | SYSTOLIC BLOOD PRESSURE: 140 MMHG | WEIGHT: 182 LBS | RESPIRATION RATE: 12 BRPM | HEIGHT: 71 IN

## 2021-08-04 DIAGNOSIS — I25.10 CORONARY ARTERY DISEASE INVOLVING NATIVE CORONARY ARTERY OF NATIVE HEART WITHOUT ANGINA PECTORIS: Primary | ICD-10-CM

## 2021-08-04 DIAGNOSIS — I48.0 PAROXYSMAL ATRIAL FIBRILLATION (HCC): ICD-10-CM

## 2021-08-04 DIAGNOSIS — E78.2 MIXED HYPERLIPIDEMIA: ICD-10-CM

## 2021-08-04 DIAGNOSIS — Z95.810 AUTOMATIC IMPLANTABLE CARDIAC DEFIBRILLATOR IN SITU: ICD-10-CM

## 2021-08-04 DIAGNOSIS — I10 BENIGN ESSENTIAL HTN: ICD-10-CM

## 2021-08-04 PROCEDURE — G8419 CALC BMI OUT NRM PARAM NOF/U: HCPCS | Performed by: INTERNAL MEDICINE

## 2021-08-04 PROCEDURE — G8536 NO DOC ELDER MAL SCRN: HCPCS | Performed by: INTERNAL MEDICINE

## 2021-08-04 PROCEDURE — G8753 SYS BP > OR = 140: HCPCS | Performed by: INTERNAL MEDICINE

## 2021-08-04 PROCEDURE — G8510 SCR DEP NEG, NO PLAN REQD: HCPCS | Performed by: INTERNAL MEDICINE

## 2021-08-04 PROCEDURE — 1101F PT FALLS ASSESS-DOCD LE1/YR: CPT | Performed by: INTERNAL MEDICINE

## 2021-08-04 PROCEDURE — 99214 OFFICE O/P EST MOD 30 MIN: CPT | Performed by: INTERNAL MEDICINE

## 2021-08-04 PROCEDURE — G8755 DIAS BP > OR = 90: HCPCS | Performed by: INTERNAL MEDICINE

## 2021-08-04 PROCEDURE — G8427 DOCREV CUR MEDS BY ELIG CLIN: HCPCS | Performed by: INTERNAL MEDICINE

## 2021-08-04 NOTE — PROGRESS NOTES
[place]: Alex [place]  [phone]    HPI:   Mr. Chris Huggins is a 80 yo M with h/o CAD s/p PCI in 2003, patent stent with no significant CAD on 2008 cath, afib on metoprolol for rate control and coumadin for anticoagulation, apical variant of a hypertrophic cardiomyopathy with deep T wave inversions on ECG, sick sinus with NSVT noted on 11/14/13 holter with multiple > 3 sec pauses s/p [employer] ICD on 11/15/13 with Dr. Charles Simon, s/p right hip revision on 12/5/13. MARIBEL in 2012 was normal.  7/2011 nuclear stress test was normal.  5/2012 echo noted EF of 50-55% and apical hypertrophy. Followed by ortho for right hip Dr. Breanna Moser    Since his last visit, overall he has been doing well. He denies any exertional chest pain. His breathing has been stable. No significant palpitations. He has been compliant with his medications. He has been walking three miles every morning regularly. He is continuing to lose weight going from 191 to 182 pounds. As a hobby, he gets items from the [employer] and renovates them. He renovated a bench recently and nicked his finger. He is compensated on exam with clear lungs and trace lower extremity edema. Assessment and Plan:   1. Atrial fibrillation, sick sinus, status post ICD. Stable and compensated. Will have him follow back in six months. 2. Chronic anticoagulation. No bleeding issues on Coumadin. Eliquis was cost prohibitive. 3. Coronary artery disease. Stable on statin, beta blocker and ACE inhibitor. 4. Cardiomyopathy, apical variant. Stable on beta blocker and his echocardiogram earlier this year was unchanged. Consider repeat echocardiogram once every few years. 5. Mixed hyperlipidemia. Tolerating statin. 6. Essential hypertension. Blood pressure is controlled. 7. Erectile dysfunction. Cialis or Viagra prn. Talked in the past about avoiding concurrent Nitroglycerin.         He  has a past medical history of Arthritis, CAD (coronary artery disease), Chronic atrial fibrillation [facility]), ED (erectile dysfunction), Hypertension, Kidney stone on right side (10/30/2011), JAMEY (obstructive sleep apnea) (4/25/2012), Skin cancer, Sun-damaged skin, and TIA (transient ischemic attack). He also has no past medical history of Arsenic suspected exposure, Family history of skin cancer, Radiation exposure, Sunburn, blistering, or Tanning bed exposure. All other systems negative except as above. PE  Vitals:    08/04/21 1058   BP: (!) 140/90   Pulse: 72   Resp: 12   SpO2: 97%   Weight: 182 lb (82.6 kg)   Height: 5' 11\" (1.803 m)    Body mass index is 25.38 kg/m². General appearance - alert, well appearing, and in no distress  Mental status - affect appropriate to mood  Neck - supple, no JVD. No bruits present.    Chest - clear to auscultation, no wheezes, rales or rhonchi  Heart - Regular rate, regular rhythm, normal S1, S2, I/VI systolic murmur LUSB  Abdomen - soft, nontender, nondistended, no masses or organomegaly  Extremities - peripheral pulses normal, no pedal edema        Recent Labs:  Lab Results   Component Value Date/Time    Cholesterol, total 97 (L) 02/15/2021 11:36 AM    HDL Cholesterol 32 (L) 02/15/2021 11:36 AM    LDL, calculated 42 02/15/2021 11:36 AM    LDL, calculated 35 03/18/2020 09:30 AM    Triglyceride 127 02/15/2021 11:36 AM     Lab Results   Component Value Date/Time    Creatinine 0.91 07/26/2021 12:45 PM     Lab Results   Component Value Date/Time    BUN 16 07/26/2021 12:45 PM    BUN (POC) 16 12/04/2013 06:46 AM     Lab Results   Component Value Date/Time    Potassium 3.6 07/26/2021 12:45 PM     Lab Results   Component Value Date/Time    Hemoglobin A1c 5.3 11/19/2013 08:45 AM     Lab Results   Component Value Date/Time    HGB 13.7 07/26/2021 12:45 PM     Lab Results   Component Value Date/Time    PLATELET 570 50/94/2258 12:45 PM       Reviewed:  Past Medical History:   Diagnosis Date    Arthritis     knees    CAD (coronary artery disease)     stent x3 approx 2001    Chronic [address] fibrillation ([facility] [address]     ED (erectile dysfunction)     Hypertension     Kidney stone on right side 10/30/2011    JAMEY (obstructive sleep apnea) 4/25/2012    Skin cancer     BCC, s/p Mohs on L flank    Sun-damaged skin     TIA (transient ischemic attack)     6/12 - [facility]     Social History     Tobacco Use   Smoking Status Never Smoker   Smokeless Tobacco Never Used     Social History     Substance and Sexual Activity   Alcohol Use No    Alcohol/week: 0.0 standard drinks     Allergies   Allergen Reactions    Acetaminophen Other (comments)     Pt states not allergic.  Oxycodone Hcl Other (comments)    Pcn [Penicillins] Unknown (comments)     As a child    Percocet [Oxycodone-Acetaminophen] Swelling     Leg swelling       Current Outpatient Medications   Medication Sig    warfarin (COUMADIN) 2.5 mg tablet TAKE TWO TABLETS BY MOUTH DAILY (Patient taking differently: INR followed by Dr. Maty Larry)   [facility] tadalafiL (Cialis) 20 mg tablet Take 1 Tab by mouth as needed (ED).  amLODIPine (NORVASC) 5 mg tablet TAKE 1 TABLET EVERY DAY    metoprolol succinate (TOPROL-XL) 25 mg XL tablet TAKE 1 TABLET EVERY DAY    pravastatin (PRAVACHOL) 10 mg tablet TAKE 1 TABLET EVERY NIGHT    enalapril (VASOTEC) 20 mg tablet TAKE 1 TABLET TWICE DAILY    finasteride (PROSCAR) 5 mg tablet Take 1 Tab by mouth daily.  aspirin delayed-release 81 mg tablet Take  by mouth daily.  FOLIC ACID PO Take 029 mcg by mouth daily.  MULTIVITAMIN WITH MINERALS (MULTI-VIT 55 PLUS PO) Take 1 Tab by mouth daily. Taking multivitamins every morning.  apixaban (ELIQUIS) 2.5 mg tablet Take 1 Tablet by mouth two (2) times a day. (Patient not taking: Reported on 7/26/2021)     No current facility-administered medications for this visit.        Flor Fletcher MD  [facility] [place]  [phone] [facility] [address]  [address]

## 2021-08-04 NOTE — PROGRESS NOTES
Chief Complaint   Patient presents with    Follow-up     6 month follow up. Denies chest pain/shortness of breath/swelling/dizziness.      Visit Vitals  BP (!) 140/90 (BP 1 Location: Left upper arm, BP Patient Position: Sitting, BP Cuff Size: Adult)   Pulse 72   Resp 12   Ht 5' 11\" (1.803 m)   Wt 182 lb (82.6 kg)   SpO2 97%   BMI 25.38 kg/m²

## 2021-08-06 NOTE — TELEPHONE ENCOUNTER
I received a communication from the drug  on 8/5 that his application for Eliquis has been approved. There is no \"tracking number\" on this communication. I doubt he would have received the medication in the mail so quickly.     There is a phone number he can call for customer service 1 [phone] if he has any questions

## 2021-08-06 NOTE — TELEPHONE ENCOUNTER
Patient calling to speak to a nurse about med that was supposed to be sent to him today.  Please call patient at [phone]

## 2021-08-06 NOTE — TELEPHONE ENCOUNTER
Pt states he was supposed to receive his Eliquis medication today 08/06/2021 but hasn't gotten it yet, pt also requested a tracking number for the medication order.

## 2021-08-17 ENCOUNTER — OFFICE VISIT (OUTPATIENT)
Dept: FAMILY MEDICINE CLINIC | Age: 81
End: 2021-08-17
Payer: MEDICARE

## 2021-08-17 VITALS
DIASTOLIC BLOOD PRESSURE: 78 MMHG | WEIGHT: 181.6 LBS | TEMPERATURE: 97.1 F | HEIGHT: 71 IN | SYSTOLIC BLOOD PRESSURE: 126 MMHG | HEART RATE: 64 BPM | RESPIRATION RATE: 17 BRPM | BODY MASS INDEX: 25.42 KG/M2 | OXYGEN SATURATION: 97 %

## 2021-08-17 DIAGNOSIS — I25.10 CORONARY ARTERY DISEASE INVOLVING NATIVE CORONARY ARTERY OF NATIVE HEART WITHOUT ANGINA PECTORIS: ICD-10-CM

## 2021-08-17 DIAGNOSIS — Z00.00 ROUTINE GENERAL MEDICAL EXAMINATION AT A HEALTH CARE FACILITY: Primary | ICD-10-CM

## 2021-08-17 DIAGNOSIS — R42 DIZZY: ICD-10-CM

## 2021-08-17 DIAGNOSIS — I10 ESSENTIAL HYPERTENSION: ICD-10-CM

## 2021-08-17 DIAGNOSIS — G45.9 TIA (TRANSIENT ISCHEMIC ATTACK): ICD-10-CM

## 2021-08-17 DIAGNOSIS — I48.0 PAROXYSMAL ATRIAL FIBRILLATION (HCC): ICD-10-CM

## 2021-08-17 PROCEDURE — G8419 CALC BMI OUT NRM PARAM NOF/U: HCPCS | Performed by: FAMILY MEDICINE

## 2021-08-17 PROCEDURE — G8427 DOCREV CUR MEDS BY ELIG CLIN: HCPCS | Performed by: FAMILY MEDICINE

## 2021-08-17 PROCEDURE — G8536 NO DOC ELDER MAL SCRN: HCPCS | Performed by: FAMILY MEDICINE

## 2021-08-17 PROCEDURE — G8754 DIAS BP LESS 90: HCPCS | Performed by: FAMILY MEDICINE

## 2021-08-17 PROCEDURE — G8510 SCR DEP NEG, NO PLAN REQD: HCPCS | Performed by: FAMILY MEDICINE

## 2021-08-17 PROCEDURE — 1101F PT FALLS ASSESS-DOCD LE1/YR: CPT | Performed by: FAMILY MEDICINE

## 2021-08-17 PROCEDURE — G0439 PPPS, SUBSEQ VISIT: HCPCS | Performed by: FAMILY MEDICINE

## 2021-08-17 PROCEDURE — 99214 OFFICE O/P EST MOD 30 MIN: CPT | Performed by: FAMILY MEDICINE

## 2021-08-17 PROCEDURE — G8752 SYS BP LESS 140: HCPCS | Performed by: FAMILY MEDICINE

## 2021-08-17 RX ORDER — AMOXICILLIN 500 MG/1
500 CAPSULE ORAL 2 TIMES DAILY
COMMUNITY
Start: 2021-08-07 | End: 2021-08-19

## 2021-08-17 NOTE — PROGRESS NOTES
1. Have you been to the ER, urgent care clinic since your last visit? Hospitalized since your last visit? No    2. Have you seen or consulted any other health care providers outside of the [address] [place] [address] since your last visit? Include any pap smears or colon screening. Yes.   Dentist    Health Maintenance Due   Topic Date Due    Shingrix Vaccine Age 49> (1 of 2) Never done    Medicare Yearly Exam  03/19/2021

## 2021-08-31 ENCOUNTER — CLINICAL SUPPORT (OUTPATIENT)
Dept: CARDIOLOGY CLINIC | Age: 81
End: 2021-08-31
Payer: MEDICARE

## 2021-08-31 DIAGNOSIS — Z95.810 AUTOMATIC IMPLANTABLE CARDIAC DEFIBRILLATOR IN SITU: Primary | ICD-10-CM

## 2021-08-31 PROCEDURE — 93282 PRGRMG EVAL IMPLANTABLE DFB: CPT | Performed by: INTERNAL MEDICINE

## 2021-09-20 NOTE — TELEPHONE ENCOUNTER
Requested Prescriptions     Pending Prescriptions Disp Refills    metoprolol succinate (TOPROL-XL) 25 mg XL tablet 90 Tablet 3     Sig: TAKE 1 TABLET EVERY DAY    tadalafiL (Cialis) 20 mg tablet 30 Tablet 11     Sig: Take 1 Tablet by mouth as needed (ED).

## 2021-09-21 RX ORDER — TADALAFIL 20 MG/1
20 TABLET ORAL AS NEEDED
Qty: 30 TABLET | Refills: 11 | Status: SHIPPED
Start: 2021-09-21 | End: 2022-07-08 | Stop reason: DRUGHIGH

## 2021-09-21 RX ORDER — METOPROLOL SUCCINATE 25 MG/1
TABLET, EXTENDED RELEASE ORAL
Qty: 90 TABLET | Refills: 3 | Status: SHIPPED | OUTPATIENT
Start: 2021-09-21 | End: 2022-01-31 | Stop reason: SDUPTHER

## 2021-10-18 DIAGNOSIS — I48.0 PAROXYSMAL ATRIAL FIBRILLATION (HCC): ICD-10-CM

## 2021-10-25 DIAGNOSIS — I48.0 PAROXYSMAL ATRIAL FIBRILLATION (HCC): ICD-10-CM

## 2021-10-25 RX ORDER — PRAVASTATIN SODIUM 10 MG/1
TABLET ORAL
Qty: 90 TABLET | Refills: 3 | Status: SHIPPED | OUTPATIENT
Start: 2021-10-25 | End: 2022-04-29 | Stop reason: SDUPTHER

## 2021-10-25 NOTE — TELEPHONE ENCOUNTER
Pt calling request refills; Pt's pharmacy is correct in chart       Requested Prescriptions     Pending Prescriptions Disp Refills    pravastatin (PRAVACHOL) 10 mg tablet 90 Tablet 3     Sig: TAKE 1 TABLET EVERY NIGHT    apixaban (ELIQUIS) 2.5 mg tablet 180 Tablet 3     Sig: Take 1 Tablet by mouth two (2) times a day.

## 2021-10-28 ENCOUNTER — TELEPHONE (OUTPATIENT)
Dept: FAMILY MEDICINE CLINIC | Age: 81
End: 2021-10-28

## 2021-10-28 NOTE — TELEPHONE ENCOUNTER
----- Message from Tom Parker sent at 10/28/2021  7:26 AM EDT -----  Subject: Message to Provider    QUESTIONS  Information for Provider? Pt is getting 4 teeth pulled tomorrow 10/29,   he'd like to know if he needs medication for this? if so please call him   with more information.  ---------------------------------------------------------------------------  --------------  CALL BACK INFO  What is the best way for the office to contact you? OK to leave message on   voicemail  Preferred Call Back Phone Number? [phone]  ---------------------------------------------------------------------------  --------------  SCRIPT ANSWERS  Relationship to Patient?  Self

## 2021-10-29 ENCOUNTER — NURSE TRIAGE (OUTPATIENT)
Dept: OTHER | Facility: CLINIC | Age: 81
End: 2021-10-29

## 2021-10-29 NOTE — TELEPHONE ENCOUNTER
.Received call from Padmini at [facility] with Red Flag Complaint. Brief description of triage: Episode of dizziness today at about 1015 am. Dizziness is gone now. Triage indicates for patient to be seen in the office today. Triage RN advised patient that if he cannot be seen in the office today to go to an [facility] [address] Care advice provided, patient verbalizes understanding; denies any other questions or concerns; instructed to call back for any new or worsening symptoms. Writer provided warm transfer to [place] at [facility] for appointment scheduling. Attention Provider: Thank you for allowing me to participate in the care of your patient. The patient was connected to triage in response to information provided to the [facility]. Please do not respond through this encounter as the response is not directed to a shared pool. Reason for Disposition   Patient wants to be seen    Answer Assessment - Initial Assessment Questions  1. SYMPTOM: \"What is the main symptom you are concerned about? \" (e.g., weakness, numbness)  Dizzy this morning    2. ONSET: \"When did this start? \" (minutes, hours, days; while sleeping)   30 minutes ago, usually walks dog, could not due to dizziness. 3. LAST NORMAL: \"When was the last time you were normal (no symptoms)? \"  30 minutes ago    4. PATTERN \"Does this come and go, or has it been constant since it started? \"  \"Is it present now? \"      Goes away when sitting down, no dizziness at present    5. CARDIAC SYMPTOMS: \"Have you had any of the following symptoms: chest pain, difficulty breathing, palpitations? \"      Denies    6. NEUROLOGIC SYMPTOMS: \"Have you had any of the following symptoms: headache, dizziness, vision loss, double vision, changes in speech, unsteady on your feet? \"      \"off balance\"  Denies numbness, tingling, weakness, blurred vision, double vision  Speech is clear    7. OTHER SYMPTOMS: \"Do you have any other symptoms? \"  Denies    8.  PREGNANCY: \"Is there any chance you are pregnant? \" \"When was your last menstrual period? \"      N/a    Protocols used: NEUROLOGIC DEFICIT-ADULT-[place]

## 2021-11-03 ENCOUNTER — TELEPHONE (OUTPATIENT)
Dept: FAMILY MEDICINE CLINIC | Age: 81
End: 2021-11-03

## 2021-11-03 NOTE — TELEPHONE ENCOUNTER
Pt states he is scheduled for dental surgery tomorrow and they told him to stop taking the pravastatin and he wanted to be that wasn't a blood pressure medication. Pt notified that it wasn't a BP medication it was cholesterol.

## 2021-11-03 NOTE — TELEPHONE ENCOUNTER
Pt is calling needing to speak with nurse in ref to his med he needs to stop taking he wants to know about two other med that he is on if he needs to stop taking them .  States he has to talk with you today procedure tomorrow

## 2021-11-03 NOTE — TELEPHONE ENCOUNTER
verified. Pt stated that the takes Eliquis only and not warfarin. Pt did state that he had warfarin in the home and informed him per Dr. Dewey Braswell to discard the warfarin. Pt states that he will. Pt is also notified that he is not to take Eliquis 24 hrs prior to his dental surgery. Understanding vocalized. Medical release and letter faxed to [facility] to [phone] and confirmation page recv'd.

## 2021-11-04 RX ORDER — ENALAPRIL MALEATE 20 MG/1
TABLET ORAL
Qty: 180 TABLET | Refills: 1 | Status: SHIPPED | OUTPATIENT
Start: 2021-11-04 | End: 2022-01-31 | Stop reason: SDUPTHER

## 2021-11-17 ENCOUNTER — TELEPHONE (OUTPATIENT)
Dept: FAMILY MEDICINE CLINIC | Age: 81
End: 2021-11-17

## 2021-11-24 NOTE — TELEPHONE ENCOUNTER
Pt stated he had a call from Rocco Mari today, I let patient know he was not in today but he said he had a missed call and may be about paper work from last week, please give a call back

## 2021-12-01 ENCOUNTER — OFFICE VISIT (OUTPATIENT)
Dept: FAMILY MEDICINE CLINIC | Age: 81
End: 2021-12-01
Payer: MEDICARE

## 2021-12-01 ENCOUNTER — NURSE TRIAGE (OUTPATIENT)
Dept: OTHER | Facility: CLINIC | Age: 81
End: 2021-12-01

## 2021-12-01 VITALS
HEART RATE: 65 BPM | DIASTOLIC BLOOD PRESSURE: 77 MMHG | BODY MASS INDEX: 25.48 KG/M2 | SYSTOLIC BLOOD PRESSURE: 129 MMHG | OXYGEN SATURATION: 98 % | HEIGHT: 71 IN | RESPIRATION RATE: 16 BRPM | WEIGHT: 182 LBS | TEMPERATURE: 96.9 F

## 2021-12-01 DIAGNOSIS — I48.0 PAROXYSMAL ATRIAL FIBRILLATION (HCC): ICD-10-CM

## 2021-12-01 DIAGNOSIS — R35.1 NOCTURIA: Primary | ICD-10-CM

## 2021-12-01 PROCEDURE — G8536 NO DOC ELDER MAL SCRN: HCPCS | Performed by: FAMILY MEDICINE

## 2021-12-01 PROCEDURE — 1101F PT FALLS ASSESS-DOCD LE1/YR: CPT | Performed by: FAMILY MEDICINE

## 2021-12-01 PROCEDURE — 99214 OFFICE O/P EST MOD 30 MIN: CPT | Performed by: FAMILY MEDICINE

## 2021-12-01 PROCEDURE — G8510 SCR DEP NEG, NO PLAN REQD: HCPCS | Performed by: FAMILY MEDICINE

## 2021-12-01 PROCEDURE — G8752 SYS BP LESS 140: HCPCS | Performed by: FAMILY MEDICINE

## 2021-12-01 PROCEDURE — G8754 DIAS BP LESS 90: HCPCS | Performed by: FAMILY MEDICINE

## 2021-12-01 PROCEDURE — G8419 CALC BMI OUT NRM PARAM NOF/U: HCPCS | Performed by: FAMILY MEDICINE

## 2021-12-01 PROCEDURE — G8427 DOCREV CUR MEDS BY ELIG CLIN: HCPCS | Performed by: FAMILY MEDICINE

## 2021-12-01 RX ORDER — FINASTERIDE 5 MG/1
5 TABLET, FILM COATED ORAL DAILY
Qty: 90 TABLET | Refills: 3 | Status: SHIPPED | OUTPATIENT
Start: 2021-12-01 | End: 2022-02-23 | Stop reason: ALTCHOICE

## 2021-12-01 RX ORDER — CLINDAMYCIN HYDROCHLORIDE 150 MG/1
CAPSULE ORAL
COMMUNITY
Start: 2021-11-04 | End: 2021-11-11

## 2021-12-01 NOTE — PROGRESS NOTES
IMAN Jarquin is a 79 y.o. male who presents with nocturia and to follow-up on Eliquis. He is getting Eliquis through patient assistance and requires some help filling out the forms. Regarding the nocturia he drinks several cups of water right before bedtime and gets up 5 times at night to urinate. He has no urinary hesitancy. He was taking Flomax in the past but was getting dizzy and so this medicine was stopped. This fixed the dizziness and he did not report much difference in his urinary pattern. I was under the impression he was taking Proscar over this time but after reviewing some of the patient assistance paperwork -which includes a receipt from his pharmacy of what medications he is actually picking up- Proscar is not something he is taken in the last year    PMHx:  Past Medical History:   Diagnosis Date    Arthritis     knees    CAD (coronary artery disease)     stent x3 approx 2001    Chronic atrial fibrillation ([facility] [address]     ED (erectile dysfunction)     Hypertension     Kidney stone on right side 10/30/2011    JAMEY (obstructive sleep apnea) 4/25/2012    Skin cancer     BCC, s/p Mohs on L flank    Sun-damaged skin     TIA (transient ischemic attack)     6/12 - seen at [facility]       Meds:   Current Outpatient Medications   Medication Sig Dispense Refill    finasteride (Proscar) 5 mg tablet Take 1 Tablet by mouth daily. 90 Tablet 3    enalapril (VASOTEC) 20 mg tablet TAKE ONE TABLET BY MOUTH TWICE A  Tablet 1    pravastatin (PRAVACHOL) 10 mg tablet TAKE 1 TABLET EVERY NIGHT 90 Tablet 3    apixaban (ELIQUIS) 2.5 mg tablet Take 1 Tablet by mouth two (2) times a day. 180 Tablet 3    metoprolol succinate (TOPROL-XL) 25 mg XL tablet TAKE 1 TABLET EVERY DAY 90 Tablet 3    tadalafiL (Cialis) 20 mg tablet Take 1 Tablet by mouth as needed (ED).  30 Tablet 11    amLODIPine (NORVASC) 5 mg tablet TAKE 1 TABLET EVERY DAY 90 Tab 3    aspirin delayed-release 81 mg tablet Take  by mouth daily.      FOLIC ACID PO Take 033 mcg by mouth daily.  MULTIVITAMIN WITH MINERALS (MULTI-VIT 55 PLUS PO) Take 1 Tab by mouth daily. Taking multivitamins every morning. Allergies: Allergies   Allergen Reactions    Acetaminophen Other (comments)     Pt states not allergic.  Oxycodone Hcl Other (comments)    Pcn [Penicillins] Unknown (comments)     As a child    Percocet [Oxycodone-Acetaminophen] Swelling     Leg swelling       Smoker:  Social History     Tobacco Use   Smoking Status Never Smoker   Smokeless Tobacco Never Used       ETOH:   Social History     Substance and Sexual Activity   Alcohol Use No    Alcohol/week: 0.0 standard drinks       FH:   Family History   Problem Relation Age of Onset    Stroke Father     Heart Attack Father     Heart Disease Father     Cancer Father 80        colon cancer    Heart Disease Mother    [facility] Arthritis-osteo Sister         s/p bilateral knee replacements    No Known Problems Daughter     Anesth Problems Neg Hx        ROS:   As listed in HPI. In addition:  Constitutional:   No headache, fever, fatigue, weight loss or weight gain      Cardiac:    No chest pain      Resp:   No cough or shortness of breath      Neuro   No loss of consciousness, dizziness, seizures      Physical Exam:  Blood pressure 129/77, pulse 65, temperature 96.9 °F (36.1 °C), temperature source Temporal, resp. rate 16, height 5' 11\" (1.803 m), weight 182 lb (82.6 kg), SpO2 98 %. GEN: No apparent distress. Alert and oriented and responds to all questions appropriately. NEUROLOGIC:  No focal neurologic deficits. Strength and sensation grossly intact. Coordination and gait grossly intact. EXT: Well perfused. No edema. SKIN: No obvious rashes.          Assessment and Plan     Nocturia  Multifactorial.  Likely prostate and excessive fluid intake right before bedtime  Please cut back on the fluid intake  Restart Proscar and see if that helps  Tamsulosin gave him dizziness and balance problems. Patient assistance for Eliquis  Filled out the doctor portion of his paperwork last week. Helped him fill out the personal and income portion. Return the original copy to him and we will fax off his portion      ICD-10-CM ICD-9-CM    1. Nocturia  R35.1 788.43    2. Paroxysmal atrial fibrillation (HCC)  I48.0 427.31        AVS given.  Pt expressed understanding of instructions

## 2021-12-01 NOTE — TELEPHONE ENCOUNTER
Reason for Disposition   Can't control passage of urine (i.e., urinary incontinence, wetting self) and present > 2 weeks   Patient wants to be seen    Answer Assessment - Initial Assessment Questions  1. SYMPTOM: \"What's the main symptom you're concerned about? \" (e.g., frequency, incontinence)      Urinary frequency and urinary incontinence (often at night 5-6x)     2. ONSET: \"When did the  Urinary problems  start? \"      A couple months ago    3. PAIN: \"Is there any pain? \" If so, ask: \"How bad is it? \" (Scale: 1-10; mild, moderate, severe)      No     4. CAUSE: \"What do you think is causing the symptoms? \"      Prostate? 5. OTHER SYMPTOMS: \"Do you have any other symptoms? \" (e.g., fever, flank pain, blood in urine, pain with urination)      No     6. PREGNANCY: \"Is there any chance you are pregnant? \" \"When was your last menstrual period? \"      N/a    Protocols used: URINARY SYMPTOMS-ADULT-[place]    Received call from [address] at [facility] with Red Flag Complaint. Brief description of triage: urinary frequency and incontinence, mainly at night and happening on and off for a couple months. Denies fever or pain. Needs assistance filling out paper work for [employer]. Triage indicates for patient to be seen today or tomorrow. Care advice provided, patient verbalizes understanding; denies any other questions or concerns; instructed to call back for any new or worsening symptoms. Writer provided warm transfer to Prince ricks at [facility] for appointment scheduling. Attention Provider: Thank you for allowing me to participate in the care of your patient. The patient was connected to triage in response to information provided to the [facility]. Please do not respond through this encounter as the response is not directed to a shared pool.

## 2021-12-01 NOTE — PROGRESS NOTES
1. Have you been to the ER, urgent care clinic since your last visit? Hospitalized since your last visit? No    2. Have you seen or consulted any other health care providers outside of the [address] [place] [address] since your last visit? Include any pap smears or colon screening.  No     Health Maintenance Due   Topic Date Due    Shingrix Vaccine Age 49> (1 of 2) Never done    Flu Vaccine (1) 09/01/2021    COVID-19 Vaccine (3 - Booster for Pfizer series) 09/23/2021

## 2021-12-03 ENCOUNTER — DOCUMENTATION ONLY (OUTPATIENT)
Dept: FAMILY MEDICINE CLINIC | Age: 81
End: 2021-12-03

## 2021-12-03 NOTE — PROGRESS NOTES
Application case #:  [id number]    Application, insurance, and wage statement faxed to [phone] and confirmation page recv'd.

## 2022-01-13 ENCOUNTER — OFFICE VISIT (OUTPATIENT)
Dept: FAMILY MEDICINE CLINIC | Age: 82
End: 2022-01-13
Payer: MEDICARE

## 2022-01-13 VITALS
HEIGHT: 71 IN | DIASTOLIC BLOOD PRESSURE: 87 MMHG | BODY MASS INDEX: 25.48 KG/M2 | OXYGEN SATURATION: 98 % | WEIGHT: 182 LBS | HEART RATE: 75 BPM | RESPIRATION RATE: 18 BRPM | TEMPERATURE: 98 F | SYSTOLIC BLOOD PRESSURE: 140 MMHG

## 2022-01-13 DIAGNOSIS — Z96.651 S/P TOTAL KNEE REPLACEMENT, RIGHT: ICD-10-CM

## 2022-01-13 DIAGNOSIS — Z96.641 S/P HIP REPLACEMENT, RIGHT: ICD-10-CM

## 2022-01-13 DIAGNOSIS — M25.551 RIGHT HIP PAIN: Primary | ICD-10-CM

## 2022-01-13 PROCEDURE — 1101F PT FALLS ASSESS-DOCD LE1/YR: CPT | Performed by: NURSE PRACTITIONER

## 2022-01-13 PROCEDURE — G8427 DOCREV CUR MEDS BY ELIG CLIN: HCPCS | Performed by: NURSE PRACTITIONER

## 2022-01-13 PROCEDURE — 99213 OFFICE O/P EST LOW 20 MIN: CPT | Performed by: NURSE PRACTITIONER

## 2022-01-13 PROCEDURE — G8419 CALC BMI OUT NRM PARAM NOF/U: HCPCS | Performed by: NURSE PRACTITIONER

## 2022-01-13 PROCEDURE — G8754 DIAS BP LESS 90: HCPCS | Performed by: NURSE PRACTITIONER

## 2022-01-13 PROCEDURE — G8432 DEP SCR NOT DOC, RNG: HCPCS | Performed by: NURSE PRACTITIONER

## 2022-01-13 PROCEDURE — G8753 SYS BP > OR = 140: HCPCS | Performed by: NURSE PRACTITIONER

## 2022-01-13 PROCEDURE — G8536 NO DOC ELDER MAL SCRN: HCPCS | Performed by: NURSE PRACTITIONER

## 2022-01-13 RX ORDER — METHYLPREDNISOLONE 4 MG/1
TABLET ORAL
Qty: 1 DOSE PACK | Refills: 0 | Status: SHIPPED | OUTPATIENT
Start: 2022-01-13 | End: 2022-01-20 | Stop reason: SDUPTHER

## 2022-01-13 NOTE — PROGRESS NOTES
Identified pt with two pt identifiers(name and ). Reviewed record in preparation for visit and have obtained necessary documentation. Chief Complaint   Patient presents with    Hip Pain    Hypertension        Pt reports pain in right hip. Vitals:    22 1257   BP: (!) 140/87   Pulse: 75   Resp: 18   Temp: 98 °F (36.7 °C)   TempSrc: Oral   SpO2: 98%   Weight: 182 lb (82.6 kg)   Height: 5' 11\" (1.803 m)   PainSc:   7   PainLoc: Hip       Health Maintenance Due   Topic    Shingrix Vaccine Age 50> (1 of 2)    Flu Vaccine (1)       Coordination of Care Questionnaire:  :   1) Have you been to an emergency room, urgent care, or hospitalized since your last visit? If yes, where when, and reason for visit? no       2. Have seen or consulted any other health care provider since your last visit? If yes, where when, and reason for visit? NO      Patient is accompanied by self I have received verbal consent from Randy Mojica to discuss any/all medical information while they are present in the room.

## 2022-01-13 NOTE — PROGRESS NOTES
Subjective:     Chief Complaint   Patient presents with    Hip Pain    Hypertension        HPI:  Josue Lombard is a 79 y.o. male here for right hip pain that started Sunday but also says that his right knee has bothering him for about two weeks. Has history of right hip and right knee replacement. Pain does not really radiate. Describes as aching and sometimes sharp, no injury recalled, no new activities   Usually very active  (walks three miles per day and lifts weights every other day) but unable to exercise since this is bothering him  Has been taking tylenol, ice/heat. Bending over and walking make the pain worse. Pain is located in his lower right buttocks/hip area. No swelling noted in right leg per patient. No fever per patient. No numbness. Walking with a limp. No pain in his back at all. No hospital, ER or specialist visits since last primary care visit except as noted above. Past Medical History:   Diagnosis Date    Arthritis     knees    CAD (coronary artery disease)     stent x3 approx 2001    Chronic atrial fibrillation ([facility] [address]     ED (erectile dysfunction)     Hypertension     Kidney stone on right side 10/30/2011    JAMEY (obstructive sleep apnea) 4/25/2012    Skin cancer     BCC, s/p Mohs on L flank    Sun-damaged skin     TIA (transient ischemic attack)     6/12 - seen at [facility]       Social History     Tobacco Use    Smoking status: Never Smoker    Smokeless tobacco: Never Used   Vaping Use    Vaping Use: Never used   Substance Use Topics    Alcohol use: No     Alcohol/week: 0.0 standard drinks    Drug use: No       Outpatient Medications Marked as Taking for the 1/13/22 encounter (Office Visit) with Nohelia Montes De Oca NP   Medication Sig Dispense Refill    finasteride (Proscar) 5 mg tablet Take 1 Tablet by mouth daily.  90 Tablet 3    enalapril (VASOTEC) 20 mg tablet TAKE ONE TABLET BY MOUTH TWICE A  Tablet 1    pravastatin (PRAVACHOL) 10 mg tablet TAKE 1 TABLET EVERY NIGHT 90 Tablet 3    apixaban (ELIQUIS) 2.5 mg tablet Take 1 Tablet by mouth two (2) times a day. 180 Tablet 3    metoprolol succinate (TOPROL-XL) 25 mg XL tablet TAKE 1 TABLET EVERY DAY 90 Tablet 3    tadalafiL (Cialis) 20 mg tablet Take 1 Tablet by mouth as needed (ED). 30 Tablet 11    amLODIPine (NORVASC) 5 mg tablet TAKE 1 TABLET EVERY DAY 90 Tab 3    aspirin delayed-release 81 mg tablet Take  by mouth daily.  FOLIC ACID PO Take 824 mcg by mouth daily.  MULTIVITAMIN WITH MINERALS (MULTI-VIT 55 PLUS PO) Take 1 Tab by mouth daily. Taking multivitamins every morning. Allergies   Allergen Reactions    Acetaminophen Other (comments)     Pt states not allergic.  Oxycodone Hcl Other (comments)    Pcn [Penicillins] Unknown (comments)     As a child    Percocet [Oxycodone-Acetaminophen] Swelling     Leg swelling       Health Maintenance reviewed      ROS:  Gen: no fatigue, no fever, no chills, no unexplained weight loss or weight gain  Eyes: no excessive tearing, itching, or discharge  Nose: no rhinorrhea, no sinus pain  Mouth: no oral lesions, no sore throat, no difficulty swallowing  Resp: no shortness of breath, no wheezing, no cough  CV: no chest pain, no orthopnea, no paroxysmal nocturnal dyspnea, no lower extremity edema, no palpitations  Abd: no nausea, no heartburn, no diarrhea, no constipation, no abdominal pain  Neuro: no headaches, no syncope or presyncopal episodes  Endo: no polyuria, no polydipsia.     : no hematuria, no dysuria, no frequency, no incontinence  Heme: no lymphadenopathy, no easy bruising or bleeding, no night sweats      PE:  Visit Vitals  BP (!) 140/87 (BP 1 Location: Left arm, BP Patient Position: Sitting, BP Cuff Size: Adult)   Pulse 75   Temp 98 °F (36.7 °C) (Oral)   Resp 18   Ht 5' 11\" (1.803 m)   Wt 182 lb (82.6 kg)   SpO2 98%   BMI 25.38 kg/m²     Gen: alert, oriented, no acute distress  Head: normocephalic, atraumatic  Neck: symmetric normal sized thyroid, no carotid bruits, no jugular vein distention  Resp: no increase work of breathing, lungs clear to ausculation bilaterally, no wheezing, rales or rhonchi  CV: S1, S2 normal.  No murmurs, rubs, or gallops. Abd: soft, not tender, not distended. No hepatosplenomegaly. Normal bowel sounds. No hernias. No abdominal or renal bruits. Neuro: cranial nerves intact, normal strength and movement in all extremities, and sensation intact and symmetric. Skin: no lesion or rash  Extremities: no cyanosis or edema. +pulses    Right hip:  Walking a little limp with right foot externally rotated. Has full ROM of right knee and hip. Pain with lifting leg, bending over and rising from seated position. No tenderness over bony prominence but has some point tenderness in glute muscles (particularly glute medial area)    No results found for this visit on 01/13/22. Assessment/Plan:  Differential diagnosis and treatment options reviewed with patient who is in agreement with treatment plan as outlined below. ICD-10-CM ICD-9-CM    1. Right hip pain  M25.551 719.45 methylPREDNISolone (MEDROL DOSEPACK) 4 mg tablet      REFERRAL TO ORTHOPEDICS   2. S/P hip replacement, right  Z96.641 V43.64 REFERRAL TO ORTHOPEDICS   3. S/P total knee replacement, right  Z96.651 V43.65 REFERRAL TO ORTHOPEDICS     Refer to ortho since patient has replacements on that side. Earliest appointment not until 2/1/22. Gave him information on ortho walk in clinic, which he says he will likely go to. For now, treat inflammation and pain with steroid pack. Continue cool compresses. Gentle stretches in AVS     I have discussed the diagnosis with the patient and the intended plan as seen in the above orders. The patient has received an after-visit summary and questions were answered concerning future plans. I have discussed medication side effects and warnings with the patient as well.   The patient verbalizes understanding and agreement with the plan.

## 2022-01-13 NOTE — PATIENT INSTRUCTIONS
[facility]    Address: [address] [facility] [address], [address]  Hours:   Thursday 8AM-8PM  Friday 8AM-8PM  Saturday 9AM-5PM  Sunday 9AM-5PM  Monday  ([place] Day)  8AM-8PM  Holiday hours  Tuesday 8AM-8PM  Wednesday 8AM-8PM    Phone: [phone]         Hip Bursitis: Exercises  Introduction  Here are some examples of exercises for you to try. The exercises may be suggested for a condition or for rehabilitation. Start each exercise slowly. Ease off the exercises if you start to have pain. You will be told when to start these exercises and which ones will work best for you. How to do the exercises  Hip rotator stretch    1. Lie on your back with both knees bent and your feet flat on the floor. 2. Put the ankle of your affected leg on your opposite thigh near your knee. 3. Use your hand to gently push your knee away from your body until you feel a gentle stretch around your hip. 4. Hold the stretch for 15 to 30 seconds. 5. Repeat 2 to 4 times. 6. Repeat steps 1 through 5, but this time use your hand to gently pull your knee toward your opposite shoulder. Iliotibial band stretch    1. Lean sideways against a wall. If you are not steady on your feet, hold on to a chair or counter. 2. Stand on the leg with the affected hip, with that leg close to the wall. Then cross your other leg in front of it. 3. Let your affected hip drop out to the side of your body and against wall. Then lean away from your affected hip until you feel a stretch. 4. Hold the stretch for 15 to 30 seconds. 5. Repeat 2 to 4 times. Straight-leg raises to the outside    1. Lie on your side, with your affected hip on top. 2. Tighten the front thigh muscles of your top leg to keep your knee straight. 3. Keep your hip and your leg straight in line with the rest of your body, and keep your knee pointing forward. Do not drop your hip back.   4. Lift your top leg straight up toward the ceiling, about 12 inches off the floor. Hold for about 6 seconds, then slowly lower your leg. 5. Repeat 8 to 12 times. Clamshell    1. Lie on your side, with your affected hip on top and your head propped on a pillow. Keep your feet and knees together and your knees bent. 2. Raise your top knee, but keep your feet together. Do not let your hips roll back. Your legs should open up like a clamshell. 3. Hold for 6 seconds. 4. Slowly lower your knee back down. Rest for 10 seconds. 5. Repeat 8 to 12 times. Follow-up care is a key part of your treatment and safety. Be sure to make and go to all appointments, and call your doctor if you are having problems. It's also a good idea to know your test results and keep a list of the medicines you take. Where can you learn more? Go to http://www.ojnes.com/  Enter H674 in the search box to learn more about \"Hip Bursitis: Exercises. \"  Current as of: July 1, 2021               Content Version: 13.0  © 2006-2021 [employer]. Care instructions adapted under license by [employer] (which disclaims liability or warranty for this information). If you have questions about a medical condition or this instruction, always ask your healthcare professional. [address] any warranty or liability for your use of this information.

## 2022-01-20 ENCOUNTER — TELEPHONE (OUTPATIENT)
Dept: FAMILY MEDICINE CLINIC | Age: 82
End: 2022-01-20

## 2022-01-20 DIAGNOSIS — M25.551 RIGHT HIP PAIN: ICD-10-CM

## 2022-01-20 RX ORDER — METHYLPREDNISOLONE 4 MG/1
TABLET ORAL
Qty: 1 DOSE PACK | Refills: 0 | Status: SHIPPED | OUTPATIENT
Start: 2022-01-20 | End: 2022-02-23 | Stop reason: ALTCHOICE

## 2022-01-20 NOTE — TELEPHONE ENCOUNTER
Patient is calling about R hip pain   that he was seen for on 1/13. [facility] and was given 2 cortisone injections. He states the pain   has worsen since the injections and he would like to know what he should   do now? Please call patient and advise.

## 2022-01-20 NOTE — TELEPHONE ENCOUNTER
Seen for hip pain on 1/13 and went directly to Ortho on-call. Had an x-ray which showed no fractures and had what sounds like 2 trigger point injections in the buttock. Felt immediately better and that lasted for about a day. Then the hip pain returned. Having trouble getting up from the seat. Having trouble getting moving. Once he finds a position of comfort in bed he can sleep just fine. Tylenol is helpful for the nighttime symptoms. He was prescribed a Medrol Dosepak on 1/13 but he did not pick it up because he was told by the orthopedist that if the injections worked that he would not need a pill. NSAIDs are best avoided given cardiac history and anticoagulation  We discussed muscle relaxer like Flexeril but he does not currently have a problem with sleep  He has a allergy to Percocet    I think that the Medrol Dosepak is the best option.   I resent this prescription  Emphasized the importance of stretches exercises and heating pad

## 2022-01-20 NOTE — TELEPHONE ENCOUNTER
Pt states that he is in a lot of pain and states that he would need some pain medication sent over for him; the pain is in his hip; please call pt back       Thank You

## 2022-01-27 ENCOUNTER — TELEPHONE (OUTPATIENT)
Dept: FAMILY MEDICINE CLINIC | Age: 82
End: 2022-01-27

## 2022-01-27 NOTE — TELEPHONE ENCOUNTER
Pt thinks he pulled a muscle in his right leg; pt states that he is in pain; please call pt back     Thank You

## 2022-01-31 ENCOUNTER — TELEPHONE (OUTPATIENT)
Dept: FAMILY MEDICINE CLINIC | Age: 82
End: 2022-01-31

## 2022-01-31 DIAGNOSIS — I48.0 PAROXYSMAL ATRIAL FIBRILLATION (HCC): ICD-10-CM

## 2022-01-31 RX ORDER — METOPROLOL SUCCINATE 25 MG/1
TABLET, EXTENDED RELEASE ORAL
Qty: 90 TABLET | Refills: 3 | Status: SHIPPED | OUTPATIENT
Start: 2022-01-31 | End: 2022-02-23 | Stop reason: SDUPTHER

## 2022-01-31 RX ORDER — ENALAPRIL MALEATE 20 MG/1
TABLET ORAL
Qty: 180 TABLET | Refills: 1 | Status: SHIPPED | OUTPATIENT
Start: 2022-01-31 | End: 2022-03-22 | Stop reason: SDUPTHER

## 2022-01-31 NOTE — TELEPHONE ENCOUNTER
First I am hearing that he is getting surgery. It is up to his surgeon whether he needs a preop visit     I see that he has a routine appointment scheduled for Friday 2/4 with Ortho.   Is possibly is confused about what will be happening at that appointment

## 2022-01-31 NOTE — TELEPHONE ENCOUNTER
Pt states that he is having severe back pain and is set for an appt with a doctor on Friday and isn't for sure if he is going to get a shot in his back; please call pt back       Thank You

## 2022-01-31 NOTE — TELEPHONE ENCOUNTER
Refill Request (patient calling)     Requested Prescriptions     Pending Prescriptions Disp Refills    metoprolol succinate (TOPROL-XL) 25 mg XL tablet 90 Tablet 3     Sig: TAKE 1 TABLET EVERY DAY    enalapril (VASOTEC) 20 mg tablet 180 Tablet 1     Sig: TAKE ONE TABLET BY MOUTH TWICE A DAY     Thank You

## 2022-01-31 NOTE — TELEPHONE ENCOUNTER
----- Message from Melvia Kehr sent at 1/31/2022  7:12 AM EST -----  Subject: Message to Provider    QUESTIONS  Information for Provider? pt has back surgery on friday, and wants to know   if he needs a pre opt needed before this surgry and he is still in a lot   of pain. what does he need to do now.   ---------------------------------------------------------------------------  --------------  CALL BACK INFO  What is the best way for the office to contact you? OK to leave message on   voicemail  Preferred Call Back Phone Number? [phone]  ---------------------------------------------------------------------------  --------------  SCRIPT ANSWERS  Relationship to Patient?  Self

## 2022-01-31 NOTE — TELEPHONE ENCOUNTER
----- Message from Sathish Machado sent at 1/31/2022  7:09 AM EST -----  Subject: Refill Request    QUESTIONS  Name of Medication? apixaban (ELIQUIS) 2.5 mg tablet  Patient-reported dosage and instructions? once in the morning and once in   the night   How many days do you have left? 2  Preferred Pharmacy? [address] [id number]  Pharmacy phone number (if available)? [phone]  ---------------------------------------------------------------------------  --------------  Myles DIAZ  What is the best way for the office to contact you? OK to leave message on   voicemail  Preferred Call Back Phone Number?  [phone]

## 2022-02-01 ENCOUNTER — TELEPHONE (OUTPATIENT)
Dept: FAMILY MEDICINE CLINIC | Age: 82
End: 2022-02-01

## 2022-02-01 DIAGNOSIS — I48.0 PAROXYSMAL ATRIAL FIBRILLATION (HCC): ICD-10-CM

## 2022-02-01 NOTE — TELEPHONE ENCOUNTER
Called [place] and they have not processed his application, yet. Called [place] and Gia Callahan states that there is a $1,900 OOP deductible. Pt would like to be placed on warfarin. Left VM to return call.

## 2022-02-01 NOTE — TELEPHONE ENCOUNTER
He should not need any kind of special instructions or clearance before his orthopedic appointment on Friday

## 2022-02-02 NOTE — TELEPHONE ENCOUNTER
Pt returning call; Pt is requesting 4 or 5 pills to hold him over until his surgery;  Pt states that he is out of the medication; Please call pt back       Thank You

## 2022-02-02 NOTE — TELEPHONE ENCOUNTER
Patient's most recent request is to have 6 tabs of Eliquis called into [facility].     Warfarin is a reasonable backup option

## 2022-02-02 NOTE — TELEPHONE ENCOUNTER
Pt notified and voiced understanding.  He just has a ortho appointment they he is not having surgery

## 2022-02-02 NOTE — TELEPHONE ENCOUNTER
Pt states he just has a appointment on Friday he is not having surgery.  Pt would like to know if its ok for him to take Tylenol

## 2022-02-04 ENCOUNTER — OFFICE VISIT (OUTPATIENT)
Dept: CARDIOLOGY CLINIC | Age: 82
End: 2022-02-04
Payer: MEDICARE

## 2022-02-04 VITALS
HEART RATE: 83 BPM | SYSTOLIC BLOOD PRESSURE: 140 MMHG | OXYGEN SATURATION: 97 % | WEIGHT: 172 LBS | RESPIRATION RATE: 18 BRPM | BODY MASS INDEX: 24.08 KG/M2 | DIASTOLIC BLOOD PRESSURE: 82 MMHG | HEIGHT: 71 IN

## 2022-02-04 DIAGNOSIS — I48.0 PAROXYSMAL ATRIAL FIBRILLATION (HCC): ICD-10-CM

## 2022-02-04 DIAGNOSIS — E78.2 MIXED HYPERLIPIDEMIA: ICD-10-CM

## 2022-02-04 DIAGNOSIS — I10 BENIGN ESSENTIAL HTN: ICD-10-CM

## 2022-02-04 DIAGNOSIS — Z95.810 AUTOMATIC IMPLANTABLE CARDIAC DEFIBRILLATOR IN SITU: ICD-10-CM

## 2022-02-04 DIAGNOSIS — I25.10 CORONARY ARTERY DISEASE INVOLVING NATIVE CORONARY ARTERY OF NATIVE HEART WITHOUT ANGINA PECTORIS: Primary | ICD-10-CM

## 2022-02-04 PROCEDURE — 1101F PT FALLS ASSESS-DOCD LE1/YR: CPT | Performed by: INTERNAL MEDICINE

## 2022-02-04 PROCEDURE — G8536 NO DOC ELDER MAL SCRN: HCPCS | Performed by: INTERNAL MEDICINE

## 2022-02-04 PROCEDURE — G8427 DOCREV CUR MEDS BY ELIG CLIN: HCPCS | Performed by: INTERNAL MEDICINE

## 2022-02-04 PROCEDURE — G8420 CALC BMI NORM PARAMETERS: HCPCS | Performed by: INTERNAL MEDICINE

## 2022-02-04 PROCEDURE — 93000 ELECTROCARDIOGRAM COMPLETE: CPT | Performed by: INTERNAL MEDICINE

## 2022-02-04 PROCEDURE — G8754 DIAS BP LESS 90: HCPCS | Performed by: INTERNAL MEDICINE

## 2022-02-04 PROCEDURE — 99214 OFFICE O/P EST MOD 30 MIN: CPT | Performed by: INTERNAL MEDICINE

## 2022-02-04 PROCEDURE — G8432 DEP SCR NOT DOC, RNG: HCPCS | Performed by: INTERNAL MEDICINE

## 2022-02-04 PROCEDURE — G8753 SYS BP > OR = 140: HCPCS | Performed by: INTERNAL MEDICINE

## 2022-02-04 NOTE — PROGRESS NOTES
[facility] [place]: Wendy Damon  [phone]    HPI:   Mr. Ngozi Begum is a 78 yo M with h/o CAD s/p PCI in 2003, patent stent with no significant CAD on 2008 cath, afib on metoprolol for rate control and coumadin for anticoagulation, apical variant of a hypertrophic cardiomyopathy with deep T wave inversions on ECG, sick sinus with NSVT noted on 11/14/13 holter with multiple > 3 sec pauses s/p [employer] ICD on 11/15/13 with Dr. Taty Bettencourt, s/p right hip revision on 12/5/13. MARIBEL in 2012 was normal.  7/2011 nuclear stress test was normal.  5/2012 echo noted EF of 50-55% and apical hypertrophy. Followed by ortho for right hip Dr. Alessandra Root    Since his last visit, overall he has been doing okay. He is mostly having issues with his lower back with some radiating pain to his right lower extremity. He does see orthopedics, Dr. Nadir Khan. He has had some of this pain despite getting cortisone shots. From a heart standpoint, he denies any exertional chest pain. He continues to be active walking regularly. Due to his back, he cannot do any weightlifting. His weight is down from 182 to 172 pounds. His breathing has been stable and no significant palpitations, lightheadedness or dizziness. He has been compliant with his medications. He is compensated on exam with clear lungs and trace lower extremity edema. His EKG is ventricularly paced. Assessment and Plan:   1. Atrial fibrillation, sick sinus, status post ICD. This has been stable and followed closely by the device clinic and Dr. Taty Bettencourt.    2. Coronary artery disease. Stable and compensated. Continue statin, beta blocker and ACE inhibitor. 3. Chronic anticoagulation. No bleeding issues on Eliquis. 4. Cardiomyopathy, apical variant. Stable on beta blocker. 5. Mixed hyperlipidemia. 6. Essential hypertension. Blood pressure is controlled. 7. Erectile dysfunction.   He takes Cialis or Viagra prn      He  has a past medical history of Arthritis, CAD (coronary artery disease), Chronic atrial fibrillation [facility]), ED (erectile dysfunction), Hypertension, Kidney stone on right side (10/30/2011), JAMEY (obstructive sleep apnea) (4/25/2012), Skin cancer, Sun-damaged skin, and TIA (transient ischemic attack). He has no past medical history of Arsenic suspected exposure, Family history of skin cancer, Radiation exposure, Sunburn, blistering, or Tanning bed exposure. All other systems negative except as above. PE  Vitals:    02/04/22 0827   BP: (!) 142/82   Pulse: 83   Resp: 18   SpO2: 97%   Weight: 172 lb (78 kg)   Height: 5' 11\" (1.803 m)    Body mass index is 23.99 kg/m². General appearance - alert, well appearing, and in no distress  Mental status - affect appropriate to mood  Neck - supple, no JVD. No bruits present.    Chest - clear to auscultation, no wheezes, rales or rhonchi  Heart - Regular rate, regular rhythm, normal S1, S2, I/VI systolic murmur LUSB  Abdomen - soft, nontender, nondistended, no masses or organomegaly  Extremities - peripheral pulses normal, no pedal edema        Recent Labs:  Lab Results   Component Value Date/Time    Cholesterol, total 97 (L) 02/15/2021 11:36 AM    HDL Cholesterol 32 (L) 02/15/2021 11:36 AM    LDL, calculated 42 02/15/2021 11:36 AM    LDL, calculated 35 03/18/2020 09:30 AM    Triglyceride 127 02/15/2021 11:36 AM     Lab Results   Component Value Date/Time    Creatinine 0.91 07/26/2021 12:45 PM     Lab Results   Component Value Date/Time    BUN 16 07/26/2021 12:45 PM    BUN (POC) 16 12/04/2013 06:46 AM     Lab Results   Component Value Date/Time    Potassium 3.6 07/26/2021 12:45 PM     Lab Results   Component Value Date/Time    Hemoglobin A1c 5.3 11/19/2013 08:45 AM     Lab Results   Component Value Date/Time    HGB 13.7 07/26/2021 12:45 PM     Lab Results   Component Value Date/Time    PLATELET 072 52/12/8935 12:45 PM       Reviewed:  Past Medical History:   Diagnosis Date    Arthritis     knees    CAD (coronary artery disease)     stent x3 approx 2001    Chronic atrial fibrillation ([facility] [address]     ED (erectile dysfunction)     Hypertension     Kidney stone on right side 10/30/2011    JAMEY (obstructive sleep apnea) 4/25/2012    Skin cancer     BCC, s/p Mohs on L flank    Sun-damaged skin     TIA (transient ischemic attack)     6/12 - seen at [facility]     Social History     Tobacco Use   Smoking Status Never Smoker   Smokeless Tobacco Never Used     Social History     Substance and Sexual Activity   Alcohol Use No    Alcohol/week: 0.0 standard drinks     Allergies   Allergen Reactions    Oxycodone Hcl Other (comments)    Pcn [Penicillins] Unknown (comments)     As a child    Percocet [Oxycodone-Acetaminophen] Swelling     Leg swelling       Current Outpatient Medications   Medication Sig    apixaban (ELIQUIS) 2.5 mg tablet Take 1 Tablet by mouth two (2) times a day.  metoprolol succinate (TOPROL-XL) 25 mg XL tablet TAKE 1 TABLET EVERY DAY    enalapril (VASOTEC) 20 mg tablet TAKE ONE TABLET BY MOUTH TWICE A DAY    finasteride (Proscar) 5 mg tablet Take 1 Tablet by mouth daily.  pravastatin (PRAVACHOL) 10 mg tablet TAKE 1 TABLET EVERY NIGHT    amLODIPine (NORVASC) 5 mg tablet TAKE 1 TABLET EVERY DAY    aspirin delayed-release 81 mg tablet Take  by mouth daily.  MULTIVITAMIN WITH MINERALS (MULTI-VIT 55 PLUS PO) Take 1 Tab by mouth daily. Taking multivitamins every morning.  methylPREDNISolone (MEDROL DOSEPACK) 4 mg tablet Per pack instructions (Patient not taking: Reported on 2/4/2022)    tadalafiL (Cialis) 20 mg tablet Take 1 Tablet by mouth as needed (ED). (Patient not taking: Reported on 9/7/9932)    FOLIC ACID PO Take 256 mcg by mouth daily. (Patient not taking: Reported on 2/4/2022)     No current facility-administered medications for this visit.        Elizabeth Barber MD  [facility] heart and [facility] [place]  [address] [facility] [address]  [address]

## 2022-02-07 ENCOUNTER — TELEPHONE (OUTPATIENT)
Dept: FAMILY MEDICINE CLINIC | Age: 82
End: 2022-02-07

## 2022-02-07 DIAGNOSIS — I48.0 PAROXYSMAL ATRIAL FIBRILLATION (HCC): Primary | ICD-10-CM

## 2022-02-07 RX ORDER — WARFARIN 2.5 MG/1
TABLET ORAL
Qty: 180 TABLET | Refills: 2 | Status: SHIPPED | OUTPATIENT
Start: 2022-02-07 | End: 2022-04-20 | Stop reason: SDUPTHER

## 2022-02-07 NOTE — TELEPHONE ENCOUNTER
Restart Coumadin 2.5 mg.   Take 1 pill a day and come in 1 week for Coumadin check [FreeTextEntry1] : MOderate risk  patient is being cleared for low risk procedure. \par NO acute medical contraindications noted for procedure thus it is an acceptable risk for patient to have procedure performed. \par Patient aware of preop precautions

## 2022-02-07 NOTE — TELEPHONE ENCOUNTER
Pt is calling wanting a refill on the warfarin stating he is about of med and to call when this is done    Warfarin

## 2022-02-14 ENCOUNTER — NURSE TRIAGE (OUTPATIENT)
Dept: OTHER | Facility: CLINIC | Age: 82
End: 2022-02-14

## 2022-02-14 ENCOUNTER — TELEPHONE (OUTPATIENT)
Dept: FAMILY MEDICINE CLINIC | Age: 82
End: 2022-02-14

## 2022-02-14 ENCOUNTER — HOSPITAL ENCOUNTER (EMERGENCY)
Age: 82
Discharge: HOME | End: 2022-02-14
Attending: EMERGENCY MEDICINE
Payer: MEDICARE

## 2022-02-14 ENCOUNTER — APPOINTMENT (OUTPATIENT)
Dept: CT IMAGING | Age: 82
End: 2022-02-14
Attending: EMERGENCY MEDICINE
Payer: MEDICARE

## 2022-02-14 VITALS
HEART RATE: 73 BPM | BODY MASS INDEX: 24.29 KG/M2 | DIASTOLIC BLOOD PRESSURE: 88 MMHG | WEIGHT: 173.5 LBS | TEMPERATURE: 97.9 F | HEIGHT: 71 IN | SYSTOLIC BLOOD PRESSURE: 137 MMHG | RESPIRATION RATE: 16 BRPM | OXYGEN SATURATION: 100 %

## 2022-02-14 DIAGNOSIS — R07.81 RIB PAIN: Primary | ICD-10-CM

## 2022-02-14 DIAGNOSIS — S22.31XA CLOSED FRACTURE OF ONE RIB OF RIGHT SIDE, INITIAL ENCOUNTER: Primary | ICD-10-CM

## 2022-02-14 DIAGNOSIS — M48.02 CERVICAL STENOSIS OF SPINAL CANAL: ICD-10-CM
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
## 2022-02-14 LAB
ALBUMIN SERPL-MCNC: 3.1 G/DL (ref 3.5–5)
ALBUMIN/GLOB SERPL: 1.1 {RATIO} (ref 1.1–2.2)
ALP SERPL-CCNC: 95 U/L (ref 45–117)
ALT SERPL-CCNC: 21 U/L (ref 12–78)
ANION GAP SERPL CALC-SCNC: 0 MMOL/L (ref 5–15)
AST SERPL-CCNC: 24 U/L (ref 15–37)
ATRIAL RATE: 61 BPM
BASOPHILS # BLD: 0 K/UL (ref 0–0.1)
BASOPHILS NFR BLD: 1 % (ref 0–1)
BILIRUB SERPL-MCNC: 0.6 MG/DL (ref 0.2–1)
BUN SERPL-MCNC: 25 MG/DL (ref 6–20)
BUN/CREAT SERPL: 26 (ref 12–20)
CALCIUM SERPL-MCNC: 8.6 MG/DL (ref 8.5–10.1)
CALCULATED R AXIS, ECG10: -78 DEGREES
CALCULATED T AXIS, ECG11: 103 DEGREES
CHLORIDE SERPL-SCNC: 111 MMOL/L (ref 97–108)
CO2 SERPL-SCNC: 34 MMOL/L (ref 21–32)
CREAT SERPL-MCNC: 0.95 MG/DL (ref 0.7–1.3)
DIAGNOSIS, 93000: NORMAL
DIFFERENTIAL METHOD BLD: ABNORMAL
EOSINOPHIL # BLD: 0.3 K/UL (ref 0–0.4)
EOSINOPHIL NFR BLD: 4 % (ref 0–7)
ERYTHROCYTE [DISTWIDTH] IN BLOOD BY AUTOMATED COUNT: 14.4 % (ref 11.5–14.5)
GLOBULIN SER CALC-MCNC: 2.9 G/DL (ref 2–4)
GLUCOSE SERPL-MCNC: 85 MG/DL (ref 65–100)
HCT VFR BLD AUTO: 48.7 % (ref 36.6–50.3)
HGB BLD-MCNC: 15.6 G/DL (ref 12.1–17)
IMM GRANULOCYTES # BLD AUTO: 0 K/UL (ref 0–0.04)
IMM GRANULOCYTES NFR BLD AUTO: 0 % (ref 0–0.5)
INR PPP: 2.5 (ref 0.9–1.1)
LYMPHOCYTES # BLD: 1.4 K/UL (ref 0.8–3.5)
LYMPHOCYTES NFR BLD: 18 % (ref 12–49)
MCH RBC QN AUTO: 33.3 PG (ref 26–34)
MCHC RBC AUTO-ENTMCNC: 32 G/DL (ref 30–36.5)
MCV RBC AUTO: 103.8 FL (ref 80–99)
MONOCYTES # BLD: 0.7 K/UL (ref 0–1)
MONOCYTES NFR BLD: 9 % (ref 5–13)
NEUTS SEG # BLD: 5.7 K/UL (ref 1.8–8)
NEUTS SEG NFR BLD: 68 % (ref 32–75)
NRBC # BLD: 0 K/UL (ref 0–0.01)
NRBC BLD-RTO: 0 PER 100 WBC
PLATELET # BLD AUTO: 141 K/UL (ref 150–400)
PMV BLD AUTO: 9.8 FL (ref 8.9–12.9)
POTASSIUM SERPL-SCNC: 4 MMOL/L (ref 3.5–5.1)
PROT SERPL-MCNC: 6 G/DL (ref 6.4–8.2)
PROTHROMBIN TIME: 24.7 SEC (ref 9–11.1)
Q-T INTERVAL, ECG07: 468 MS
QRS DURATION, ECG06: 188 MS
QTC CALCULATION (BEZET), ECG08: 497 MS
RBC # BLD AUTO: 4.69 M/UL (ref 4.1–5.7)
SODIUM SERPL-SCNC: 145 MMOL/L (ref 136–145)
TROPONIN-HIGH SENSITIVITY: 52 NG/L (ref 0–76)
VENTRICULAR RATE, ECG03: 68 BPM
WBC # BLD AUTO: 8.2 K/UL (ref 4.1–11.1)

## 2022-02-14 PROCEDURE — 80053 COMPREHEN METABOLIC PANEL: CPT

## 2022-02-14 PROCEDURE — 36415 COLL VENOUS BLD VENIPUNCTURE: CPT

## 2022-02-14 PROCEDURE — 72125 CT NECK SPINE W/O DYE: CPT

## 2022-02-14 PROCEDURE — 84484 ASSAY OF TROPONIN QUANT: CPT

## 2022-02-14 PROCEDURE — 71250 CT THORAX DX C-: CPT

## 2022-02-14 PROCEDURE — 93005 ELECTROCARDIOGRAM TRACING: CPT

## 2022-02-14 PROCEDURE — 70450 CT HEAD/BRAIN W/O DYE: CPT

## 2022-02-14 PROCEDURE — 85025 COMPLETE CBC W/AUTO DIFF WBC: CPT

## 2022-02-14 PROCEDURE — 74011000250 HC RX REV CODE- 250: Performed by: EMERGENCY MEDICINE

## 2022-02-14 PROCEDURE — 99282 EMERGENCY DEPT VISIT SF MDM: CPT

## 2022-02-14 PROCEDURE — 85610 PROTHROMBIN TIME: CPT

## 2022-02-14 RX ORDER — LIDOCAINE 4 G/100G
1 PATCH TOPICAL
Status: DISCONTINUED | OUTPATIENT
Start: 2022-02-14 | End: 2022-02-14 | Stop reason: HOSPADM

## 2022-02-14 RX ORDER — LIDOCAINE 4 G/100G
PATCH TOPICAL
Qty: 5 PATCH | Refills: 0 | Status: SHIPPED
Start: 2022-02-14 | End: 2022-10-14

## 2022-02-14 NOTE — ED PROVIDER NOTES
EMERGENCY DEPARTMENT HISTORY AND PHYSICAL EXAM      Date: 2/14/2022  Patient Name: Ariella Lindo    History of Presenting Illness     Chief Complaint   Patient presents with    Dizziness     Pt ambulatory into triage with a cc of dizzines since saturday; pt states he gets dizzy only when bending over to pick something up since the fall. Pt does not know if he hit his head during the fall.  Fall     Pt states he slipped on gravel and fell on his right side on friday and is complaining of rib pain since the fall       History Provided By: Patient    HPI: Ariella Lindo, 79 y.o. male with PMHx significant for CAD status post stents, A. fib, on Coumadin, who presents with a chief complaint of lightheadedness and right-sided rib pain. Patient had a fall 4 days ago. He states that he slipped and fell while walking his dog. Landed on his right ribs. Is unsure if he hit his head. Has felt lightheaded when he bends over ever since the injury and has had some ongoing pain over his right lateral ribs. He has been taking Tylenol and using ice which she states helps his pain. He called his primary care today and was advised to come to the ER for evaluation as they do not have an appointment. He denies any chest pain or shortness of breath. Tells me he was only recently started on Coumadin and was previously on Eliquis. PCP: Sonia Loera MD    There are no other complaints, changes, or physical findings at this time.     Current Facility-Administered Medications   Medication Dose Route Frequency Provider Last Rate Last Admin    lidocaine 4 % patch 1 Patch  1 Patch TransDERmal NOW Janet Munoz MD   1 Patch at 02/14/22 1143     Current Outpatient Medications   Medication Sig Dispense Refill    lidocaine 4 % patch Apply one patch to affected area for 12 hours and the remove for 12 hours 5 Patch 0    warfarin (COUMADIN) 2.5 mg tablet One TABLET BY MOUTH DAILY 180 Tablet 2    metoprolol succinate (TOPROL-XL) 25 mg XL tablet TAKE 1 TABLET EVERY DAY 90 Tablet 3    enalapril (VASOTEC) 20 mg tablet TAKE ONE TABLET BY MOUTH TWICE A  Tablet 1    methylPREDNISolone (MEDROL DOSEPACK) 4 mg tablet Per pack instructions (Patient not taking: Reported on 2/4/2022) 1 Dose Pack 0    finasteride (Proscar) 5 mg tablet Take 1 Tablet by mouth daily. 90 Tablet 3    pravastatin (PRAVACHOL) 10 mg tablet TAKE 1 TABLET EVERY NIGHT 90 Tablet 3    tadalafiL (Cialis) 20 mg tablet Take 1 Tablet by mouth as needed (ED). (Patient not taking: Reported on 2/4/2022) 30 Tablet 11    amLODIPine (NORVASC) 5 mg tablet TAKE 1 TABLET EVERY DAY 90 Tab 3    aspirin delayed-release 81 mg tablet Take  by mouth daily.  FOLIC ACID PO Take 703 mcg by mouth daily. (Patient not taking: Reported on 2/4/2022)      MULTIVITAMIN WITH MINERALS (MULTI-VIT 55 PLUS PO) Take 1 Tab by mouth daily. Taking multivitamins every morning.        Past History     Past Medical History:  Past Medical History:   Diagnosis Date    Arthritis     knees    CAD (coronary artery disease)     stent x3 approx 2001    Chronic atrial fibrillation ([address]     ED (erectile dysfunction)     Hypertension     Kidney stone on right side 10/30/2011    JAMEY (obstructive sleep apnea) 4/25/2012    Skin cancer     BCC, s/p Mohs on L flank    Sun-damaged skin     TIA (transient ischemic attack)     6/12 - [facility] at [facility]     Past Surgical History:  Past Surgical History:   Procedure Laterality Date    HX APPENDECTOMY  age 15   [de-identified] CATARACT REMOVAL Bilateral     HX COLONOSCOPY  05/10/2012    hyperplastic    HX CYSTOSTOMY  1/23/14    diffuse erythema    HX HERNIA REPAIR Bilateral     inguinal hernia    HX HERNIA REPAIR  09/12/2017    L inguinal hernia repair    HX HIP REPLACEMENT Bilateral     HX HIP REPLACEMENT Right 12/4/13    REVISION     HX IMPLANTABLE CARDIOVERTER DEFIBRILLATOR  11/15/13    + PM    HX KNEE REPLACEMENT Right 7/2011    HX LUMBAR DISKECTOMY 18's    HX MOHS PROCEDURES  02/21/2017    BCC left lateral cheek by Dr. Yasmin Nunez HX MOHS PROCEDURES  01/05/2015    L [facility]    HX ORTHOPAEDIC Left     LEFT HAND SURGERY    HX PACEMAKER  11/15/2013    AICD    INS PPM/ICD LED SING ONLY  11/15/2013         [facility] CARDIAC SURG PROCEDURE UNLIST  2006    stents x 3      Family History:  Family History   Problem Relation Age of Onset    Stroke Father     Heart Attack Father     Heart Disease Father     Cancer Father 80        colon cancer    Heart Disease Mother     OSTEOARTHRITIS Sister         s/p bilateral knee replacements    No Known Problems Daughter     Anesth Problems Neg Hx      Social History:  Social History     Tobacco Use    Smoking status: Never Smoker    Smokeless tobacco: Never Used   Vaping Use    Vaping Use: Never used   Substance Use Topics    Alcohol use: No     Alcohol/week: 0.0 standard drinks    Drug use: No     Allergies: Allergies   Allergen Reactions    Oxycodone Hcl Other (comments)    Pcn [Penicillins] Unknown (comments)     As a child    Percocet [Oxycodone-Acetaminophen] Swelling     Leg swelling     Review of Systems   Review of Systems   Constitutional: Negative for chills and fever. HENT: Negative for congestion, rhinorrhea and sore throat. Respiratory: Negative for cough and shortness of breath. Cardiovascular: Positive for chest pain (rib pain). Gastrointestinal: Negative for abdominal pain, nausea and vomiting. Genitourinary: Negative for dysuria and urgency. Skin: Negative for rash. Neurological: Positive for light-headedness. Negative for dizziness and headaches. All other systems reviewed and are negative. Physical Exam   Physical Exam  Vitals and nursing note reviewed. Constitutional:       General: He is not in acute distress. Appearance: He is well-developed. HENT:      Head: Normocephalic and atraumatic.    Eyes:      Conjunctiva/sclera: Conjunctivae normal.      Pupils: Pupils are equal, round, and reactive to light. Cardiovascular:      Rate and Rhythm: Normal rate and regular rhythm. Pulmonary:      Effort: Pulmonary effort is normal. No respiratory distress. Breath sounds: Normal breath sounds. No stridor. Chest:      Chest wall: Tenderness present. No crepitus. Abdominal:      General: There is no distension. Palpations: Abdomen is soft. Tenderness: There is no abdominal tenderness. Musculoskeletal:         General: Normal range of motion. Cervical back: Normal range of motion. Skin:     General: Skin is warm and dry. Neurological:      Mental Status: He is alert and oriented to person, place, and time. Diagnostic Study Results   Labs -     Recent Results (from the past 12 hour(s))   EKG, 12 LEAD, INITIAL    Collection Time: 02/14/22 11:10 AM   Result Value Ref Range    Ventricular Rate 68 BPM    Atrial Rate 61 BPM    QRS Duration 188 ms    Q-T Interval 468 ms    QTC Calculation (Bezet) 497 ms    Calculated R Axis -78 degrees    Calculated T Axis 103 degrees    Diagnosis       Ventricular-paced rhythm  When compared with ECG of 19-FEB-2021 08:00,  Vent. rate has increased BY   2 BPM  Confirmed by Marti Spencer ([id number]) on 2/14/2022 2:08:13 PM     CBC WITH AUTOMATED DIFF    Collection Time: 02/14/22 11:13 AM   Result Value Ref Range    WBC 8.2 4.1 - 11.1 K/uL    RBC 4.69 4.10 - 5.70 M/uL    HGB 15.6 12.1 - 17.0 g/dL    HCT 48.7 36.6 - 50.3 %    .8 (H) 80.0 - 99.0 FL    MCH 33.3 26.0 - 34.0 PG    MCHC 32.0 30.0 - 36.5 g/dL    RDW 14.4 11.5 - 14.5 %    PLATELET 059 (L) 920 - 400 K/uL    MPV 9.8 8.9 - 12.9 FL    NRBC 0.0 0  WBC    ABSOLUTE NRBC 0.00 0.00 - 0.01 K/uL    NEUTROPHILS 68 32 - 75 %    LYMPHOCYTES 18 12 - 49 %    MONOCYTES 9 5 - 13 %    EOSINOPHILS 4 0 - 7 %    BASOPHILS 1 0 - 1 %    IMMATURE GRANULOCYTES 0 0.0 - 0.5 %    ABS. NEUTROPHILS 5.7 1.8 - 8.0 K/UL    ABS. LYMPHOCYTES 1.4 0.8 - 3.5 K/UL    ABS.  MONOCYTES 0.7 0.0 - 1.0 K/UL    ABS. EOSINOPHILS 0.3 0.0 - 0.4 K/UL    ABS. BASOPHILS 0.0 0.0 - 0.1 K/UL    ABS. IMM. GRANS. 0.0 0.00 - 0.04 K/UL    DF AUTOMATED     METABOLIC PANEL, COMPREHENSIVE    Collection Time: 02/14/22 11:13 AM   Result Value Ref Range    Sodium 145 136 - 145 mmol/L    Potassium 4.0 3.5 - 5.1 mmol/L    Chloride 111 (H) 97 - 108 mmol/L    CO2 34 (H) 21 - 32 mmol/L    Anion gap 0 (L) 5 - 15 mmol/L    Glucose 85 65 - 100 mg/dL    BUN 25 (H) 6 - 20 MG/DL    Creatinine 0.95 0.70 - 1.30 MG/DL    BUN/Creatinine ratio 26 (H) 12 - 20      GFR est AA >60 >60 ml/min/1.73m2    GFR est non-AA >60 >60 ml/min/1.73m2    Calcium 8.6 8.5 - 10.1 MG/DL    Bilirubin, total 0.6 0.2 - 1.0 MG/DL    ALT (SGPT) 21 12 - 78 U/L    AST (SGOT) 24 15 - 37 U/L    Alk. phosphatase 95 45 - 117 U/L    Protein, total 6.0 (L) 6.4 - 8.2 g/dL    Albumin 3.1 (L) 3.5 - 5.0 g/dL    Globulin 2.9 2.0 - 4.0 g/dL    A-G Ratio 1.1 1.1 - 2.2     TROPONIN-HIGH SENSITIVITY    Collection Time: 02/14/22 11:13 AM   Result Value Ref Range    Troponin-High Sensitivity 52 0 - 76 ng/L   PROTHROMBIN TIME + INR    Collection Time: 02/14/22 11:55 AM   Result Value Ref Range    INR 2.5 (H) 0.9 - 1.1      Prothrombin time 24.7 (H) 9.0 - 11.1 sec       Radiologic Studies -   CT HEAD WO CONT   Final Result   No acute intracranial process. Imaging findings consistent with There is no evidence of depressed skull   fractures of soft tissue swelling. chronic microvascular ischemic change. There   is a moderate degree of cerebral atrophy. CT SPINE CERV WO CONT   Final Result   Likely chronic compression deformities at C5, C6 and C7. Severe canal and foraminal stenoses with likely chronic degenerative cord   compression. Nonemergent MRI of the cervical spine for further delineation is recommended. CT CHEST WO CONT   Final Result   Acute, displaced, right, lateral, sixth rib fracture.         CT HEAD WO CONT    Result Date: 2/14/2022  No acute intracranial process. Imaging findings consistent with There is no evidence of depressed skull fractures of soft tissue swelling. chronic microvascular ischemic change. There is a moderate degree of cerebral atrophy. CT CHEST WO CONT    Result Date: 2/14/2022  Acute, displaced, right, lateral, sixth rib fracture. CT SPINE CERV WO CONT    Result Date: 2/14/2022  Likely chronic compression deformities at C5, C6 and C7. Severe canal and foraminal stenoses with likely chronic degenerative cord compression. Nonemergent MRI of the cervical spine for further delineation is recommended. Medical Decision Making   I am the first provider for this patient. I reviewed the vital signs, available nursing notes, past medical history, past surgical history, family history and social history. Vital Signs-Reviewed the patient's vital signs. Patient Vitals for the past 12 hrs:   Temp Pulse Resp BP SpO2   02/14/22 1107 97.9 °F (36.6 °C) 73 16 137/88 100 %       Pulse Oximetry Analysis - 100% on ra    ED EKG interpretation:  Rhythm: paced; and regular . Rate (approx.): 68; Other findings: No acute ischemic changes. This EKG was interpreted by ADITYA Pelletier MD,ED Provider. Records Reviewed: Nursing Notes and Old Medical Records    Provider Notes (Medical Decision Making):   Patient presents with a chief complaint of some lightheadedness as well as rib pain after a fall 4 days ago. Does have tenderness over the right lateral ribs without crepitus or obvious ecchymosis. Suspect likely rib fracture. Will check CT imaging of the chest.  Also check CT head and C-spine given the fall and lightheadedness. ED Course:   Initial assessment performed. The patients presenting problems have been discussed, and they are in agreement with the care plan formulated and outlined with them. I have encouraged them to ask questions as they arise throughout their visit. CT scan with right sixth rib fracture.   No other acute traumatic findings. Does show fairly severe cervical stenosis. Discussed this incidental finding with the patient as well as the recommendation for follow-up MRI. INR therapeutic at 2.5. Patient has improved symptoms with lidocaine patch. Advised he can continue to take Tylenol at home. Was ambulatory with a steady gait with his cane. Stable for discharge. Procedures:  Procedures    Critical Care:  none    Disposition:  Discharge Note:  The patient has been re-evaluated and is ready for discharge. Reviewed available results with patient. Counseled patient on diagnosis and care plan. Patient has expressed understanding, and all questions have been answered. Patient agrees with plan and agrees to follow up as recommended, or to return to the ED if their symptoms worsen. Discharge instructions have been provided and explained to the patient, along with reasons to return to the ED. PLAN:  1. Discharge Medication List as of 2/14/2022  1:14 PM        2. Follow-up Information     Follow up With Specialties Details Why Contact Info    Fabio Gregg MD Family Medicine Schedule an appointment as soon as possible for a visit   [address] [place] [address] [phone]      [facility] EMERGENCY DEPT Emergency Medicine  As needed, If symptoms worsen [address] [facility]  [address] [facility]  [phone]        Return to ED if worse     Diagnosis     Clinical Impression:   1. Closed fracture of one rib of right side, initial encounter    2. Cervical stenosis of spinal canal            Please note that this dictation was completed with [employer], the computer voice recognition software. Quite often unanticipated grammatical, syntax, homophones, and other interpretive errors are inadvertently transcribed by the computer software. Please disregard these errors.   Please excuse any errors that have escaped final proofreading

## 2022-02-14 NOTE — DISCHARGE INSTRUCTIONS
CT HEAD WO CONT    Result Date: 2/14/2022  No acute intracranial process. Imaging findings consistent with There is no evidence of depressed skull fractures of soft tissue swelling. chronic microvascular ischemic change. There is a moderate degree of cerebral atrophy. CT CHEST WO CONT    Result Date: 2/14/2022  Acute, displaced, right, lateral, sixth rib fracture. CT SPINE CERV WO CONT    Result Date: 2/14/2022  Likely chronic compression deformities at C5, C6 and C7. Severe canal and foraminal stenoses with likely chronic degenerative cord compression. Nonemergent MRI of the cervical spine for further delineation is recommended.

## 2022-02-14 NOTE — TELEPHONE ENCOUNTER
Can x-ray ribs if he would like. Suggestion to help with pain is to hold the part of your ribs that is hurting and press on it while you are coughing and sneezing. This \"splinting\" will help with pain.   Hugging a pillow also helps

## 2022-02-14 NOTE — TELEPHONE ENCOUNTER
Received call from Amarilis at [facility] with Red Flag Complaint. Subjective: Caller states \"Extreme dizziness and can barley walk around the house without feeling like I am going to fall over. I got over my dizziness when I had some bursitis. I fell in the shower and I may have hit my head. Every time I cough or sneeze it kills me. \"     Current Symptoms: right side rib pain (from fall), dizziness (unsure if he hit his head). Onset: 4 days ago; fell in the shower     Pain Severity: 10/10    Temperature: no     What has been tried: ice and tylenol     Recommended disposition: ED. Caller states he will have someone drive him. Care advice provided, patient verbalizes understanding; denies any other questions or concerns; instructed to call back for any new or worsening symptoms. Attention Provider: Thank you for allowing me to participate in the care of your patient. The patient was connected to triage in response to information provided to the [facility]. Please do not respond through this encounter as the response is not directed to a shared pool.     Reason for Disposition   SEVERE dizziness (e.g., unable to stand, requires support to walk, feels like passing out now)   SEVERE chest pain    Protocols used: DIZZINESS-ADULT-OH, CHEST INJURY-ADULT-OH

## 2022-02-14 NOTE — TELEPHONE ENCOUNTER
Pt is complaining of ribs hurting. He   can not cough or sneeze without hurting. He fell last friday and would   like to see Dr Bree Wilcox today but there was no appointments.

## 2022-02-21 ENCOUNTER — NURSE TRIAGE (OUTPATIENT)
Dept: OTHER | Facility: CLINIC | Age: 82
End: 2022-02-21

## 2022-02-21 ENCOUNTER — TELEPHONE (OUTPATIENT)
Dept: FAMILY MEDICINE CLINIC | Age: 82
End: 2022-02-21

## 2022-02-21 NOTE — TELEPHONE ENCOUNTER
Is there availability with another provider? Bring medications to appointment. They may need to be adjusted    Seen in the emergency room for this. Vital signs are stable. Found to have a right rib fracture and severe cervical stenosis (chronic).   Already has appropriate follow-up scheduled with orthospine on 3/17

## 2022-02-21 NOTE — TELEPHONE ENCOUNTER
Received call from Juan Luis Edmondson at [facility] with Red Flag Complaint. Subjective: Caller states \"I have been feeling dizzy and light headed since the 1st, but nothing is making it going away. I also need to have my Coumadin levels checked since they switched me to it instead of the Eliquis. \"     Current Symptoms: Dizzy, light-headed    Onset: 3 weeks ago; gradual    Associated Symptoms: reduced activity    Pain Severity: 7/10; aching; constant    Temperature: None    What has been tried: Tylenol PRN - helps occasionally, also using Lidocaine patch for rib pain. LMP: NA Pregnant: NA    Recommended disposition: See PCP in office within 3 days, if no available appts, advised to seek care at [facility] or via walk-in    Care advice provided, patient verbalizes understanding; denies any other questions or concerns; instructed to call back for any new or worsening symptoms. Patient/Caller agrees with recommended disposition; writer provided warm transfer to [employer] at [facility] for appointment scheduling    Attention Provider: Thank you for allowing me to participate in the care of your patient. The patient was connected to triage in response to information provided to the [facility]. Please do not respond through this encounter as the response is not directed to a shared pool.       Reason for Disposition   [1] MODERATE dizziness (e.g., interferes with normal activities) AND [2] has been evaluated by physician for this    Protocols used: [facility]

## 2022-02-21 NOTE — TELEPHONE ENCOUNTER
Pt is calling wanting to come in and see Dr larose in person we have nothing until the 7th of March he states he is off balance and dizzy he has been like this ever since he fell on side walk

## 2022-02-23 ENCOUNTER — OFFICE VISIT (OUTPATIENT)
Dept: FAMILY MEDICINE CLINIC | Age: 82
End: 2022-02-23
Payer: MEDICARE

## 2022-02-23 VITALS
WEIGHT: 177.8 LBS | HEART RATE: 64 BPM | DIASTOLIC BLOOD PRESSURE: 82 MMHG | SYSTOLIC BLOOD PRESSURE: 104 MMHG | RESPIRATION RATE: 16 BRPM | BODY MASS INDEX: 24.89 KG/M2 | TEMPERATURE: 98.4 F | HEIGHT: 71 IN | OXYGEN SATURATION: 98 %

## 2022-02-23 DIAGNOSIS — R93.7 ABNORMAL CT SCAN, CERVICAL SPINE: Primary | ICD-10-CM
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
## 2022-02-23 DIAGNOSIS — I48.0 PAROXYSMAL ATRIAL FIBRILLATION (HCC): ICD-10-CM

## 2022-02-23 DIAGNOSIS — I10 PRIMARY HYPERTENSION: ICD-10-CM

## 2022-02-23 DIAGNOSIS — R26.89 BALANCE PROBLEM: ICD-10-CM

## 2022-02-23 LAB
INR BLD: 2.8
PT POC: 36.6 SECONDS
VALID INTERNAL CONTROL?: YES

## 2022-02-23 PROCEDURE — G8536 NO DOC ELDER MAL SCRN: HCPCS | Performed by: FAMILY MEDICINE

## 2022-02-23 PROCEDURE — 85610 PROTHROMBIN TIME: CPT | Performed by: FAMILY MEDICINE

## 2022-02-23 PROCEDURE — G8752 SYS BP LESS 140: HCPCS | Performed by: FAMILY MEDICINE

## 2022-02-23 PROCEDURE — G8420 CALC BMI NORM PARAMETERS: HCPCS | Performed by: FAMILY MEDICINE

## 2022-02-23 PROCEDURE — G8427 DOCREV CUR MEDS BY ELIG CLIN: HCPCS | Performed by: FAMILY MEDICINE

## 2022-02-23 PROCEDURE — 99214 OFFICE O/P EST MOD 30 MIN: CPT | Performed by: FAMILY MEDICINE

## 2022-02-23 PROCEDURE — G8432 DEP SCR NOT DOC, RNG: HCPCS | Performed by: FAMILY MEDICINE

## 2022-02-23 PROCEDURE — G8754 DIAS BP LESS 90: HCPCS | Performed by: FAMILY MEDICINE

## 2022-02-23 PROCEDURE — 1101F PT FALLS ASSESS-DOCD LE1/YR: CPT | Performed by: FAMILY MEDICINE

## 2022-02-23 RX ORDER — METOPROLOL SUCCINATE 25 MG/1
12.5 TABLET, EXTENDED RELEASE ORAL DAILY
Qty: 90 TABLET | Refills: 3 | Status: SHIPPED | OUTPATIENT
Start: 2022-02-23 | End: 2022-11-01 | Stop reason: SDUPTHER

## 2022-02-23 RX ORDER — METOPROLOL SUCCINATE 25 MG/1
12.5 TABLET, EXTENDED RELEASE ORAL DAILY
Qty: 90 TABLET | Refills: 3 | Status: SHIPPED | OUTPATIENT
Start: 2022-02-23 | End: 2022-02-23 | Stop reason: SDUPTHER

## 2022-02-23 NOTE — PROGRESS NOTES
1. Have you been to the ER, urgent care clinic since your last visit? Hospitalized since your last visit? Yes, on file. 2. Have you seen or consulted any other health care providers outside of the [address] [place] [address] since your last visit? Include any pap smears or colon screening.  No    Health Maintenance Due   Topic Date Due    Shingrix Vaccine Age 49> (1 of 2) Never done    Flu Vaccine (1) 09/01/2021    Lipid Screen  02/15/2022     Chief Complaint   Patient presents with   [facility] Follow Up

## 2022-02-23 NOTE — PROGRESS NOTES
Chief Complaint   Patient presents with   [address] [facility] Follow Up     Pt was seen int he ER on 2/14 for the following reason:    \"Giancarlo Patten, 79 y.o. male with PMHx significant for CAD status post stents, A. fib, on Coumadin, who presents with a chief complaint of lightheadedness and right-sided rib pain. Patient had a fall 4 days ago. He states that he slipped and fell while walking his dog. Landed on his right ribs. Is unsure if he hit his head. Has felt lightheaded when he bends over ever since the injury and has had some ongoing pain over his right lateral ribs. He has been taking Tylenol and using ice which she states helps his pain. \"    During the ER visit patient had a CT of his head and cervical spine as well as a CT of his chest.  The CT of his cervical spine found compression deformities at C5-6 and 7 along with severe canal and foraminal stenosis, a nonemergent MRI follow-up was recommended. Patient was recently switched from Eliquis to Coumadin. Patient reports that he continues to feel dizzy with quick changes in position and bending over. Subjective: (As above and below)     Chief Complaint   Patient presents with   [address] [facility] Follow Up     he is a 79y.o. year old male who presents for evaluation. Reviewed PmHx, RxHx, FmHx, SocHx, AllgHx and updated in chart.     Review of Systems - negative except as listed above    Objective:     Vitals:    02/23/22 1514   BP: 104/82   Pulse: 64   Resp: 16   Temp: 98.4 °F (36.9 °C)   TempSrc: Oral   SpO2: 98%   Weight: 177 lb 12.8 oz (80.6 kg)   Height: 5' 11\" (1.803 m)     Physical Examination: General appearance - alert, well appearing, and in no distress  Mental status - normal mood, behavior, speech, dress, motor activity, and thought processes  Mouth - mucous membranes moist, pharynx normal without lesions  Chest - clear to auscultation, no wheezes, rales or rhonchi, symmetric air entry, chest wall tenderness noted right lateral chest, fractured 6th rib noted during ER visit  Heart - normal rate, regular rhythm, normal S1, S2, no murmurs, rubs, clicks or gallops  Musculoskeletal - no joint tenderness, deformity or swelling  Extremities - peripheral pulses normal, no pedal edema, no clubbing or cyanosis    Assessment/ Plan:   1. Abnormal CT scan, cervical spine  -MRI ordered to follow up on CT abnormalities, pt denies neck pain other than with certain positions  - MRI CERV SPINE W WO CONT; Future    2. Balance problem  -reduce BP medication as written due to low readings  - MRI CERV SPINE W WO CONT; Future    3. Fall, initial encounter  -reduce BP medication to reduce dizziness     4. Primary hypertension  -reduced as written  - metoprolol succinate (TOPROL-XL) 25 mg XL tablet; Take 0.5 Tablets by mouth daily. Dispense: 90 Tablet; Refill: 3    5. Paroxysmal atrial fibrillation (HCC)  -follow anticoag calendar  - AMB POC PT/INR       I have discussed the diagnosis with the patient and the intended plan as seen in the above orders. The patient has received an after-visit summary and questions were answered concerning future plans.      Medication Side Effects and Warnings were discussed with patient: yes  Patient Labs were reviewed: yes  Patient Past Records were reviewed:  yes    Magaly Luo M.D.

## 2022-02-24 ENCOUNTER — TELEPHONE (OUTPATIENT)
Dept: CARDIOLOGY CLINIC | Age: 82
End: 2022-02-24

## 2022-02-24 NOTE — TELEPHONE ENCOUNTER
Patient is calling because he needs a card that says what type of device he has and if it's compatible to his pacemaker because he needs to have a MRI. They are unable to schedule him into he gets this information. If he is unable to get a card he would like the information fax over if possible to [address] [phone] fax     [phone] patient

## 2022-02-24 NOTE — TELEPHONE ENCOUNTER
Verified patient with two types of identifiers. Notified patient will ask MD to complete MRI form. Scheduled patient for device check on Tuesday 3/1/22 at 1:00 pm. Patient verbalized understanding and will call with any other questions.       Future Appointments   Date Time Provider Claudia Wang   3/1/2022  1:00 PM Juancarlos Avila BS AMB   3/7/2022 10:00 AM Darrion Alejandro MD [facility] BS AMB   8/4/2022  8:40 AM Runell Sever, MD [facility] BS AMB

## 2022-02-25 ENCOUNTER — TELEPHONE (OUTPATIENT)
Dept: CARDIOLOGY CLINIC | Age: 82
End: 2022-02-25

## 2022-02-25 ENCOUNTER — TELEPHONE (OUTPATIENT)
Dept: FAMILY MEDICINE CLINIC | Age: 82
End: 2022-02-25

## 2022-02-25 NOTE — TELEPHONE ENCOUNTER
----- Message from Emigdioyasmin Kd sent at 2/25/2022 10:49 AM EST -----  Subject: Message to Provider    QUESTIONS  Information for Provider? PATIENT CALLED TO NOTIFY DR MERRILL THAT HE WILL   NOT BE GETTING HIS MRI OF THE CERVICAL SPINE, BECAUSE HIS COPAY IS $350.00   AND HE CANNOT AFFORD IT. JUST WANTED TO LET THE DR AWARE.   ---------------------------------------------------------------------------  --------------  CALL BACK INFO  What is the best way for the office to contact you? Do not leave any   message, patient will call back for answer  Preferred Call Back Phone Number? [phone]  ---------------------------------------------------------------------------  --------------  SCRIPT ANSWERS  Relationship to Patient?  Self

## 2022-02-25 NOTE — TELEPHONE ENCOUNTER
Faxed MRI form to [facility] Dr. Lincoln Samples at fax number [phone]. Fax confirmation received.

## 2022-03-01 ENCOUNTER — CLINICAL SUPPORT (OUTPATIENT)
Dept: CARDIOLOGY CLINIC | Age: 82
End: 2022-03-01
Payer: MEDICARE

## 2022-03-01 DIAGNOSIS — Z95.810 AUTOMATIC IMPLANTABLE CARDIAC DEFIBRILLATOR IN SITU: Primary | ICD-10-CM

## 2022-03-01 PROCEDURE — 93282 PRGRMG EVAL IMPLANTABLE DFB: CPT | Performed by: INTERNAL MEDICINE

## 2022-03-07 ENCOUNTER — OFFICE VISIT (OUTPATIENT)
Dept: FAMILY MEDICINE CLINIC | Age: 82
End: 2022-03-07
Payer: MEDICARE

## 2022-03-07 VITALS
RESPIRATION RATE: 16 BRPM | WEIGHT: 174.6 LBS | OXYGEN SATURATION: 95 % | HEIGHT: 71 IN | SYSTOLIC BLOOD PRESSURE: 102 MMHG | TEMPERATURE: 97.3 F | HEART RATE: 68 BPM | BODY MASS INDEX: 24.44 KG/M2 | DIASTOLIC BLOOD PRESSURE: 66 MMHG

## 2022-03-07 DIAGNOSIS — R93.7 ABNORMAL CT SCAN, CERVICAL SPINE: ICD-10-CM
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
## 2022-03-07 DIAGNOSIS — I48.0 PAROXYSMAL ATRIAL FIBRILLATION (HCC): Primary | ICD-10-CM

## 2022-03-07 DIAGNOSIS — I10 PRIMARY HYPERTENSION: ICD-10-CM

## 2022-03-07 DIAGNOSIS — R07.81 RIB PAIN: ICD-10-CM

## 2022-03-07 LAB
INR BLD: 3.1
PT POC: 36.6 SECONDS
VALID INTERNAL CONTROL?: YES

## 2022-03-07 PROCEDURE — G8752 SYS BP LESS 140: HCPCS | Performed by: FAMILY MEDICINE

## 2022-03-07 PROCEDURE — G8754 DIAS BP LESS 90: HCPCS | Performed by: FAMILY MEDICINE

## 2022-03-07 PROCEDURE — 99214 OFFICE O/P EST MOD 30 MIN: CPT | Performed by: FAMILY MEDICINE

## 2022-03-07 PROCEDURE — G8420 CALC BMI NORM PARAMETERS: HCPCS | Performed by: FAMILY MEDICINE

## 2022-03-07 PROCEDURE — 1101F PT FALLS ASSESS-DOCD LE1/YR: CPT | Performed by: FAMILY MEDICINE

## 2022-03-07 PROCEDURE — G8427 DOCREV CUR MEDS BY ELIG CLIN: HCPCS | Performed by: FAMILY MEDICINE

## 2022-03-07 PROCEDURE — G8432 DEP SCR NOT DOC, RNG: HCPCS | Performed by: FAMILY MEDICINE

## 2022-03-07 PROCEDURE — 85610 PROTHROMBIN TIME: CPT | Performed by: FAMILY MEDICINE

## 2022-03-07 PROCEDURE — G8536 NO DOC ELDER MAL SCRN: HCPCS | Performed by: FAMILY MEDICINE

## 2022-03-07 RX ORDER — FINASTERIDE 5 MG/1
5 TABLET, FILM COATED ORAL DAILY
Qty: 90 TABLET | Refills: 3 | Status: SHIPPED
Start: 2022-03-07 | End: 2022-10-14

## 2022-03-07 RX ORDER — FINASTERIDE 5 MG/1
TABLET, FILM COATED ORAL
COMMUNITY
Start: 2022-02-26 | End: 2022-03-07 | Stop reason: SDUPTHER

## 2022-03-07 NOTE — PROGRESS NOTES
Health Maintenance Due   Topic Date Due    Shingrix Vaccine Age 49> (1 of 2) Never done    Flu Vaccine (1) 09/01/2021    Lipid Screen  02/15/2022     1. \"Have you been to the ER, urgent care clinic since your last visit? Hospitalized since your last visit? \" Yes. ED [facility] ED    2. \"Have you seen or consulted any other health care providers outside of the [address] [place] [address] since your last visit? \" No     3. For patients aged 39-70: Has the patient had a colonoscopy / FIT/ Cologuard? Yes - no Care Gap present      If the patient is female:    4. For patients aged 41-77: Has the patient had a mammogram within the past 2 years? NA - based on age or sex      11. For patients aged 21-65: Has the patient had a pap smear?  NA - based on age or sex

## 2022-03-07 NOTE — TELEPHONE ENCOUNTER
[phone] returning call Has been stable on fluoxetine 30 mg once daily and Wellbutrin although would consider scaling back on one or both of these medications which could be inducing some fatigue as can the gabapentin that she is taking.

## 2022-03-07 NOTE — PROGRESS NOTES
HPI  Olimpia Kang is a 79 y.o. male who presents for follow-up on dizziness, right rib fracture, fall that led to the emergency room. Emergency room visit 2/14, follow-up with this office 2/23. Significant findings in the emergency room were loss of vertebral height at C5-C6 and C7 and multilevel severe canal and foraminal stenosis of the C-spine. Thought to have a likely chronic degenerative cord compression but MRI is the right scan for that    Follow-up visit blood pressure found to be low and it was suggested that he stop his amlodipine because metoprolol in half. He is pretty sure that he cut the metoprolol in half but he thinks he still taking amlodipine. He is not a good historian for the medicines that he takes      PMHx:  Past Medical History:   Diagnosis Date    Arthritis     knees    CAD (coronary artery disease)     stent x3 approx 2001    Chronic atrial fibrillation ([facility] [address]     ED (erectile dysfunction)     Hypertension     Kidney stone on right side 10/30/2011    JAMEY (obstructive sleep apnea) 4/25/2012    Skin cancer     BCC, s/p Mohs on L flank    Sun-damaged skin     TIA (transient ischemic attack)     6/12 - seen at [facility]       Meds:   Current Outpatient Medications   Medication Sig Dispense Refill    finasteride (PROSCAR) 5 mg tablet Take 1 Tablet by mouth daily. 90 Tablet 3    metoprolol succinate (TOPROL-XL) 25 mg XL tablet Take 0.5 Tablets by mouth daily. 90 Tablet 3    lidocaine 4 % patch Apply one patch to affected area for 12 hours and the remove for 12 hours 5 Patch 0    warfarin (COUMADIN) 2.5 mg tablet One TABLET BY MOUTH DAILY 180 Tablet 2    enalapril (VASOTEC) 20 mg tablet TAKE ONE TABLET BY MOUTH TWICE A  Tablet 1    pravastatin (PRAVACHOL) 10 mg tablet TAKE 1 TABLET EVERY NIGHT 90 Tablet 3    tadalafiL (Cialis) 20 mg tablet Take 1 Tablet by mouth as needed (ED). 30 Tablet 11    aspirin delayed-release 81 mg tablet Take  by mouth daily.       FOLIC ACID PO Take 400 mcg by mouth daily.  MULTIVITAMIN WITH MINERALS (MULTI-VIT 55 PLUS PO) Take 1 Tab by mouth daily. Taking multivitamins every morning. Allergies: Allergies   Allergen Reactions    Oxycodone Hcl Other (comments)    Pcn [Penicillins] Unknown (comments)     As a child    Percocet [Oxycodone-Acetaminophen] Swelling     Leg swelling       Smoker:  Social History     Tobacco Use   Smoking Status Never Smoker   Smokeless Tobacco Never Used       ETOH:   Social History     Substance and Sexual Activity   Alcohol Use No    Alcohol/week: 0.0 standard drinks       FH:   Family History   Problem Relation Age of Onset    Stroke Father     Heart Attack Father     Heart Disease Father     Cancer Father 80        colon cancer    Heart Disease Mother     OSTEOARTHRITIS Sister         s/p bilateral knee replacements    No Known Problems Daughter     Anesth Problems Neg Hx        ROS:   As listed in HPI. In addition:  Constitutional:   No headache, fever, fatigue, weight loss or weight gain      Cardiac:    No chest pain      Resp:   No cough or shortness of breath      Neuro   No loss of consciousness, dizziness, seizures      Physical Exam:  Blood pressure 102/66, pulse 68, temperature 97.3 °F (36.3 °C), temperature source Temporal, resp. rate 16, height 5' 11\" (1.803 m), weight 174 lb 9.6 oz (79.2 kg), SpO2 95 %. GEN: No apparent distress. Alert and oriented and responds to all questions appropriately. NEUROLOGIC:  No focal neurologic deficits. Strength and sensation grossly intact. Coordination and gait grossly intact. EXT: Well perfused. No edema. SKIN: No obvious rashes. Lungs clear to auscultation bilaterally  CV regular rate rhythm no murmur       Assessment and Plan     A. fib  Anticoagulation with Coumadin  Was on Eliquis but has been having trouble completing the patient assistance paperwork  INR 3.1. Near goal.  Continue current dose recheck 2 weeks.     Hypertension  Recently hypotensive and may have contributed to his fall  Orthostatics today are only remarkable for a increase in pulse from sitting to standing. Blood pressure consistently 115/80 sitting standing and laying  Cutting metoprolol in half  Agree with stopping amlodipine 5 mg and reiterated this    In the past Flomax was the likely culprit. He is pretty sure he is not taking this  Restarted Proscar for BPH symptoms and is not clear whether he is taking this although he did  a 90-day supply in December according to a receipt that he had with him    Cervical canal stenosis  This could be contributing to a balance problem as well  He is established with orthospine. MRI ordered by this office will apparently not be covered by his insurance to the tune of $5000. Asked him to bring this issue up with his orthopedist  He does not currently have any neck pain    Rib fracture following fall  Has not been bothering him for the last few days. Evidence this is healing appropriately      ICD-10-CM ICD-9-CM    1. Paroxysmal atrial fibrillation (HCC)  I48.0 427.31 AMB POC PT/INR   2. Abnormal CT scan, cervical spine  R93.7 793.7    3. Fall, initial encounter  [address] XXXA E888.9    4. Rib pain  R07.81 786.50    5. Primary hypertension  I10 401.9        AVS given.  Pt expressed understanding of instructions

## 2022-03-18 PROBLEM — E66.9 OBESITY (BMI 30.0-34.9): Status: ACTIVE | Noted: 2017-12-18

## 2022-03-19 PROBLEM — Z79.01 CHRONIC ANTICOAGULATION: Status: ACTIVE | Noted: 2017-01-16

## 2022-03-22 RX ORDER — ENALAPRIL MALEATE 20 MG/1
TABLET ORAL
Qty: 180 TABLET | Refills: 1 | Status: SHIPPED | OUTPATIENT
Start: 2022-03-22 | End: 2022-08-18 | Stop reason: SDUPTHER

## 2022-03-22 NOTE — TELEPHONE ENCOUNTER
----- Message from Huitron sent at 3/22/2022  7:55 AM EDT -----  Subject: Refill Request    QUESTIONS  Name of Medication? enalapril (VASOTEC) 20 mg tablet  Patient-reported dosage and instructions? 20 mg twice daily  How many days do you have left? 7  [facility] [id number]  Pharmacy phone number (if available)? [phone]  ---------------------------------------------------------------------------  --------------  Brooks DIAZ  What is the best way for the office to contact you? OK to leave message on   voicemail  Preferred Call Back Phone Number?  [phone]

## 2022-04-04 ENCOUNTER — NURSE TRIAGE (OUTPATIENT)
Dept: OTHER | Facility: CLINIC | Age: 82
End: 2022-04-04

## 2022-04-04 ENCOUNTER — TELEPHONE (OUTPATIENT)
Dept: FAMILY MEDICINE CLINIC | Age: 82
End: 2022-04-04

## 2022-04-04 NOTE — TELEPHONE ENCOUNTER
Received call from [facility] with Red Flag Complaint. Subjective: Caller states \"he feels dizzy and cannot walk in a straight line. Concerned he cannot climb his ladder to prevent falling\"     Current Symptoms: dizziness    Onset: 1 month ago; sudden, gradual, improving    Associated Symptoms: reduced activity    Pain Severity: 0/10; n/a; n/a    Temperature: no n/a      What has been tried: dramamine and it worked some, not sure of dose    LMP: NA Pregnant: NA    Recommended disposition: Go to ED/UCC Now (Or to Office with PCP Approval). Caller refuses ED. Stated he would go to [facility] or call his PCP back later. Care advice provided, patient verbalizes understanding; denies any other questions or concerns; instructed to call back for any new or worsening symptoms. Patient/caller agrees to proceed to nearest [facility]     Attention Provider: Thank you for allowing me to participate in the care of your patient. The patient was connected to triage in response to information provided to the [facility]. Please do not respond through this encounter as the response is not directed to a shared pool.         Reason for Disposition   [1] Dizziness (vertigo) present now AND [2] one or more STROKE RISK FACTORS (i.e., hypertension, diabetes, prior stroke/TIA, heart attack)  (Exception: prior physician evaluation for this AND no different/worse than usual)    Protocols used: DIZZINESS - VERTIGO-ADULT-

## 2022-04-04 NOTE — TELEPHONE ENCOUNTER
----- Message from [facility] sent at 4/4/2022  1:10 PM EDT -----  Subject: Message to Provider    QUESTIONS  Information for Provider? Patient would like a Blood test for his   medication and he would also like his ears cleaned out. Patient says his   ear may be messing with his balance. Please follow up with patient   ---------------------------------------------------------------------------  --------------  CALL BACK INFO  What is the best way for the office to contact you? OK to leave message on   voicemail  Preferred Call Back Phone Number? [phone]  ---------------------------------------------------------------------------  --------------  SCRIPT ANSWERS  Relationship to Patient?  Self

## 2022-04-06 ENCOUNTER — CLINICAL SUPPORT (OUTPATIENT)
Dept: FAMILY MEDICINE CLINIC | Age: 82
End: 2022-04-06
Payer: MEDICARE

## 2022-04-06 DIAGNOSIS — I48.0 PAROXYSMAL ATRIAL FIBRILLATION (HCC): Primary | ICD-10-CM

## 2022-04-06 LAB
INR BLD: 1.6 (ref 1–1.5)
PT POC: 19.4 SECONDS (ref 9.1–12)
VALID INTERNAL CONTROL?: YES

## 2022-04-06 PROCEDURE — 85610 PROTHROMBIN TIME: CPT | Performed by: NURSE PRACTITIONER

## 2022-04-20 ENCOUNTER — CLINICAL SUPPORT (OUTPATIENT)
Dept: FAMILY MEDICINE CLINIC | Age: 82
End: 2022-04-20
Payer: MEDICARE

## 2022-04-20 DIAGNOSIS — I48.0 PAROXYSMAL ATRIAL FIBRILLATION (HCC): Primary | ICD-10-CM

## 2022-04-20 LAB
INR BLD: 1.9 (ref 1–1.5)
PT POC: 22.6 SECONDS (ref 9.1–12)
VALID INTERNAL CONTROL?: YES

## 2022-04-20 PROCEDURE — 85610 PROTHROMBIN TIME: CPT | Performed by: FAMILY MEDICINE

## 2022-04-20 RX ORDER — WARFARIN 2.5 MG/1
TABLET ORAL
Qty: 180 TABLET | Refills: 2 | Status: SHIPPED | OUTPATIENT
Start: 2022-04-20 | End: 2022-06-28 | Stop reason: SDUPTHER

## 2022-04-20 NOTE — PROGRESS NOTES
Addison Crawford is a 79 y.o. male who presents today for Anticoagulation monitoring. Indication: Atrial Fibrillation  INR Goal: 2.0-3.0. Current dose:  Coumadin 2.5 mg daily, except 5 mg Thur. Missed Coumadin Doses:  None  Medication Changes:  no  Dietary Changes:  no    Symptoms: taking coumadin appropriately without any bleeding. Latest INRs:  Lab Results   Component Value Date/Time    INR 2.5 (H) 02/14/2022 11:55 AM    INR 1.6 (H) 03/30/2021 02:23 PM    INR 1.7 (H) 02/10/2021 09:00 AM    INR POC 1.6 (A) 04/06/2022 10:45 AM    INR POC 3.1 03/07/2022 09:57 AM    INR POC 2.8 02/23/2022 04:08 PM    Prothrombin time 24.7 (H) 02/14/2022 11:55 AM    Prothrombin time 17.3 (H) 03/30/2021 02:23 PM    Prothrombin time 18.0 (H) 02/10/2021 09:00 AM        New Coumadin dose:.current treatment plan is effective, no change in therapy. Next check to be scheduled for  2 weeks.

## 2022-04-29 RX ORDER — PRAVASTATIN SODIUM 10 MG/1
TABLET ORAL
Qty: 90 TABLET | Refills: 3 | Status: SHIPPED | OUTPATIENT
Start: 2022-04-29 | End: 2022-07-25 | Stop reason: SDUPTHER

## 2022-04-29 NOTE — TELEPHONE ENCOUNTER
Pt is calling to refill    Requested Prescriptions     Pending Prescriptions Disp Refills    pravastatin (PRAVACHOL) 10 mg tablet 90 Tablet 3     Sig: TAKE 1 TABLET EVERY NIGHT     Pt has confirmed as Pri[facility] in [facility]

## 2022-05-06 ENCOUNTER — CLINICAL SUPPORT (OUTPATIENT)
Dept: FAMILY MEDICINE CLINIC | Age: 82
End: 2022-05-06

## 2022-05-06 DIAGNOSIS — I48.0 PAROXYSMAL ATRIAL FIBRILLATION (HCC): Primary | ICD-10-CM

## 2022-05-06 LAB
INR BLD: 2.7
PT POC: 32.9 SECONDS
VALID INTERNAL CONTROL?: YES

## 2022-05-06 PROCEDURE — 85610 PROTHROMBIN TIME: CPT | Performed by: FAMILY MEDICINE

## 2022-05-06 NOTE — PROGRESS NOTES
Gill Rutherford is a 79 y.o. male who presents today for Anticoagulation monitoring. Indication: Atrial Fibrillation  INR Goal: 2.0-3.0. Current dose:  Coumadin 2.5 mg daily, except 5 mg Thur. Missed Coumadin Doses:  None  Medication Changes:  no  Dietary Changes:  no    Symptoms: taking coumadin appropriately without any bleeding. Latest INRs:  Lab Results   Component Value Date/Time    INR 2.5 (H) 02/14/2022 11:55 AM    INR 1.6 (H) 03/30/2021 02:23 PM    INR 1.7 (H) 02/10/2021 09:00 AM    INR POC 2.7 05/06/2022 09:40 AM    INR POC 1.9 (A) 04/20/2022 09:01 AM    INR POC 1.6 (A) 04/06/2022 10:45 AM    Prothrombin time 24.7 (H) 02/14/2022 11:55 AM    Prothrombin time 17.3 (H) 03/30/2021 02:23 PM    Prothrombin time 18.0 (H) 02/10/2021 09:00 AM        New Coumadin dose:.current treatment plan is effective, no change in therapy. Next check to be scheduled for  2 weeks.

## 2022-05-20 ENCOUNTER — CLINICAL SUPPORT (OUTPATIENT)
Dept: FAMILY MEDICINE CLINIC | Age: 82
End: 2022-05-20
Payer: MEDICARE

## 2022-05-20 DIAGNOSIS — I48.0 PAROXYSMAL ATRIAL FIBRILLATION (HCC): Primary | ICD-10-CM

## 2022-05-20 LAB
INR BLD: 2.1
PT POC: 24.8 SECONDS
VALID INTERNAL CONTROL?: YES

## 2022-05-20 PROCEDURE — 85610 PROTHROMBIN TIME: CPT | Performed by: FAMILY MEDICINE

## 2022-05-20 NOTE — PROGRESS NOTES
Ivette Yoo is a 79 y.o. male who presents today for Anticoagulation monitoring. Indication: Atrial Fibrillation  INR Goal: 2.0-3.0. Current dose:  Coumadin 2.5 mg daily, except 5 mg Thursday. Missed Coumadin Doses:  None  Medication Changes:  no  Dietary Changes:  no    Symptoms: taking coumadin appropriately without any bleeding. Latest INRs:  Lab Results   Component Value Date/Time    INR 2.5 (H) 02/14/2022 11:55 AM    INR 1.6 (H) 03/30/2021 02:23 PM    INR 1.7 (H) 02/10/2021 09:00 AM    INR POC 2.7 05/06/2022 09:40 AM    INR POC 1.9 (A) 04/20/2022 09:01 AM    INR POC 1.6 (A) 04/06/2022 10:45 AM    Prothrombin time 24.7 (H) 02/14/2022 11:55 AM    Prothrombin time 17.3 (H) 03/30/2021 02:23 PM    Prothrombin time 18.0 (H) 02/10/2021 09:00 AM        New Coumadin dose:.current treatment plan is effective, no change in therapy. Next check to be scheduled for  2 weeks.

## 2022-05-26 RX ORDER — AMLODIPINE BESYLATE 5 MG/1
TABLET ORAL
Qty: 90 TABLET | Refills: 3 | Status: CANCELLED | OUTPATIENT
Start: 2022-05-26

## 2022-05-26 NOTE — TELEPHONE ENCOUNTER
Patient called in refill for Amlodipine. However, script appears to be discontinued. Patient was informed of the status of the script and was told that staff would call back if there were any issues. Patient voiced understanding.     PCP: Mi Mauro MD    Last appt: 5/20/2022  Future Appointments   Date Time Provider Claudia Wang   6/2/2022  9:40 AM [email or web address], Dipesh Donato BS AMB   6/3/2022  9:00 AM NURSE-ROBERT [facility] BS AMB   8/4/2022  8:40 AM MD GERARD Gil  AMB       Requested Prescriptions     Pending Prescriptions Disp Refills    amLODIPine (NORVASC) 5 mg tablet 90 Tablet 3     Sig: TAKE 1 TABLET EVERY DAY       Prior labs and Blood pressures:  BP Readings from Last 3 Encounters:   03/07/22 102/66   02/23/22 104/82   02/14/22 137/88     Lab Results   Component Value Date/Time    Sodium 145 02/14/2022 11:13 AM    Potassium 4.0 02/14/2022 11:13 AM    Chloride 111 (H) 02/14/2022 11:13 AM    CO2 34 (H) 02/14/2022 11:13 AM    Anion gap 0 (L) 02/14/2022 11:13 AM    Glucose 85 02/14/2022 11:13 AM    BUN 25 (H) 02/14/2022 11:13 AM    Creatinine 0.95 02/14/2022 11:13 AM    BUN/Creatinine ratio 26 (H) 02/14/2022 11:13 AM    GFR est AA >60 02/14/2022 11:13 AM    GFR est non-AA >60 02/14/2022 11:13 AM    Calcium 8.6 02/14/2022 11:13 AM

## 2022-05-26 NOTE — TELEPHONE ENCOUNTER
We stopped amlodipine a few months ago because it was lowering his blood pressure and making him dizzy.     Do not refill and make sure he stopped taking it

## 2022-05-31 ENCOUNTER — TELEPHONE (OUTPATIENT)
Dept: FAMILY MEDICINE CLINIC | Age: 82
End: 2022-05-31

## 2022-06-03 ENCOUNTER — TELEPHONE (OUTPATIENT)
Dept: CARDIOLOGY CLINIC | Age: 82
End: 2022-06-03

## 2022-06-03 NOTE — TELEPHONE ENCOUNTER
Called patient to reschedule appointment on 08/04/2022 with Dr Shaila Yousif. Dr Jaciel Ruiz will be covering at the hospital this week. Patient's voicemail box was full. Will send letter to let patient know appointment has been cancelled.      [facility]

## 2022-06-06 RX ORDER — AMLODIPINE BESYLATE 5 MG/1
TABLET ORAL
Qty: 90 TABLET | Refills: 3 | OUTPATIENT
Start: 2022-06-06

## 2022-06-14 ENCOUNTER — CLINICAL SUPPORT (OUTPATIENT)
Dept: CARDIOLOGY CLINIC | Age: 82
End: 2022-06-14
Payer: MEDICARE

## 2022-06-14 DIAGNOSIS — Z95.810 AUTOMATIC IMPLANTABLE CARDIAC DEFIBRILLATOR IN SITU: Primary | ICD-10-CM

## 2022-06-14 PROCEDURE — 93289 INTERROG DEVICE EVAL HEART: CPT | Performed by: INTERNAL MEDICINE

## 2022-06-20 ENCOUNTER — CLINICAL SUPPORT (OUTPATIENT)
Dept: FAMILY MEDICINE CLINIC | Age: 82
End: 2022-06-20
Payer: MEDICARE

## 2022-06-20 DIAGNOSIS — I48.0 PAROXYSMAL ATRIAL FIBRILLATION (HCC): Primary | ICD-10-CM

## 2022-06-20 LAB
INR BLD: 1.7 (ref 1–1.5)
PT POC: 21 SECONDS (ref 9.1–12)
VALID INTERNAL CONTROL?: YES

## 2022-06-20 PROCEDURE — 85610 PROTHROMBIN TIME: CPT | Performed by: FAMILY MEDICINE

## 2022-06-28 RX ORDER — WARFARIN 2.5 MG/1
TABLET ORAL
Qty: 180 TABLET | Refills: 2 | Status: SHIPPED | OUTPATIENT
Start: 2022-06-28 | End: 2022-09-13 | Stop reason: SDUPTHER

## 2022-06-28 NOTE — TELEPHONE ENCOUNTER
Pt is calling refill on med    Requested Prescriptions     Pending Prescriptions Disp Refills    warfarin (COUMADIN) 2.5 mg tablet 180 Tablet 2     Sig: One TABLET BY MOUTH DAILY

## 2022-07-08 ENCOUNTER — OFFICE VISIT (OUTPATIENT)
Dept: FAMILY MEDICINE CLINIC | Age: 82
End: 2022-07-08
Payer: MEDICARE

## 2022-07-08 VITALS
DIASTOLIC BLOOD PRESSURE: 89 MMHG | SYSTOLIC BLOOD PRESSURE: 139 MMHG | TEMPERATURE: 98.2 F | WEIGHT: 175.8 LBS | HEART RATE: 63 BPM | OXYGEN SATURATION: 97 % | RESPIRATION RATE: 16 BRPM | HEIGHT: 71 IN | BODY MASS INDEX: 24.61 KG/M2

## 2022-07-08 DIAGNOSIS — I10 PRIMARY HYPERTENSION: ICD-10-CM

## 2022-07-08 DIAGNOSIS — N40.1 BENIGN PROSTATIC HYPERPLASIA WITH NOCTURIA: ICD-10-CM

## 2022-07-08 DIAGNOSIS — Z79.01 CHRONIC ANTICOAGULATION: ICD-10-CM

## 2022-07-08 DIAGNOSIS — R35.1 NOCTURIA: ICD-10-CM

## 2022-07-08 DIAGNOSIS — R26.89 BALANCE PROBLEM: ICD-10-CM

## 2022-07-08 DIAGNOSIS — R35.1 BENIGN PROSTATIC HYPERPLASIA WITH NOCTURIA: ICD-10-CM

## 2022-07-08 DIAGNOSIS — I48.0 PAROXYSMAL ATRIAL FIBRILLATION (HCC): Primary | ICD-10-CM

## 2022-07-08 LAB
INR BLD: 1.8
PT POC: 21.5 SECONDS
VALID INTERNAL CONTROL?: YES

## 2022-07-08 PROCEDURE — 85610 PROTHROMBIN TIME: CPT | Performed by: FAMILY MEDICINE

## 2022-07-08 PROCEDURE — G8510 SCR DEP NEG, NO PLAN REQD: HCPCS | Performed by: FAMILY MEDICINE

## 2022-07-08 PROCEDURE — 99214 OFFICE O/P EST MOD 30 MIN: CPT | Performed by: FAMILY MEDICINE

## 2022-07-08 PROCEDURE — G8536 NO DOC ELDER MAL SCRN: HCPCS | Performed by: FAMILY MEDICINE

## 2022-07-08 PROCEDURE — 1123F ACP DISCUSS/DSCN MKR DOCD: CPT | Performed by: FAMILY MEDICINE

## 2022-07-08 PROCEDURE — G8420 CALC BMI NORM PARAMETERS: HCPCS | Performed by: FAMILY MEDICINE

## 2022-07-08 PROCEDURE — 1101F PT FALLS ASSESS-DOCD LE1/YR: CPT | Performed by: FAMILY MEDICINE

## 2022-07-08 PROCEDURE — G8427 DOCREV CUR MEDS BY ELIG CLIN: HCPCS | Performed by: FAMILY MEDICINE

## 2022-07-08 PROCEDURE — G8754 DIAS BP LESS 90: HCPCS | Performed by: FAMILY MEDICINE

## 2022-07-08 PROCEDURE — G8752 SYS BP LESS 140: HCPCS | Performed by: FAMILY MEDICINE

## 2022-07-08 RX ORDER — TADALAFIL 5 MG/1
5 TABLET ORAL DAILY
Qty: 90 TABLET | Refills: 3 | Status: SHIPPED | OUTPATIENT
Start: 2022-07-08 | End: 2022-07-08 | Stop reason: SDUPTHER

## 2022-07-08 RX ORDER — TADALAFIL 5 MG/1
5 TABLET ORAL DAILY
Qty: 90 TABLET | Refills: 3 | Status: SHIPPED | OUTPATIENT
Start: 2022-07-08

## 2022-07-08 NOTE — PROGRESS NOTES
IMAN Lara is a 79 y.o. male who is to follow-up on dizziness/balance problem. He is in a new relationship and has been taking an OTC supplement containing nitric oxide every day so that he will be ready for intercourse. He does find it useful but he does notice that since very taking it that is when his dizziness complaint started. He has a longer standing balance problem where he will feel like he has pulled off balance by his dog. He does not have a problem bending over to clean up after the dog    PMHx:  Past Medical History:   Diagnosis Date    Arthritis     knees    CAD (coronary artery disease)     stent x3 approx 2001    Chronic atrial fibrillation ([facility] [address]     ED (erectile dysfunction)     Hypertension     Kidney stone on right side 10/30/2011    JAMEY (obstructive sleep apnea) 4/25/2012    Skin cancer     BCC, s/p Mohs on L flank    Sun-damaged skin     TIA (transient ischemic attack)     6/12 - seen at [facility]       Meds:   Current Outpatient Medications   Medication Sig Dispense Refill    tadalafiL (CIALIS) 5 mg tablet Take 1 Tablet by mouth daily. 90 Tablet 3    warfarin (COUMADIN) 2.5 mg tablet One TABLET BY MOUTH DAILY 180 Tablet 2    pravastatin (PRAVACHOL) 10 mg tablet TAKE 1 TABLET EVERY NIGHT 90 Tablet 3    enalapril (VASOTEC) 20 mg tablet TAKE ONE TABLET BY MOUTH TWICE A  Tablet 1    finasteride (PROSCAR) 5 mg tablet Take 1 Tablet by mouth daily. 90 Tablet 3    metoprolol succinate (TOPROL-XL) 25 mg XL tablet Take 0.5 Tablets by mouth daily. 90 Tablet 3    lidocaine 4 % patch Apply one patch to affected area for 12 hours and the remove for 12 hours 5 Patch 0    aspirin delayed-release 81 mg tablet Take  by mouth daily.  FOLIC ACID PO Take 777 mcg by mouth daily.  MULTIVITAMIN WITH MINERALS (MULTI-VIT 55 PLUS PO) Take 1 Tab by mouth daily. Taking multivitamins every morning. Allergies:    Allergies   Allergen Reactions    Oxycodone Hcl Other (comments)    Pcn [Penicillins] Unknown (comments)     As a child    Percocet [Oxycodone-Acetaminophen] Swelling     Leg swelling       Smoker:  Social History     Tobacco Use   Smoking Status Never Smoker   Smokeless Tobacco Never Used       ETOH:   Social History     Substance and Sexual Activity   Alcohol Use No    Alcohol/week: 0.0 standard drinks       FH:   Family History   Problem Relation Age of Onset    Stroke Father     Heart Attack Father     Heart Disease Father     Cancer Father 80        colon cancer    Heart Disease Mother     OSTEOARTHRITIS Sister         s/p bilateral knee replacements    No Known Problems Daughter     Anesth Problems Neg Hx        ROS:   As listed in HPI. In addition:  Constitutional:   No headache, fever, fatigue, weight loss or weight gain      Cardiac:    No chest pain      Resp:   No cough or shortness of breath      Neuro   No loss of consciousness, dizziness, seizures      Physical Exam:  Blood pressure 139/89, pulse 63, temperature 98.2 °F (36.8 °C), temperature source Temporal, resp. rate 16, height 5' 11\" (1.803 m), weight 175 lb 12.8 oz (79.7 kg), SpO2 97 %. GEN: No apparent distress. Alert and oriented and responds to all questions appropriately. NEUROLOGIC:  No focal neurologic deficits. Strength and sensation grossly intact. Coordination and gait grossly intact. EXT: Well perfused. No edema. SKIN: No obvious rashes. Gait is wide-based, but very stiff. He is unable to internally or externally rotate his hips actively but passive rotation is range of motion at the hips is intact. Orthostatic vitals are unremarkable. Romberg is unremarkable vibratory sense is intact, reflexes are intact       Assessment and Plan     A. fib  INR 1.8.  10% increase and recheck 2 weeks    Dizziness  Is taking a nitric oxide containing product for erectile dysfunction. This is probably causing orthostatic dizziness.   He has stopped this medicine for the last few days and that appears to have resolved. Erectile dysfunction/BPH  He responded well to Cialis 20 mg but wishes he was taking a medicine every day. 5 mg Cialis may help with both his BPH and erectile dysfunction. Warned of the potential side effect of dizziness. Balance problem  This is primarily muscular. He has had hip and knee replacements. He is not capable of active range of motion internal and external rotation at the hip although the joint is capable of tolerating this passively. He needs to work on abductor's and adductor's hip. Refer to physical therapy      ICD-10-CM ICD-9-CM    1. Paroxysmal atrial fibrillation (HCC)  I48.0 427.31 AMB POC PT/INR   2. Nocturia  R35.1 788.43    3. Balance problem  R26.89 781.99 REFERRAL TO PHYSICAL THERAPY   4. Primary hypertension  I10 401.9    5. Chronic anticoagulation  Z79.01 V58.61    6. Benign prostatic hyperplasia with nocturia  N40.1 600.01 tadalafiL (CIALIS) 5 mg tablet    R35.1 788.43        AVS given.  Pt expressed understanding of instructions

## 2022-07-08 NOTE — PROGRESS NOTES
Health Maintenance Due   Topic Date Due    Shingrix Vaccine Age 49> (1 of 2) Never done    Lipid Screen  02/15/2022     1. \"Have you been to the ER, urgent care clinic since your last visit? Hospitalized since your last visit? \" No    2. \"Have you seen or consulted any other health care providers outside of the [address] [place] [address] since your last visit? \" No     3. For patients aged 39-70: Has the patient had a colonoscopy / FIT/ Cologuard? NA - based on age      If the patient is female:    4. For patients aged 41-77: Has the patient had a mammogram within the past 2 years? NA - based on age or sex      11. For patients aged 21-65: Has the patient had a pap smear? NA - based on age or sex    Rosibel Greenberg is a 79 y.o. male who presents today for Anticoagulation monitoring. Indication: Atrial Fibrillation  INR Goal: 2.0-3.0. Current dose:  Coumadin 2.5 mg daily, except 5 mg daily. Missed Coumadin Doses:  None  Medication Changes:  no  Dietary Changes:  no    Symptoms: taking coumadin appropriately without any bleeding.     Latest INRs:  Lab Results   Component Value Date/Time    INR 2.5 (H) 02/14/2022 11:55 AM    INR 1.6 (H) 03/30/2021 02:23 PM    INR 1.7 (H) 02/10/2021 09:00 AM    INR POC 1.7 (A) 06/20/2022 09:40 AM    INR POC 2.1 05/20/2022 09:32 AM    INR POC 2.7 05/06/2022 09:40 AM    Prothrombin time 24.7 (H) 02/14/2022 11:55 AM    Prothrombin time 17.3 (H) 03/30/2021 02:23 PM    Prothrombin time 18.0 (H) 02/10/2021 09:00 AM

## 2022-07-15 RX ORDER — AMLODIPINE BESYLATE 5 MG/1
TABLET ORAL
Qty: 90 TABLET | Refills: 3 | OUTPATIENT
Start: 2022-07-15

## 2022-07-15 NOTE — TELEPHONE ENCOUNTER
Refill request (pt calling)    Requested Prescriptions     Pending Prescriptions Disp Refills    amLODIPine (NORVASC) 5 mg tablet 90 Tablet 3     Sig: TAKE 1 TABLET EVERY DAY     Pharmacy confirmed as [facility] [id number] [facility] [address]    Pt does have enough for the weekend

## 2022-07-15 NOTE — TELEPHONE ENCOUNTER
I hope that he is not currently taking this medication. It was stopped because it was making him dizzy.   Please stop taking this and do not refill

## 2022-07-22 ENCOUNTER — CLINICAL SUPPORT (OUTPATIENT)
Dept: FAMILY MEDICINE CLINIC | Age: 82
End: 2022-07-22
Payer: MEDICARE

## 2022-07-22 DIAGNOSIS — I48.0 PAROXYSMAL ATRIAL FIBRILLATION (HCC): Primary | ICD-10-CM

## 2022-07-22 LAB
INR BLD: 2.3
PT POC: 27.2 SECONDS
VALID INTERNAL CONTROL?: YES

## 2022-07-22 PROCEDURE — 85610 PROTHROMBIN TIME: CPT | Performed by: FAMILY MEDICINE

## 2022-07-22 NOTE — PROGRESS NOTES
Diamond Beckman is a 79 y.o. male who presents today for Anticoagulation monitoring. Indication: Atrial Fibrillation  INR Goal: 2.0-3.0. Missed Coumadin Doses:  None  Medication Changes:  no  Dietary Changes:  no    Latest INRs:  Lab Results   Component Value Date/Time    INR 2.5 (H) 02/14/2022 11:55 AM    INR 1.6 (H) 03/30/2021 02:23 PM    INR 1.7 (H) 02/10/2021 09:00 AM    INR POC 1.8 07/08/2022 08:42 AM    INR POC 1.7 (A) 06/20/2022 09:40 AM    INR POC 2.1 05/20/2022 09:32 AM    Prothrombin time 24.7 (H) 02/14/2022 11:55 AM    Prothrombin time 17.3 (H) 03/30/2021 02:23 PM    Prothrombin time 18.0 (H) 02/10/2021 09:00 AM        New Coumadin dose:.current treatment plan is effective, no change in therapy. Next check to be scheduled for  2 weeks.

## 2022-07-25 RX ORDER — PRAVASTATIN SODIUM 10 MG/1
TABLET ORAL
Qty: 90 TABLET | Refills: 3 | Status: SHIPPED | OUTPATIENT
Start: 2022-07-25 | End: 2022-10-17 | Stop reason: SDUPTHER

## 2022-07-25 NOTE — TELEPHONE ENCOUNTER
Refill Request (pt calling)     Requested Prescriptions     Pending Prescriptions Disp Refills    pravastatin (PRAVACHOL) 10 mg tablet 90 Tablet 3     Sig: TAKE 1 TABLET EVERY NIGHT       Thank You

## 2022-08-05 ENCOUNTER — CLINICAL SUPPORT (OUTPATIENT)
Dept: FAMILY MEDICINE CLINIC | Age: 82
End: 2022-08-05
Payer: MEDICARE

## 2022-08-05 DIAGNOSIS — I48.0 PAROXYSMAL ATRIAL FIBRILLATION (HCC): Primary | ICD-10-CM

## 2022-08-05 LAB
INR BLD: 2.5 (ref 1–1.5)
PT POC: 29.4 SECONDS (ref 9.1–12)
VALID INTERNAL CONTROL?: YES

## 2022-08-05 PROCEDURE — 85610 PROTHROMBIN TIME: CPT | Performed by: FAMILY MEDICINE

## 2022-08-10 ENCOUNTER — APPOINTMENT (OUTPATIENT)
Dept: CT IMAGING | Age: 82
End: 2022-08-10
Attending: EMERGENCY MEDICINE
Payer: MEDICARE

## 2022-08-10 ENCOUNTER — TELEPHONE (OUTPATIENT)
Dept: FAMILY MEDICINE CLINIC | Age: 82
End: 2022-08-10

## 2022-08-10 ENCOUNTER — NURSE TRIAGE (OUTPATIENT)
Dept: OTHER | Facility: CLINIC | Age: 82
End: 2022-08-10

## 2022-08-10 ENCOUNTER — HOSPITAL ENCOUNTER (EMERGENCY)
Age: 82
Discharge: HOME | End: 2022-08-10
Attending: EMERGENCY MEDICINE
Payer: MEDICARE

## 2022-08-10 VITALS
SYSTOLIC BLOOD PRESSURE: 160 MMHG | HEIGHT: 71 IN | OXYGEN SATURATION: 99 % | WEIGHT: 175.93 LBS | TEMPERATURE: 98.3 F | DIASTOLIC BLOOD PRESSURE: 95 MMHG | BODY MASS INDEX: 24.63 KG/M2 | RESPIRATION RATE: 16 BRPM | HEART RATE: 58 BPM

## 2022-08-10 DIAGNOSIS — N30.00 ACUTE CYSTITIS WITHOUT HEMATURIA: Primary | ICD-10-CM

## 2022-08-10 LAB
ALBUMIN SERPL-MCNC: 3.1 G/DL (ref 3.5–5)
ALBUMIN/GLOB SERPL: 1 {RATIO} (ref 1.1–2.2)
ALP SERPL-CCNC: 89 U/L (ref 45–117)
ALT SERPL-CCNC: 17 U/L (ref 12–78)
ANION GAP SERPL CALC-SCNC: 7 MMOL/L (ref 5–15)
APPEARANCE UR: ABNORMAL
AST SERPL-CCNC: 27 U/L (ref 15–37)
ATRIAL RATE: 81 BPM
BACTERIA URNS QL MICRO: ABNORMAL /HPF
BASOPHILS # BLD: 0 K/UL (ref 0–0.1)
BASOPHILS NFR BLD: 1 % (ref 0–1)
BILIRUB SERPL-MCNC: 0.8 MG/DL (ref 0.2–1)
BILIRUB UR QL: NEGATIVE
BUN SERPL-MCNC: 12 MG/DL (ref 6–20)
BUN/CREAT SERPL: 14 (ref 12–20)
CALCIUM SERPL-MCNC: 8.6 MG/DL (ref 8.5–10.1)
CALCULATED P AXIS, ECG09: 79 DEGREES
CALCULATED R AXIS, ECG10: -78 DEGREES
CALCULATED T AXIS, ECG11: 100 DEGREES
CHLORIDE SERPL-SCNC: 106 MMOL/L (ref 97–108)
CO2 SERPL-SCNC: 30 MMOL/L (ref 21–32)
COLOR UR: ABNORMAL
CREAT SERPL-MCNC: 0.84 MG/DL (ref 0.7–1.3)
DIAGNOSIS, 93000: NORMAL
DIFFERENTIAL METHOD BLD: ABNORMAL
EOSINOPHIL # BLD: 0.2 K/UL (ref 0–0.4)
EOSINOPHIL NFR BLD: 5 % (ref 0–7)
EPITH CASTS URNS QL MICRO: ABNORMAL /LPF
ERYTHROCYTE [DISTWIDTH] IN BLOOD BY AUTOMATED COUNT: 13.2 % (ref 11.5–14.5)
GLOBULIN SER CALC-MCNC: 3.2 G/DL (ref 2–4)
GLUCOSE SERPL-MCNC: 79 MG/DL (ref 65–100)
GLUCOSE UR STRIP.AUTO-MCNC: NEGATIVE MG/DL
HCT VFR BLD AUTO: 37.5 % (ref 36.6–50.3)
HGB BLD-MCNC: 12 G/DL (ref 12.1–17)
HGB UR QL STRIP: ABNORMAL
IMM GRANULOCYTES # BLD AUTO: 0 K/UL (ref 0–0.04)
IMM GRANULOCYTES NFR BLD AUTO: 0 % (ref 0–0.5)
INR PPP: 2 (ref 0.9–1.1)
KETONES UR QL STRIP.AUTO: NEGATIVE MG/DL
LEUKOCYTE ESTERASE UR QL STRIP.AUTO: ABNORMAL
LYMPHOCYTES # BLD: 0.9 K/UL (ref 0.8–3.5)
LYMPHOCYTES NFR BLD: 18 % (ref 12–49)
MCH RBC QN AUTO: 32.1 PG (ref 26–34)
MCHC RBC AUTO-ENTMCNC: 32 G/DL (ref 30–36.5)
MCV RBC AUTO: 100.3 FL (ref 80–99)
MONOCYTES # BLD: 0.5 K/UL (ref 0–1)
MONOCYTES NFR BLD: 10 % (ref 5–13)
NEUTS SEG # BLD: 3.2 K/UL (ref 1.8–8)
NEUTS SEG NFR BLD: 66 % (ref 32–75)
NITRITE UR QL STRIP.AUTO: POSITIVE
NRBC # BLD: 0 K/UL (ref 0–0.01)
NRBC BLD-RTO: 0 PER 100 WBC
P-R INTERVAL, ECG05: 188 MS
PH UR STRIP: 5.5 [PH] (ref 5–8)
PLATELET # BLD AUTO: 136 K/UL (ref 150–400)
PMV BLD AUTO: 9.2 FL (ref 8.9–12.9)
POTASSIUM SERPL-SCNC: 3.5 MMOL/L (ref 3.5–5.1)
PROT SERPL-MCNC: 6.3 G/DL (ref 6.4–8.2)
PROT UR STRIP-MCNC: 30 MG/DL
PROTHROMBIN TIME: 20.1 SEC (ref 9–11.1)
Q-T INTERVAL, ECG07: 478 MS
QRS DURATION, ECG06: 202 MS
QTC CALCULATION (BEZET), ECG08: 555 MS
RBC # BLD AUTO: 3.74 M/UL (ref 4.1–5.7)
RBC #/AREA URNS HPF: ABNORMAL /HPF (ref 0–5)
SODIUM SERPL-SCNC: 143 MMOL/L (ref 136–145)
SP GR UR REFRACTOMETRY: 1.01
UROBILINOGEN UR QL STRIP.AUTO: 1 EU/DL (ref 0.2–1)
VENTRICULAR RATE, ECG03: 81 BPM
WBC # BLD AUTO: 4.9 K/UL (ref 4.1–11.1)
WBC URNS QL MICRO: >100 /HPF (ref 0–4)

## 2022-08-10 PROCEDURE — 70450 CT HEAD/BRAIN W/O DYE: CPT

## 2022-08-10 PROCEDURE — 70496 CT ANGIOGRAPHY HEAD: CPT

## 2022-08-10 PROCEDURE — 74011000636 HC RX REV CODE- 636: Performed by: EMERGENCY MEDICINE

## 2022-08-10 PROCEDURE — 74011250636 HC RX REV CODE- 250/636: Performed by: EMERGENCY MEDICINE

## 2022-08-10 PROCEDURE — 74011000258 HC RX REV CODE- 258: Performed by: EMERGENCY MEDICINE

## 2022-08-10 PROCEDURE — 96365 THER/PROPH/DIAG IV INF INIT: CPT

## 2022-08-10 PROCEDURE — 36415 COLL VENOUS BLD VENIPUNCTURE: CPT

## 2022-08-10 PROCEDURE — 99285 EMERGENCY DEPT VISIT HI MDM: CPT

## 2022-08-10 PROCEDURE — 85610 PROTHROMBIN TIME: CPT

## 2022-08-10 PROCEDURE — 85025 COMPLETE CBC W/AUTO DIFF WBC: CPT

## 2022-08-10 PROCEDURE — 80053 COMPREHEN METABOLIC PANEL: CPT

## 2022-08-10 PROCEDURE — 93005 ELECTROCARDIOGRAM TRACING: CPT

## 2022-08-10 PROCEDURE — 81001 URINALYSIS AUTO W/SCOPE: CPT

## 2022-08-10 RX ORDER — CEFDINIR 300 MG/1
300 CAPSULE ORAL 2 TIMES DAILY
Qty: 14 CAPSULE | Refills: 0 | Status: SHIPPED | OUTPATIENT
Start: 2022-08-10 | End: 2022-08-10

## 2022-08-10 RX ORDER — CEFDINIR 300 MG/1
300 CAPSULE ORAL 2 TIMES DAILY
Qty: 14 CAPSULE | Refills: 0 | Status: SHIPPED | OUTPATIENT
Start: 2022-08-10 | End: 2022-08-17

## 2022-08-10 RX ADMIN — SODIUM CHLORIDE 1 G: 900 INJECTION INTRAVENOUS at 18:01

## 2022-08-10 RX ADMIN — IOPAMIDOL 100 ML: 755 INJECTION, SOLUTION INTRAVENOUS at 15:43

## 2022-08-10 NOTE — DISCHARGE INSTRUCTIONS
It was a pleasure taking care of you at [facility] Emergency Department today. We know that when you come to [facility], you are entrusting us with your health, comfort, and safety. Our physicians and nurses honor that trust, and we truly appreciate the opportunity to care for you and your loved ones. We also value your feedback. If you receive a survey about your Emergency Department experience today, please fill it out. We care about our patients' feedback, and we listen to what you have to say. Thank you!

## 2022-08-10 NOTE — TELEPHONE ENCOUNTER
Pt states he is feeling very unsteady and extremely dizzy today. Recommended for him to go to ER and to have a  due to not having available appts in office.   Pt states, he would have his girlfriend take him to [facility]>

## 2022-08-10 NOTE — TELEPHONE ENCOUNTER
Received call from [address] at [facility] with Red Flag Complaint. Subjective: Caller states \"having some vertigo, is off balance\"     Current Symptoms: feeling some vertigo, mainly when walking, feels off balance. No fainting or falling  No dizziness when sitting  Denies chest pain or difficulty breathing  Headache yesterday    Onset: a few weeks ago; intermittent    Associated Symptoms: NA    Pain Severity: denies    Temperature: no fever     What has been tried:     LMP: NA Pregnant: NA    Recommended disposition: See in Office Today    Care advice provided, patient verbalizes understanding; denies any other questions or concerns; instructed to call back for any new or worsening symptoms. Patient/Caller agrees with recommended disposition; writer provided warm transfer to Lynda Helton at [facility] for appointment scheduling    Attention Provider: Thank you for allowing me to participate in the care of your patient. The patient was connected to triage in response to information provided to the ECC. Please do not respond through this encounter as the response is not directed to a shared pool.     Reason for Disposition   Patient wants to be seen    Protocols used: Dizziness-ADULT-[place]

## 2022-08-10 NOTE — ED PROVIDER NOTES
EMERGENCY DEPARTMENT HISTORY AND PHYSICAL EXAM           Date: 8/10/2022  Patient Name: Geraldo Elizondo  Patient Age and Sex: 79 y.o. male  MRN:  [id number]  CSN:  [id number]    History of Presenting Illness     Chief Complaint   Patient presents with    Gait Problem     Two weeks ago off balance. No numbness. No dizziness. Describes walks where he goes to one side or the other. History Provided By: Patient    Ability to gather history was limited by:     HPI: Geraldo Elizondo, 79 y.o. male with history of coronary artery disease and 3 stents, atrial fibrillation, TIA, who takes aspirin and warfarin, complains of approximately 2 weeks of feeling off balance and mild gait problems which sounds like mild ataxia when walking. He has not experienced vertigo. Yesterday felt as though his right arm and hand were mildly numb but not weak, also had mild headache yesterday. He does not have any symptoms today. No weakness or numbness or ataxia symptoms today. Symptoms are mild severity recently. Location:    Quality:      Severity:    Duration:   Timing:      Context:    Modifying factors:   Associated symptoms:     Past History     The patient's medical, surgical, and social history on file were reviewed by me today.     The family history was reviewed by me today and was non-contributory, unless otherwise specified below:    Past Medical History:  Past Medical History:   Diagnosis Date    Arthritis     knees    CAD (coronary artery disease)     stent x3 approx 2001    Chronic atrial fibrillation [facility])     ED (erectile dysfunction)     Hypertension     Kidney stone on right side 10/30/2011    JAMEY (obstructive sleep apnea) 4/25/2012    Skin cancer     BCC, s/p Mohs on L flank    Sun-damaged skin     TIA (transient ischemic attack)     6/12 - [facility] at [facility]       Past Surgical History:  Past Surgical History:   Procedure Laterality Date    HX APPENDECTOMY  age 15    HX CATARACT REMOVAL Bilateral     HX COLONOSCOPY  05/10/2012    hyperplastic    HX CYSTOSTOMY  1/23/14    diffuse erythema    HX HERNIA REPAIR Bilateral     inguinal hernia    HX HERNIA REPAIR  09/12/2017    L inguinal hernia repair    HX HIP REPLACEMENT Bilateral     HX HIP REPLACEMENT Right 12/4/13    REVISION     HX IMPLANTABLE CARDIOVERTER DEFIBRILLATOR  11/15/13    + PM    HX KNEE REPLACEMENT Right 7/2011    HX LUMBAR DISKECTOMY  1970's    HX MOHS PROCEDURES  02/21/2017    BCC left lateral cheek by Dr. Dilip Lewis MOHS PROCEDURES  01/05/2015    L temple, BCC    HX ORTHOPAEDIC Left     LEFT HAND SURGERY    HX PACEMAKER  11/15/2013    AICD    INS PPM/ICD LED SING ONLY  11/15/2013         LA CARDIAC SURG PROCEDURE UNLIST  2006    stents x 3        Family History:  Family History   Problem Relation Age of Onset    Stroke Father     Heart Attack Father     Heart Disease Father     Cancer Father 80        colon cancer    Heart Disease Mother     OSTEOARTHRITIS Sister         s/p bilateral knee replacements    No Known Problems Daughter     Anesth Problems Neg Hx        Social History:  Social History     Tobacco Use    Smoking status: Never    Smokeless tobacco: Never   Vaping Use    Vaping Use: Never used   Substance Use Topics    Alcohol use: No     Alcohol/week: 0.0 standard drinks    Drug use: No       Current Medications:  No current facility-administered medications on file prior to encounter. Current Outpatient Medications on File Prior to Encounter   Medication Sig Dispense Refill    pravastatin (PRAVACHOL) 10 mg tablet TAKE 1 TABLET EVERY NIGHT 90 Tablet 3    tadalafiL (CIALIS) 5 mg tablet Take 1 Tablet by mouth daily. 90 Tablet 3    warfarin (COUMADIN) 2.5 mg tablet One TABLET BY MOUTH DAILY 180 Tablet 2    enalapril (VASOTEC) 20 mg tablet TAKE ONE TABLET BY MOUTH TWICE A  Tablet 1    finasteride (PROSCAR) 5 mg tablet Take 1 Tablet by mouth daily.  90 Tablet 3    metoprolol succinate (TOPROL-XL) 25 mg XL tablet Take 0.5 Tablets by mouth daily. 90 Tablet 3    lidocaine 4 % patch Apply one patch to affected area for 12 hours and the remove for 12 hours 5 Patch 0    aspirin delayed-release 81 mg tablet Take  by mouth daily. FOLIC ACID PO Take 714 mcg by mouth daily. MULTIVITAMIN WITH MINERALS (MULTI-VIT 55 PLUS PO) Take 1 Tab by mouth daily. Taking multivitamins every morning. Allergies: Allergies   Allergen Reactions    Oxycodone Hcl Other (comments)    Pcn [Penicillins] Unknown (comments)     As a child    Percocet [Oxycodone-Acetaminophen] Swelling     Leg swelling     Review of Systems   A complete ROS was reviewed by me today and was negative, unless otherwise specified below:    Review of Systems   Constitutional:  Negative for fatigue and fever. Cardiovascular:  Negative for chest pain. Gastrointestinal:  Negative for abdominal pain. Musculoskeletal:  Positive for gait problem. Neurological:  Positive for numbness and headaches. Negative for dizziness, facial asymmetry and light-headedness. All other systems reviewed and are negative. Physical Exam   Vital Signs  Patient Vitals for the past 8 hrs:   Pulse BP SpO2   08/10/22 1700 (!) 58 (!) 160/95 99 %          Physical Exam  Vitals and nursing note reviewed. Constitutional:       General: He is not in acute distress. Appearance: Normal appearance. He is well-developed. He is not ill-appearing. HENT:      Head: Normocephalic and atraumatic. Eyes:      General:         Right eye: No discharge. Left eye: No discharge. Conjunctiva/sclera: Conjunctivae normal.   Cardiovascular:      Rate and Rhythm: Normal rate. Rhythm irregularly irregular. Heart sounds: Normal heart sounds. No murmur heard. Pulmonary:      Effort: Pulmonary effort is normal. No respiratory distress. Breath sounds: Normal breath sounds. No wheezing. Abdominal:      General: There is no distension. Palpations: Abdomen is soft. Tenderness:  There is no abdominal tenderness. Musculoskeletal:         General: No deformity. Normal range of motion. Cervical back: Normal range of motion and neck supple. Skin:     General: Skin is warm and dry. Findings: No rash. Neurological:      General: No focal deficit present. Mental Status: He is alert and oriented to person, place, and time. GCS: GCS eye subscore is 4. GCS verbal subscore is 5. GCS motor subscore is 6. Cranial Nerves: Cranial nerves are intact. No cranial nerve deficit, dysarthria or facial asymmetry. Sensory: Sensation is intact. No sensory deficit. Motor: Motor function is intact. No weakness, atrophy or abnormal muscle tone. Coordination: Coordination is intact. Finger-Nose-Finger Test normal.      Gait: Gait is intact. Gait normal.   Psychiatric:         Mood and Affect: Mood normal.         Behavior: Behavior normal.         Thought Content: Thought content normal.       Diagnostic Study Results   Labs  Recent Results (from the past 24 hour(s))   CBC WITH AUTOMATED DIFF    Collection Time: 08/10/22  1:29 PM   Result Value Ref Range    WBC 4.9 4.1 - 11.1 K/uL    RBC 3.74 (L) 4.10 - 5.70 M/uL    HGB 12.0 (L) 12.1 - 17.0 g/dL    HCT 37.5 36.6 - 50.3 %    .3 (H) 80.0 - 99.0 FL    MCH 32.1 26.0 - 34.0 PG    MCHC 32.0 30.0 - 36.5 g/dL    RDW 13.2 11.5 - 14.5 %    PLATELET 173 (L) 263 - 400 K/uL    MPV 9.2 8.9 - 12.9 FL    NRBC 0.0 0  WBC    ABSOLUTE NRBC 0.00 0.00 - 0.01 K/uL    NEUTROPHILS 66 32 - 75 %    LYMPHOCYTES 18 12 - 49 %    MONOCYTES 10 5 - 13 %    EOSINOPHILS 5 0 - 7 %    BASOPHILS 1 0 - 1 %    IMMATURE GRANULOCYTES 0 0.0 - 0.5 %    ABS. NEUTROPHILS 3.2 1.8 - 8.0 K/UL    ABS. LYMPHOCYTES 0.9 0.8 - 3.5 K/UL    ABS. MONOCYTES 0.5 0.0 - 1.0 K/UL    ABS. EOSINOPHILS 0.2 0.0 - 0.4 K/UL    ABS. BASOPHILS 0.0 0.0 - 0.1 K/UL    ABS. IMM.  GRANS. 0.0 0.00 - 0.04 K/UL    DF AUTOMATED     METABOLIC PANEL, COMPREHENSIVE    Collection Time: 08/10/22  1:29 PM   Result Value Ref Range    Sodium 143 136 - 145 mmol/L    Potassium 3.5 3.5 - 5.1 mmol/L    Chloride 106 97 - 108 mmol/L    CO2 30 21 - 32 mmol/L    Anion gap 7 5 - 15 mmol/L    Glucose 79 65 - 100 mg/dL    BUN 12 6 - 20 MG/DL    Creatinine 0.84 0.70 - 1.30 MG/DL    BUN/Creatinine ratio 14 12 - 20      GFR est AA >60 >60 ml/min/1.73m2    GFR est non-AA >60 >60 ml/min/1.73m2    Calcium 8.6 8.5 - 10.1 MG/DL    Bilirubin, total 0.8 0.2 - 1.0 MG/DL    ALT (SGPT) 17 12 - 78 U/L    AST (SGOT) 27 15 - 37 U/L    Alk. phosphatase 89 45 - 117 U/L    Protein, total 6.3 (L) 6.4 - 8.2 g/dL    Albumin 3.1 (L) 3.5 - 5.0 g/dL    Globulin 3.2 2.0 - 4.0 g/dL    A-G Ratio 1.0 (L) 1.1 - 2.2     PROTHROMBIN TIME + INR    Collection Time: 08/10/22  1:29 PM   Result Value Ref Range    INR 2.0 (H) 0.9 - 1.1      Prothrombin time 20.1 (H) 9.0 - 11.1 sec   EKG, 12 LEAD, INITIAL    Collection Time: 08/10/22  3:00 PM   Result Value Ref Range    Ventricular Rate 81 BPM    Atrial Rate 81 BPM    P-R Interval 188 ms    QRS Duration 202 ms    Q-T Interval 478 ms    QTC Calculation (Bezet) 555 ms    Calculated P Axis 79 degrees    Calculated R Axis -78 degrees    Calculated T Axis 100 degrees    Diagnosis       Atrial-sensed ventricular-paced rhythm  When compared with ECG of 14-FEB-2022 11:10,  Vent.  rate has increased BY  13 BPM     URINALYSIS W/ RFLX MICROSCOPIC    Collection Time: 08/10/22  3:01 PM   Result Value Ref Range    Color DARK YELLOW      Appearance TURBID (A) CLEAR      Specific gravity 1.015      pH (UA) 5.5 5.0 - 8.0      Protein 30 (A) NEG mg/dL    Glucose Negative NEG mg/dL    Ketone Negative NEG mg/dL    Bilirubin Negative NEG      Blood LARGE (A) NEG      Urobilinogen 1.0 0.2 - 1.0 EU/dL    Nitrites Positive (A) NEG      Leukocyte Esterase LARGE (A) NEG      WBC >100 (H) 0 - 4 /hpf    RBC 0-5 0 - 5 /hpf    Epithelial cells FEW FEW /lpf    Bacteria 2+ (A) NEG /hpf       Radiologic Studies  CT HEAD WO CONT   Final Result   No acute intracranial process. Imaging findings consistent with mild chronic microvascular ischemic change. There is a moderate degree of cerebral atrophy. CTA HEAD NECK W CONT   Final Result      1. No evidence for acute large vessel arterial occlusion. Mild atherosclerosis. 2. 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 3. Probable infundibula at the origins of bilateral posterior communicating   arteries. 4. Mild ectasia of the ascending aorta. 5. Enlarged pulmonary arteries compatible with pulmonary hypertension. 6. Severe multilevel cervical spondylosis. CXR Results  (Last 48 hours)      None            Billable Procedures   EKG reviewed by ED Physician in the absence of a cardiologist: Yes  EKG below was interpreted by Jennifer Lopez MD    Procedures    Medical Decision Making     I reviewed the patient's most recent Emergency Dept notes and diagnostic tests in formulating my MDM on today's visit. Provider Notes (Medical Decision Making):   79-year-old male with about 2 weeks of mild ataxia symptoms, though not currently today, also some mild headache and mild subjective numbness in the right arm and hand yesterday. Asymptomatic today. Alert, very well-appearing, no distress. Normal vital signs. He has a normal neurologic exam.  Cranial nerves are intact. Normal cerebellar testing such as finger-to-nose testing. I observed his gait coming from the triage room and he ambulated without any difficulty or apparent ataxia. No truncal ataxia. Test of skew was negative. CTA negative for CVA or LVO, consistent with my exam.    He has an obvious urinary tract infection by urinalysis, which is likely the cause of all of his symptoms. He was treated with ceftriaxone in the ED, Rx Omnicef, follow-up primary care.     NIHSS: 0      Jennifer Lopez MD  1:46 PM  8/10/2022       Social History     Tobacco Use    Smoking status: Never    Smokeless tobacco: Never   Vaping Use    Vaping Use: Never used   Substance Use Topics    Alcohol use: No     Alcohol/week: 0.0 standard drinks    Drug use: No       Medications Administered during ED course:  Medications   iopamidoL (ISOVUE-370) 76 % injection 100 mL (100 mL IntraVENous Given 8/10/22 1543)   cefTRIAXone (ROCEPHIN) 1 g in 0.9% sodium chloride (MBP/ADV) 50 mL MBP (0 g IntraVENous IV Completed 8/10/22 7121)          Prescriptions from today's ED visit:  Discharge Medication List as of 8/10/2022  6:16 PM        START taking these medications    Details   cefdinir (OMNICEF) 300 mg capsule Take 1 Capsule by mouth two (2) times a day for 7 days. , Normal, Disp-14 Capsule, R-0           CONTINUE these medications which have NOT CHANGED    Details   pravastatin (PRAVACHOL) 10 mg tablet TAKE 1 TABLET EVERY NIGHT, Normal, Disp-90 Tablet, R-3      tadalafiL (CIALIS) 5 mg tablet Take 1 Tablet by mouth daily. , Normal, Disp-90 Tablet, R-3      warfarin (COUMADIN) 2.5 mg tablet One TABLET BY MOUTH DAILY, Normal, Disp-180 Tablet, R-2      enalapril (VASOTEC) 20 mg tablet TAKE ONE TABLET BY MOUTH TWICE A DAY, Normal, Disp-180 Tablet, R-1      finasteride (PROSCAR) 5 mg tablet Take 1 Tablet by mouth daily. , Normal, Disp-90 Tablet, R-3      metoprolol succinate (TOPROL-XL) 25 mg XL tablet Take 0.5 Tablets by mouth daily. , Normal, Disp-90 Tablet, R-3      lidocaine 4 % patch Apply one patch to affected area for 12 hours and the remove for 12 hours, Normal, Disp-5 Patch, R-0      aspirin delayed-release 81 mg tablet Take  by mouth daily. , Historical Med      FOLIC ACID PO Take 530 mcg by mouth daily. , Historical Med      MULTIVITAMIN WITH MINERALS (MULTI-VIT 55 PLUS PO) Take 1 Tab by mouth daily. Taking multivitamins every morning., Historical Med            Diagnosis and Disposition     Disposition:  Discharged    Clinical Impression:   1. Acute cystitis without hematuria        Attestation:  I personally performed the services described in this documentation on this date 8/10/2022 for patient Juan David Farrell. Nicola Carranza MD        I was the first provider for this patient on this visit. To the best of my ability I reviewed relevant prior medical records, electrocardiograms, laboratories, and radiologic studies. The patient's presenting problems were discussed, and the patient was in agreement with the care plan formulated and outlined with them. Nicola Carranza MD    Please note that this dictation was completed with Dragon voice recognition software. Quite often unanticipated grammatical, syntax, homophones, and other interpretive errors are inadvertently transcribed by the computer software. Please disregard these errors and excuse any errors that have escaped final proofreading.

## 2022-08-11 LAB
ATRIAL RATE: 80 BPM
CALCULATED P AXIS, ECG09: 94 DEGREES
CALCULATED R AXIS, ECG10: -79 DEGREES
CALCULATED T AXIS, ECG11: 95 DEGREES
DIAGNOSIS, 93000: NORMAL
P-R INTERVAL, ECG05: 152 MS
Q-T INTERVAL, ECG07: 476 MS
QRS DURATION, ECG06: 202 MS
QTC CALCULATION (BEZET), ECG08: 548 MS
VENTRICULAR RATE, ECG03: 80 BPM

## 2022-08-18 ENCOUNTER — TELEPHONE (OUTPATIENT)
Dept: FAMILY MEDICINE CLINIC | Age: 82
End: 2022-08-18

## 2022-08-18 RX ORDER — ENALAPRIL MALEATE 20 MG/1
TABLET ORAL
Qty: 180 TABLET | Refills: 1 | Status: SHIPPED | OUTPATIENT
Start: 2022-08-18

## 2022-08-18 NOTE — TELEPHONE ENCOUNTER
Pt called to say that his insurance [employer] is saying that he doesn't have Enapril 20mg listed as a med.
Pt states he is out of enapril and he needs a refill sent it ASAP. Request sent to [facility] Falk and [facility].  Pt notified
4

## 2022-08-18 NOTE — TELEPHONE ENCOUNTER
Refill Request (patient calling)     -PT IS OUT OF MEDICATION AND NEEDS THIS MEDICATION FOR TONIGHT     Requested Prescriptions     Pending Prescriptions Disp Refills    enalapril (VASOTEC) 20 mg tablet 180 Tablet 1     Sig: TAKE ONE TABLET BY MOUTH TWICE A DAY       Thank You

## 2022-08-19 ENCOUNTER — CLINICAL SUPPORT (OUTPATIENT)
Dept: FAMILY MEDICINE CLINIC | Age: 82
End: 2022-08-19
Payer: MEDICARE

## 2022-08-19 DIAGNOSIS — I48.0 PAROXYSMAL ATRIAL FIBRILLATION (HCC): Primary | ICD-10-CM

## 2022-08-19 LAB
INR BLD: 1.5
PT POC: 18 SECONDS
VALID INTERNAL CONTROL?: YES

## 2022-08-19 PROCEDURE — 85610 PROTHROMBIN TIME: CPT | Performed by: FAMILY MEDICINE

## 2022-09-13 NOTE — TELEPHONE ENCOUNTER
Refill Request (patient calling)       Requested Prescriptions     Pending Prescriptions Disp Refills    warfarin (COUMADIN) 2.5 mg tablet 180 Tablet 2     Sig: One TABLET BY MOUTH DAILY       Thank You

## 2022-09-15 ENCOUNTER — CLINICAL SUPPORT (OUTPATIENT)
Dept: CARDIOLOGY CLINIC | Age: 82
End: 2022-09-15
Payer: MEDICARE

## 2022-09-15 DIAGNOSIS — Z95.810 AUTOMATIC IMPLANTABLE CARDIAC DEFIBRILLATOR IN SITU: Primary | ICD-10-CM

## 2022-09-15 PROCEDURE — 93289 INTERROG DEVICE EVAL HEART: CPT | Performed by: INTERNAL MEDICINE

## 2022-09-15 RX ORDER — WARFARIN 2.5 MG/1
TABLET ORAL
Qty: 180 TABLET | Refills: 2 | Status: SHIPPED | OUTPATIENT
Start: 2022-09-15 | End: 2022-10-17 | Stop reason: SDUPTHER

## 2022-09-15 NOTE — PROGRESS NOTES
Chargeable ICD clinic check.(MDT). Device functioning appropriately as programmed. See scanned documents in media manger.

## 2022-09-16 ENCOUNTER — CLINICAL SUPPORT (OUTPATIENT)
Dept: FAMILY MEDICINE CLINIC | Age: 82
End: 2022-09-16
Payer: MEDICARE

## 2022-09-16 DIAGNOSIS — I48.0 PAROXYSMAL ATRIAL FIBRILLATION (HCC): Primary | ICD-10-CM

## 2022-09-16 LAB
INR BLD: 2.4
PT POC: 28.4 SECONDS
VALID INTERNAL CONTROL?: YES

## 2022-09-16 PROCEDURE — 85610 PROTHROMBIN TIME: CPT | Performed by: FAMILY MEDICINE

## 2022-10-14 ENCOUNTER — CLINICAL SUPPORT (OUTPATIENT)
Dept: FAMILY MEDICINE CLINIC | Age: 82
End: 2022-10-14
Payer: MEDICARE

## 2022-10-14 DIAGNOSIS — I48.0 PAROXYSMAL ATRIAL FIBRILLATION (HCC): Primary | ICD-10-CM

## 2022-10-14 LAB
INR BLD: 2.7
PT POC: 32.2 SECONDS
VALID INTERNAL CONTROL?: YES

## 2022-10-14 PROCEDURE — 85610 PROTHROMBIN TIME: CPT | Performed by: FAMILY MEDICINE

## 2022-10-17 ENCOUNTER — OFFICE VISIT (OUTPATIENT)
Dept: FAMILY MEDICINE CLINIC | Age: 82
End: 2022-10-17
Payer: MEDICARE

## 2022-10-17 VITALS
HEIGHT: 71 IN | SYSTOLIC BLOOD PRESSURE: 144 MMHG | HEART RATE: 58 BPM | TEMPERATURE: 97.7 F | WEIGHT: 174.8 LBS | DIASTOLIC BLOOD PRESSURE: 83 MMHG | RESPIRATION RATE: 16 BRPM | BODY MASS INDEX: 24.47 KG/M2 | OXYGEN SATURATION: 98 %

## 2022-10-17 DIAGNOSIS — G31.84 MCI (MILD COGNITIVE IMPAIRMENT): Primary | ICD-10-CM

## 2022-10-17 DIAGNOSIS — I50.22 CHRONIC SYSTOLIC (CONGESTIVE) HEART FAILURE (HCC): ICD-10-CM

## 2022-10-17 PROCEDURE — G8420 CALC BMI NORM PARAMETERS: HCPCS | Performed by: FAMILY MEDICINE

## 2022-10-17 PROCEDURE — G8427 DOCREV CUR MEDS BY ELIG CLIN: HCPCS | Performed by: FAMILY MEDICINE

## 2022-10-17 PROCEDURE — G8753 SYS BP > OR = 140: HCPCS | Performed by: FAMILY MEDICINE

## 2022-10-17 PROCEDURE — 1101F PT FALLS ASSESS-DOCD LE1/YR: CPT | Performed by: FAMILY MEDICINE

## 2022-10-17 PROCEDURE — G8536 NO DOC ELDER MAL SCRN: HCPCS | Performed by: FAMILY MEDICINE

## 2022-10-17 PROCEDURE — 1123F ACP DISCUSS/DSCN MKR DOCD: CPT | Performed by: FAMILY MEDICINE

## 2022-10-17 PROCEDURE — G8754 DIAS BP LESS 90: HCPCS | Performed by: FAMILY MEDICINE

## 2022-10-17 PROCEDURE — 99214 OFFICE O/P EST MOD 30 MIN: CPT | Performed by: FAMILY MEDICINE

## 2022-10-17 PROCEDURE — G8510 SCR DEP NEG, NO PLAN REQD: HCPCS | Performed by: FAMILY MEDICINE

## 2022-10-17 RX ORDER — PRAVASTATIN SODIUM 10 MG/1
TABLET ORAL
Qty: 90 TABLET | Refills: 3 | Status: SHIPPED | OUTPATIENT
Start: 2022-10-17

## 2022-10-17 RX ORDER — WARFARIN 2.5 MG/1
TABLET ORAL
Qty: 180 TABLET | Refills: 2 | Status: SHIPPED | OUTPATIENT
Start: 2022-10-17

## 2022-10-17 NOTE — PROGRESS NOTES
HPI  Martha Begum is a 79 y.o. male who presents with a concern about memory and a question about swallowing Vicks vapor rub. Apparently he has been taking a small amount of oral Vicks vapor rub since he was an infant. This habit was started by his mother. Expressed a memory concern. Feels that when he walks into a room he has trouble remembering why he walked into the room. He does not think other people are concerned about his memory. Nobody has brought anything to his attention. He does have trouble remembering his medications. He will sometimes restart a medicine that he was taken off of a while ago. He often has adverse effects when this happens. His mental state is about the same as it has been over the last several years during my interactions with him. I was under the impression that he had a cognitive disability at baseline. Tells me today that he went to college and graduated from [place]. Reports that he was not a great student and had his girlfriend do his homework most of the time. 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 Global barrier was difficulty comprehending the instructions  Executive function/visual-spatial is quite poor  Was able to recall 5 words on the second try for his immediate recall  Switched out the list of instructions on the \"a tap\" and started tapping on the B  Could not comprehend the abstraction portion  Did not even try delayed recall.   When given a category cue \"1 was a type of fabric\" he responded \"red\"    PMHx:  Past Medical History:   Diagnosis Date    Arthritis     knees    CAD (coronary artery disease)     stent x3 approx 2001    Chronic atrial fibrillation [facility])     ED (erectile dysfunction)     Hypertension     Kidney stone on right side 10/30/2011    JAMEY (obstructive sleep apnea) 4/25/2012    Skin cancer     BCC, s/p Mohs on L flank    Sun-damaged skin     TIA (transient ischemic attack)     6/12 - seen at [facility]       Meds:   Current Outpatient Medications   Medication Sig Dispense Refill    warfarin (COUMADIN) 2.5 mg tablet One TABLET BY MOUTH DAILY 180 Tablet 2    enalapril (VASOTEC) 20 mg tablet TAKE ONE TABLET BY MOUTH TWICE A  Tablet 1    pravastatin (PRAVACHOL) 10 mg tablet TAKE 1 TABLET EVERY NIGHT 90 Tablet 3    tadalafiL (CIALIS) 5 mg tablet Take 1 Tablet by mouth daily. 90 Tablet 3    metoprolol succinate (TOPROL-XL) 25 mg XL tablet Take 0.5 Tablets by mouth daily. 90 Tablet 3    aspirin delayed-release 81 mg tablet Take  by mouth daily. FOLIC ACID PO Take 591 mcg by mouth daily. MULTIVITAMIN WITH MINERALS (MULTI-VIT 55 PLUS PO) Take 1 Tab by mouth daily. Taking multivitamins every morning. Allergies: Allergies   Allergen Reactions    Oxycodone Hcl Other (comments)    Pcn [Penicillins] Unknown (comments)     As a child    Percocet [Oxycodone-Acetaminophen] Swelling     Leg swelling       Smoker:  Social History     Tobacco Use   Smoking Status Never   Smokeless Tobacco Never       ETOH:   Social History     Substance and Sexual Activity   Alcohol Use No    Alcohol/week: 0.0 standard drinks       FH:   Family History   Problem Relation Age of Onset    Stroke Father     Heart Attack Father     Heart Disease Father     Cancer Father 80        colon cancer    Heart Disease Mother     OSTEOARTHRITIS Sister         s/p bilateral knee replacements    No Known Problems Daughter     Anesth Problems Neg Hx        ROS:   As listed in HPI. In addition:  Constitutional:   No headache, fever, fatigue, weight loss or weight gain      Cardiac:    No chest pain      Resp:   No cough or shortness of breath      Neuro   No loss of consciousness, dizziness, seizures      Physical Exam:  Blood pressure (!) 144/83, pulse (!) 58, temperature 97.7 °F (36.5 °C), temperature source Temporal, resp. rate 16, height 5' 11\" (1.803 m), weight 174 lb 12.8 oz (79.3 kg), SpO2 98 %. GEN: No apparent distress.  Alert and oriented and responds to all questions appropriately. NEUROLOGIC:  No focal neurologic deficits. Strength and sensation grossly intact. Coordination and gait grossly intact. EXT: Well perfused. No edema. SKIN: No obvious rashes. Assessment and Plan       Cognitive impairment  Scored impressively poorly on the [employer] considering college education and what I perceived to be his current level of functioning. I recommend pursuing neuropsych testing to see if we can gather any more information  Mental status stable over the last few years since has been interacting with. I do not detect any worsening      ICD-10-CM ICD-9-CM    1. MCI (mild cognitive impairment)  G31.84 331.83 REFERRAL TO NEUROPSYCHOLOGY      2. Chronic systolic (congestive) heart failure  I50.22 428.22      428.0           AVS given.  Pt expressed understanding of instructions

## 2022-10-17 NOTE — PROGRESS NOTES
Health Maintenance Due   Topic Date Due    Shingrix Vaccine Age 49> (1 of 2) Never done    Lipid Screen  02/15/2022    COVID-19 Vaccine (4 - Booster for Pfizer series) 02/18/2022    Flu Vaccine (1) 08/01/2022    Medicare Yearly Exam  08/18/2022     1. \"Have you been to the ER, urgent care clinic since your last visit? Hospitalized since your last visit? \" No    2. \"Have you seen or consulted any other health care providers outside of the [address] [place] [address] since your last visit? \" No     3. For patients aged 39-70: Has the patient had a colonoscopy / FIT/ Cologuard? NA - based on age      If the patient is female:    4. For patients aged 41-77: Has the patient had a mammogram within the past 2 years? NA - based on age or sex      11. For patients aged 21-65: Has the patient had a pap smear?  NA - based on age or sex

## 2022-11-01 DIAGNOSIS — I10 PRIMARY HYPERTENSION: ICD-10-CM

## 2022-11-01 RX ORDER — METOPROLOL SUCCINATE 25 MG/1
12.5 TABLET, EXTENDED RELEASE ORAL DAILY
Qty: 90 TABLET | Refills: 3 | Status: SHIPPED | OUTPATIENT
Start: 2022-11-01

## 2022-11-01 NOTE — TELEPHONE ENCOUNTER
Pt is calling requesting a refill on med states he is completely out    Requested Prescriptions     Pending Prescriptions Disp Refills    metoprolol succinate (TOPROL-XL) 25 mg XL tablet 90 Tablet 3     Sig: Take 0.5 Tablets by mouth daily.

## 2022-11-07 RX ORDER — ENALAPRIL MALEATE 20 MG/1
TABLET ORAL
Qty: 180 TABLET | Refills: 1 | Status: SHIPPED | OUTPATIENT
Start: 2022-11-07

## 2022-11-07 RX ORDER — WARFARIN 2.5 MG/1
TABLET ORAL
Qty: 180 TABLET | Refills: 2 | Status: SHIPPED | OUTPATIENT
Start: 2022-11-07 | End: 2022-11-16 | Stop reason: SDUPTHER

## 2022-11-07 NOTE — TELEPHONE ENCOUNTER
Refill request (pt calling)        Requested Prescriptions     Pending Prescriptions Disp Refills    warfarin (COUMADIN) 2.5 mg tablet 180 Tablet 2     Sig: One TABLET BY MOUTH DAILY    enalapril (VASOTEC) 20 mg tablet 180 Tablet 1     Sig: TAKE ONE TABLET BY MOUTH TWICE A DAY

## 2022-11-10 ENCOUNTER — TELEPHONE (OUTPATIENT)
Dept: FAMILY MEDICINE CLINIC | Age: 82
End: 2022-11-10

## 2022-11-15 ENCOUNTER — CLINICAL SUPPORT (OUTPATIENT)
Dept: FAMILY MEDICINE CLINIC | Age: 82
End: 2022-11-15
Payer: MEDICARE

## 2022-11-15 ENCOUNTER — TELEPHONE (OUTPATIENT)
Dept: CARDIOLOGY CLINIC | Age: 82
End: 2022-11-15

## 2022-11-15 DIAGNOSIS — I48.0 PAROXYSMAL ATRIAL FIBRILLATION (HCC): ICD-10-CM

## 2022-11-15 DIAGNOSIS — G31.84 MCI (MILD COGNITIVE IMPAIRMENT): Primary | ICD-10-CM

## 2022-11-15 LAB
INR BLD: 2.8
PT POC: 33.3 SECONDS
VALID INTERNAL CONTROL?: YES

## 2022-11-15 PROCEDURE — 85610 PROTHROMBIN TIME: CPT | Performed by: FAMILY MEDICINE

## 2022-11-15 NOTE — TELEPHONE ENCOUNTER
Patient calling to follow up on the completion of documents for permission to continue driving. Patient states he will come in tomorrow 11/16/2022 to retreive the documents if they are ready.         Please advise          [facility]:[phone]

## 2022-11-15 NOTE — PROGRESS NOTES
Andrea Hugo is a 79 y.o. male who presents today for Anticoagulation monitoring. Indication: Atrial Fibrillation  INR Goal: 2.0-3.0. Current dose:  Coumadin 2.5mg every Mon, Wed, Fri, 5mg all other days. Missed Coumadin Doses:  None  Medication Changes:  no  Dietary Changes:  no    Symptoms: taking coumadin appropriately without any bleeding. Latest INRs:  Lab Results   Component Value Date/Time    INR 2.0 (H) 08/10/2022 01:29 PM    INR 2.5 (H) 02/14/2022 11:55 AM    INR 1.6 (H) 03/30/2021 02:23 PM    INR POC 2.8 11/15/2022 12:56 PM    INR POC 2.7 10/14/2022 10:07 AM    INR POC 2.4 09/16/2022 09:26 AM    Prothrombin time 20.1 (H) 08/10/2022 01:29 PM    Prothrombin time 24.7 (H) 02/14/2022 11:55 AM    Prothrombin time 17.3 (H) 03/30/2021 02:23 PM        New Coumadin dose:.current treatment plan is effective, no change in therapy. Next check to be scheduled for  4 weeks.

## 2022-11-16 ENCOUNTER — TELEPHONE (OUTPATIENT)
Dept: FAMILY MEDICINE CLINIC | Age: 82
End: 2022-11-16

## 2022-11-16 RX ORDER — WARFARIN 2.5 MG/1
TABLET ORAL
Qty: 180 TABLET | Refills: 2 | Status: SHIPPED | OUTPATIENT
Start: 2022-11-16 | End: 2022-11-16 | Stop reason: SDUPTHER

## 2022-11-16 RX ORDER — WARFARIN 2.5 MG/1
TABLET ORAL
Qty: 180 TABLET | Refills: 2 | Status: SHIPPED | OUTPATIENT
Start: 2022-11-16

## 2022-11-16 NOTE — TELEPHONE ENCOUNTER
Patient walked in to report that he was out of Coumadin. This refill was sent last weekend and I resent it to [facility] today.   Please inform patient

## 2022-11-16 NOTE — TELEPHONE ENCOUNTER
Spoke with Sabiha Parada ([facility]) and he wanted to let you know that he picked up a 90 day supply on 09/15 and will not be able to get another until 12/15.

## 2022-11-18 ENCOUNTER — OFFICE VISIT (OUTPATIENT)
Dept: FAMILY MEDICINE CLINIC | Age: 82
End: 2022-11-18
Payer: MEDICARE

## 2022-11-18 DIAGNOSIS — G89.29 CHRONIC PAIN OF RIGHT KNEE: ICD-10-CM

## 2022-11-18 DIAGNOSIS — M25.561 CHRONIC PAIN OF RIGHT KNEE: ICD-10-CM

## 2022-11-18 DIAGNOSIS — M25.551 RIGHT HIP PAIN: Primary | ICD-10-CM

## 2022-11-18 PROCEDURE — 3078F DIAST BP <80 MM HG: CPT

## 2022-11-18 PROCEDURE — 3074F SYST BP LT 130 MM HG: CPT

## 2022-11-18 PROCEDURE — 99213 OFFICE O/P EST LOW 20 MIN: CPT

## 2022-11-18 PROCEDURE — 1123F ACP DISCUSS/DSCN MKR DOCD: CPT

## 2022-11-18 RX ORDER — METHYLPREDNISOLONE 4 MG/1
4 TABLET ORAL
Qty: 1 DOSE PACK | Refills: 0 | Status: SHIPPED | OUTPATIENT
Start: 2022-11-18

## 2022-11-18 NOTE — PROGRESS NOTES
Chief Complaint   Patient presents with    Hip Pain     Right leg/hip, pt states it starts hurting when it gets cold out      Pt states the pain is not as bad when walking but hurts more when sitting. Health Maintenance Due   Topic Date Due    Shingrix Vaccine Age 49> (1 of 2) Never done    Lipid Screen  02/15/2022    Medicare Yearly Exam  08/18/2022     1. \"Have you been to the ER, urgent care clinic since your last visit? Hospitalized since your last visit? \" No    2. \"Have you seen or consulted any other health care providers outside of the [address] [place] [address] since your last visit? \" No     3. For patients aged 39-70: Has the patient had a colonoscopy / FIT/ Cologuard? NA - based on age      If the patient is female:    4. For patients aged 41-77: Has the patient had a mammogram within the past 2 years? NA - based on age or sex      11. For patients aged 21-65: Has the patient had a pap smear?  NA - based on age or sex

## 2022-11-18 NOTE — PROGRESS NOTES
Kelly Hernandez is a 79 y.o. male  and presents with   Chief Complaint   Patient presents with    Hip Pain     Right leg/hip, pt states it starts hurting when it gets cold out      Pain started about Wednesday. States this pain occurs every time it gets cold out. Feels the exact same as last year. Pain is aching and sharp at times but does not radiate. Decreased ROM due to pain. When it is not cold out he endorses walking several miles without difficulty. No new falls, activity or injuries. Has been taking tylenol and ice/heat which does help with pain. Has a history of right knee and hip replacement. Walks with a cane. Walks with a limp. Previous documentation of similar symptoms in January 2022 at that time he was given a medrol pack and went to [facility] and received a cortisone shot which he states helped with the pain all winter. Current Outpatient Medications on File Prior to Visit   Medication Sig Dispense Refill    warfarin (COUMADIN) 2.5 mg tablet Two TABLET BY MOUTH DAILY 180 Tablet 2    enalapril (VASOTEC) 20 mg tablet TAKE ONE TABLET BY MOUTH TWICE A  Tablet 1    metoprolol succinate (TOPROL-XL) 25 mg XL tablet Take 0.5 Tablets by mouth daily. 90 Tablet 3    pravastatin (PRAVACHOL) 10 mg tablet TAKE 1 TABLET EVERY NIGHT 90 Tablet 3    tadalafiL (CIALIS) 5 mg tablet Take 1 Tablet by mouth daily. 90 Tablet 3    aspirin delayed-release 81 mg tablet Take  by mouth daily. FOLIC ACID PO Take 346 mcg by mouth daily. MULTIVITAMIN WITH MINERALS (MULTI-VIT 55 PLUS PO) Take 1 Tab by mouth daily. Taking multivitamins every morning. No current facility-administered medications on file prior to visit.       Past Medical History:   Diagnosis Date    Arthritis     knees    CAD (coronary artery disease)     stent x3 approx 2001    Chronic atrial fibrillation [facility])     ED (erectile dysfunction)     Hypertension     Kidney stone on right side 10/30/2011    JAMEY (obstructive sleep apnea) 4/25/2012    Skin cancer     BCC, s/p Mohs on L flank    Sun-damaged skin     TIA (transient ischemic attack)     6/12 - seen at [facility]     Past Surgical History:   Procedure Laterality Date    HX APPENDECTOMY  age 15    HX CATARACT REMOVAL Bilateral     HX COLONOSCOPY  05/10/2012    hyperplastic    HX CYSTOSTOMY  1/23/14    diffuse erythema    HX HERNIA REPAIR Bilateral     inguinal hernia    HX HERNIA REPAIR  09/12/2017    L inguinal hernia repair    HX HIP REPLACEMENT Bilateral     HX HIP REPLACEMENT Right 12/4/13    REVISION     HX IMPLANTABLE CARDIOVERTER DEFIBRILLATOR  11/15/13    + PM    HX KNEE REPLACEMENT Right 7/2011    HX LUMBAR DISKECTOMY  1970's    HX MOHS PROCEDURES  02/21/2017    BCC left lateral cheek by Dr. Yung Freed [id number]  01/05/2015    L temple, BCC    HX ORTHOPAEDIC Left     LEFT HAND SURGERY    HX PACEMAKER  11/15/2013    AICD    INS PPM/ICD LED SING ONLY  11/15/2013         [place] CARDIAC SURG PROCEDURE UNLIST  2006    stents x 3      Social History     Socioeconomic History    Marital status:      Spouse name: Not on file    Number of children: Not on file    Years of education: Not on file    Highest education level: Not on file   Occupational History    Not on file   Tobacco Use    Smoking status: Never    Smokeless tobacco: Never   Vaping Use    Vaping Use: Never used   Substance and Sexual Activity    Alcohol use: No     Alcohol/week: 0.0 standard drinks    Drug use: No    Sexual activity: Yes     Partners: Female     Birth control/protection: None   Other Topics Concern    Not on file   Social History Narrative    Not on file     Social Determinants of Health     Financial Resource Strain: Not on file   Food Insecurity: Not on file   Transportation Needs: Not on file   Physical Activity: Not on file   Stress: Not on file   Social Connections: Not on file   Intimate Partner Violence: Not on file   Housing Stability: Not on file     Allergies   Allergen Reactions Oxycodone Hcl Other (comments)    Pcn [Penicillins] Unknown (comments)     As a child    Percocet [Oxycodone-Acetaminophen] Swelling     Leg swelling       ROS:  Gen: no fatigue, no fever, no chills, no unexplained weight loss or weight gain  Eyes: no excessive tearing, itching, or discharge  Nose: no rhinorrhea, no sinus pain  Mouth: no oral lesions, no sore throat, no difficulty swallowing  Resp: no shortness of breath, no wheezing, no cough  CV: no chest pain, no orthopnea, no paroxysmal nocturnal dyspnea, no lower extremity edema, no palpitations  Abd: no nausea, no heartburn, no diarrhea, no constipation, no abdominal pain  Neuro: no headaches, no syncope or presyncopal episodes  Endo: no polyuria, no polydipsia. : no hematuria, no dysuria, no frequency, no incontinence  Heme: no lymphadenopathy, no easy bruising or bleeding, no night sweats        Physical Examination:    Visit Vitals  BP (!) 162/80 (BP 1 Location: Right arm, BP Patient Position: Sitting, BP Cuff Size: Adult)   Pulse 80   Resp 16   Ht 5' 11\" (1.803 m)   Wt 179 lb 9.6 oz (81.5 kg)   SpO2 99%   BMI 25.05 kg/m²      Gen: alert, oriented, no acute distress  Head: normocephalic, atraumatic  Neck: symmetric normal sized thyroid, no carotid bruits, no jugular vein distention  Resp: no increase work of breathing, lungs clear to ausculation bilaterally, no wheezing, rales or rhonchi  CV: S1, S2 normal.  No murmurs, rubs, or gallops. Abd: soft, not tender, not distended. No hepatosplenomegaly. Normal bowel sounds. No hernias. No abdominal or renal bruits. Neuro: cranial nerves intact, normal strength and movement in all extremities, and sensation intact and symmetric. Skin: no lesion or rash  Extremities: no cyanosis or edema. +2 pulses    Right hip: Walking with a limp with right foot externally rotated. Pain with extension of leg, bending over and rising from seated position.  Mild tenderness over bony prominence of knee but no tenderness of bony prominence of hip. Decreased ROM of right knee and hip. Assessment/Plan:  Differential diagnosis and treatment options reviewed with patient who is in agreement with treatment plan as outlined below. ICD-10-CM ICD-9-CM    1. Right hip pain  M25.551 719.45 methylPREDNISolone (MEDROL DOSEPACK) 4 mg tablet      2. Chronic pain of right knee  M25.561 719.46 methylPREDNISolone (MEDROL DOSEPACK) 4 mg tablet    G89.29 338.29       Gave him information on ortho walk in clinic. Will treat inflammation and pain with steroid pack. Continue with tylenol, heat and cool compresses. Elevated blood pressure most likely due to pain. Patient has history of poor recall of medications. Has history of imbalance and dizziness. Has had previous low pressures that contributed to dizziness. Will have patient follow-up in 1-2 weeks to reassess BP when pain is better controlled. Medication Side Effects and Warnings were discussed with patient: yes  Patient Labs were reviewed: yes  Patient Past Records were reviewed:  yes  Follow-up and Dispositions    Return in 1 week (on 11/25/2022), or if symptoms worsen or fail to improve, for recheck BP . Verbal and written instructions (see AVS) provided. Patient expresses understanding and agreement of diagnosis and treatment plan.     Ed Yang NP

## 2022-11-19 VITALS
HEART RATE: 80 BPM | BODY MASS INDEX: 25.15 KG/M2 | OXYGEN SATURATION: 99 % | RESPIRATION RATE: 16 BRPM | SYSTOLIC BLOOD PRESSURE: 162 MMHG | HEIGHT: 71 IN | DIASTOLIC BLOOD PRESSURE: 80 MMHG | WEIGHT: 179.6 LBS

## 2022-12-15 ENCOUNTER — CLINICAL SUPPORT (OUTPATIENT)
Dept: FAMILY MEDICINE CLINIC | Age: 82
End: 2022-12-15
Payer: MEDICARE

## 2022-12-15 ENCOUNTER — TELEPHONE (OUTPATIENT)
Dept: FAMILY MEDICINE CLINIC | Age: 82
End: 2022-12-15

## 2022-12-15 DIAGNOSIS — I48.0 PAROXYSMAL ATRIAL FIBRILLATION (HCC): Primary | ICD-10-CM

## 2022-12-15 DIAGNOSIS — R35.0 URINE FREQUENCY: ICD-10-CM

## 2022-12-15 LAB
BILIRUB UR QL STRIP: NEGATIVE
GLUCOSE UR-MCNC: NEGATIVE MG/DL
INR BLD: 2.8
KETONES P FAST UR STRIP-MCNC: NEGATIVE MG/DL
PH UR STRIP: 7 [PH] (ref 4.6–8)
PROT UR QL STRIP: NORMAL
PT POC: 33.3 SECONDS
SP GR UR STRIP: 1.02 (ref 1–1.03)
UA UROBILINOGEN AMB POC: NORMAL (ref 0.2–1)
URINALYSIS CLARITY POC: NORMAL
URINALYSIS COLOR POC: YELLOW
URINE BLOOD POC: NORMAL
URINE LEUKOCYTES POC: NORMAL
URINE NITRITES POC: NEGATIVE
VALID INTERNAL CONTROL?: YES

## 2022-12-15 RX ORDER — CIPROFLOXACIN 250 MG/1
250 TABLET, FILM COATED ORAL EVERY 12 HOURS
Qty: 6 TABLET | Refills: 0 | Status: SHIPPED | OUTPATIENT
Start: 2022-12-15 | End: 2022-12-18

## 2022-12-15 NOTE — PROGRESS NOTES
Froilan Del Rosario is a 79 y.o. male who presents today for Anticoagulation monitoring. Pt is also here to drop off urine for symptoms of burning and urine frequency. Indication: Atrial Fibrillation  INR Goal: 2.0-3.0. Current dose:  Coumadin 2.5 mg every Mon Wed and Fri and 5mg all other days. Missed Coumadin Doses:  None  Medication Changes:  No  Dietary Changes:  no    Symptoms: taking coumadin appropriately without any bleeding. Latest INRs:  Lab Results   Component Value Date/Time    INR 2.0 (H) 08/10/2022 01:29 PM    INR 2.5 (H) 02/14/2022 11:55 AM    INR 1.6 (H) 03/30/2021 02:23 PM    INR POC 2.8 11/15/2022 12:56 PM    INR POC 2.7 10/14/2022 10:07 AM    INR POC 2.4 09/16/2022 09:26 AM    Prothrombin time 20.1 (H) 08/10/2022 01:29 PM    Prothrombin time 24.7 (H) 02/14/2022 11:55 AM    Prothrombin time 17.3 (H) 03/30/2021 02:23 PM        New Coumadin dose:.current treatment plan is effective, no change in therapy. Next check to be scheduled for  4 weeks.

## 2022-12-15 NOTE — TELEPHONE ENCOUNTER
verified during nurse visit. Pt came in for a nurse visit for INR but asked to leave urine because he is having urine frequency and burning. Pt left urine, did an U/A on urine and put order in for urine culture. Informed pt we will give him a call when provider gets back to us.

## 2022-12-15 NOTE — TELEPHONE ENCOUNTER
Inflammatory urine. Possible UTI. Called in ciprofloxacin. Notify patient.     Await culture Mercedes Flap Text: The defect edges were debeveled with a #15 scalpel blade.  Given the location of the defect, shape of the defect and the proximity to free margins a Mercedes flap was deemed most appropriate.  Using a sterile surgical marker, an appropriate advancement flap was drawn incorporating the defect and placing the expected incisions within the relaxed skin tension lines where possible. The area thus outlined was incised deep to adipose tissue with a #15 scalpel blade.  The skin margins were undermined to an appropriate distance in all directions utilizing iris scissors.

## 2022-12-18 LAB
BACTERIA SPEC CULT: ABNORMAL
CC UR VC: ABNORMAL
SERVICE CMNT-IMP: ABNORMAL

## 2022-12-19 ENCOUNTER — TELEPHONE (OUTPATIENT)
Dept: FAMILY MEDICINE CLINIC | Age: 82
End: 2022-12-19

## 2022-12-19 NOTE — TELEPHONE ENCOUNTER
Urine culture resulted. Small amount of bacteria suggests that his symptoms could have been due to a UTI.   Hope he is feeling better after antibiotic

## 2022-12-19 NOTE — TELEPHONE ENCOUNTER
verified. Informed pt of message from provider regarding urine culture. Pt had not picked up antibiotics yet. Informed pt antibiotics were sent to pts pharmacy and to take them to help with symptoms. Pt verified understanding and had no further questions.

## 2022-12-19 NOTE — TELEPHONE ENCOUNTER
Received DMV form. This time he has provided us with the complete \"Med 2\" form. However, I do not know why he needs this filled out.   I think would be best if he came in for an appointment to discuss in detail and get the appropriate exam to fill the form out

## 2022-12-20 ENCOUNTER — OFFICE VISIT (OUTPATIENT)
Dept: CARDIOLOGY CLINIC | Age: 82
End: 2022-12-20
Payer: MEDICARE

## 2022-12-20 ENCOUNTER — OFFICE VISIT (OUTPATIENT)
Dept: FAMILY MEDICINE CLINIC | Age: 82
End: 2022-12-20

## 2022-12-20 VITALS
HEIGHT: 71 IN | BODY MASS INDEX: 24.84 KG/M2 | WEIGHT: 177.4 LBS | OXYGEN SATURATION: 98 % | TEMPERATURE: 97.7 F | HEART RATE: 80 BPM | RESPIRATION RATE: 17 BRPM | SYSTOLIC BLOOD PRESSURE: 168 MMHG | DIASTOLIC BLOOD PRESSURE: 90 MMHG

## 2022-12-20 DIAGNOSIS — I48.0 PAROXYSMAL ATRIAL FIBRILLATION (HCC): ICD-10-CM

## 2022-12-20 DIAGNOSIS — I10 PRIMARY HYPERTENSION: Primary | ICD-10-CM

## 2022-12-20 DIAGNOSIS — Z95.810 AUTOMATIC IMPLANTABLE CARDIAC DEFIBRILLATOR IN SITU: Primary | ICD-10-CM

## 2022-12-20 DIAGNOSIS — Z95.810 S/P ICD (INTERNAL CARDIAC DEFIBRILLATOR) PROCEDURE: ICD-10-CM

## 2022-12-20 DIAGNOSIS — I25.10 CORONARY ARTERY DISEASE INVOLVING NATIVE CORONARY ARTERY OF NATIVE HEART WITHOUT ANGINA PECTORIS: ICD-10-CM

## 2022-12-20 PROCEDURE — 1123F ACP DISCUSS/DSCN MKR DOCD: CPT | Performed by: FAMILY MEDICINE

## 2022-12-20 PROCEDURE — 3078F DIAST BP <80 MM HG: CPT | Performed by: FAMILY MEDICINE

## 2022-12-20 PROCEDURE — G8755 DIAS BP > OR = 90: HCPCS | Performed by: FAMILY MEDICINE

## 2022-12-20 PROCEDURE — 99213 OFFICE O/P EST LOW 20 MIN: CPT | Performed by: FAMILY MEDICINE

## 2022-12-20 PROCEDURE — 1101F PT FALLS ASSESS-DOCD LE1/YR: CPT | Performed by: FAMILY MEDICINE

## 2022-12-20 PROCEDURE — G8510 SCR DEP NEG, NO PLAN REQD: HCPCS | Performed by: FAMILY MEDICINE

## 2022-12-20 PROCEDURE — G8753 SYS BP > OR = 140: HCPCS | Performed by: FAMILY MEDICINE

## 2022-12-20 PROCEDURE — 3074F SYST BP LT 130 MM HG: CPT | Performed by: FAMILY MEDICINE

## 2022-12-20 PROCEDURE — G8427 DOCREV CUR MEDS BY ELIG CLIN: HCPCS | Performed by: FAMILY MEDICINE

## 2022-12-20 PROCEDURE — G8536 NO DOC ELDER MAL SCRN: HCPCS | Performed by: FAMILY MEDICINE

## 2022-12-20 PROCEDURE — G8420 CALC BMI NORM PARAMETERS: HCPCS | Performed by: FAMILY MEDICINE

## 2022-12-20 NOTE — PROGRESS NOTES
Health Maintenance Due   Topic Date Due    Shingrix Vaccine Age 49> (1 of 2) Never done    Lipid Screen  02/15/2022    Medicare Yearly Exam  08/18/2022     1. \"Have you been to the ER, urgent care clinic since your last visit? Hospitalized since your last visit? \"  No    2. \"Have you seen or consulted any other health care providers outside of the [address] [place] [address] since your last visit? \" No     3. For patients aged 39-70: Has the patient had a colonoscopy / FIT/ Cologuard? NA - based on age      If the patient is female:    4. For patients aged 41-77: Has the patient had a mammogram within the past 2 years? NA - based on age or sex      11. For patients aged 21-65: Has the patient had a pap smear?  NA - based on age or sex

## 2022-12-20 NOTE — PROGRESS NOTES
C/ VVI ICD clinic check. Device functioning appropriately as programmed. See scanned documents in media manger.

## 2022-12-20 NOTE — PROGRESS NOTES
HPI  Baljeet Valerio is a 79 y.o. male who presents to discuss DMV form that he dropped off earlier this week. Reports he needs the form in order to \"get my license back\". Reports that he has not had any accidents or run-ins with the police. Reports he has never had a speeding ticket. As far as he can tell the medical form from the [facility] stems from his recent attempt to renew his license and he failed the vision test.  He is confused by all this and brought a folder with DMV correspondence that we sifting through together. He does have a letter that specifies that the only thing he needs is a Med4 form filled out by his eye doctor. He then has a letter dated November 2 that the vision report was received from his ophthalmologist and it is satisfactory. Only thing he needs to do now is return to the [facility] to get his license renewed and certain restrictions will be placed on his license. Reports that he has not done this yet. He now has a letter dated December 9 but says he needs the complete Med2 and Med4 form. The specific Med 2 form that he gave me was the cardiac history form. We are unclear why he would need cardiac clearance.   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      PMHx:  Past Medical History:   Diagnosis Date    Arthritis     knees    CAD (coronary artery disease)     stent x3 approx 2001    Chronic atrial fibrillation [facility])     ED (erectile dysfunction)     Hypertension     Kidney stone on right side 10/30/2011    JAMEY (obstructive sleep apnea) 4/25/2012    Skin cancer     BCC, s/p Mohs on L flank    Sun-damaged skin     TIA (transient ischemic attack)     6/12 - seen at [facility]       Meds:   Current Outpatient Medications   Medication Sig Dispense Refill    warfarin (COUMADIN) 2.5 mg tablet Two TABLET BY MOUTH DAILY 180 Tablet 2    enalapril (VASOTEC) 20 mg tablet TAKE ONE TABLET BY MOUTH TWICE A  Tablet 1    metoprolol succinate (TOPROL-XL) 25 mg XL tablet Take 0.5 Tablets by mouth daily. 90 Tablet 3    pravastatin (PRAVACHOL) 10 mg tablet TAKE 1 TABLET EVERY NIGHT 90 Tablet 3    tadalafiL (CIALIS) 5 mg tablet Take 1 Tablet by mouth daily. 90 Tablet 3    aspirin delayed-release 81 mg tablet Take  by mouth daily. FOLIC ACID PO Take 912 mcg by mouth daily. MULTIVITAMIN WITH MINERALS (MULTI-VIT 55 PLUS PO) Take 1 Tab by mouth daily. Taking multivitamins every morning. Allergies: Allergies   Allergen Reactions    Oxycodone Hcl Other (comments)    Pcn [Penicillins] Unknown (comments)     As a child    Percocet [Oxycodone-Acetaminophen] Swelling     Leg swelling       Smoker:  Social History     Tobacco Use   Smoking Status Never   Smokeless Tobacco Never       ETOH:   Social History     Substance and Sexual Activity   Alcohol Use No    Alcohol/week: 0.0 standard drinks       FH:   Family History   Problem Relation Age of Onset    Stroke Father     Heart Attack Father     Heart Disease Father     Cancer Father 80        colon cancer    Heart Disease Mother     OSTEOARTHRITIS Sister         s/p bilateral knee replacements    No Known Problems Daughter     Anesth Problems Neg Hx        ROS:   As listed in HPI. In addition:  Constitutional:   No headache, fever, fatigue, weight loss or weight gain      Cardiac:    No chest pain      Resp:   No cough or shortness of breath      Neuro   No loss of consciousness, dizziness, seizures      Physical Exam:  Blood pressure (!) 168/90, pulse 80, temperature 97.7 °F (36.5 °C), temperature source Temporal, resp. rate 17, height 5' 11\" (1.803 m), weight 177 lb 6.4 oz (80.5 kg), SpO2 98 %. GEN: No apparent distress. Alert and oriented and responds to all questions appropriately. NEUROLOGIC:  No focal neurologic deficits. Strength and sensation grossly intact. Coordination and gait grossly intact. EXT: Well perfused. No edema. SKIN: No obvious rashes.   Lungs clear to auscultation bilaterally  CV regular rate rhythm no murmur       Assessment and Plan     Hypertension  Elevated today. Poor historian for the medicines that he took this morning. Distraught because he has another appointment he needs to get to after this and feels rushed. Will be seeing his cardiologist    DMV form  As far as I can tell from his report he failed the vision screen when renewing his license and needed to get his eye doctor to fill out the appropriate DMV paperwork. This appears to have been done already. Now DMV is requesting medical clearance. I indicated that the cardiac history is \"not applicable\" for the purposes of driving a personal vehicle and indicated from medical standpoint he should be okay to drive          [id number] ICD-9-CM    1. Primary hypertension  I10 401.9       2. Paroxysmal atrial fibrillation (HCC)  I48.0 427.31       3. Coronary artery disease involving native coronary artery of native heart without angina pectoris  I25.10 414.01       4. S/P ICD (internal cardiac defibrillator) procedure  Z95.810 V45.02           AVS given.  Pt expressed understanding of instructions

## 2023-01-01 ENCOUNTER — HOSPICE ADMISSION (OUTPATIENT)
Age: 83
End: 2023-01-01
Payer: MEDICARE

## 2023-01-01 ENCOUNTER — APPOINTMENT (OUTPATIENT)
Facility: HOSPITAL | Age: 83
DRG: 065 | End: 2023-01-01
Attending: INTERNAL MEDICINE
Payer: MEDICARE

## 2023-01-01 ENCOUNTER — APPOINTMENT (OUTPATIENT)
Facility: HOSPITAL | Age: 83
DRG: 065 | End: 2023-01-01
Payer: MEDICARE

## 2023-01-01 ENCOUNTER — HOSPITAL ENCOUNTER (INPATIENT)
Facility: HOSPITAL | Age: 83
LOS: 9 days | Discharge: HOSPICE | DRG: 065 | End: 2023-08-23
Attending: STUDENT IN AN ORGANIZED HEALTH CARE EDUCATION/TRAINING PROGRAM | Admitting: INTERNAL MEDICINE
Payer: MEDICARE

## 2023-01-01 ENCOUNTER — APPOINTMENT (OUTPATIENT)
Facility: HOSPITAL | Age: 83
DRG: 065 | End: 2023-01-01
Attending: PSYCHIATRY & NEUROLOGY
Payer: MEDICARE

## 2023-01-01 ENCOUNTER — PROCEDURE VISIT (OUTPATIENT)
Age: 83
End: 2023-01-01
Payer: MEDICARE

## 2023-01-01 VITALS
WEIGHT: 158 LBS | OXYGEN SATURATION: 100 % | BODY MASS INDEX: 22.12 KG/M2 | HEART RATE: 89 BPM | TEMPERATURE: 97.7 F | SYSTOLIC BLOOD PRESSURE: 142 MMHG | RESPIRATION RATE: 20 BRPM | HEIGHT: 71 IN | DIASTOLIC BLOOD PRESSURE: 82 MMHG

## 2023-01-01 DIAGNOSIS — G30.1 MODERATE LATE ONSET ALZHEIMER'S DEMENTIA WITHOUT BEHAVIORAL DISTURBANCE, PSYCHOTIC DISTURBANCE, MOOD DISTURBANCE, OR ANXIETY (HCC): Primary | ICD-10-CM

## 2023-01-01 DIAGNOSIS — I21.4 NSTEMI (NON-ST ELEVATED MYOCARDIAL INFARCTION) (HCC): ICD-10-CM

## 2023-01-01 DIAGNOSIS — I61.9 CVA (CEREBROVASCULAR ACCIDENT DUE TO INTRACEREBRAL HEMORRHAGE) (HCC): ICD-10-CM

## 2023-01-01 DIAGNOSIS — N30.00 ACUTE CYSTITIS WITHOUT HEMATURIA: ICD-10-CM

## 2023-01-01 DIAGNOSIS — I63.9 CEREBROVASCULAR ACCIDENT (CVA), UNSPECIFIED MECHANISM (HCC): Primary | ICD-10-CM

## 2023-01-01 DIAGNOSIS — F02.B0 MODERATE LATE ONSET ALZHEIMER'S DEMENTIA WITHOUT BEHAVIORAL DISTURBANCE, PSYCHOTIC DISTURBANCE, MOOD DISTURBANCE, OR ANXIETY (HCC): Primary | ICD-10-CM

## 2023-01-01 DIAGNOSIS — G45.9 TIA (TRANSIENT ISCHEMIC ATTACK): ICD-10-CM

## 2023-01-01 LAB
ALBUMIN SERPL-MCNC: 3.6 G/DL (ref 3.5–5)
ALBUMIN/GLOB SERPL: 1 (ref 1.1–2.2)
ALP SERPL-CCNC: 90 U/L (ref 45–117)
ALT SERPL-CCNC: 34 U/L (ref 12–78)
ANION GAP BLD CALC-SCNC: 11 (ref 10–20)
ANION GAP SERPL CALC-SCNC: 2 MMOL/L (ref 5–15)
ANION GAP SERPL CALC-SCNC: 4 MMOL/L (ref 5–15)
ANION GAP SERPL CALC-SCNC: 5 MMOL/L (ref 5–15)
ANION GAP SERPL CALC-SCNC: 6 MMOL/L (ref 5–15)
ANION GAP SERPL CALC-SCNC: 8 MMOL/L (ref 5–15)
APPEARANCE UR: ABNORMAL
AST SERPL-CCNC: 79 U/L (ref 15–37)
BACTERIA SPEC CULT: ABNORMAL
BACTERIA SPEC CULT: NORMAL
BACTERIA SPEC CULT: NORMAL
BACTERIA URNS QL MICRO: ABNORMAL /HPF
BASE EXCESS BLD CALC-SCNC: 4.6 MMOL/L
BASOPHILS # BLD: 0 K/UL (ref 0–0.1)
BASOPHILS # BLD: 0.1 K/UL (ref 0–0.1)
BASOPHILS NFR BLD: 0 % (ref 0–1)
BASOPHILS NFR BLD: 0 % (ref 0–1)
BASOPHILS NFR BLD: 1 % (ref 0–1)
BASOPHILS NFR BLD: 1 % (ref 0–1)
BILIRUB SERPL-MCNC: 1.7 MG/DL (ref 0.2–1)
BILIRUB UR QL: NEGATIVE
BUN SERPL-MCNC: 15 MG/DL (ref 6–20)
BUN SERPL-MCNC: 18 MG/DL (ref 6–20)
BUN SERPL-MCNC: 19 MG/DL (ref 6–20)
BUN SERPL-MCNC: 21 MG/DL (ref 6–20)
BUN SERPL-MCNC: 22 MG/DL (ref 6–20)
BUN SERPL-MCNC: 23 MG/DL (ref 6–20)
BUN SERPL-MCNC: 26 MG/DL (ref 6–20)
BUN SERPL-MCNC: 27 MG/DL (ref 6–20)
BUN/CREAT SERPL: 16 (ref 12–20)
BUN/CREAT SERPL: 21 (ref 12–20)
BUN/CREAT SERPL: 29 (ref 12–20)
BUN/CREAT SERPL: 33 (ref 12–20)
BUN/CREAT SERPL: 33 (ref 12–20)
BUN/CREAT SERPL: 38 (ref 12–20)
CA-I BLD-MCNC: 1.15 MMOL/L (ref 1.12–1.32)
CALCIUM SERPL-MCNC: 7.8 MG/DL (ref 8.5–10.1)
CALCIUM SERPL-MCNC: 8.4 MG/DL (ref 8.5–10.1)
CALCIUM SERPL-MCNC: 8.7 MG/DL (ref 8.5–10.1)
CALCIUM SERPL-MCNC: 8.7 MG/DL (ref 8.5–10.1)
CALCIUM SERPL-MCNC: 8.8 MG/DL (ref 8.5–10.1)
CALCIUM SERPL-MCNC: 8.8 MG/DL (ref 8.5–10.1)
CALCIUM SERPL-MCNC: 8.9 MG/DL (ref 8.5–10.1)
CALCIUM SERPL-MCNC: 9.3 MG/DL (ref 8.5–10.1)
CC UR VC: ABNORMAL
CHLORIDE BLD-SCNC: 105 MMOL/L (ref 100–108)
CHLORIDE SERPL-SCNC: 107 MMOL/L (ref 97–108)
CHLORIDE SERPL-SCNC: 109 MMOL/L (ref 97–108)
CHLORIDE SERPL-SCNC: 111 MMOL/L (ref 97–108)
CHLORIDE SERPL-SCNC: 112 MMOL/L (ref 97–108)
CHLORIDE SERPL-SCNC: 113 MMOL/L (ref 97–108)
CHLORIDE SERPL-SCNC: 115 MMOL/L (ref 97–108)
CHOLEST SERPL-MCNC: 127 MG/DL
CK SERPL-CCNC: 1138 U/L (ref 39–308)
CK SERPL-CCNC: 218 U/L (ref 39–308)
CK SERPL-CCNC: 270 U/L (ref 39–308)
CK SERPL-CCNC: 621 U/L (ref 39–308)
CO2 BLD-SCNC: 29 MMOL/L (ref 19–24)
CO2 SERPL-SCNC: 24 MMOL/L (ref 21–32)
CO2 SERPL-SCNC: 25 MMOL/L (ref 21–32)
CO2 SERPL-SCNC: 26 MMOL/L (ref 21–32)
CO2 SERPL-SCNC: 26 MMOL/L (ref 21–32)
CO2 SERPL-SCNC: 27 MMOL/L (ref 21–32)
CO2 SERPL-SCNC: 28 MMOL/L (ref 21–32)
CO2 SERPL-SCNC: 29 MMOL/L (ref 21–32)
CO2 SERPL-SCNC: 29 MMOL/L (ref 21–32)
COLOR UR: ABNORMAL
COMMENT:: NORMAL
CREAT SERPL-MCNC: 0.65 MG/DL (ref 0.7–1.3)
CREAT SERPL-MCNC: 0.67 MG/DL (ref 0.7–1.3)
CREAT SERPL-MCNC: 0.72 MG/DL (ref 0.7–1.3)
CREAT SERPL-MCNC: 0.73 MG/DL (ref 0.7–1.3)
CREAT SERPL-MCNC: 0.79 MG/DL (ref 0.7–1.3)
CREAT SERPL-MCNC: 0.8 MG/DL (ref 0.7–1.3)
CREAT SERPL-MCNC: 0.86 MG/DL (ref 0.7–1.3)
CREAT SERPL-MCNC: 0.93 MG/DL (ref 0.7–1.3)
CREAT UR-MCNC: 0.6 MG/DL (ref 0.6–1.3)
DIFFERENTIAL METHOD BLD: ABNORMAL
ECHO AO ROOT DIAM: 3.2 CM
ECHO AO ROOT INDEX: 1.68 CM/M2
ECHO AV AREA PEAK VELOCITY: 3.6 CM2
ECHO AV AREA/BSA PEAK VELOCITY: 1.9 CM2/M2
ECHO AV PEAK GRADIENT: 5 MMHG
ECHO AV PEAK VELOCITY: 1.1 M/S
ECHO AV VELOCITY RATIO: 0.91
ECHO BSA: 1.89 M2
ECHO BSA: 1.9 M2
ECHO EST RA PRESSURE: 8 MMHG
ECHO LA DIAMETER INDEX: 2.25 CM/M2
ECHO LA DIAMETER: 4.3 CM
ECHO LA TO AORTIC ROOT RATIO: 1.34
ECHO LA VOL 4C: 128 ML (ref 18–58)
ECHO LA VOLUME INDEX A4C: 67 ML/M2 (ref 16–34)
ECHO LV FRACTIONAL SHORTENING: 27 % (ref 28–44)
ECHO LV INTERNAL DIMENSION DIASTOLE INDEX: 2.36 CM/M2
ECHO LV INTERNAL DIMENSION DIASTOLIC: 4.5 CM (ref 4.2–5.9)
ECHO LV INTERNAL DIMENSION SYSTOLIC INDEX: 1.73 CM/M2
ECHO LV INTERNAL DIMENSION SYSTOLIC: 3.3 CM
ECHO LV IVSD: 1.6 CM (ref 0.6–1)
ECHO LV MASS 2D: 304.6 G (ref 88–224)
ECHO LV MASS INDEX 2D: 159.5 G/M2 (ref 49–115)
ECHO LV POSTERIOR WALL DIASTOLIC: 1.6 CM (ref 0.6–1)
ECHO LV RELATIVE WALL THICKNESS RATIO: 0.71
ECHO LVOT AREA: 4.2 CM2
ECHO LVOT DIAM: 2.3 CM
ECHO LVOT PEAK GRADIENT: 4 MMHG
ECHO LVOT PEAK VELOCITY: 1 M/S
ECHO PULMONARY ARTERY END DIASTOLIC PRESSURE: 4 MMHG
ECHO PV REGURGITANT MAX VELOCITY: 1 M/S
ECHO RA AREA 4C: 36 CM2
ECHO RIGHT VENTRICULAR SYSTOLIC PRESSURE (RVSP): 30 MMHG
ECHO TV REGURGITANT MAX VELOCITY: 2.34 M/S
ECHO TV REGURGITANT PEAK GRADIENT: 22 MMHG
EKG ATRIAL RATE: 67 BPM
EKG DIAGNOSIS: NORMAL
EKG P AXIS: 7 DEGREES
EKG P-R INTERVAL: 254 MS
EKG Q-T INTERVAL: 448 MS
EKG QRS DURATION: 148 MS
EKG QTC CALCULATION (BAZETT): 473 MS
EKG R AXIS: 66 DEGREES
EKG T AXIS: -54 DEGREES
EKG VENTRICULAR RATE: 67 BPM
EOSINOPHIL # BLD: 0 K/UL (ref 0–0.4)
EOSINOPHIL # BLD: 0.1 K/UL (ref 0–0.4)
EOSINOPHIL # BLD: 0.1 K/UL (ref 0–0.4)
EOSINOPHIL # BLD: 0.3 K/UL (ref 0–0.4)
EOSINOPHIL NFR BLD: 0 % (ref 0–7)
EOSINOPHIL NFR BLD: 1 % (ref 0–7)
EOSINOPHIL NFR BLD: 2 % (ref 0–7)
EOSINOPHIL NFR BLD: 5 % (ref 0–7)
EPITH CASTS URNS QL MICRO: ABNORMAL /LPF
ERYTHROCYTE [DISTWIDTH] IN BLOOD BY AUTOMATED COUNT: 14.9 % (ref 11.5–14.5)
ERYTHROCYTE [DISTWIDTH] IN BLOOD BY AUTOMATED COUNT: 15.7 % (ref 11.5–14.5)
ERYTHROCYTE [DISTWIDTH] IN BLOOD BY AUTOMATED COUNT: 15.8 % (ref 11.5–14.5)
ERYTHROCYTE [DISTWIDTH] IN BLOOD BY AUTOMATED COUNT: 15.9 % (ref 11.5–14.5)
ERYTHROCYTE [DISTWIDTH] IN BLOOD BY AUTOMATED COUNT: 15.9 % (ref 11.5–14.5)
EST. AVERAGE GLUCOSE BLD GHB EST-MCNC: 91 MG/DL
ETHANOL SERPL-MCNC: <10 MG/DL (ref 0–0.08)
GLOBULIN SER CALC-MCNC: 3.5 G/DL (ref 2–4)
GLUCOSE BLD STRIP.AUTO-MCNC: 105 MG/DL (ref 74–106)
GLUCOSE SERPL-MCNC: 101 MG/DL (ref 65–100)
GLUCOSE SERPL-MCNC: 102 MG/DL (ref 65–100)
GLUCOSE SERPL-MCNC: 115 MG/DL (ref 65–100)
GLUCOSE SERPL-MCNC: 116 MG/DL (ref 65–100)
GLUCOSE SERPL-MCNC: 119 MG/DL (ref 65–100)
GLUCOSE SERPL-MCNC: 140 MG/DL (ref 65–100)
GLUCOSE SERPL-MCNC: 76 MG/DL (ref 65–100)
GLUCOSE SERPL-MCNC: 95 MG/DL (ref 65–100)
GLUCOSE UR STRIP.AUTO-MCNC: NEGATIVE MG/DL
HBA1C MFR BLD: 4.8 % (ref 4–5.6)
HCO3 BLDA-SCNC: 29 MMOL/L
HCT VFR BLD AUTO: 32.2 % (ref 36.6–50.3)
HCT VFR BLD AUTO: 41.6 % (ref 36.6–50.3)
HCT VFR BLD AUTO: 44.3 % (ref 36.6–50.3)
HCT VFR BLD AUTO: 44.8 % (ref 36.6–50.3)
HCT VFR BLD AUTO: 46.2 % (ref 36.6–50.3)
HDLC SERPL-MCNC: 44 MG/DL
HDLC SERPL: 2.9 (ref 0–5)
HGB BLD-MCNC: 10.2 G/DL (ref 12.1–17)
HGB BLD-MCNC: 13.2 G/DL (ref 12.1–17)
HGB BLD-MCNC: 14.5 G/DL (ref 12.1–17)
HGB BLD-MCNC: 14.6 G/DL (ref 12.1–17)
HGB BLD-MCNC: 15.7 G/DL (ref 12.1–17)
HGB UR QL STRIP: ABNORMAL
IMM GRANULOCYTES # BLD AUTO: 0 K/UL (ref 0–0.04)
IMM GRANULOCYTES # BLD AUTO: 0.1 K/UL (ref 0–0.04)
IMM GRANULOCYTES NFR BLD AUTO: 0 % (ref 0–0.5)
IMM GRANULOCYTES NFR BLD AUTO: 1 % (ref 0–0.5)
INR PPP: 1.5 (ref 0.9–1.1)
INR PPP: 1.6 (ref 0.9–1.1)
KETONES UR QL STRIP.AUTO: 15 MG/DL
LACTATE BLD-SCNC: 1.86 MMOL/L (ref 0.4–2)
LDLC SERPL CALC-MCNC: 63 MG/DL (ref 0–100)
LEUKOCYTE ESTERASE UR QL STRIP.AUTO: ABNORMAL
LYMPHOCYTES # BLD: 0.7 K/UL (ref 0.8–3.5)
LYMPHOCYTES # BLD: 0.8 K/UL (ref 0.8–3.5)
LYMPHOCYTES # BLD: 0.9 K/UL (ref 0.8–3.5)
LYMPHOCYTES # BLD: 1 K/UL (ref 0.8–3.5)
LYMPHOCYTES NFR BLD: 14 % (ref 12–49)
LYMPHOCYTES NFR BLD: 6 % (ref 12–49)
LYMPHOCYTES NFR BLD: 9 % (ref 12–49)
LYMPHOCYTES NFR BLD: 9 % (ref 12–49)
MAGNESIUM SERPL-MCNC: 2 MG/DL (ref 1.6–2.4)
MAGNESIUM SERPL-MCNC: 2 MG/DL (ref 1.6–2.4)
MAGNESIUM SERPL-MCNC: 2.1 MG/DL (ref 1.6–2.4)
MAGNESIUM SERPL-MCNC: 2.1 MG/DL (ref 1.6–2.4)
MAGNESIUM SERPL-MCNC: 2.2 MG/DL (ref 1.6–2.4)
MCH RBC QN AUTO: 31.4 PG (ref 26–34)
MCH RBC QN AUTO: 31.4 PG (ref 26–34)
MCH RBC QN AUTO: 31.7 PG (ref 26–34)
MCH RBC QN AUTO: 31.8 PG (ref 26–34)
MCH RBC QN AUTO: 33.2 PG (ref 26–34)
MCHC RBC AUTO-ENTMCNC: 31.7 G/DL (ref 30–36.5)
MCHC RBC AUTO-ENTMCNC: 31.7 G/DL (ref 30–36.5)
MCHC RBC AUTO-ENTMCNC: 32.4 G/DL (ref 30–36.5)
MCHC RBC AUTO-ENTMCNC: 33 G/DL (ref 30–36.5)
MCHC RBC AUTO-ENTMCNC: 34 G/DL (ref 30–36.5)
MCV RBC AUTO: 100.3 FL (ref 80–99)
MCV RBC AUTO: 96.3 FL (ref 80–99)
MCV RBC AUTO: 97 FL (ref 80–99)
MCV RBC AUTO: 97.7 FL (ref 80–99)
MCV RBC AUTO: 98.8 FL (ref 80–99)
MONOCYTES # BLD: 0.8 K/UL (ref 0–1)
MONOCYTES # BLD: 0.9 K/UL (ref 0–1)
MONOCYTES # BLD: 0.9 K/UL (ref 0–1)
MONOCYTES # BLD: 1 K/UL (ref 0–1)
MONOCYTES NFR BLD: 10 % (ref 5–13)
MONOCYTES NFR BLD: 11 % (ref 5–13)
MONOCYTES NFR BLD: 8 % (ref 5–13)
MONOCYTES NFR BLD: 8 % (ref 5–13)
NEUTS SEG # BLD: 10.4 K/UL (ref 1.8–8)
NEUTS SEG # BLD: 5.1 K/UL (ref 1.8–8)
NEUTS SEG # BLD: 7 K/UL (ref 1.8–8)
NEUTS SEG # BLD: 8.6 K/UL (ref 1.8–8)
NEUTS SEG NFR BLD: 70 % (ref 32–75)
NEUTS SEG NFR BLD: 78 % (ref 32–75)
NEUTS SEG NFR BLD: 81 % (ref 32–75)
NEUTS SEG NFR BLD: 86 % (ref 32–75)
NITRITE UR QL STRIP.AUTO: NEGATIVE
NRBC # BLD: 0 K/UL (ref 0–0.01)
NRBC BLD-RTO: 0 PER 100 WBC
PCO2 BLDV: 39.4 MMHG (ref 41–51)
PH BLDV: 7.47 (ref 7.32–7.42)
PH UR STRIP: 7 (ref 5–8)
PHOSPHATE SERPL-MCNC: 2.5 MG/DL (ref 2.6–4.7)
PHOSPHATE SERPL-MCNC: 2.7 MG/DL (ref 2.6–4.7)
PHOSPHATE SERPL-MCNC: 2.7 MG/DL (ref 2.6–4.7)
PHOSPHATE SERPL-MCNC: 2.8 MG/DL (ref 2.6–4.7)
PLATELET # BLD AUTO: 158 K/UL (ref 150–400)
PLATELET # BLD AUTO: 164 K/UL (ref 150–400)
PLATELET # BLD AUTO: 177 K/UL (ref 150–400)
PLATELET # BLD AUTO: 178 K/UL (ref 150–400)
PLATELET # BLD AUTO: 187 K/UL (ref 150–400)
PMV BLD AUTO: 10.1 FL (ref 8.9–12.9)
PMV BLD AUTO: 10.5 FL (ref 8.9–12.9)
PMV BLD AUTO: 9.4 FL (ref 8.9–12.9)
PMV BLD AUTO: 9.6 FL (ref 8.9–12.9)
PMV BLD AUTO: 9.7 FL (ref 8.9–12.9)
PO2 BLDV: 29 MMHG (ref 25–40)
POTASSIUM BLD-SCNC: 3.4 MMOL/L (ref 3.5–5.5)
POTASSIUM SERPL-SCNC: 3.1 MMOL/L (ref 3.5–5.1)
POTASSIUM SERPL-SCNC: 3.2 MMOL/L (ref 3.5–5.1)
POTASSIUM SERPL-SCNC: 3.3 MMOL/L (ref 3.5–5.1)
POTASSIUM SERPL-SCNC: 3.4 MMOL/L (ref 3.5–5.1)
POTASSIUM SERPL-SCNC: 3.5 MMOL/L (ref 3.5–5.1)
POTASSIUM SERPL-SCNC: 3.6 MMOL/L (ref 3.5–5.1)
POTASSIUM SERPL-SCNC: 3.6 MMOL/L (ref 3.5–5.1)
POTASSIUM SERPL-SCNC: 4.2 MMOL/L (ref 3.5–5.1)
PROCALCITONIN SERPL-MCNC: 0.09 NG/ML
PROT SERPL-MCNC: 7.1 G/DL (ref 6.4–8.2)
PROT UR STRIP-MCNC: 100 MG/DL
PROTHROMBIN TIME: 15.1 SEC (ref 9–11.1)
PROTHROMBIN TIME: 16 SEC (ref 9–11.1)
RBC # BLD AUTO: 3.21 M/UL (ref 4.1–5.7)
RBC # BLD AUTO: 4.21 M/UL (ref 4.1–5.7)
RBC # BLD AUTO: 4.6 M/UL (ref 4.1–5.7)
RBC # BLD AUTO: 4.62 M/UL (ref 4.1–5.7)
RBC # BLD AUTO: 4.73 M/UL (ref 4.1–5.7)
RBC #/AREA URNS HPF: ABNORMAL /HPF (ref 0–5)
RBC MORPH BLD: ABNORMAL
SAO2 % BLD: 59 %
SARS-COV-2 RDRP RESP QL NAA+PROBE: NOT DETECTED
SERVICE CMNT-IMP: ABNORMAL
SERVICE CMNT-IMP: NORMAL
SERVICE CMNT-IMP: NORMAL
SODIUM BLD-SCNC: 145 MMOL/L (ref 136–145)
SODIUM SERPL-SCNC: 141 MMOL/L (ref 136–145)
SODIUM SERPL-SCNC: 142 MMOL/L (ref 136–145)
SODIUM SERPL-SCNC: 143 MMOL/L (ref 136–145)
SODIUM SERPL-SCNC: 144 MMOL/L (ref 136–145)
SODIUM SERPL-SCNC: 146 MMOL/L (ref 136–145)
SODIUM SERPL-SCNC: 146 MMOL/L (ref 136–145)
SOURCE: NORMAL
SP GR UR REFRACTOMETRY: 1.01
SPECIMEN HOLD: NORMAL
SPECIMEN SITE: ABNORMAL
TRIGL SERPL-MCNC: 100 MG/DL
TROPONIN I SERPL HS-MCNC: 101 NG/L (ref 0–76)
TSH SERPL DL<=0.05 MIU/L-ACNC: 1.97 UIU/ML (ref 0.36–3.74)
URINE CULTURE IF INDICATED: ABNORMAL
UROBILINOGEN UR QL STRIP.AUTO: 1 EU/DL (ref 0.2–1)
VAS LEFT CCA DIST EDV: 12.2 CM/S
VAS LEFT CCA DIST PSV: 53.9 CM/S
VAS LEFT CCA PROX EDV: 7.2 CM/S
VAS LEFT CCA PROX PSV: 67.8 CM/S
VAS LEFT ECA EDV: 6 CM/S
VAS LEFT ECA PSV: 72.3 CM/S
VAS LEFT ICA DIST EDV: 12.2 CM/S
VAS LEFT ICA DIST PSV: 50.2 CM/S
VAS LEFT ICA MID EDV: 9.7 CM/S
VAS LEFT ICA MID PSV: 41.6 CM/S
VAS LEFT ICA PROX EDV: 13.4 CM/S
VAS LEFT ICA PROX PSV: 42.9 CM/S
VAS LEFT ICA/CCA PSV: 0.9 NO UNITS
VAS LEFT SUBCLAVIAN PROX EDV: 0 CM/S
VAS LEFT SUBCLAVIAN PROX PSV: 68 CM/S
VAS LEFT VERTEBRAL EDV: 14.6 CM/S
VAS LEFT VERTEBRAL PSV: 47.8 CM/S
VAS RIGHT CCA DIST EDV: 12.6 CM/S
VAS RIGHT CCA DIST PSV: 54.6 CM/S
VAS RIGHT CCA PROX EDV: 14.4 CM/S
VAS RIGHT CCA PROX PSV: 67.4 CM/S
VAS RIGHT ECA EDV: 0 CM/S
VAS RIGHT ECA PSV: 63.6 CM/S
VAS RIGHT ICA DIST EDV: 13.1 CM/S
VAS RIGHT ICA DIST PSV: 52.6 CM/S
VAS RIGHT ICA MID EDV: 8.7 CM/S
VAS RIGHT ICA MID PSV: 32.8 CM/S
VAS RIGHT ICA PROX EDV: 8.7 CM/S
VAS RIGHT ICA PROX PSV: 33.9 CM/S
VAS RIGHT ICA/CCA PSV: 1 NO UNITS
VAS RIGHT SUBCLAVIAN PROX EDV: 0 CM/S
VAS RIGHT SUBCLAVIAN PROX PSV: 70.4 CM/S
VAS RIGHT VERTEBRAL EDV: 13.1 CM/S
VAS RIGHT VERTEBRAL PSV: 42.8 CM/S
VLDLC SERPL CALC-MCNC: 20 MG/DL
WBC # BLD AUTO: 10.5 K/UL (ref 4.1–11.1)
WBC # BLD AUTO: 11.4 K/UL (ref 4.1–11.1)
WBC # BLD AUTO: 12.1 K/UL (ref 4.1–11.1)
WBC # BLD AUTO: 7.3 K/UL (ref 4.1–11.1)
WBC # BLD AUTO: 8.9 K/UL (ref 4.1–11.1)
WBC URNS QL MICRO: ABNORMAL /HPF (ref 0–4)

## 2023-01-01 PROCEDURE — 36415 COLL VENOUS BLD VENIPUNCTURE: CPT

## 2023-01-01 PROCEDURE — 1100000003 HC PRIVATE W/ TELEMETRY

## 2023-01-01 PROCEDURE — 80053 COMPREHEN METABOLIC PANEL: CPT

## 2023-01-01 PROCEDURE — 6370000000 HC RX 637 (ALT 250 FOR IP): Performed by: NURSE PRACTITIONER

## 2023-01-01 PROCEDURE — 2580000003 HC RX 258: Performed by: INTERNAL MEDICINE

## 2023-01-01 PROCEDURE — 6370000000 HC RX 637 (ALT 250 FOR IP): Performed by: INTERNAL MEDICINE

## 2023-01-01 PROCEDURE — 51798 US URINE CAPACITY MEASURE: CPT

## 2023-01-01 PROCEDURE — 82550 ASSAY OF CK (CPK): CPT

## 2023-01-01 PROCEDURE — 83735 ASSAY OF MAGNESIUM: CPT

## 2023-01-01 PROCEDURE — 2500000003 HC RX 250 WO HCPCS: Performed by: INTERNAL MEDICINE

## 2023-01-01 PROCEDURE — 92526 ORAL FUNCTION THERAPY: CPT

## 2023-01-01 PROCEDURE — 99233 SBSQ HOSP IP/OBS HIGH 50: CPT | Performed by: FAMILY MEDICINE

## 2023-01-01 PROCEDURE — 97530 THERAPEUTIC ACTIVITIES: CPT

## 2023-01-01 PROCEDURE — 85025 COMPLETE CBC W/AUTO DIFF WBC: CPT

## 2023-01-01 PROCEDURE — 92507 TX SP LANG VOICE COMM INDIV: CPT

## 2023-01-01 PROCEDURE — 0656 HSPC GENERAL INPATIENT

## 2023-01-01 PROCEDURE — 80048 BASIC METABOLIC PNL TOTAL CA: CPT

## 2023-01-01 PROCEDURE — 6360000004 HC RX CONTRAST MEDICATION

## 2023-01-01 PROCEDURE — 97535 SELF CARE MNGMENT TRAINING: CPT

## 2023-01-01 PROCEDURE — 6360000002 HC RX W HCPCS: Performed by: INTERNAL MEDICINE

## 2023-01-01 PROCEDURE — 96365 THER/PROPH/DIAG IV INF INIT: CPT

## 2023-01-01 PROCEDURE — 82947 ASSAY GLUCOSE BLOOD QUANT: CPT

## 2023-01-01 PROCEDURE — 51701 INSERT BLADDER CATHETER: CPT

## 2023-01-01 PROCEDURE — 83036 HEMOGLOBIN GLYCOSYLATED A1C: CPT

## 2023-01-01 PROCEDURE — 99222 1ST HOSP IP/OBS MODERATE 55: CPT | Performed by: INTERNAL MEDICINE

## 2023-01-01 PROCEDURE — 84295 ASSAY OF SERUM SODIUM: CPT

## 2023-01-01 PROCEDURE — 43752 NASAL/OROGASTRIC W/TUBE PLMT: CPT

## 2023-01-01 PROCEDURE — 84100 ASSAY OF PHOSPHORUS: CPT

## 2023-01-01 PROCEDURE — 82803 BLOOD GASES ANY COMBINATION: CPT

## 2023-01-01 PROCEDURE — 82330 ASSAY OF CALCIUM: CPT

## 2023-01-01 PROCEDURE — 2500000003 HC RX 250 WO HCPCS: Performed by: STUDENT IN AN ORGANIZED HEALTH CARE EDUCATION/TRAINING PROGRAM

## 2023-01-01 PROCEDURE — 96366 THER/PROPH/DIAG IV INF ADDON: CPT

## 2023-01-01 PROCEDURE — 99232 SBSQ HOSP IP/OBS MODERATE 35: CPT | Performed by: PSYCHIATRY & NEUROLOGY

## 2023-01-01 PROCEDURE — 2580000003 HC RX 258: Performed by: STUDENT IN AN ORGANIZED HEALTH CARE EDUCATION/TRAINING PROGRAM

## 2023-01-01 PROCEDURE — 74230 X-RAY XM SWLNG FUNCJ C+: CPT

## 2023-01-01 PROCEDURE — 96360 HYDRATION IV INFUSION INIT: CPT

## 2023-01-01 PROCEDURE — 84145 PROCALCITONIN (PCT): CPT

## 2023-01-01 PROCEDURE — 6360000004 HC RX CONTRAST MEDICATION: Performed by: PSYCHIATRY & NEUROLOGY

## 2023-01-01 PROCEDURE — 93880 EXTRACRANIAL BILAT STUDY: CPT | Performed by: PSYCHIATRY & NEUROLOGY

## 2023-01-01 PROCEDURE — 6360000004 HC RX CONTRAST MEDICATION: Performed by: INTERNAL MEDICINE

## 2023-01-01 PROCEDURE — 93880 EXTRACRANIAL BILAT STUDY: CPT

## 2023-01-01 PROCEDURE — 97535 SELF CARE MNGMENT TRAINING: CPT | Performed by: OCCUPATIONAL THERAPIST

## 2023-01-01 PROCEDURE — 6360000002 HC RX W HCPCS: Performed by: NURSE PRACTITIONER

## 2023-01-01 PROCEDURE — 84132 ASSAY OF SERUM POTASSIUM: CPT

## 2023-01-01 PROCEDURE — 70450 CT HEAD/BRAIN W/O DYE: CPT

## 2023-01-01 PROCEDURE — C8924 2D TTE W OR W/O FOL W/CON,FU: HCPCS

## 2023-01-01 PROCEDURE — 95819 EEG AWAKE AND ASLEEP: CPT | Performed by: PSYCHIATRY & NEUROLOGY

## 2023-01-01 PROCEDURE — 80061 LIPID PANEL: CPT

## 2023-01-01 PROCEDURE — 92523 SPEECH SOUND LANG COMPREHEN: CPT

## 2023-01-01 PROCEDURE — 87086 URINE CULTURE/COLONY COUNT: CPT

## 2023-01-01 PROCEDURE — 96361 HYDRATE IV INFUSION ADD-ON: CPT

## 2023-01-01 PROCEDURE — 6360000002 HC RX W HCPCS: Performed by: STUDENT IN AN ORGANIZED HEALTH CARE EDUCATION/TRAINING PROGRAM

## 2023-01-01 PROCEDURE — 71045 X-RAY EXAM CHEST 1 VIEW: CPT

## 2023-01-01 PROCEDURE — 96136 PSYCL/NRPSYC TST PHY/QHP 1ST: CPT | Performed by: CLINICAL NEUROPSYCHOLOGIST

## 2023-01-01 PROCEDURE — 99285 EMERGENCY DEPT VISIT HI MDM: CPT

## 2023-01-01 PROCEDURE — 87186 SC STD MICRODIL/AGAR DIL: CPT

## 2023-01-01 PROCEDURE — 87635 SARS-COV-2 COVID-19 AMP PRB: CPT

## 2023-01-01 PROCEDURE — 51702 INSERT TEMP BLADDER CATH: CPT

## 2023-01-01 PROCEDURE — 85610 PROTHROMBIN TIME: CPT

## 2023-01-01 PROCEDURE — 99497 ADVNCD CARE PLAN 30 MIN: CPT | Performed by: INTERNAL MEDICINE

## 2023-01-01 PROCEDURE — 96139 PSYCL/NRPSYC TST TECH EA: CPT | Performed by: CLINICAL NEUROPSYCHOLOGIST

## 2023-01-01 PROCEDURE — 6370000000 HC RX 637 (ALT 250 FOR IP): Performed by: STUDENT IN AN ORGANIZED HEALTH CARE EDUCATION/TRAINING PROGRAM

## 2023-01-01 PROCEDURE — 95819 EEG AWAKE AND ASLEEP: CPT

## 2023-01-01 PROCEDURE — 92610 EVALUATE SWALLOWING FUNCTION: CPT

## 2023-01-01 PROCEDURE — 84484 ASSAY OF TROPONIN QUANT: CPT

## 2023-01-01 PROCEDURE — 97163 PT EVAL HIGH COMPLEX 45 MIN: CPT

## 2023-01-01 PROCEDURE — 70498 CT ANGIOGRAPHY NECK: CPT

## 2023-01-01 PROCEDURE — 92611 MOTION FLUOROSCOPY/SWALLOW: CPT

## 2023-01-01 PROCEDURE — 99233 SBSQ HOSP IP/OBS HIGH 50: CPT | Performed by: PSYCHIATRY & NEUROLOGY

## 2023-01-01 PROCEDURE — 97166 OT EVAL MOD COMPLEX 45 MIN: CPT

## 2023-01-01 PROCEDURE — 99233 SBSQ HOSP IP/OBS HIGH 50: CPT | Performed by: INTERNAL MEDICINE

## 2023-01-01 PROCEDURE — 96138 PSYCL/NRPSYC TECH 1ST: CPT | Performed by: CLINICAL NEUROPSYCHOLOGIST

## 2023-01-01 PROCEDURE — 82077 ASSAY SPEC XCP UR&BREATH IA: CPT

## 2023-01-01 PROCEDURE — 6370000000 HC RX 637 (ALT 250 FOR IP)

## 2023-01-01 PROCEDURE — 0DH63UZ INSERTION OF FEEDING DEVICE INTO STOMACH, PERCUTANEOUS APPROACH: ICD-10-PCS | Performed by: STUDENT IN AN ORGANIZED HEALTH CARE EDUCATION/TRAINING PROGRAM

## 2023-01-01 PROCEDURE — 87077 CULTURE AEROBIC IDENTIFY: CPT

## 2023-01-01 PROCEDURE — 84443 ASSAY THYROID STIM HORMONE: CPT

## 2023-01-01 PROCEDURE — 87040 BLOOD CULTURE FOR BACTERIA: CPT

## 2023-01-01 PROCEDURE — 99223 1ST HOSP IP/OBS HIGH 75: CPT | Performed by: PSYCHIATRY & NEUROLOGY

## 2023-01-01 PROCEDURE — 81001 URINALYSIS AUTO W/SCOPE: CPT

## 2023-01-01 PROCEDURE — 85027 COMPLETE CBC AUTOMATED: CPT

## 2023-01-01 RX ORDER — ASPIRIN 81 MG/1
81 TABLET, CHEWABLE ORAL DAILY
Status: DISCONTINUED | OUTPATIENT
Start: 2023-01-01 | End: 2023-01-01

## 2023-01-01 RX ORDER — POLYETHYLENE GLYCOL 3350 17 G/17G
17 POWDER, FOR SOLUTION ORAL DAILY PRN
Status: DISCONTINUED | OUTPATIENT
Start: 2023-01-01 | End: 2023-01-01

## 2023-01-01 RX ORDER — FOLIC ACID 1 MG/1
1 TABLET ORAL DAILY
Status: DISCONTINUED | OUTPATIENT
Start: 2023-01-01 | End: 2023-01-01

## 2023-01-01 RX ORDER — LABETALOL HYDROCHLORIDE 5 MG/ML
20 INJECTION, SOLUTION INTRAVENOUS EVERY 4 HOURS PRN
Status: DISCONTINUED | OUTPATIENT
Start: 2023-01-01 | End: 2023-01-01

## 2023-01-01 RX ORDER — ONDANSETRON 2 MG/ML
4 INJECTION INTRAMUSCULAR; INTRAVENOUS EVERY 6 HOURS PRN
Status: DISCONTINUED | OUTPATIENT
Start: 2023-01-01 | End: 2023-01-01 | Stop reason: HOSPADM

## 2023-01-01 RX ORDER — ENOXAPARIN SODIUM 100 MG/ML
40 INJECTION SUBCUTANEOUS DAILY
Status: DISCONTINUED | OUTPATIENT
Start: 2023-01-01 | End: 2023-01-01

## 2023-01-01 RX ORDER — METOPROLOL TARTRATE 5 MG/5ML
5 INJECTION INTRAVENOUS EVERY 6 HOURS
Status: DISCONTINUED | OUTPATIENT
Start: 2023-01-01 | End: 2023-01-01

## 2023-01-01 RX ORDER — CLONIDINE 0.1 MG/24H
1 PATCH, EXTENDED RELEASE TRANSDERMAL WEEKLY
Status: DISCONTINUED | OUTPATIENT
Start: 2023-01-01 | End: 2023-01-01

## 2023-01-01 RX ORDER — CASTOR OIL AND BALSAM, PERU 788; 87 MG/G; MG/G
OINTMENT TOPICAL 2 TIMES DAILY
Status: DISCONTINUED | OUTPATIENT
Start: 2023-01-01 | End: 2023-01-01 | Stop reason: HOSPADM

## 2023-01-01 RX ORDER — HALOPERIDOL 5 MG/ML
1 INJECTION INTRAMUSCULAR EVERY 6 HOURS PRN
Status: DISCONTINUED | OUTPATIENT
Start: 2023-01-01 | End: 2023-01-01

## 2023-01-01 RX ORDER — LORAZEPAM 2 MG/ML
1 INJECTION INTRAMUSCULAR ONCE
Status: COMPLETED | OUTPATIENT
Start: 2023-01-01 | End: 2023-01-01

## 2023-01-01 RX ORDER — ASPIRIN 300 MG/1
300 SUPPOSITORY RECTAL DAILY
Status: DISCONTINUED | OUTPATIENT
Start: 2023-01-01 | End: 2023-01-01

## 2023-01-01 RX ORDER — DOXAZOSIN 2 MG/1
8 TABLET ORAL DAILY
Status: DISCONTINUED | OUTPATIENT
Start: 2023-01-01 | End: 2023-01-01

## 2023-01-01 RX ORDER — POTASSIUM CHLORIDE, DEXTROSE MONOHYDRATE 150; 5 MG/100ML; G/100ML
INJECTION, SOLUTION INTRAVENOUS CONTINUOUS
Status: DISCONTINUED | OUTPATIENT
Start: 2023-01-01 | End: 2023-01-01

## 2023-01-01 RX ORDER — HALOPERIDOL 5 MG/ML
2 INJECTION INTRAMUSCULAR ONCE
Status: COMPLETED | OUTPATIENT
Start: 2023-01-01 | End: 2023-01-01

## 2023-01-01 RX ORDER — SODIUM CHLORIDE, SODIUM LACTATE, POTASSIUM CHLORIDE, AND CALCIUM CHLORIDE .6; .31; .03; .02 G/100ML; G/100ML; G/100ML; G/100ML
500 INJECTION, SOLUTION INTRAVENOUS ONCE
Status: COMPLETED | OUTPATIENT
Start: 2023-01-01 | End: 2023-01-01

## 2023-01-01 RX ORDER — SODIUM CHLORIDE 0.9 % (FLUSH) 0.9 %
5-40 SYRINGE (ML) INJECTION PRN
Status: DISCONTINUED | OUTPATIENT
Start: 2023-01-01 | End: 2023-01-01 | Stop reason: HOSPADM

## 2023-01-01 RX ORDER — LIDOCAINE HYDROCHLORIDE 20 MG/ML
JELLY TOPICAL PRN
Status: DISCONTINUED | OUTPATIENT
Start: 2023-01-01 | End: 2023-01-01 | Stop reason: HOSPADM

## 2023-01-01 RX ORDER — DEXTROSE MONOHYDRATE 50 MG/ML
INJECTION, SOLUTION INTRAVENOUS CONTINUOUS
Status: DISCONTINUED | OUTPATIENT
Start: 2023-01-01 | End: 2023-01-01

## 2023-01-01 RX ORDER — LIDOCAINE HYDROCHLORIDE 20 MG/ML
JELLY TOPICAL PRN
Status: DISCONTINUED | OUTPATIENT
Start: 2023-01-01 | End: 2023-01-01 | Stop reason: SDUPTHER

## 2023-01-01 RX ORDER — HYDROMORPHONE HYDROCHLORIDE 1 MG/ML
1 SOLUTION ORAL
Status: DISCONTINUED | OUTPATIENT
Start: 2023-01-01 | End: 2023-01-01 | Stop reason: HOSPADM

## 2023-01-01 RX ORDER — HYDROMORPHONE HYDROCHLORIDE 1 MG/ML
1 INJECTION, SOLUTION INTRAMUSCULAR; INTRAVENOUS; SUBCUTANEOUS EVERY 4 HOURS PRN
Status: DISCONTINUED | OUTPATIENT
Start: 2023-01-01 | End: 2023-01-01 | Stop reason: HOSPADM

## 2023-01-01 RX ORDER — LORAZEPAM 2 MG/ML
0.5 INJECTION INTRAMUSCULAR EVERY 4 HOURS PRN
Status: DISCONTINUED | OUTPATIENT
Start: 2023-01-01 | End: 2023-01-01 | Stop reason: HOSPADM

## 2023-01-01 RX ORDER — LORAZEPAM 2 MG/ML
1 INJECTION INTRAMUSCULAR EVERY 6 HOURS PRN
Status: DISCONTINUED | OUTPATIENT
Start: 2023-01-01 | End: 2023-01-01

## 2023-01-01 RX ORDER — LORAZEPAM 2 MG/ML
1 CONCENTRATE ORAL EVERY 4 HOURS PRN
Status: DISCONTINUED | OUTPATIENT
Start: 2023-01-01 | End: 2023-01-01 | Stop reason: HOSPADM

## 2023-01-01 RX ORDER — ROSUVASTATIN CALCIUM 40 MG/1
40 TABLET, COATED ORAL NIGHTLY
Status: DISCONTINUED | OUTPATIENT
Start: 2023-01-01 | End: 2023-01-01

## 2023-01-01 RX ORDER — SODIUM CHLORIDE 9 MG/ML
INJECTION, SOLUTION INTRAVENOUS CONTINUOUS
Status: DISCONTINUED | OUTPATIENT
Start: 2023-01-01 | End: 2023-01-01

## 2023-01-01 RX ORDER — HYDRALAZINE HYDROCHLORIDE 20 MG/ML
10 INJECTION INTRAMUSCULAR; INTRAVENOUS EVERY 6 HOURS PRN
Status: DISCONTINUED | OUTPATIENT
Start: 2023-01-01 | End: 2023-01-01

## 2023-01-01 RX ORDER — DEXTROSE MONOHYDRATE, SODIUM CHLORIDE, AND POTASSIUM CHLORIDE 50; 1.49; 4.5 G/1000ML; G/1000ML; G/1000ML
INJECTION, SOLUTION INTRAVENOUS CONTINUOUS
Status: DISCONTINUED | OUTPATIENT
Start: 2023-01-01 | End: 2023-01-01

## 2023-01-01 RX ORDER — ENALAPRILAT 1.25 MG/ML
1.25 INJECTION INTRAVENOUS EVERY 6 HOURS SCHEDULED
Status: DISCONTINUED | OUTPATIENT
Start: 2023-01-01 | End: 2023-01-01

## 2023-01-01 RX ORDER — FINASTERIDE 5 MG/1
5 TABLET, FILM COATED ORAL DAILY
Status: DISCONTINUED | OUTPATIENT
Start: 2023-01-01 | End: 2023-01-01

## 2023-01-01 RX ORDER — ENOXAPARIN SODIUM 100 MG/ML
1 INJECTION SUBCUTANEOUS DAILY
Status: DISCONTINUED | OUTPATIENT
Start: 2023-01-01 | End: 2023-01-01

## 2023-01-01 RX ORDER — LABETALOL HYDROCHLORIDE 5 MG/ML
10 INJECTION, SOLUTION INTRAVENOUS EVERY 6 HOURS PRN
Status: DISCONTINUED | OUTPATIENT
Start: 2023-01-01 | End: 2023-01-01

## 2023-01-01 RX ORDER — DIPHENHYDRAMINE HYDROCHLORIDE 50 MG/ML
25 INJECTION INTRAMUSCULAR; INTRAVENOUS ONCE
Status: COMPLETED | OUTPATIENT
Start: 2023-01-01 | End: 2023-01-01

## 2023-01-01 RX ORDER — ENOXAPARIN SODIUM 100 MG/ML
1 INJECTION SUBCUTANEOUS 2 TIMES DAILY
Status: DISCONTINUED | OUTPATIENT
Start: 2023-01-01 | End: 2023-01-01

## 2023-01-01 RX ORDER — METOPROLOL TARTRATE 5 MG/5ML
2.5 INJECTION INTRAVENOUS EVERY 6 HOURS
Status: DISCONTINUED | OUTPATIENT
Start: 2023-01-01 | End: 2023-01-01

## 2023-01-01 RX ORDER — METOPROLOL SUCCINATE 25 MG/1
12.5 TABLET, EXTENDED RELEASE ORAL DAILY
Status: DISCONTINUED | OUTPATIENT
Start: 2023-01-01 | End: 2023-01-01

## 2023-01-01 RX ORDER — ONDANSETRON 4 MG/1
4 TABLET, ORALLY DISINTEGRATING ORAL EVERY 8 HOURS PRN
Status: DISCONTINUED | OUTPATIENT
Start: 2023-01-01 | End: 2023-01-01 | Stop reason: HOSPADM

## 2023-01-01 RX ORDER — SODIUM CHLORIDE 0.9 % (FLUSH) 0.9 %
5-40 SYRINGE (ML) INJECTION EVERY 12 HOURS SCHEDULED
Status: DISCONTINUED | OUTPATIENT
Start: 2023-01-01 | End: 2023-01-01 | Stop reason: HOSPADM

## 2023-01-01 RX ORDER — LIDOCAINE HYDROCHLORIDE 20 MG/ML
JELLY TOPICAL ONCE
Status: DISCONTINUED | OUTPATIENT
Start: 2023-01-01 | End: 2023-01-01 | Stop reason: HOSPADM

## 2023-01-01 RX ORDER — HALOPERIDOL 5 MG/ML
2 INJECTION INTRAMUSCULAR EVERY 6 HOURS PRN
Status: DISCONTINUED | OUTPATIENT
Start: 2023-01-01 | End: 2023-01-01

## 2023-01-01 RX ORDER — LIDOCAINE HYDROCHLORIDE 20 MG/ML
JELLY TOPICAL ONCE
Status: DISCONTINUED | OUTPATIENT
Start: 2023-01-01 | End: 2023-01-01

## 2023-01-01 RX ORDER — SODIUM CHLORIDE 9 MG/ML
INJECTION, SOLUTION INTRAVENOUS PRN
Status: DISCONTINUED | OUTPATIENT
Start: 2023-01-01 | End: 2023-01-01 | Stop reason: HOSPADM

## 2023-01-01 RX ORDER — DILTIAZEM HYDROCHLORIDE 5 MG/ML
10 INJECTION INTRAVENOUS ONCE
Status: DISCONTINUED | OUTPATIENT
Start: 2023-01-01 | End: 2023-01-01

## 2023-01-01 RX ORDER — POTASSIUM CHLORIDE 7.45 MG/ML
10 INJECTION INTRAVENOUS
Status: COMPLETED | OUTPATIENT
Start: 2023-01-01 | End: 2023-01-01

## 2023-01-01 RX ADMIN — ENALAPRILAT 1.25 MG: 2.5 INJECTION INTRAVENOUS at 21:03

## 2023-01-01 RX ADMIN — ENALAPRILAT 1.25 MG: 2.5 INJECTION INTRAVENOUS at 08:51

## 2023-01-01 RX ADMIN — NITROGLYCERIN 0.5 INCH: 20 OINTMENT TOPICAL at 10:25

## 2023-01-01 RX ADMIN — SODIUM CHLORIDE, PRESERVATIVE FREE 10 ML: 5 INJECTION INTRAVENOUS at 21:07

## 2023-01-01 RX ADMIN — HYDRALAZINE HYDROCHLORIDE 10 MG: 20 INJECTION INTRAMUSCULAR; INTRAVENOUS at 18:08

## 2023-01-01 RX ADMIN — ENOXAPARIN SODIUM 70 MG: 100 INJECTION SUBCUTANEOUS at 20:33

## 2023-01-01 RX ADMIN — Medication: at 09:56

## 2023-01-01 RX ADMIN — SODIUM CHLORIDE 1000 MG: 900 INJECTION INTRAVENOUS at 01:20

## 2023-01-01 RX ADMIN — METOPROLOL TARTRATE 5 MG: 5 INJECTION INTRAVENOUS at 03:39

## 2023-01-01 RX ADMIN — ENOXAPARIN SODIUM 70 MG: 100 INJECTION SUBCUTANEOUS at 10:00

## 2023-01-01 RX ADMIN — LABETALOL HYDROCHLORIDE 20 MG: 5 INJECTION INTRAVENOUS at 22:58

## 2023-01-01 RX ADMIN — POTASSIUM CHLORIDE, DEXTROSE MONOHYDRATE AND SODIUM CHLORIDE: 150; 5; 450 INJECTION, SOLUTION INTRAVENOUS at 18:01

## 2023-01-01 RX ADMIN — SODIUM CHLORIDE, PRESERVATIVE FREE 10 ML: 5 INJECTION INTRAVENOUS at 08:52

## 2023-01-01 RX ADMIN — METOPROLOL TARTRATE 5 MG: 5 INJECTION INTRAVENOUS at 15:32

## 2023-01-01 RX ADMIN — METOPROLOL TARTRATE 5 MG: 5 INJECTION INTRAVENOUS at 17:16

## 2023-01-01 RX ADMIN — PERFLUTREN 1.5 ML: 6.52 INJECTION, SUSPENSION INTRAVENOUS at 12:22

## 2023-01-01 RX ADMIN — SODIUM CHLORIDE, PRESERVATIVE FREE 10 ML: 5 INJECTION INTRAVENOUS at 08:20

## 2023-01-01 RX ADMIN — ENALAPRILAT 1.25 MG: 2.5 INJECTION INTRAVENOUS at 10:20

## 2023-01-01 RX ADMIN — LORAZEPAM 1 MG: 2 SOLUTION, CONCENTRATE ORAL at 20:34

## 2023-01-01 RX ADMIN — NITROGLYCERIN 0.5 INCH: 20 OINTMENT TOPICAL at 14:45

## 2023-01-01 RX ADMIN — SODIUM CHLORIDE, PRESERVATIVE FREE 10 ML: 5 INJECTION INTRAVENOUS at 20:10

## 2023-01-01 RX ADMIN — ENOXAPARIN SODIUM 70 MG: 100 INJECTION SUBCUTANEOUS at 09:14

## 2023-01-01 RX ADMIN — LABETALOL HYDROCHLORIDE 20 MG: 5 INJECTION INTRAVENOUS at 11:44

## 2023-01-01 RX ADMIN — ENOXAPARIN SODIUM 40 MG: 100 INJECTION SUBCUTANEOUS at 08:52

## 2023-01-01 RX ADMIN — ENALAPRILAT 1.25 MG: 2.5 INJECTION INTRAVENOUS at 23:44

## 2023-01-01 RX ADMIN — Medication: at 19:58

## 2023-01-01 RX ADMIN — POTASSIUM CHLORIDE AND DEXTROSE MONOHYDRATE: 150; 5 INJECTION, SOLUTION INTRAVENOUS at 07:40

## 2023-01-01 RX ADMIN — ENALAPRILAT 1.25 MG: 2.5 INJECTION INTRAVENOUS at 14:47

## 2023-01-01 RX ADMIN — Medication: at 08:52

## 2023-01-01 RX ADMIN — SODIUM CHLORIDE 1000 MG: 900 INJECTION INTRAVENOUS at 00:53

## 2023-01-01 RX ADMIN — ENALAPRILAT 1.25 MG: 2.5 INJECTION INTRAVENOUS at 02:39

## 2023-01-01 RX ADMIN — Medication: at 20:33

## 2023-01-01 RX ADMIN — ENALAPRILAT 1.25 MG: 2.5 INJECTION INTRAVENOUS at 21:51

## 2023-01-01 RX ADMIN — ASPIRIN 300 MG: 300 SUPPOSITORY RECTAL at 09:54

## 2023-01-01 RX ADMIN — HYDRALAZINE HYDROCHLORIDE 10 MG: 20 INJECTION INTRAMUSCULAR; INTRAVENOUS at 03:08

## 2023-01-01 RX ADMIN — SODIUM CHLORIDE: 9 INJECTION, SOLUTION INTRAVENOUS at 01:53

## 2023-01-01 RX ADMIN — NITROGLYCERIN 0.5 INCH: 20 OINTMENT TOPICAL at 14:50

## 2023-01-01 RX ADMIN — ENALAPRILAT 1.25 MG: 2.5 INJECTION INTRAVENOUS at 14:20

## 2023-01-01 RX ADMIN — METOPROLOL TARTRATE 5 MG: 5 INJECTION INTRAVENOUS at 22:49

## 2023-01-01 RX ADMIN — POTASSIUM CHLORIDE, DEXTROSE MONOHYDRATE AND SODIUM CHLORIDE: 150; 5; 450 INJECTION, SOLUTION INTRAVENOUS at 10:03

## 2023-01-01 RX ADMIN — HYDRALAZINE HYDROCHLORIDE 10 MG: 20 INJECTION INTRAMUSCULAR; INTRAVENOUS at 21:12

## 2023-01-01 RX ADMIN — LORAZEPAM 1 MG: 2 SOLUTION, CONCENTRATE ORAL at 11:59

## 2023-01-01 RX ADMIN — ENALAPRILAT 1.25 MG: 2.5 INJECTION INTRAVENOUS at 13:22

## 2023-01-01 RX ADMIN — HALOPERIDOL LACTATE 2 MG: 5 INJECTION, SOLUTION INTRAMUSCULAR at 20:37

## 2023-01-01 RX ADMIN — ENOXAPARIN SODIUM 70 MG: 100 INJECTION SUBCUTANEOUS at 21:46

## 2023-01-01 RX ADMIN — ENALAPRILAT 1.25 MG: 2.5 INJECTION INTRAVENOUS at 13:07

## 2023-01-01 RX ADMIN — ENOXAPARIN SODIUM 40 MG: 100 INJECTION SUBCUTANEOUS at 10:48

## 2023-01-01 RX ADMIN — ENOXAPARIN SODIUM 70 MG: 100 INJECTION SUBCUTANEOUS at 22:01

## 2023-01-01 RX ADMIN — METOPROLOL TARTRATE 5 MG: 5 INJECTION INTRAVENOUS at 17:28

## 2023-01-01 RX ADMIN — ENALAPRILAT 1.25 MG: 2.5 INJECTION INTRAVENOUS at 18:03

## 2023-01-01 RX ADMIN — POTASSIUM CHLORIDE AND DEXTROSE MONOHYDRATE: 150; 5 INJECTION, SOLUTION INTRAVENOUS at 18:22

## 2023-01-01 RX ADMIN — LORAZEPAM 1 MG: 2 INJECTION INTRAMUSCULAR; INTRAVENOUS at 17:59

## 2023-01-01 RX ADMIN — ENALAPRILAT 1.25 MG: 2.5 INJECTION INTRAVENOUS at 20:55

## 2023-01-01 RX ADMIN — HYDRALAZINE HYDROCHLORIDE 10 MG: 20 INJECTION INTRAMUSCULAR; INTRAVENOUS at 09:15

## 2023-01-01 RX ADMIN — SODIUM CHLORIDE, PRESERVATIVE FREE 10 ML: 5 INJECTION INTRAVENOUS at 10:25

## 2023-01-01 RX ADMIN — NITROGLYCERIN 0.5 INCH: 20 OINTMENT TOPICAL at 03:30

## 2023-01-01 RX ADMIN — METOPROLOL TARTRATE 5 MG: 5 INJECTION INTRAVENOUS at 10:20

## 2023-01-01 RX ADMIN — SODIUM CHLORIDE, PRESERVATIVE FREE 10 ML: 5 INJECTION INTRAVENOUS at 20:34

## 2023-01-01 RX ADMIN — ENALAPRILAT 1.25 MG: 2.5 INJECTION INTRAVENOUS at 18:21

## 2023-01-01 RX ADMIN — CEFEPIME 1000 MG: 1 INJECTION, POWDER, FOR SOLUTION INTRAMUSCULAR; INTRAVENOUS at 22:50

## 2023-01-01 RX ADMIN — HALOPERIDOL LACTATE 2 MG: 5 INJECTION, SOLUTION INTRAMUSCULAR at 21:50

## 2023-01-01 RX ADMIN — SODIUM CHLORIDE, PRESERVATIVE FREE 20 ML: 5 INJECTION INTRAVENOUS at 08:53

## 2023-01-01 RX ADMIN — METOPROLOL TARTRATE 5 MG: 5 INJECTION INTRAVENOUS at 21:03

## 2023-01-01 RX ADMIN — NITROGLYCERIN 0.5 INCH: 20 OINTMENT TOPICAL at 06:12

## 2023-01-01 RX ADMIN — HYDRALAZINE HYDROCHLORIDE 10 MG: 20 INJECTION INTRAMUSCULAR; INTRAVENOUS at 03:21

## 2023-01-01 RX ADMIN — ASPIRIN 300 MG: 300 SUPPOSITORY RECTAL at 08:51

## 2023-01-01 RX ADMIN — Medication: at 21:52

## 2023-01-01 RX ADMIN — Medication: at 18:33

## 2023-01-01 RX ADMIN — SODIUM CHLORIDE, PRESERVATIVE FREE 10 ML: 5 INJECTION INTRAVENOUS at 20:38

## 2023-01-01 RX ADMIN — HYDRALAZINE HYDROCHLORIDE 10 MG: 20 INJECTION INTRAMUSCULAR; INTRAVENOUS at 07:47

## 2023-01-01 RX ADMIN — ENALAPRILAT 1.25 MG: 2.5 INJECTION INTRAVENOUS at 18:01

## 2023-01-01 RX ADMIN — LORAZEPAM 1 MG: 2 INJECTION INTRAMUSCULAR; INTRAVENOUS at 01:32

## 2023-01-01 RX ADMIN — ENALAPRILAT 1.25 MG: 2.5 INJECTION INTRAVENOUS at 05:23

## 2023-01-01 RX ADMIN — NITROGLYCERIN 0.5 INCH: 20 OINTMENT TOPICAL at 05:19

## 2023-01-01 RX ADMIN — ENOXAPARIN SODIUM 70 MG: 100 INJECTION SUBCUTANEOUS at 21:52

## 2023-01-01 RX ADMIN — Medication: at 21:46

## 2023-01-01 RX ADMIN — Medication: at 10:49

## 2023-01-01 RX ADMIN — METOPROLOL TARTRATE 5 MG: 5 INJECTION INTRAVENOUS at 20:56

## 2023-01-01 RX ADMIN — METOPROLOL TARTRATE 5 MG: 5 INJECTION INTRAVENOUS at 08:53

## 2023-01-01 RX ADMIN — ENOXAPARIN SODIUM 70 MG: 100 INJECTION SUBCUTANEOUS at 09:41

## 2023-01-01 RX ADMIN — LORAZEPAM 1 MG: 2 INJECTION INTRAMUSCULAR; INTRAVENOUS at 16:39

## 2023-01-01 RX ADMIN — METOPROLOL TARTRATE 5 MG: 5 INJECTION INTRAVENOUS at 20:09

## 2023-01-01 RX ADMIN — POTASSIUM CHLORIDE AND DEXTROSE MONOHYDRATE: 150; 5 INJECTION, SOLUTION INTRAVENOUS at 08:51

## 2023-01-01 RX ADMIN — METOPROLOL TARTRATE 5 MG: 5 INJECTION INTRAVENOUS at 16:13

## 2023-01-01 RX ADMIN — ENOXAPARIN SODIUM 70 MG: 100 INJECTION SUBCUTANEOUS at 20:55

## 2023-01-01 RX ADMIN — Medication: at 10:19

## 2023-01-01 RX ADMIN — HYDRALAZINE HYDROCHLORIDE 10 MG: 20 INJECTION INTRAMUSCULAR; INTRAVENOUS at 08:52

## 2023-01-01 RX ADMIN — ENALAPRILAT 1.25 MG: 2.5 INJECTION INTRAVENOUS at 14:54

## 2023-01-01 RX ADMIN — POTASSIUM CHLORIDE 10 MEQ: 7.46 INJECTION, SOLUTION INTRAVENOUS at 14:21

## 2023-01-01 RX ADMIN — ENALAPRILAT 1.25 MG: 2.5 INJECTION INTRAVENOUS at 00:59

## 2023-01-01 RX ADMIN — POTASSIUM CHLORIDE 10 MEQ: 7.46 INJECTION, SOLUTION INTRAVENOUS at 13:12

## 2023-01-01 RX ADMIN — HALOPERIDOL LACTATE 2 MG: 5 INJECTION, SOLUTION INTRAMUSCULAR at 02:42

## 2023-01-01 RX ADMIN — SODIUM CHLORIDE, PRESERVATIVE FREE 10 ML: 5 INJECTION INTRAVENOUS at 09:24

## 2023-01-01 RX ADMIN — SODIUM CHLORIDE: 9 INJECTION, SOLUTION INTRAVENOUS at 23:15

## 2023-01-01 RX ADMIN — SODIUM CHLORIDE, PRESERVATIVE FREE 10 ML: 5 INJECTION INTRAVENOUS at 22:11

## 2023-01-01 RX ADMIN — METOPROLOL TARTRATE 5 MG: 5 INJECTION INTRAVENOUS at 15:33

## 2023-01-01 RX ADMIN — DIPHENHYDRAMINE HYDROCHLORIDE 25 MG: 50 INJECTION, SOLUTION INTRAMUSCULAR; INTRAVENOUS at 22:37

## 2023-01-01 RX ADMIN — NITROGLYCERIN 0.5 INCH: 20 OINTMENT TOPICAL at 22:06

## 2023-01-01 RX ADMIN — HALOPERIDOL LACTATE 2 MG: 5 INJECTION, SOLUTION INTRAMUSCULAR at 01:27

## 2023-01-01 RX ADMIN — ENALAPRILAT 1.25 MG: 2.5 INJECTION INTRAVENOUS at 01:28

## 2023-01-01 RX ADMIN — IOPAMIDOL 100 ML: 755 INJECTION, SOLUTION INTRAVENOUS at 11:47

## 2023-01-01 RX ADMIN — Medication: at 20:59

## 2023-01-01 RX ADMIN — SODIUM CHLORIDE, PRESERVATIVE FREE 10 ML: 5 INJECTION INTRAVENOUS at 12:49

## 2023-01-01 RX ADMIN — NITROGLYCERIN 0.5 INCH: 20 OINTMENT TOPICAL at 20:56

## 2023-01-01 RX ADMIN — METOPROLOL TARTRATE 5 MG: 5 INJECTION INTRAVENOUS at 04:03

## 2023-01-01 RX ADMIN — Medication: at 22:01

## 2023-01-01 RX ADMIN — ENALAPRILAT 1.25 MG: 2.5 INJECTION INTRAVENOUS at 17:08

## 2023-01-01 RX ADMIN — METOPROLOL TARTRATE 2.5 MG: 5 INJECTION INTRAVENOUS at 10:02

## 2023-01-01 RX ADMIN — ENALAPRILAT 1.25 MG: 2.5 INJECTION INTRAVENOUS at 09:19

## 2023-01-01 RX ADMIN — SODIUM CHLORIDE 1000 MG: 900 INJECTION INTRAVENOUS at 00:12

## 2023-01-01 RX ADMIN — Medication: at 10:23

## 2023-01-01 RX ADMIN — HALOPERIDOL LACTATE 1 MG: 5 INJECTION, SOLUTION INTRAMUSCULAR at 17:21

## 2023-01-01 RX ADMIN — ENALAPRILAT 1.25 MG: 2.5 INJECTION INTRAVENOUS at 03:48

## 2023-01-01 RX ADMIN — ASPIRIN 300 MG: 300 SUPPOSITORY RECTAL at 10:48

## 2023-01-01 RX ADMIN — NITROGLYCERIN 0.5 INCH: 20 OINTMENT TOPICAL at 06:07

## 2023-01-01 RX ADMIN — SODIUM CHLORIDE, PRESERVATIVE FREE 10 ML: 5 INJECTION INTRAVENOUS at 08:40

## 2023-01-01 RX ADMIN — ENALAPRILAT 1.25 MG: 2.5 INJECTION INTRAVENOUS at 05:16

## 2023-01-01 RX ADMIN — HYDRALAZINE HYDROCHLORIDE 10 MG: 20 INJECTION INTRAMUSCULAR; INTRAVENOUS at 21:13

## 2023-01-01 RX ADMIN — SODIUM CHLORIDE: 9 INJECTION, SOLUTION INTRAVENOUS at 12:49

## 2023-01-01 RX ADMIN — ENOXAPARIN SODIUM 70 MG: 100 INJECTION SUBCUTANEOUS at 09:54

## 2023-01-01 RX ADMIN — FOLIC ACID 1 MG: 1 TABLET ORAL at 10:19

## 2023-01-01 RX ADMIN — ENALAPRILAT 1.25 MG: 2.5 INJECTION INTRAVENOUS at 06:33

## 2023-01-01 RX ADMIN — METOPROLOL TARTRATE 5 MG: 5 INJECTION INTRAVENOUS at 15:23

## 2023-01-01 RX ADMIN — ASPIRIN 300 MG: 300 SUPPOSITORY RECTAL at 05:44

## 2023-01-01 RX ADMIN — HYDRALAZINE HYDROCHLORIDE 10 MG: 20 INJECTION INTRAMUSCULAR; INTRAVENOUS at 03:17

## 2023-01-01 RX ADMIN — ENALAPRILAT 1.25 MG: 2.5 INJECTION INTRAVENOUS at 11:40

## 2023-01-01 RX ADMIN — METOPROLOL TARTRATE 5 MG: 5 INJECTION INTRAVENOUS at 21:49

## 2023-01-01 RX ADMIN — SODIUM CHLORIDE 1000 MG: 900 INJECTION INTRAVENOUS at 01:53

## 2023-01-01 RX ADMIN — NITROGLYCERIN 0.5 INCH: 20 OINTMENT TOPICAL at 17:09

## 2023-01-01 RX ADMIN — METOPROLOL TARTRATE 5 MG: 5 INJECTION INTRAVENOUS at 09:15

## 2023-01-01 RX ADMIN — LORAZEPAM 1 MG: 2 INJECTION INTRAMUSCULAR; INTRAVENOUS at 21:28

## 2023-01-01 RX ADMIN — ENOXAPARIN SODIUM 70 MG: 100 INJECTION SUBCUTANEOUS at 10:21

## 2023-01-01 RX ADMIN — SODIUM CHLORIDE, PRESERVATIVE FREE 10 ML: 5 INJECTION INTRAVENOUS at 10:49

## 2023-01-01 RX ADMIN — ASPIRIN 300 MG: 300 SUPPOSITORY RECTAL at 09:22

## 2023-01-01 RX ADMIN — ENALAPRILAT 1.25 MG: 2.5 INJECTION INTRAVENOUS at 05:43

## 2023-01-01 RX ADMIN — DOXAZOSIN 8 MG: 2 TABLET ORAL at 10:19

## 2023-01-01 RX ADMIN — METOPROLOL TARTRATE 5 MG: 5 INJECTION INTRAVENOUS at 22:12

## 2023-01-01 RX ADMIN — NITROGLYCERIN 0.5 INCH: 20 OINTMENT TOPICAL at 05:37

## 2023-01-01 RX ADMIN — ENALAPRILAT 1.25 MG: 2.5 INJECTION INTRAVENOUS at 00:02

## 2023-01-01 RX ADMIN — NITROGLYCERIN 0.5 INCH: 20 OINTMENT TOPICAL at 14:54

## 2023-01-01 RX ADMIN — FINASTERIDE 5 MG: 5 TABLET, FILM COATED ORAL at 10:19

## 2023-01-01 RX ADMIN — Medication: at 20:56

## 2023-01-01 RX ADMIN — POTASSIUM CHLORIDE AND DEXTROSE MONOHYDRATE: 150; 5 INJECTION, SOLUTION INTRAVENOUS at 06:35

## 2023-01-01 RX ADMIN — SODIUM CHLORIDE, PRESERVATIVE FREE 10 ML: 5 INJECTION INTRAVENOUS at 10:22

## 2023-01-01 RX ADMIN — HYDRALAZINE HYDROCHLORIDE 10 MG: 20 INJECTION INTRAMUSCULAR; INTRAVENOUS at 18:16

## 2023-01-01 RX ADMIN — NITROGLYCERIN 0.5 INCH: 20 OINTMENT TOPICAL at 21:04

## 2023-01-01 RX ADMIN — ENALAPRILAT 1.25 MG: 2.5 INJECTION INTRAVENOUS at 20:34

## 2023-01-01 RX ADMIN — NITROGLYCERIN 0.5 INCH: 20 OINTMENT TOPICAL at 15:23

## 2023-01-01 RX ADMIN — LABETALOL HYDROCHLORIDE 20 MG: 5 INJECTION INTRAVENOUS at 04:43

## 2023-01-01 RX ADMIN — METOPROLOL TARTRATE 5 MG: 5 INJECTION INTRAVENOUS at 10:24

## 2023-01-01 RX ADMIN — METOPROLOL TARTRATE 5 MG: 5 INJECTION INTRAVENOUS at 10:48

## 2023-01-01 RX ADMIN — ENALAPRILAT 1.25 MG: 2.5 INJECTION INTRAVENOUS at 10:48

## 2023-01-01 RX ADMIN — ENALAPRILAT 1.25 MG: 2.5 INJECTION INTRAVENOUS at 14:18

## 2023-01-01 RX ADMIN — NITROGLYCERIN 0.5 INCH: 20 OINTMENT TOPICAL at 14:19

## 2023-01-01 RX ADMIN — METOPROLOL TARTRATE 5 MG: 5 INJECTION INTRAVENOUS at 09:54

## 2023-01-01 RX ADMIN — METOPROLOL TARTRATE 5 MG: 5 INJECTION INTRAVENOUS at 03:23

## 2023-01-01 RX ADMIN — SODIUM CHLORIDE 1000 MG: 900 INJECTION INTRAVENOUS at 01:50

## 2023-01-01 RX ADMIN — POTASSIUM CHLORIDE 10 MEQ: 7.46 INJECTION, SOLUTION INTRAVENOUS at 10:48

## 2023-01-01 RX ADMIN — NITROGLYCERIN 0.5 INCH: 20 OINTMENT TOPICAL at 21:51

## 2023-01-01 RX ADMIN — METOPROLOL TARTRATE 5 MG: 5 INJECTION INTRAVENOUS at 03:05

## 2023-01-01 RX ADMIN — LABETALOL HYDROCHLORIDE 20 MG: 5 INJECTION INTRAVENOUS at 04:12

## 2023-01-01 RX ADMIN — ENOXAPARIN SODIUM 70 MG: 100 INJECTION SUBCUTANEOUS at 10:24

## 2023-01-01 RX ADMIN — ENOXAPARIN SODIUM 40 MG: 100 INJECTION SUBCUTANEOUS at 09:16

## 2023-01-01 RX ADMIN — SODIUM CHLORIDE, POTASSIUM CHLORIDE, SODIUM LACTATE AND CALCIUM CHLORIDE 500 ML: 600; 310; 30; 20 INJECTION, SOLUTION INTRAVENOUS at 21:47

## 2023-01-01 RX ADMIN — Medication: at 08:19

## 2023-01-01 RX ADMIN — LIDOCAINE HYDROCHLORIDE: 20 JELLY TOPICAL at 16:31

## 2023-01-01 RX ADMIN — DIATRIZOATE MEGLUMINE AND DIATRIZOATE SODIUM 20 ML: 600; 100 SOLUTION ORAL; RECTAL at 16:30

## 2023-01-01 RX ADMIN — METOPROLOL TARTRATE 5 MG: 5 INJECTION INTRAVENOUS at 15:43

## 2023-01-01 RX ADMIN — METOPROLOL TARTRATE 5 MG: 5 INJECTION INTRAVENOUS at 04:02

## 2023-01-01 RX ADMIN — Medication: at 08:40

## 2023-01-01 RX ADMIN — ENOXAPARIN SODIUM 70 MG: 100 INJECTION SUBCUTANEOUS at 22:07

## 2023-01-01 RX ADMIN — Medication: at 09:23

## 2023-01-01 RX ADMIN — METOPROLOL TARTRATE 5 MG: 5 INJECTION INTRAVENOUS at 14:54

## 2023-01-01 RX ADMIN — ASPIRIN 300 MG: 300 SUPPOSITORY RECTAL at 08:40

## 2023-01-01 RX ADMIN — HYDRALAZINE HYDROCHLORIDE 10 MG: 20 INJECTION INTRAMUSCULAR; INTRAVENOUS at 14:31

## 2023-01-01 RX ADMIN — SODIUM CHLORIDE, PRESERVATIVE FREE 10 ML: 5 INJECTION INTRAVENOUS at 21:21

## 2023-01-01 RX ADMIN — LABETALOL HYDROCHLORIDE 20 MG: 5 INJECTION INTRAVENOUS at 07:01

## 2023-01-01 RX ADMIN — ASPIRIN 300 MG: 300 SUPPOSITORY RECTAL at 10:23

## 2023-01-01 RX ADMIN — POTASSIUM CHLORIDE AND DEXTROSE MONOHYDRATE: 150; 5 INJECTION, SOLUTION INTRAVENOUS at 14:36

## 2023-01-01 RX ADMIN — LABETALOL HYDROCHLORIDE 20 MG: 5 INJECTION INTRAVENOUS at 18:08

## 2023-01-01 RX ADMIN — ENALAPRILAT 1.25 MG: 2.5 INJECTION INTRAVENOUS at 10:43

## 2023-01-01 RX ADMIN — ENALAPRILAT 1.25 MG: 2.5 INJECTION INTRAVENOUS at 01:19

## 2023-01-01 RX ADMIN — METOPROLOL TARTRATE 5 MG: 5 INJECTION INTRAVENOUS at 02:40

## 2023-01-01 RX ADMIN — METOPROLOL TARTRATE 5 MG: 5 INJECTION INTRAVENOUS at 08:51

## 2023-01-01 RX ADMIN — SODIUM CHLORIDE 1000 MG: 900 INJECTION INTRAVENOUS at 02:29

## 2023-01-01 RX ADMIN — SODIUM CHLORIDE, PRESERVATIVE FREE 10 ML: 5 INJECTION INTRAVENOUS at 21:47

## 2023-01-01 RX ADMIN — ASPIRIN 81 MG: 81 TABLET, CHEWABLE ORAL at 10:18

## 2023-01-01 RX ADMIN — Medication 1 MG: at 11:59

## 2023-01-01 RX ADMIN — METOPROLOL TARTRATE 5 MG: 5 INJECTION INTRAVENOUS at 03:48

## 2023-01-01 RX ADMIN — METOPROLOL TARTRATE 5 MG: 5 INJECTION INTRAVENOUS at 21:51

## 2023-01-01 RX ADMIN — POTASSIUM CHLORIDE, DEXTROSE MONOHYDRATE AND SODIUM CHLORIDE: 150; 5; 450 INJECTION, SOLUTION INTRAVENOUS at 01:03

## 2023-01-01 RX ADMIN — ENALAPRILAT 1.25 MG: 2.5 INJECTION INTRAVENOUS at 02:33

## 2023-01-01 RX ADMIN — SODIUM CHLORIDE, PRESERVATIVE FREE 10 ML: 5 INJECTION INTRAVENOUS at 09:57

## 2023-01-01 ASSESSMENT — PAIN SCALES - PAIN ASSESSMENT IN ADVANCED DEMENTIA (PAINAD)
BREATHING: 0
BODYLANGUAGE: 1
NEGVOCALIZATION: 0
TOTALSCORE: 2
FACIALEXPRESSION: 1
CONSOLABILITY: 0

## 2023-01-01 ASSESSMENT — PAIN SCALES - GENERAL: PAINLEVEL_OUTOF10: 0

## 2023-01-01 ASSESSMENT — PAIN - FUNCTIONAL ASSESSMENT: PAIN_FUNCTIONAL_ASSESSMENT: ADULT NONVERBAL PAIN SCALE (NPVS)

## 2023-01-01 ASSESSMENT — PAIN SCALES - WONG BAKER: WONGBAKER_NUMERICALRESPONSE: 0

## 2023-01-20 ENCOUNTER — CLINICAL SUPPORT (OUTPATIENT)
Dept: FAMILY MEDICINE CLINIC | Age: 83
End: 2023-01-20
Payer: MEDICARE

## 2023-01-20 DIAGNOSIS — I48.0 PAROXYSMAL ATRIAL FIBRILLATION (HCC): Primary | ICD-10-CM

## 2023-01-20 LAB
INR BLD: 2.7
PT POC: 32.8 SECONDS
VALID INTERNAL CONTROL?: YES

## 2023-01-20 NOTE — PROGRESS NOTES
Walter Arambula is a 79 y.o. male who presents today for Anticoagulation monitoring. Indication: Atrial Fibrillation  INR Goal: 2.0-3.0. Current dose:  Coumadin 2.5mg mon wed fri and 5mg all other day. Missed Coumadin Doses:  None  Medication Changes:  no  Dietary Changes:  no    Symptoms: taking coumadin appropriately without any bleeding. Latest INRs:  Lab Results   Component Value Date/Time    INR 2.0 (H) 08/10/2022 01:29 PM    INR 2.5 (H) 02/14/2022 11:55 AM    INR 1.6 (H) 03/30/2021 02:23 PM    INR POC 2.8 12/15/2022 07:52 AM    INR POC 2.8 11/15/2022 12:56 PM    INR POC 2.7 10/14/2022 10:07 AM    Prothrombin time 20.1 (H) 08/10/2022 01:29 PM    Prothrombin time 24.7 (H) 02/14/2022 11:55 AM    Prothrombin time 17.3 (H) 03/30/2021 02:23 PM        New Coumadin dose:.current treatment plan is effective, no change in therapy. Next check to be scheduled for  4 weeks.

## 2023-02-20 ENCOUNTER — CLINICAL SUPPORT (OUTPATIENT)
Dept: FAMILY MEDICINE CLINIC | Age: 83
End: 2023-02-20
Payer: MEDICARE

## 2023-02-20 DIAGNOSIS — I48.0 PAROXYSMAL ATRIAL FIBRILLATION (HCC): Primary | ICD-10-CM

## 2023-02-20 LAB
INR BLD: 6.9
PT POC: 82.9 SECONDS
VALID INTERNAL CONTROL?: YES

## 2023-02-20 PROCEDURE — 85610 PROTHROMBIN TIME: CPT | Performed by: NURSE PRACTITIONER

## 2023-02-20 NOTE — PROGRESS NOTES
Jack Freeman is a 79 y.o. male who presents today for Anticoagulation monitoring. Indication: Atrial Fibrillation  INR Goal: 2.0-3.0. Current dose:  Coumadin 5 mg daily, except 2.5 mg Mon/Wed/Fri. Missed Coumadin Doses:  None  Medication Changes:  no  Dietary Changes:  no    Symptoms: taking coumadin appropriately without any bleeding. Latest INRs:  Lab Results   Component Value Date/Time    INR 2.0 (H) 08/10/2022 01:29 PM    INR 2.5 (H) 02/14/2022 11:55 AM    INR 1.6 (H) 03/30/2021 02:23 PM    INR POC 2.7 01/20/2023 08:03 AM    INR POC 2.8 12/15/2022 07:52 AM    INR POC 2.8 11/15/2022 12:56 PM    Prothrombin time 20.1 (H) 08/10/2022 01:29 PM    Prothrombin time 24.7 (H) 02/14/2022 11:55 AM    Prothrombin time 17.3 (H) 03/30/2021 02:23 PM        New Coumadin dose:.the following changes are made - see anti-coag calendar. Next check to be scheduled for  2 days.

## 2023-02-22 ENCOUNTER — CLINICAL SUPPORT (OUTPATIENT)
Dept: FAMILY MEDICINE CLINIC | Age: 83
End: 2023-02-22
Payer: MEDICARE

## 2023-02-22 DIAGNOSIS — I48.0 PAROXYSMAL ATRIAL FIBRILLATION (HCC): Primary | ICD-10-CM

## 2023-02-22 LAB
INR BLD: 3.5
PT POC: 42.5 SECONDS
VALID INTERNAL CONTROL?: YES

## 2023-02-22 NOTE — PROGRESS NOTES
Denzil Osler is a 79 y.o. male who presents today for Anticoagulation monitoring. Indication: Atrial Fibrillation  INR Goal: 2.0-3.0. Current dose:  Coumadin mg daily. Missed Coumadin Doses: This week - patient was told to hold coumadin doses Tuesday and Wednesday (today)   Medication Changes:  no  Dietary Changes:  has not been eating greens     Symptoms: taking coumadin appropriately without any bleeding. Latest INRs:  Lab Results   Component Value Date/Time    INR 2.0 (H) 08/10/2022 01:29 PM    INR 2.5 (H) 02/14/2022 11:55 AM    INR 1.6 (H) 03/30/2021 02:23 PM    INR POC 3.5 02/22/2023 02:11 PM    INR POC 6.9 02/20/2023 09:34 AM    INR POC 2.7 01/20/2023 08:03 AM    Prothrombin time 20.1 (H) 08/10/2022 01:29 PM    Prothrombin time 24.7 (H) 02/14/2022 11:55 AM    Prothrombin time 17.3 (H) 03/30/2021 02:23 PM        New Coumadin dose:. the following changes are made - Hold medication today and see anti coag chart for medication changes. Oneida Weems Next check to be scheduled for  1 weeks.

## 2023-03-01 ENCOUNTER — CLINICAL SUPPORT (OUTPATIENT)
Dept: FAMILY MEDICINE CLINIC | Age: 83
End: 2023-03-01
Payer: MEDICARE

## 2023-03-01 ENCOUNTER — TELEPHONE (OUTPATIENT)
Dept: FAMILY MEDICINE CLINIC | Age: 83
End: 2023-03-01

## 2023-03-01 DIAGNOSIS — R35.1 BENIGN PROSTATIC HYPERPLASIA WITH NOCTURIA: ICD-10-CM

## 2023-03-01 DIAGNOSIS — N40.1 BENIGN PROSTATIC HYPERPLASIA WITH NOCTURIA: ICD-10-CM

## 2023-03-01 DIAGNOSIS — I48.0 PAROXYSMAL ATRIAL FIBRILLATION (HCC): Primary | ICD-10-CM

## 2023-03-01 LAB
INR BLD: 4.2
PT POC: 50.6 SECONDS
VALID INTERNAL CONTROL?: YES

## 2023-03-01 PROCEDURE — 85610 PROTHROMBIN TIME: CPT | Performed by: FAMILY MEDICINE

## 2023-03-01 RX ORDER — TADALAFIL 5 MG/1
5 TABLET ORAL DAILY
Qty: 90 TABLET | Refills: 3 | Status: SHIPPED | OUTPATIENT
Start: 2023-03-01

## 2023-03-01 NOTE — PROGRESS NOTES
Yusef Lepe is a 79 y.o. male who presents today for Anticoagulation monitoring. Indication: Atrial Fibrillation  INR Goal: 2.0-3.0. Current dose:  Coumadin been alternating QOD. Missed Coumadin Doses:  None  Medication Changes:  no  Dietary Changes:  no    Symptoms: taking coumadin appropriately without any bleeding. Latest INRs:  Lab Results   Component Value Date/Time    INR 2.0 (H) 08/10/2022 01:29 PM    INR 2.5 (H) 02/14/2022 11:55 AM    INR 1.6 (H) 03/30/2021 02:23 PM    INR POC 3.5 02/22/2023 02:11 PM    INR POC 6.9 02/20/2023 09:34 AM    INR POC 2.7 01/20/2023 08:03 AM    Prothrombin time 20.1 (H) 08/10/2022 01:29 PM    Prothrombin time 24.7 (H) 02/14/2022 11:55 AM    Prothrombin time 17.3 (H) 03/30/2021 02:23 PM        New Coumadin dose:.the following changes are made - see anti-coag calendar. Next check to be scheduled for  2 weeks.

## 2023-03-02 ENCOUNTER — TELEPHONE (OUTPATIENT)
Dept: FAMILY MEDICINE CLINIC | Age: 83
End: 2023-03-02

## 2023-03-15 ENCOUNTER — CLINICAL SUPPORT (OUTPATIENT)
Dept: FAMILY MEDICINE CLINIC | Age: 83
End: 2023-03-15
Payer: MEDICARE

## 2023-03-15 DIAGNOSIS — I48.0 PAROXYSMAL ATRIAL FIBRILLATION (HCC): Primary | ICD-10-CM

## 2023-03-15 DIAGNOSIS — R31.9 HEMATURIA, UNSPECIFIED TYPE: ICD-10-CM

## 2023-03-15 DIAGNOSIS — R35.0 URINE FREQUENCY: ICD-10-CM

## 2023-03-15 LAB
BILIRUB UR QL STRIP: NEGATIVE
GLUCOSE UR-MCNC: NEGATIVE MG/DL
INR BLD: 2.5
KETONES P FAST UR STRIP-MCNC: NEGATIVE MG/DL
PH UR STRIP: 7.5 [PH] (ref 4.6–8)
PROT UR QL STRIP: POSITIVE
PT POC: 29.5 SECONDS
SP GR UR STRIP: 1.02 (ref 1–1.03)
UA UROBILINOGEN AMB POC: NORMAL (ref 0.2–1)
URINALYSIS CLARITY POC: NORMAL
URINALYSIS COLOR POC: YELLOW
URINE BLOOD POC: NORMAL
URINE LEUKOCYTES POC: NORMAL
URINE NITRITES POC: NEGATIVE
VALID INTERNAL CONTROL?: YES

## 2023-03-15 PROCEDURE — 81003 URINALYSIS AUTO W/O SCOPE: CPT | Performed by: FAMILY MEDICINE

## 2023-03-15 PROCEDURE — 85610 PROTHROMBIN TIME: CPT | Performed by: FAMILY MEDICINE

## 2023-03-15 NOTE — PROGRESS NOTES
Deandre Gifford is a 79 y.o. male who presents today for Anticoagulation monitoring. Indication: Atrial Fibrillation  INR Goal: 2.0-3.0. Current dose:  Coumadin 2.5 mg daily. Missed Coumadin Doses:  None  Medication Changes:  no  Dietary Changes:  no    Symptoms: taking coumadin appropriately without any bleeding. Latest INRs:  Lab Results   Component Value Date/Time    INR 2.0 (H) 08/10/2022 01:29 PM    INR 2.5 (H) 02/14/2022 11:55 AM    INR 1.6 (H) 03/30/2021 02:23 PM    INR POC 4.2 03/01/2023 11:11 AM    INR POC 3.5 02/22/2023 02:11 PM    INR POC 6.9 02/20/2023 09:34 AM    Prothrombin time 20.1 (H) 08/10/2022 01:29 PM    Prothrombin time 24.7 (H) 02/14/2022 11:55 AM    Prothrombin time 17.3 (H) 03/30/2021 02:23 PM        New Coumadin dose:.current treatment plan is effective, no change in therapy. Next check to be scheduled for  2 weeks.

## 2023-03-15 NOTE — PROGRESS NOTES
Presented for INR check. Came in with a brief note from his girlfriend. She has a concern about burning with urination once yesterday, and blood in the urine. He reports that he did not see blood, she saw blood. He has been urinating without issue today. He was inclined to blame food that his grandson cooks for him that the other day. \"He cooks exotic stuff, every time I eat his cooking I have burning when I pee\"    There were leukocytes in urine. We will send for culture. He is currently asymptomatic.   We will wait on antibiotic until culture results

## 2023-03-17 LAB — BACTERIA UR CULT: NORMAL

## 2023-03-20 ENCOUNTER — TELEPHONE (OUTPATIENT)
Dept: FAMILY MEDICINE CLINIC | Age: 83
End: 2023-03-20

## 2023-03-20 NOTE — TELEPHONE ENCOUNTER
Urine culture reviewed. No bacteria grew. He is unlikely to have a urinary tract infection.   No antibiotic is needed

## 2023-03-21 ENCOUNTER — OFFICE VISIT (OUTPATIENT)
Dept: CARDIOLOGY CLINIC | Age: 83
End: 2023-03-21

## 2023-03-21 DIAGNOSIS — Z95.810 AUTOMATIC IMPLANTABLE CARDIAC DEFIBRILLATOR IN SITU: Primary | ICD-10-CM

## 2023-03-21 NOTE — PROGRESS NOTES
C/ VVI ICD clinic only check. Device functioning appropriately as programmed. See scanned documents in media manger.

## 2023-03-27 ENCOUNTER — TELEPHONE (OUTPATIENT)
Dept: FAMILY MEDICINE CLINIC | Age: 83
End: 2023-03-27

## 2023-03-27 DIAGNOSIS — I10 PRIMARY HYPERTENSION: ICD-10-CM

## 2023-03-27 NOTE — TELEPHONE ENCOUNTER
Pt is calling about medication stating pharmacy is waiting to hear from us not sure what he is talking about can you call pt back

## 2023-03-28 RX ORDER — PRAVASTATIN SODIUM 10 MG/1
TABLET ORAL
Qty: 90 TABLET | Refills: 3 | Status: SHIPPED | OUTPATIENT
Start: 2023-03-28

## 2023-03-28 RX ORDER — METOPROLOL SUCCINATE 25 MG/1
12.5 TABLET, EXTENDED RELEASE ORAL DAILY
Qty: 90 TABLET | Refills: 3 | Status: SHIPPED | OUTPATIENT
Start: 2023-03-28

## 2023-03-28 RX ORDER — ENALAPRIL MALEATE 20 MG/1
TABLET ORAL
Qty: 180 TABLET | Refills: 1 | Status: SHIPPED | OUTPATIENT
Start: 2023-03-28

## 2023-03-29 ENCOUNTER — CLINICAL SUPPORT (OUTPATIENT)
Dept: FAMILY MEDICINE CLINIC | Age: 83
End: 2023-03-29

## 2023-03-29 DIAGNOSIS — I48.0 PAROXYSMAL ATRIAL FIBRILLATION (HCC): Primary | ICD-10-CM

## 2023-03-29 LAB
INR BLD: 1.8
PT POC: 21.5 SECONDS
VALID INTERNAL CONTROL?: YES

## 2023-03-31 RX ORDER — FINASTERIDE 5 MG/1
5 TABLET, FILM COATED ORAL DAILY
Qty: 90 TABLET | Refills: 3 | Status: SHIPPED | OUTPATIENT
Start: 2023-03-31

## 2023-03-31 NOTE — PROGRESS NOTES
Notified by patient's significant other that he is having frequent urination, nocturia. Requesting refill on a medicine for prostate. Proscar was taken off his list 6 months ago because he was not taking it. I do think he tolerates this medication okay.   He has been intolerant of Flomax and similar in the past causing increased dizziness and orthostasis

## 2023-04-12 ENCOUNTER — CLINICAL SUPPORT (OUTPATIENT)
Dept: FAMILY MEDICINE CLINIC | Age: 83
End: 2023-04-12

## 2023-04-12 DIAGNOSIS — I48.0 PAROXYSMAL ATRIAL FIBRILLATION (HCC): Primary | ICD-10-CM

## 2023-04-12 LAB
INR BLD: 2.7 (ref 1–1.5)
PT POC: 27.1 SECONDS (ref 9.1–12)
VALID INTERNAL CONTROL?: YES

## 2023-04-12 NOTE — PROGRESS NOTES
Karen Orourke is a 79 y.o. male who presents today for Anticoagulation monitoring. Indication: Atrial Fibrillation  INR Goal: 2.0-3.0. Current dose:  Coumadin 2.5 mg daily. Missed Coumadin Doses:  {none/this week/over CTNC:83822}  Medication Changes:  {yes***/no:77713}  Dietary Changes:  {yes***/no:73426}    Symptoms: taking coumadin appropriately {findings; bleedin}. Latest INRs:  Lab Results   Component Value Date/Time    INR 2.0 (H) 08/10/2022 01:29 PM    INR 2.5 (H) 2022 11:55 AM    INR 1.6 (H) 2021 02:23 PM    INR POC 1.8 2023 10:22 AM    INR POC 2.5 03/15/2023 10:37 AM    INR POC 4.2 2023 11:11 AM    Prothrombin time 20.1 (H) 08/10/2022 01:29 PM    Prothrombin time 24.7 (H) 2022 11:55 AM    Prothrombin time 17.3 (H) 2021 02:23 PM        New Coumadin dose:. {disease follow up plans:068003}. Next check to be scheduled for  {#:53932} weeks.

## 2023-04-18 ENCOUNTER — TELEPHONE (OUTPATIENT)
Dept: FAMILY MEDICINE CLINIC | Age: 83
End: 2023-04-18

## 2023-04-18 NOTE — TELEPHONE ENCOUNTER
verified. Informed pt of message from provider regarding medication. Pt states he had thought he was supposed to stop medication but he went to the pharmacy and he still had the prescription there from  so he picked it up and started taking. Pt denies any prostate issues. Pt states he will stop taking the medication and follow up with urology if needed.

## 2023-04-18 NOTE — TELEPHONE ENCOUNTER
I was unaware he was taking terazosin. I asked him to stop taking this 2 years ago because it was making him dizzy. If he is having a prostate problem he needs to follow-up with urology.   Any of the medications that will help his prostate will also make him dizzy

## 2023-04-18 NOTE — TELEPHONE ENCOUNTER
Pt is calling to inform the terazosin (HYTRIN) 10 mg capsule is making him dizzy all day after taking the RX.      Pt states it does help him sleep

## 2023-04-21 ENCOUNTER — TELEPHONE (OUTPATIENT)
Dept: FAMILY MEDICINE CLINIC | Age: 83
End: 2023-04-21

## 2023-04-21 NOTE — TELEPHONE ENCOUNTER
Dizziness is a potential side effect of daily Cialis. This medication has been prescribed for 1 year and I do not recall him complaining of the side effects before    He did recently start random a medication from years ago and got dizzy.   Might be a good idea to bring meds in for a good reconciliation

## 2023-04-21 NOTE — TELEPHONE ENCOUNTER
Patient states that his Tadalafil has made him very off balance. Would like to talk with you about it. Also, his dentist needs to speak with Dr Jessica Donnelly. Dr Arlette Zamudio, [phone]. He is looking to replace an entire new plate.

## 2023-04-26 ENCOUNTER — CLINICAL SUPPORT (OUTPATIENT)
Dept: FAMILY MEDICINE CLINIC | Age: 83
End: 2023-04-26
Payer: MEDICARE

## 2023-04-26 DIAGNOSIS — N50.89 SWOLLEN TESTICLE: ICD-10-CM

## 2023-04-26 DIAGNOSIS — I48.0 PAROXYSMAL ATRIAL FIBRILLATION (HCC): Primary | ICD-10-CM

## 2023-04-26 LAB
INR BLD: 1.9
PT POC: 23.1 SECONDS
VALID INTERNAL CONTROL?: YES

## 2023-04-26 PROCEDURE — 85610 PROTHROMBIN TIME: CPT | Performed by: FAMILY MEDICINE

## 2023-04-26 PROCEDURE — 1123F ACP DISCUSS/DSCN MKR DOCD: CPT | Performed by: FAMILY MEDICINE

## 2023-04-26 PROCEDURE — 99213 OFFICE O/P EST LOW 20 MIN: CPT | Performed by: FAMILY MEDICINE

## 2023-04-26 NOTE — PROGRESS NOTES
Shanda Palma is a 79 y.o. male who presents today for Anticoagulation monitoring. Indication: Atrial Fibrillation  INR Goal: 2.0-3.0. Current dose:  Coumadin 2.5 mg daily. Missed Coumadin Doses:  None  Medication Changes:  no  Dietary Changes:  no    Symptoms: taking coumadin appropriately without any bleeding. Latest INRs:  Lab Results   Component Value Date/Time    INR 2.0 (H) 08/10/2022 01:29 PM    INR 2.5 (H) 02/14/2022 11:55 AM    INR 1.6 (H) 03/30/2021 02:23 PM    INR POC 2.7 (A) 04/12/2023 09:16 AM    INR POC 1.8 03/29/2023 10:22 AM    INR POC 2.5 03/15/2023 10:37 AM    Prothrombin time 20.1 (H) 08/10/2022 01:29 PM    Prothrombin time 24.7 (H) 02/14/2022 11:55 AM    Prothrombin time 17.3 (H) 03/30/2021 02:23 PM        New Coumadin dose:.current treatment plan is effective, no change in therapy. Next check to be scheduled for  2 weeks.

## 2023-04-26 NOTE — PATIENT INSTRUCTIONS
Swollen testicle  I think you have a \"hydrocele\" which is a fluid sac above the testicle  We need to ultrasound to make sure  I suggest you see a urologist    Urologist can help you with your nighttime urination and your swollen testicle    -- [phone]  Dr. Michelle Almanza., [facility], [address], [place], [address] [facility]  -- ([address] [facility],  [address], [address] [facility] [place] [address], [place], [address] [facility]

## 2023-04-26 NOTE — PROGRESS NOTES
IMAN Gifford is a 79 y.o. male who presents ostensibly for INR check but has a question about a left swollen testicle that started about a month ago. No pain. Find it awkward combination difficult urinate. On exam has what appears to be a 4 cm hydrocele superior to the left testicle. Left testicle is otherwise smooth    PMHx:  Past Medical History:   Diagnosis Date    Arthritis     knees    CAD (coronary artery disease)     stent x3 approx 2001    Chronic atrial fibrillation [facility])     ED (erectile dysfunction)     Hypertension     Kidney stone on right side 10/30/2011    JAMEY (obstructive sleep apnea) 4/25/2012    Skin cancer     BCC, s/p Mohs on L flank    Sun-damaged skin     TIA (transient ischemic attack)     6/12 - seen at [facility]       Meds:   Current Outpatient Medications   Medication Sig Dispense Refill    finasteride (PROSCAR) 5 mg tablet Take 1 Tablet by mouth daily. 90 Tablet 3    enalapril (VASOTEC) 20 mg tablet TAKE ONE TABLET BY MOUTH TWICE A  Tablet 1    pravastatin (PRAVACHOL) 10 mg tablet TAKE 1 TABLET EVERY NIGHT 90 Tablet 3    metoprolol succinate (TOPROL-XL) 25 mg XL tablet Take 0.5 Tablets by mouth daily. 90 Tablet 3    tadalafiL (CIALIS) 5 mg tablet Take 1 Tablet by mouth daily. 90 Tablet 3    warfarin (COUMADIN) 2.5 mg tablet Two TABLET BY MOUTH DAILY 180 Tablet 2    aspirin delayed-release 81 mg tablet Take  by mouth daily. FOLIC ACID PO Take 216 mcg by mouth daily. MULTIVITAMIN WITH MINERALS (MULTI-VIT 55 PLUS PO) Take 1 Tab by mouth daily. Taking multivitamins every morning. Allergies:    Allergies   Allergen Reactions    Oxycodone Hcl Other (comments)    Pcn [Penicillins] Unknown (comments)     As a child    Percocet [Oxycodone-Acetaminophen] Swelling     Leg swelling       Smoker:  Social History     Tobacco Use   Smoking Status Never   Smokeless Tobacco Never       ETOH:   Social History     Substance and Sexual Activity   Alcohol Use No    Alcohol/week: 0.0 standard drinks       FH:   Family History   Problem Relation Age of Onset    Stroke Father     Heart Attack Father     Heart Disease Father     Cancer Father 80        colon cancer    Heart Disease Mother     OSTEOARTHRITIS Sister         s/p bilateral knee replacements    No Known Problems Daughter     Anesth Problems Neg Hx        ROS:   As listed in HPI. In addition:  Constitutional:   No headache, fever, fatigue, weight loss or weight gain      Cardiac:    No chest pain      Resp:   No cough or shortness of breath      Neuro   No loss of consciousness, dizziness, seizures      Physical Exam:  There were no vitals taken for this visit. GEN: No apparent distress. Alert and oriented and responds to all questions appropriately. NEUROLOGIC:  No focal neurologic deficits. Strength and sensation grossly intact. Coordination and gait grossly intact. EXT: Well perfused. No edema. SKIN: No obvious rashes. Assessment and Plan     Swollen testicle, likely hydrocele  Ultrasound    He also has nocturia and has repeatedly tried to take Flomax, Cialis, Proscar with adequate results and he has also some side effect of these medications. Recommend he see urology. He has neuropsych testing coming up in July which I think appropriate      ICD-10-CM ICD-9-CM    1. Paroxysmal atrial fibrillation (HCC)  I48.0 427.31 AMB POC PT/INR      2. Swollen testicle  N50.89 608.86 US SCROTUM/TESTICLES      REFERRAL TO UROLOGY          AVS given.  Pt expressed understanding of instructions

## 2023-05-05 RX ORDER — ENALAPRIL MALEATE 20 MG/1
TABLET ORAL
Qty: 180 TABLET | Refills: 1 | Status: SHIPPED | OUTPATIENT
Start: 2023-05-05

## 2023-05-22 ENCOUNTER — OFFICE VISIT (OUTPATIENT)
Age: 83
End: 2023-05-22
Payer: MEDICARE

## 2023-05-22 VITALS
SYSTOLIC BLOOD PRESSURE: 200 MMHG | HEART RATE: 78 BPM | WEIGHT: 169 LBS | OXYGEN SATURATION: 94 % | BODY MASS INDEX: 23.66 KG/M2 | DIASTOLIC BLOOD PRESSURE: 102 MMHG | HEIGHT: 71 IN

## 2023-05-22 DIAGNOSIS — Z95.810 ICD (IMPLANTABLE CARDIOVERTER-DEFIBRILLATOR) IN PLACE: ICD-10-CM

## 2023-05-22 DIAGNOSIS — I48.0 PAROXYSMAL ATRIAL FIBRILLATION (HCC): Primary | ICD-10-CM

## 2023-05-22 DIAGNOSIS — I25.10 CORONARY ARTERY DISEASE INVOLVING NATIVE CORONARY ARTERY OF NATIVE HEART WITHOUT ANGINA PECTORIS: ICD-10-CM

## 2023-05-22 PROCEDURE — 93005 ELECTROCARDIOGRAM TRACING: CPT | Performed by: INTERNAL MEDICINE

## 2023-05-22 PROCEDURE — 3074F SYST BP LT 130 MM HG: CPT | Performed by: INTERNAL MEDICINE

## 2023-05-22 PROCEDURE — 93010 ELECTROCARDIOGRAM REPORT: CPT | Performed by: INTERNAL MEDICINE

## 2023-05-22 PROCEDURE — 99214 OFFICE O/P EST MOD 30 MIN: CPT | Performed by: INTERNAL MEDICINE

## 2023-05-22 PROCEDURE — 1123F ACP DISCUSS/DSCN MKR DOCD: CPT | Performed by: INTERNAL MEDICINE

## 2023-05-22 PROCEDURE — 3078F DIAST BP <80 MM HG: CPT | Performed by: INTERNAL MEDICINE

## 2023-05-22 RX ORDER — TERAZOSIN 10 MG/1
CAPSULE ORAL
COMMUNITY

## 2023-05-22 RX ORDER — HYDROCHLOROTHIAZIDE 25 MG/1
TABLET ORAL
COMMUNITY

## 2023-05-22 RX ORDER — FINASTERIDE 5 MG/1
TABLET, FILM COATED ORAL
COMMUNITY
Start: 2023-03-31

## 2023-05-22 ASSESSMENT — PATIENT HEALTH QUESTIONNAIRE - PHQ9
SUM OF ALL RESPONSES TO PHQ QUESTIONS 1-9: 0
SUM OF ALL RESPONSES TO PHQ QUESTIONS 1-9: 0
1. LITTLE INTEREST OR PLEASURE IN DOING THINGS: 0
SUM OF ALL RESPONSES TO PHQ9 QUESTIONS 1 & 2: 0
2. FEELING DOWN, DEPRESSED OR HOPELESS: 0
SUM OF ALL RESPONSES TO PHQ QUESTIONS 1-9: 0
SUM OF ALL RESPONSES TO PHQ QUESTIONS 1-9: 0

## 2023-05-22 NOTE — PROGRESS NOTES
mood  Neck - supple, no JVD. No bruits present.    Chest - clear to auscultation, no wheezes, rales or rhonchi  Heart - Regular rate, regular rhythm, normal S1, S2, I/VI systolic murmur LUSB  Abdomen - soft, nontender, nondistended, no masses or organomegaly  Extremities - peripheral pulses normal, no pedal edema        Recent Labs:  Lab Results   Component Value Date/Time    Cholesterol, total 97 (L) 02/15/2021 11:36 AM    HDL Cholesterol 32 (L) 02/15/2021 11:36 AM    LDL, calculated 42 02/15/2021 11:36 AM    LDL, calculated 35 03/18/2020 09:30 AM    Triglyceride 127 02/15/2021 11:36 AM     Lab Results   Component Value Date/Time    Creatinine 0.91 07/26/2021 12:45 PM     Lab Results   Component Value Date/Time    BUN 16 07/26/2021 12:45 PM    BUN (POC) 16 12/04/2013 06:46 AM     Lab Results   Component Value Date/Time    Potassium 3.6 07/26/2021 12:45 PM     Lab Results   Component Value Date/Time    Hemoglobin A1c 5.3 11/19/2013 08:45 AM     Lab Results   Component Value Date/Time    HGB 13.7 07/26/2021 12:45 PM     Lab Results   Component Value Date/Time    PLATELET 845 76/77/5482 12:45 PM       Reviewed:  Past Medical History:   Diagnosis Date    Arthritis     knees    CAD (coronary artery disease)     stent x3 approx 2001    Chronic atrial fibrillation ([address]     ED (erectile dysfunction)     Hypertension     Kidney stone on right side 10/30/2011    JUAN DANIEL (obstructive sleep apnea) 4/25/2012    Skin cancer     BCC, s/p Mohs on L flank    Sun-damaged skin     TIA (transient ischemic attack)     6/12 - [facility] at [facility]     Social History     Tobacco Use   Smoking Status Never Smoker   Smokeless Tobacco Never Used     Social History     Substance and Sexual Activity   Alcohol Use No    Alcohol/week: 0.0 standard drinks     Allergies   Allergen Reactions    Oxycodone Hcl Other (comments)    Pcn [Penicillins] Unknown (comments)     As a child    Percocet [Oxycodone-Acetaminophen] Swelling     Leg swelling       Current

## 2023-06-06 ENCOUNTER — NURSE ONLY (OUTPATIENT)
Age: 83
End: 2023-06-06
Payer: MEDICARE

## 2023-06-06 DIAGNOSIS — I48.0 PAROXYSMAL ATRIAL FIBRILLATION (HCC): Primary | ICD-10-CM

## 2023-06-06 LAB
POC INR: 2.5
PROTHROMBIN TIME, POC: 29.9

## 2023-06-06 PROCEDURE — 85610 PROTHROMBIN TIME: CPT | Performed by: FAMILY MEDICINE

## 2023-06-06 PROCEDURE — PBSHW AMB POC PT/INR: Performed by: FAMILY MEDICINE

## 2023-06-06 NOTE — PROGRESS NOTES
Néstor Sanchez is a 79 y.o. male who presents today for Anticoagulation monitoring. Indication: Atrial Fibrillation  INR Goal: 2.0-3.0. Current dose:  Coumadin 2.5 mg daily. Missed Coumadin Doses:  None  Medication Changes:  no  Dietary Changes:  no     Symptoms: taking coumadin appropriately without any bleeding.

## 2023-06-20 ENCOUNTER — NURSE ONLY (OUTPATIENT)
Age: 83
End: 2023-06-20
Payer: MEDICARE

## 2023-06-20 DIAGNOSIS — I48.0 PAROXYSMAL ATRIAL FIBRILLATION (HCC): Primary | ICD-10-CM

## 2023-06-20 LAB
POC INR: 1.7
PROTHROMBIN TIME, POC: 20.8

## 2023-06-20 PROCEDURE — 85610 PROTHROMBIN TIME: CPT | Performed by: FAMILY MEDICINE

## 2023-06-20 PROCEDURE — PBSHW AMB POC PT/INR: Performed by: FAMILY MEDICINE

## 2023-06-20 RX ORDER — ENALAPRIL MALEATE 20 MG/1
20 TABLET ORAL 2 TIMES DAILY
Qty: 180 TABLET | Refills: 3 | Status: SHIPPED | OUTPATIENT
Start: 2023-06-20

## 2023-06-20 NOTE — PROGRESS NOTES
Ruddy Gutierrez is a 79 y.o. male who presents today for Anticoagulation monitoring. Indication: Atrial Fibrillation  INR Goal: 2.0-3.0. Current dose:  Coumadin 2.5 mg daily. Missed Coumadin Doses:  None  Medication Changes:  no  Dietary Changes:  no     Symptoms: taking coumadin appropriately without any bleeding.     Per Dr. Alexander Crockett, no change in therapy, recheck in 2 weeks

## 2023-06-27 ENCOUNTER — TELEPHONE (OUTPATIENT)
Age: 83
End: 2023-06-27

## 2023-06-27 ENCOUNTER — PROCEDURE VISIT (OUTPATIENT)
Age: 83
End: 2023-06-27
Payer: MEDICARE

## 2023-06-27 DIAGNOSIS — I48.0 PAROXYSMAL ATRIAL FIBRILLATION (HCC): Primary | ICD-10-CM

## 2023-06-27 DIAGNOSIS — Z01.810 INPLANTABLE CARDIOVERTER-DEFIBRILLATOR IN PLACE, PRE-OPERATIVE CARDIOVASCULAR EXAMINATION: Primary | ICD-10-CM

## 2023-06-27 DIAGNOSIS — Z95.810 INPLANTABLE CARDIOVERTER-DEFIBRILLATOR IN PLACE, PRE-OPERATIVE CARDIOVASCULAR EXAMINATION: Primary | ICD-10-CM

## 2023-06-27 PROCEDURE — 93282 PRGRMG EVAL IMPLANTABLE DFB: CPT | Performed by: INTERNAL MEDICINE

## 2023-06-27 RX ORDER — WARFARIN SODIUM 2.5 MG/1
TABLET ORAL
Qty: 90 TABLET | Refills: 3 | Status: SHIPPED | OUTPATIENT
Start: 2023-06-27

## 2023-07-05 ENCOUNTER — NURSE ONLY (OUTPATIENT)
Age: 83
End: 2023-07-05
Payer: MEDICARE

## 2023-07-05 DIAGNOSIS — I48.0 PAROXYSMAL ATRIAL FIBRILLATION (HCC): Primary | ICD-10-CM

## 2023-07-05 LAB
POC INR: 2.5
PROTHROMBIN TIME, POC: 29.6

## 2023-07-05 PROCEDURE — 85610 PROTHROMBIN TIME: CPT | Performed by: FAMILY MEDICINE

## 2023-07-05 PROCEDURE — PBSHW AMB POC PT/INR: Performed by: FAMILY MEDICINE

## 2023-07-05 RX ORDER — TADALAFIL 5 MG/1
5 TABLET ORAL DAILY
Qty: 90 TABLET | Refills: 3 | Status: SHIPPED | OUTPATIENT
Start: 2023-07-05

## 2023-07-05 RX ORDER — METOPROLOL SUCCINATE 25 MG/1
12.5 TABLET, EXTENDED RELEASE ORAL DAILY
Qty: 45 TABLET | Refills: 3 | Status: SHIPPED | OUTPATIENT
Start: 2023-07-05

## 2023-07-05 RX ORDER — FINASTERIDE 5 MG/1
5 TABLET, FILM COATED ORAL DAILY
Qty: 90 TABLET | Refills: 3 | Status: SHIPPED | OUTPATIENT
Start: 2023-07-05

## 2023-07-05 NOTE — PROGRESS NOTES
Oliverio Olsen is a 79 y.o. male who presents today for Anticoagulation monitoring. Indication: atrial fibrillation  INR Goal: 2.0-3.0. Current dose:  Coumadin 2.5 mg daily. Missed Coumadin Doses:  None  Medication Changes:  no  Dietary Changes:  no    Symptoms: taking coumadin appropriately without any bleeding. Latest INRs:  Lab Results   Component Value Date/Time    INR 2.5 07/05/2023 10:28 AM    INR 1.7 06/20/2023 11:10 AM    INR 2.5 06/06/2023 01:26 PM    INR 1.9 04/26/2023 08:59 AM    INR 2.7 04/12/2023 09:16 AM    INR 1.8 03/29/2023 10:22 AM        New Coumadin dose:.current treatment plan is effective, no change in therapy. Next check to be scheduled for  2 weeks.

## 2023-07-19 ENCOUNTER — NURSE ONLY (OUTPATIENT)
Age: 83
End: 2023-07-19
Payer: MEDICARE

## 2023-07-19 DIAGNOSIS — I48.0 PAROXYSMAL ATRIAL FIBRILLATION (HCC): Primary | ICD-10-CM

## 2023-07-19 LAB
POC INR: 1.9
PROTHROMBIN TIME, POC: 22.8

## 2023-07-19 PROCEDURE — PBSHW AMB POC PT/INR: Performed by: NURSE PRACTITIONER

## 2023-07-19 PROCEDURE — 85610 PROTHROMBIN TIME: CPT | Performed by: NURSE PRACTITIONER

## 2023-07-19 NOTE — PROGRESS NOTES
Juan Miguel Irene is a 79 y.o. male who presents today for Anticoagulation monitoring. Indication: atrial fibrillation  INR Goal: 2.0-3.0. Current dose:  Coumadin 2.5 mg daily. Missed Coumadin Doses:  None  Medication Changes:  no  Dietary Changes:  no     Symptoms: taking coumadin appropriately without any bleeding. New Coumadin dose:. . see anti coag chart     Next check to be scheduled for  2 weeks.

## 2023-07-26 ENCOUNTER — OFFICE VISIT (OUTPATIENT)
Age: 83
End: 2023-07-26
Payer: MEDICARE

## 2023-07-26 DIAGNOSIS — R41.840 ATTENTION DEFICIT: ICD-10-CM

## 2023-07-26 DIAGNOSIS — R41.3 MEMORY LOSS: Primary | ICD-10-CM

## 2023-07-26 PROCEDURE — 1123F ACP DISCUSS/DSCN MKR DOCD: CPT | Performed by: CLINICAL NEUROPSYCHOLOGIST

## 2023-07-26 PROCEDURE — 90791 PSYCH DIAGNOSTIC EVALUATION: CPT | Performed by: CLINICAL NEUROPSYCHOLOGIST

## 2023-07-26 NOTE — PROGRESS NOTES
Disp: 45 tablet, Rfl: 3    finasteride (PROSCAR) 5 MG tablet, Take 1 tablet by mouth daily, Disp: 90 tablet, Rfl: 3    warfarin (COUMADIN) 2.5 MG tablet, 1 tablet by mouth daily and as directed, Disp: 90 tablet, Rfl: 3    enalapril (VASOTEC) 20 MG tablet, Take 1 tablet by mouth 2 times daily, Disp: 180 tablet, Rfl: 3    hydroCHLOROthiazide (HYDRODIURIL) 25 MG tablet, hydrochlorothiazide 25 mg tablet, Disp: , Rfl:     terazosin (HYTRIN) 10 MG capsule, terazosin 10 mg capsule, Disp: , Rfl:     FOLIC ACID PO, Take 687 mcg by mouth daily, Disp: , Rfl:     aspirin 81 MG EC tablet, Take by mouth daily, Disp: , Rfl:     pravastatin (PRAVACHOL) 10 MG tablet, TAKE 1 TABLET EVERY NIGHT, Disp: , Rfl:     Pertinent Neurological History:  Seizure: Never  Stroke: Never  TBI: Never    Neurodiagnostic Findings:  Imaging: CT head (8/10/2022) was notable for \"mild chronic microvascular ischemic change. There is a moderate degree of cerebral atrophy. 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 Unable to recall highest rank. Would miss combat during [place].  Initially experienced nightmares upon returning but participated in group therapy sessions which \"helped tremendously. \"  He denied current symptoms of PTSD. Relationship Status: . Patient was  for 39 years when his wife passed away (unable to recall when she passed away). The patient is currently in a relationship with his girlfriend of 2 years. Children: He does not have any biological children but he had a stepson.     Housing: Private residence with

## 2023-07-31 ENCOUNTER — TELEPHONE (OUTPATIENT)
Age: 83
End: 2023-07-31

## 2023-07-31 NOTE — TELEPHONE ENCOUNTER
Contacted [employer] no PA is needed for [id number], [id number], [id number], [id number], [id number].     Transaction ID: [id number]

## 2023-08-14 PROBLEM — I63.412 EMBOLIC STROKE INVOLVING LEFT MIDDLE CEREBRAL ARTERY (HCC): Status: ACTIVE | Noted: 2023-01-01

## 2023-08-14 PROBLEM — R41.82 ACUTE ALTERATION IN MENTAL STATUS: Status: ACTIVE | Noted: 2023-01-01

## 2023-08-14 PROBLEM — I63.9 APHASIA DUE TO ACUTE CEREBROVASCULAR ACCIDENT (CVA) (HCC): Status: ACTIVE | Noted: 2023-01-01

## 2023-08-14 PROBLEM — I65.23 BILATERAL CAROTID ARTERY STENOSIS: Status: ACTIVE | Noted: 2023-01-01

## 2023-08-14 PROBLEM — I61.9 CVA (CEREBROVASCULAR ACCIDENT DUE TO INTRACEREBRAL HEMORRHAGE) (HCC): Status: ACTIVE | Noted: 2023-01-01

## 2023-08-14 PROBLEM — R55 CONVULSIVE SYNCOPE: Status: ACTIVE | Noted: 2023-01-01

## 2023-08-14 PROBLEM — R47.01 APHASIA DUE TO ACUTE CEREBROVASCULAR ACCIDENT (CVA) (HCC): Status: ACTIVE | Noted: 2023-01-01

## 2023-08-14 NOTE — H&P
Hospitalist Admission Note    NAME:   Adonis Lawson   : 1940   MRN: [id number]     Date/Time: 2023 1:18 AM    Patient PCP: Shanelle Gunter MD    ______________________________________________________________________  Given the patient's current clinical presentation, I have a high level of concern for decompensation if discharged from the emergency department. Complex decision making was performed, which includes reviewing the patient's available past medical records, laboratory results, and x-ray films. My assessment of this patient's clinical condition and my plan of care is as follows. Assessment / Plan:      CVA    CT head shows Late acute/subacute ischemia is noted in the left parietal lobe. No hemorrhage is identified. Admit to tele / neuro  Consult Neurology  Check MRI brain  Check lipid panel  Check Hgb A1C  Check ECHO with bubble  C/w ASA or rectal asa  High intensity statin   PT/OT/SLP  BP control for SBP < 140  Substitute home enalapril for Vasotec 1.25 mg IV every 6  Consult palliative care -prognosis guarded    History of atrial fibrillation    INR 1.5  Do not believe patient can tolerate p.o. at this time so we will hold Coumadin  Holding metoprolol as patient is n.p.o. Abnormal UA    Patient cannot verbalize if he has any UTI symptoms  Continue with ceftriaxone for now  Follow-up urine culture    Rhabdomyolysis    CK 1.1k  IVF with NS  Trend CK      Medical Decision Making:   I personally reviewed labs: CBC, BMP  I personally reviewed imaging: CT head  I personally reviewed EKG: Sinus with V-paced  Toxic drug monitoring:   Discussed case with: ED provider. After discussion I am in agreement that acuity of patient's medical condition necessitates hospital stay. Code Status: Full  DVT Prophylaxis: Lovenox  GI Prophylaxis:  Baseline:  Independent    Subjective:   CHIEF COMPLAINT: Found down    HISTORY OF PRESENT ILLNESS:     Mary Uriostegui is a 79 y.o.  male with PMHx

## 2023-08-14 NOTE — ED NOTES
2015: assumed care of pt from EMS. EMS reports they had to force entry into the home. Pt was found face down in the floor in his room. He reportedly lives alone and they reported he last took his medications on Friday. He arrives with stool on his feet and lower legs. Skin tears noted to left great toe, right knee, right elbow, and lower back. Pupils equal. Turns head when name is called, is unable to provide any verbal response. Purposeful movement of left arm and leg. Able to move right arm, but does not follow commands with right side. 2030: pt to CT with RN and Dr. Shayy Ashford. 2100: pt cleansed of stool. CHG bath given. Repositioned to right side. Mepilex placed on sacrum. Parker catheter placed. Pt tolerated well. Sediment noted in urine. 2115: bedside report given to Jeff Amin, [address].    2137: Received phone call from pt's niece. She reports pt's sister Roberta Jackman is next of kin. Jose Emory can be reached at [phone] (home) or [phone] (cell) if needed.      Gloria Cornejo RN  08/13/23 2671

## 2023-08-14 NOTE — CONSULTS
Consult  REFERRED BY:  Santosh Mckeon MD    CHIEF COMPLAINT: Sudden collapse and being found down      Subjective:     Valentina Gagnon is a 79 y.o. right-handed  male we are seeing as a new patient, at the request of the hospitalist, for evaluation of new problem of patient being found down at home and was unable to speak, and seems to have weakness on the right side. Patient has a known history of atrial fibrillation and is on Coumadin therapy, and had TIAs in the past.  He is in chronic atrial fibs. His CT scan shows early infarct in the left posterior head regions. His EEG just shows moderate generalized slowing, slightly more prominent in the left temporal region. The patient appears to be globally aphasic. His MRI scan is pending. Patient has a carotid Doppler status pending, CTA was not done on admission. Patient has some known memory problems and was being evaluated by neuropsychiatry Dr. Alyssa Clement for memory problems last month. No other history is available at this time.     Past Medical History:   Diagnosis Date    Arthritis     knees    CAD (coronary artery disease)     stent x3 approx 2001    Chronic atrial fibrillation [facility])     ED (erectile dysfunction)     Hypertension     Kidney stone on right side 10/30/2011    JUAN DANIEL (obstructive sleep apnea) 4/25/2012    Skin cancer     BCC, s/p Mohs on L flank    Sun-damaged skin     TIA (transient ischemic attack)     6/12 - [facility] at [facility]      Past Surgical History:   Procedure Laterality Date    APPENDECTOMY  age 15    [address]  11/15/13    + PM    CATARACT REMOVAL Bilateral     COLONOSCOPY  05/10/2012    hyperplastic    CYSTOSTOMY  1/23/14    diffuse erythema    HERNIA REPAIR  09/12/2017    L inguinal hernia repair    HERNIA REPAIR Bilateral     inguinal hernia    INS PPM/ICD LED SING ONLY  11/15/2013         LUMBAR DISCECTOMY  1970's    MOHS SURGERY  01/05/2015    [facility]    MOHS SURGERY  02/21/2017    BCC left lateral cheek

## 2023-08-14 NOTE — ED NOTES
2203Ramirez Bardales MD at bedside w RN and pt. NIHSS of 25. Pt has no blink threat, right sided no effort against gravity, left arm no drift, left leg drift but effort against gravity. No droop noted   Able to follow some but not all simple commands, speech very slurred. Andrés Ford RN  08/13/23 [id number]: Full verbal report given to DUDLEY Doe reviewed, questions answered and plan of care discussed.         Andrés Ford RN  08/14/23 5133

## 2023-08-15 NOTE — PROCEDURES
[facility]  EEG    Name:  Jayden Orr  MR#:  [id number]  :  1940  ACCOUNT #:  [de-identified]  DATE OF SERVICE:  2023    CLINICAL INDICATION:  The patient is an 79-year-old male with history of sudden altered mental status, sudden loss of speech. EEG to rule out seizures, rule out focal abnormality, the patient with possible stroke and possible partial complex seizures. EEG CLASSIFICATION:  Dysrhythmia grade II, maximum left hemisphere, maximum frontotemporal.    DESCRIPTION OF THE RECORD:  This is a 16-channel EEG recording on the patient to rule out seizures. The patient did have a somewhat poorly formed posteriorly located occipital alpha rhythm of 8-9 Hz that did attenuate some with eye opening. During the recording, there was a moderate increase in some generalized 2-6 Hz activity seen. With this being slightly more prominent in the left frontotemporal region indicating an area of focal cortical disturbance or structural lesions seen there. No clear spike or spike-and-wave discharges seen. No recorded electrographic spells of any type were seen. The patient did enter states of sleep with K complexes and sleep spindles seen. Hyperventilation was not performed. Photic stimulation produced a mild driving response in the posterior head regions. INTERPRETATION:  This is a moderately abnormal electroencephalogram due to the generalized slowing seen and with some focal slowing seen in the left temporal region indicating an area of cortical disturbance or some structural lesions seen there, but no recorded electrographic seizures of any type were seen. Clinical correlation recommended. Anna Lowe MD      [id number]  D:  2023 21:55  T:  08/15/2023 2:08  JOB #:  [id number]  CC:   Anna Lowe MD

## 2023-08-15 NOTE — CARE COORDINATION
CM attempted to conduct assessment but the patient is drowsy and confused; primary nurse is aware and stated that's his current condition. CM contacted the following numbers with no answer: Oumou Latham, sister    [phone] [facility] Phone)  [phone] [facility]   [phone] (Home Phone)   [phone] [facility] Phone)    CM will attempt to call again.  A confidential voicemail was left with a call back request.     Susannah Porter RN  Case Management  [phone]

## 2023-08-15 NOTE — CONSULTS
Palliative Medicine Consult  Raimundo: [phone] ([id number])    Patient Name: Filippo Blair  YOB: 1940    Date of Initial Consult: 8/14/2023  Date of Today's Visit: 8/16/2023  Reason for Consult: goals of care/code discussion   Requesting Provider: Heriberto Kay MD      SUMMARY:   Filippo Blair is a 79 y.o. male with a past history of atrial fibrillation, on Coumadin, and had TIAs in the past.  CAD s/p stent, s/p ICD/patient pacemaker, who was admitted on 8/13/2023 from home found down in his home covered with feces, last known 2 days prior, with a diagnosis of acute/subacute CVA in the left parietal lobe with expressive aphasia and dysphagia, currently NPO. In addition rhabdomyolysis. CK trending down on IV fluids  Neurology following, of note patient has known memory problem and was evaluated by neuropsychiatry Dr. Albertus Opitz for memory issues last month. Current medical issues leading to Palliative Medicine involvement include: care decisions     Social: not  , no children, lives in [place], at [place] independent and active was driving up until prior to hosptlization. Enedelia Jones is ex girl friend who lives in [place] . Yulissa Chen is his only  sister Linda Valladares sibling who lives in [facility], she currently dealing with health issues . 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 Patient is currently restless due to pain in lower abdomen due to urinary retention , bed side RN reaching hospitalist for Parker placement . He has expressive aphasia. Advanced directive on file: Minna Mcgowanadelita [phone] his ex-girlfriend's primary MPOA. Living will reflects patient would not want to prolong his life artificially if he is imminent or comatose. Patient only sibling/sister Autumn  contact point on the chart.   I spoke

## 2023-08-16 PROBLEM — I63.9 CEREBROVASCULAR ACCIDENT (CVA) (HCC): Status: ACTIVE | Noted: 2023-01-01

## 2023-08-16 PROBLEM — R63.30 FEEDING DIFFICULTY: Status: ACTIVE | Noted: 2023-01-01

## 2023-08-16 PROBLEM — Z71.89 DNR (DO NOT RESUSCITATE) DISCUSSION: Status: ACTIVE | Noted: 2023-01-01

## 2023-08-16 PROBLEM — Z51.5 PALLIATIVE CARE ENCOUNTER: Status: ACTIVE | Noted: 2023-01-01

## 2023-08-16 PROBLEM — R45.1 RESTLESSNESS: Status: ACTIVE | Noted: 2023-01-01

## 2023-08-16 NOTE — WOUND CARE
Wound care consult for the multiple wounds that are present on admission. Pt. Is 79years old and he was admitted for ischemic CVA. He was found down on a floor by his family this past Friday. Family called EMS and the door had to be broken down. Pt. Apparently had been on the floor for a while. He was soiled in feces and urine. He verbalized unintelligible words / mumbles but does respond to voice. Past Medical History:   Diagnosis Date    Arthritis     knees    CAD (coronary artery disease)     stent x3 approx 2001    Chronic atrial fibrillation [facility])     ED (erectile dysfunction)     Hypertension     Kidney stone on right side 10/30/2011    JUAN DANIEL (obstructive sleep apnea) 4/25/2012    Skin cancer     BCC, s/p Mohs on L flank    Sun-damaged skin     TIA (transient ischemic attack)     6/12 - seen at [facility]     Past Surgical History:   Procedure Laterality Date    APPENDECTOMY  age 15    [address]  11/15/13    + PM    CATARACT REMOVAL Bilateral     COLONOSCOPY  05/10/2012    hyperplastic    CYSTOSTOMY  1/23/14    diffuse erythema    HERNIA REPAIR  09/12/2017    L inguinal hernia repair    HERNIA REPAIR Bilateral     inguinal hernia    INS PPM/ICD LED SING ONLY  11/15/2013         LUMBAR DISCECTOMY  1970's    MOHS SURGERY  01/05/2015    L temple, [facility]    MOHS SURGERY  02/21/2017    BCC left lateral cheek by Dr. Georgie Mcnair  11/15/2013    AICD    MS UNLISTED PROCEDURE CARDIAC SURGERY  2006    stents x 3     TOTAL HIP ARTHROPLASTY Bilateral     TOTAL HIP ARTHROPLASTY Right 12/4/13    REVISION     TOTAL KNEE ARTHROPLASTY Right 7/2011     Assessment and Treatment of the wounds: Pt. Has several wounds noted on the skin. Head to toe exam revealed some surface abrasions on the scalp that are not bleeding. The nose abrasion that is stable. No s/sx of nose  bleed. Pt.'s mouth is very soiled with dry film across the tongue and teeth and cheeks.  His

## 2023-08-16 NOTE — CARE COORDINATION
Care Management Initial Assessment       RUR: 15  Readmission? No  1st IM letter given? Yes - 08/14/23  1st  letter given: No    Eval was done with Pt Sister, Florentino Pacheco and Ex Girlfriend Custer Dear. Palliative is following. Ex Girlfriend now lives in [place] with family and she was listed as the decision maker however, she would prefer for Sister to be main decision maker. Palliative plans on working with them to make that determination. Pt was living at home alone I in one level home with no steps to enter. Pt has a Dog and Sister is taking the dog until we have a decision about his placement. Plan A: Anticipating that Pt will need Acute Rehab vs SNF Pending Progress. Sister stated that she was ok sending referrals to [facility] and [facility]. Referrals sent through [employer]: Pending. Plan B: CM will send email to Sister at [email or web address]. [employer] with SNF list for her to review and ask her to chose 5 SNF as back up plan. CM may need to look into applying for Medicaid for family is unsure of his financial situtation at this time. Anticipating that Pt will be here through the weekend. Lubna Fears and will continue to monitor progress. Andrea Arshad, [facility]  Ext [phone]           08/16/23 1372   Service Assessment   Patient Orientation Unable to Assess   Cognition Other (see comment)  (CVA)   History Provided By Other (see comment)  (Sister: [phone])   Primary [address] [facility] Family Members   Patient's Healthcare Decision Maker is: Named in [address]  (AMD: Ex Girlfriend: Dilcia Hernandez. NOK: Sister: Florentino Pacheco)   PCP Verified by CM Yes  ([facility]: Seun Betts)   Last Visit to PCP Within last year  (12/22)   Prior Functional Level Independent in ADLs/IADLs   Current Functional Level Assistance with the following:;Bathing;Dressing; Toileting;Feeding;Cooking;Housework; Shopping;Mobility   Can patient return to prior living arrangement Yes   Ability to make needs known:

## 2023-08-17 PROBLEM — Z71.89 DNR (DO NOT RESUSCITATE) DISCUSSION: Status: ACTIVE | Noted: 2023-01-01

## 2023-08-17 NOTE — ACP (ADVANCE CARE PLANNING)
Palliative Medicine  (Per Dr. Rachell Casas): Marily Low is a 79 y.o. male with a past history of atrial fibrillation, on Coumadin, and had TIAs in the past.  CAD s/p stent, s/p ICD/patient pacemaker, who was admitted on 8/13/2023 from home found down in his home covered with feces, last known 2 days prior, with a diagnosis of acute/subacute CVA in the left parietal lobe with expressive aphasia and dysphagia, currently NPO. In addition rhabdomyolysis. CK trending down on IV fluids. Neurology following, of note patient has known memory problem and was evaluated by neuropsychiatry Dr. Cristian Conner for memory issues last month. Current medical issues leading to Palliative Medicine involvement include: care decisions     Code Status: Full Code    Advance Care Planning:  Demographics 8/14/2023   Marital Status    Mr. Cliff Shaw is unmarried and has no children. His only sibling, Kiersten Oliva, is his legal nok and has accepted the role of primary HCDM and Westley April, who is named in AMDs on file has resigned as mPOA per our conference call today. Patient / Family Encounter Documentation    Participants (names): Wilian Morris (confused and restless, unable to participate in conversation), Dr. Jacoby Dozier, Jo Worrell, [facility]    Narrative: Palliative team facilitated conference call with Westley Chen, ex-girlfriend/mPOA, and sister Kiersten Oliva. Early in the call Kiersten Oliva confirmed that as sister and legal nok she is taking on the role of primary HCDM for patient. Sister Kiersten Oliva shared that she feels supported by her 4 children and will be consulting them regarding health care decisions for her brother and she plans to use his AMDs for guidance. This writer has sent her the 3 documents on file via email and printed a copy which will be left for her in an envelope in patient's room.  Kiersten Oliva is not medically cleared to drive due to her own health, so she plans to have a ride this weekend to

## 2023-08-20 NOTE — PROCEDURES
Patient tachy in the 150s. MD notified. RN checked on patient, patient pulled mitts off and pulled out IV. No further orders at this time.

## 2023-08-21 PROBLEM — F02.B0 MODERATE LATE ONSET ALZHEIMER'S DEMENTIA WITHOUT BEHAVIORAL DISTURBANCE, PSYCHOTIC DISTURBANCE, MOOD DISTURBANCE, OR ANXIETY (HCC): Status: ACTIVE | Noted: 2023-01-01

## 2023-08-21 PROBLEM — G30.1 MODERATE LATE ONSET ALZHEIMER'S DEMENTIA WITHOUT BEHAVIORAL DISTURBANCE, PSYCHOTIC DISTURBANCE, MOOD DISTURBANCE, OR ANXIETY (HCC): Status: ACTIVE | Noted: 2023-01-01

## 2023-08-22 PROBLEM — R13.10 DYSPHAGIA: Status: ACTIVE | Noted: 2023-01-01

## 2023-08-22 PROBLEM — Z51.5 PALLIATIVE CARE BY SPECIALIST: Status: ACTIVE | Noted: 2023-01-01

## 2023-08-22 NOTE — PROGRESS NOTES
(MARLON-7). Results: For standardized tests, performance was compared to a demographically matched sample of neurocognitively healthy adults using the following classification system to indicate deviation from mean (or average) test performance:    Normally Distributed Not-Normally Distributed   Descriptor Percentile Descriptor Percentile   \"Exceptionally High\" >97th \"Within Normal Limits\" >24th   \"Above Average\" 91st - 97th \"Low Average\" 9th - 24th   \"High Average\" 75th - 90th \"Below Average\" 2nd - 8th   \"Average\" 25th - 74th \"Exceptionally Low\" <2nd   \"Low Average\" 9th - 24th    \"Below Average\" 2nd - 8th    \"Exceptionally Low\" <2nd      Performance and symptom validity are analyzed in a number of ways, including administration of neuropsychological measures that have been empirically shown to identify suboptimal performance or purposeful exaggeration. These tests and their scores have been redacted from this report in order to ensure test security, but are available to formally trained neuropsychologists upon request. In addition, when possible, the overall pattern of performance is analyzed for consistency between measures, consistency with the expected severity of impairment, and the presenting symptoms are compared against base rates of symptoms in other patients with similar problems. The patient's performances were within acceptable limits, indicating the results of the present evaluation are believed to be valid and reliable. Premorbid Functioning:   Average  Attention:   Brief auditory attention: Low average  Auditory working memory: Below average to exceptionally low  Processing Speed:  Exceptionally low to below average but generally in the exceptionally low range  Executive Functioning:   Mental set switching: Discontinued due to time limits. During the allotted time, the patient successfully completed 3 out of 24 transitions. He made 6 sequencing errors.   Problem Solving: Exceptionally

## 2023-08-22 NOTE — ACP (ADVANCE CARE PLANNING)
Advance Care Planning     Advance Care Planning (ACP) Physician/NP/PA Conversation    Date of Conversation: 8/13/2023  Conducted with:  Healthcare Decision Maker: Named in Advance Directive or Healthcare Power of  (name) [address] Decision Maker:    Primary Decision Maker: Janet Wade - Brother/Sister - [phone]    Secondary Decision Maker: Po Mckeon - Niece/Nephew - [phone]  Click here to complete Healthcare Decision Makers including selection of the Healthcare Decision Maker Relationship (ie \"Primary\"). Care Preferences:     Met with Kimi Phillips and Neli Giraldo to discuss goals of care. Pt has not progressed since our last visit, still with dysphagia. Discussed a comfort plan of care ie treating symptoms and minimizing suffering. In a comfort plan of care we forego further artificial nutrition and allow for comfort feeding if appropriate/pt desires. Family is in agreement with comfort plan of care. Discussed hospice services will place a hospice consult.        Length of Voluntary ACP Conversation in minutes:  16 minutes    Marlene Elizabeth MD

## 2023-08-22 NOTE — CARE COORDINATION
Transition of Care Plan:    RUR: 16%  Prior Level of Functioning: Independent  Disposition: Hospice - will need placement  If SNF or IPR: Date FOC offered:   Date FOC received:   Accepting facility: TBD  Date authorization started with reference number:   Date authorization received and expires: Follow up appointments:   DME needed: TBD by Hospice  Transportation at discharge: will need BLS  IM/IMM Medicare/ letter given: 1st IM given 8/18   Is patient a  and connected with VA? If yes, was [employer] transfer form completed and VA notified? Caregiver Contact: Sister - Nancy Melendezum - [phone] or [phone];  mPOA - ex-girlfriend - Sharon Leo - has deferred to patient's sister (only sister - no children)  Discharge Caregiver contacted prior to discharge? One Baltazar García Conference needed? CM met with patient's sister and niece to discuss Hospice options - also discussed placement options if patient does not qualify for GIP or Hospice House  Barriers to discharge:  Access to patient's finances for placement - Durable POA is ex-girlfriend in [place] who is unable to come to [place] - family trying to gain access to financial records. After freedom of choice offered, Referral placed to [facility] via [facility] for assessment and information session with family.       Barney Mora, RN, BSN, [place]  Manager of Case Management  [phone]

## 2023-08-23 PROBLEM — R52 NONVERBAL SIGNS OF PAIN: Status: ACTIVE | Noted: 2023-01-01

## 2023-08-23 PROBLEM — Z51.5 HOSPICE CARE: Status: ACTIVE | Noted: 2023-01-01

## 2023-08-23 NOTE — CARE COORDINATION
Transition of Care Plan:     RUR: 16%-Moderate Risk  Prior Level of Functioning: Independent  Disposition: [address]   If SNF or IPR: Date FOC offered: 8/22  Date FOC received: 8/22  Accepting facility: TBD  Date authorization started with reference number: N/A  Date authorization received and expires: N/A  Follow up appointments: Per Hospice  DME needed: TBD by Hospice  Transportation at discharge: [facility] arranged by Hospice for 1400  IM/IMM Medicare/ letter given: 2nd I-8/22/23  Is patient a [place] and connected with [facility]? No  Caregiver Contact: Sister Anne Pompa - [phone] or [phone];  mPOA - ex-girlfriend - Darrius Francisco - has deferred to patient's sister (only sister - no children)  Discharge Caregiver contacted prior to discharge? One Baltazar García Conference needed? None           08/23/23 1342   Services At/After Discharge   Transition of Care Consult (CM Consult) [address] [facility] [address] Provided? No   Mode of Transport at Discharge [facility]  ([id number] by [facility])   Hospital Transport Time of Discharge 1400   Confirm Follow Up Transport Other (see comment)  (Per hospice)   Condition of Participation: Discharge Planning   The Plan for Transition of Care is related to the following treatment goals: Home with hospice   The Patient and/or Patient Representative was provided with a Choice of Provider? Patient Representative   Name of the Patient Representative who was provided with the Choice of Provider and agrees with the Discharge Plan? Margaret Dalton Wade-Daughter   The Patient and/Or Patient Representative agree with the Discharge Plan? Yes   Freedom of Choice list was provided with basic dialogue that supports the patient's individualized plan of care/goals, treatment preferences, and shares the quality data associated with the providers?   Yes     CM spoke with hospice nurse Joseline Bee who advised that patient can

## 2023-08-23 NOTE — PLAN OF CARE
Problem: Discharge Planning  Goal: Discharge to home or other facility with appropriate resources  Outcome: Progressing     Problem: Pain  Goal: Verbalizes/displays adequate comfort level or baseline comfort level  Outcome: Progressing     Problem: Occupational Therapy - Adult  Goal: By Discharge: Performs self-care activities at highest level of function for planned discharge setting. See evaluation for individualized goals. Description: FUNCTIONAL STATUS PRIOR TO ADMISSION:  Due to pt's recent CVA and decreased verbal output, pt was unable to provide full functional status. ,  ,  ,  ,  ,  ,  ,  ,  ,  ,       HOME SUPPORT: Pt was admitted from home. Believe pt was living alone PTA. Pt has family that lives close to pt per chart review. Occupational Therapy Goals:  Initiated 8/14/2023  1. Patient will perform grooming while seated at EOB with Moderate Assist within 7 day(s). 2.  Patient will perform upper body dressing while using hemiparetic techniques with Moderate Assist within 7 day(s). 3.  Patient will perform lower body dressing with DME PRN w/ Moderate Assist within 7 day(s). 4.  Patient will perform toilet transfers with Moderate Assist  within 7 day(s). 5.  Patient will perform all aspects of toileting with Moderate Assist within 7 day(s). 6.  Patient will perform EOB sponge bathing w/ Mod Assist within 7 day(s). 8/16/2023 1456 by Luda Velez OT  Outcome: Progressing     Problem: SLP Adult - Impaired Swallowing  Goal: By Discharge: Advance to least restrictive diet without signs or symptoms of aspiration for planned discharge setting. See evaluation for individualized goals. Description: 1. Patient will participate in swallow re-eval within 7 days. MET 8/16/2023  2. Patient will tolerate thin liquid and puree trials with SLP without overt signs of aspiration within 7 days.   8/16/2023 1339 by AGUSTINA Lynne  Outcome: Progressing     Problem: Skin/Tissue Integrity  Goal: Absence of
Problem: Discharge Planning  Goal: Discharge to home or other facility with appropriate resources  Outcome: Progressing     Problem: Pain  Goal: Verbalizes/displays adequate comfort level or baseline comfort level  Outcome: Progressing     Problem: Skin/Tissue Integrity  Goal: Absence of new skin breakdown  Description: 1. Monitor for areas of redness and/or skin breakdown  2. Assess vascular access sites hourly  3. Every 4-6 hours minimum:  Change oxygen saturation probe site  4. Every 4-6 hours:  If on nasal continuous positive airway pressure, respiratory therapy assess nares and determine need for appliance change or resting period. Outcome: Progressing     Problem: Chronic Conditions and Co-morbidities  Goal: Patient's chronic conditions and co-morbidity symptoms are monitored and maintained or improved  Outcome: Progressing     Problem: Physical Therapy - Adult  Goal: By Discharge: Performs mobility at highest level of function for planned discharge setting. See evaluation for individualized goals. Description: FUNCTIONAL STATUS PRIOR TO ADMISSION: Prior to admission, pt lived alone and was completely independent per chart. Found down by family after not hearing from him for 2-3 days. HOME SUPPORT PRIOR TO ADMISSION: Pt lived alone in 1 story home with MAKENNA. Unable to gain more background from pt due to new aphasia    Physical Therapy Goals  Initiated 8/14/2023  1. Patient will move from supine to sit and sit to supine, scoot up and down, and roll side to side in bed with moderate assistance within 7 day(s). 2.  Patient will perform sit to stand with maximal assistance within 7 day(s). 3.  Patient will transfer from bed to chair and chair to bed with maximal assistance using the least restrictive device within 7 day(s). 4.  Patient will ambulate with maximal assistance for 10 feet with the least restrictive device within 7 day(s).      8/14/2023 1638 by Juan Grace PT  Outcome:
Problem: Discharge Planning  Goal: Discharge to home or other facility with appropriate resources  Outcome: Progressing     Problem: Pain  Goal: Verbalizes/displays adequate comfort level or baseline comfort level  Outcome: Progressing     Problem: Skin/Tissue Integrity  Goal: Absence of new skin breakdown  Description: 1. Monitor for areas of redness and/or skin breakdown  2. Assess vascular access sites hourly  3. Every 4-6 hours minimum:  Change oxygen saturation probe site  4. Every 4-6 hours:  If on nasal continuous positive airway pressure, respiratory therapy assess nares and determine need for appliance change or resting period. Outcome: Progressing     Problem: Chronic Conditions and Co-morbidities  Goal: Patient's chronic conditions and co-morbidity symptoms are monitored and maintained or improved  Outcome: Progressing     Problem: Safety - Adult  Goal: Free from fall injury  Outcome: Progressing     Problem: Safety - Medical Restraint  Goal: Remains free of injury from restraints (Restraint for Interference with Medical Device)  Description: INTERVENTIONS:  1. Determine that other, less restrictive measures have been tried or would not be effective before applying the restraint  2. Evaluate the patient's condition at the time of restraint application  3. Inform patient/family regarding the reason for restraint  4.  Q2H: Monitor safety, psychosocial status, comfort, nutrition and hydration  Outcome: Progressing
Problem: Occupational Therapy - Adult  Goal: By Discharge: Performs self-care activities at highest level of function for planned discharge setting. See evaluation for individualized goals. Description: FUNCTIONAL STATUS PRIOR TO ADMISSION:  Due to pt's recent CVA and decreased verbal output, pt was unable to provide full functional status. ,  ,  ,  ,  ,  ,  ,  ,  ,  ,       HOME SUPPORT: Pt was admitted from home. Believe pt was living alone PTA. Pt has family that lives close to pt per chart review. Occupational Therapy Goals:  Initiated 8/14/2023  1. Patient will perform grooming while seated at EOB with Moderate Assist within 7 day(s). 2.  Patient will perform upper body dressing while using hemiparetic techniques with Moderate Assist within 7 day(s). 3.  Patient will perform lower body dressing with DME PRN w/ Moderate Assist within 7 day(s). 4.  Patient will perform toilet transfers with Moderate Assist  within 7 day(s). 5.  Patient will perform all aspects of toileting with Moderate Assist within 7 day(s). 6.  Patient will perform EOB sponge bathing w/ Mod Assist within 7 day(s). 8/15/2023 1448 by Zaria Tsang OT  Outcome: Not Progressing     Problem: Physical Therapy - Adult  Goal: By Discharge: Performs mobility at highest level of function for planned discharge setting. See evaluation for individualized goals. Description: FUNCTIONAL STATUS PRIOR TO ADMISSION: Prior to admission, pt lived alone and was completely independent per chart. Found down by family after not hearing from him for 2-3 days. HOME SUPPORT PRIOR TO ADMISSION: Pt lived alone in 1 story home with MAKENNA. Unable to gain more background from pt due to new aphasia    Physical Therapy Goals  Initiated 8/14/2023  1. Patient will move from supine to sit and sit to supine, scoot up and down, and roll side to side in bed with moderate assistance within 7 day(s).     2.  Patient will perform sit to stand with maximal
Problem: Occupational Therapy - Adult  Goal: By Discharge: Performs self-care activities at highest level of function for planned discharge setting. See evaluation for individualized goals. Description: FUNCTIONAL STATUS PRIOR TO ADMISSION:  Due to pt's recent CVA and decreased verbal output, pt was unable to provide full functional status. ,  ,  ,  ,  ,  ,  ,  ,  ,  ,       HOME SUPPORT: Pt was admitted from home. Believe pt was living alone PTA. Pt has family that lives close to pt per chart review. Occupational Therapy Goals:  Initiated 8/14/2023  1. Patient will perform grooming while seated at EOB with Moderate Assist within 7 day(s). 2.  Patient will perform upper body dressing while using hemiparetic techniques with Moderate Assist within 7 day(s). 3.  Patient will perform lower body dressing with DME PRN w/ Moderate Assist within 7 day(s). 4.  Patient will perform toilet transfers with Moderate Assist  within 7 day(s). 5.  Patient will perform all aspects of toileting with Moderate Assist within 7 day(s). 6.  Patient will perform EOB sponge bathing w/ Mod Assist within 7 day(s). Outcome: Not Progressing   OCCUPATIONAL THERAPY TREATMENT  Patient: Oliverio Olsen (60 y.o. male)  Date: 8/22/2023  Primary Diagnosis: CVA (cerebrovascular accident due to intracerebral hemorrhage) ([address]) [I61.9]  NSTEMI (non-ST elevated myocardial infarction) ([address]) [I21.4]  Acute cystitis without hematuria [N30.00]  Cerebrovascular accident (CVA), unspecified mechanism ([address]) [I63.9]       Precautions: NPO, Bed Alarm, Fall Risk                Chart, occupational therapy assessment, plan of care, and goals were reviewed. ASSESSMENT  Patient continues to benefit from skilled OT services and is not progressing towards goals. Pt was supine upon arrival and was restless and swinging his arms in the air. Pt was uncovered and genitalia was exposed.   Therapist covered pt up and pt appeared to attempt to pull covering
Problem: Occupational Therapy - Adult  Goal: By Discharge: Performs self-care activities at highest level of function for planned discharge setting. See evaluation for individualized goals. Description: FUNCTIONAL STATUS PRIOR TO ADMISSION:  Due to pt's recent CVA and decreased verbal output, pt was unable to provide full functional status. ,  ,  ,  ,  ,  ,  ,  ,  ,  ,       HOME SUPPORT: Pt was admitted from home. Believe pt was living alone PTA. Pt has family that lives close to pt per chart review. Occupational Therapy Goals:  Initiated 8/14/2023  1. Patient will perform grooming while seated at EOB with Moderate Assist within 7 day(s). 2.  Patient will perform upper body dressing while using hemiparetic techniques with Moderate Assist within 7 day(s). 3.  Patient will perform lower body dressing with DME PRN w/ Moderate Assist within 7 day(s). 4.  Patient will perform toilet transfers with Moderate Assist  within 7 day(s). 5.  Patient will perform all aspects of toileting with Moderate Assist within 7 day(s). 6.  Patient will perform EOB sponge bathing w/ Mod Assist within 7 day(s). Outcome: Progressing    OCCUPATIONAL THERAPY TREATMENT  Patient: Janis Headley (70 y.o. male)  Date: 8/16/2023  Primary Diagnosis: CVA (cerebrovascular accident due to intracerebral hemorrhage) ([address]) [I61.9]  NSTEMI (non-ST elevated myocardial infarction) ([address]) [I21.4]  Acute cystitis without hematuria [N30.00]  Cerebrovascular accident (CVA), unspecified mechanism ([address]) [I63.9]       Precautions: NPO, Bed Alarm, Fall Risk                Chart, occupational therapy assessment, plan of care, and goals were reviewed. ASSESSMENT  Patient continues to benefit from skilled OT services and is slowly progressing towards goals. Pt received in bed, agreeable to therapy. Pt vitals tracked during session, Vitals listed below.  Pt still presents with high BP (HR was much more controlled on this date than previous) along
Problem: Physical Therapy - Adult  Goal: By Discharge: Performs mobility at highest level of function for planned discharge setting. See evaluation for individualized goals. Description: FUNCTIONAL STATUS PRIOR TO ADMISSION: Prior to admission, pt lived alone and was completely independent per chart. Found down by family after not hearing from him for 2-3 days. HOME SUPPORT PRIOR TO ADMISSION: Pt lived alone in 1 story home with MAKENNA. Unable to gain more background from pt due to new aphasia    Physical Therapy Goals  Initiated 8/14/2023  1. Patient will move from supine to sit and sit to supine, scoot up and down, and roll side to side in bed with moderate assistance within 7 day(s). 2.  Patient will perform sit to stand with maximal assistance within 7 day(s). 3.  Patient will transfer from bed to chair and chair to bed with maximal assistance using the least restrictive device within 7 day(s). 4.  Patient will ambulate with maximal assistance for 10 feet with the least restrictive device within 7 day(s). Outcome: Not Progressing   PHYSICAL THERAPY TREATMENT/DISCHARGE    Patient: Ricardo Dyer (50 y.o. male)  Date: 8/22/2023  Diagnosis: CVA (cerebrovascular accident due to intracerebral hemorrhage) ([address]) [I61.9]  NSTEMI (non-ST elevated myocardial infarction) ([address]) [I21.4]  Acute cystitis without hematuria [N30.00]  Cerebrovascular accident (CVA), unspecified mechanism ([address]) [I63.9] CVA (cerebrovascular accident due to intracerebral hemorrhage) ([address])      Precautions: NPO, Bed Alarm, Fall Risk                    ASSESSMENT:  Patient has been followed by skilled PT services and has not progressed towards goals. Patient received supine in bed, agreeable to PT session but unintelligible speech throughout. Pt with difficulty following commands, requiring up to total A for bed mobility and hand over hand assist for mobility. Sitting balance EOB with B UE support and up to mod A posteriorly.  Pt
Problem: Safety - Medical Restraint  Goal: Remains free of injury from restraints (Restraint for Interference with Medical Device)  Description: INTERVENTIONS:  1. Determine that other, less restrictive measures have been tried or would not be effective before applying the restraint  2. Evaluate the patient's condition at the time of restraint application  3. Inform patient/family regarding the reason for restraint  4.  Q2H: Monitor safety, psychosocial status, comfort, nutrition and hydration  Outcome: Progressing  Flowsheets (Taken 8/18/2023 1042)  Remains free of injury from restraints (restraint for interference with medical device):   Determine that other, less restrictive measures have been tried or would not be effective before applying the restraint   Evaluate the patient's condition at the time of restraint application   Every 2 hours: Monitor safety, psychosocial status, comfort, nutrition and hydration
Speech LAnguage Pathology TREATMENT    Patient: Filippo Blair (76 y.o. male)  Date: 8/22/2023  Primary Diagnosis: CVA (cerebrovascular accident due to intracerebral hemorrhage) ([address]) [I61.9]  NSTEMI (non-ST elevated myocardial infarction) ([address]) [I21.4]  Acute cystitis without hematuria [N30.00]  Cerebrovascular accident (CVA), unspecified mechanism ([address]) [I63.9]       Precautions: NPO, Bed Alarm, Fall Risk                  ASSESSMENT :  Patient continues to be followed by SLP for dysphagia and aphasia. MD at bedside initially for observation of swallow treatment. Patient initially with oral holding of ice chip trial with no effort to oral manipulate or masticate, resulting in absent posterior propulsion, and absent swallow initiation, requiring [place] to clear. Future ice chip trials resulted in expectoration of ice chips. Thin liquid presented via spoon. Patient observed to talk with bolus in oral cavity (i.e. patient continuing with reduced bolus awareness). Intermittent swallow initiation observed, though not consistently requiring SLP to Yankauer residual bolus from patient's oral cavity. Patient continues with no command following. Patient continues with dysarthric speech, characterized by reduced breath support, blended word boundaries, and imprecise articulation, affecting the intelligibility of his limited verbalizations. Patient's reduced attention limit his ability to participate in auditory comprehension tasks and automatic speech tasks. Note family meeting planned for this afternoon. Will continue to follow pending [employer] [place] discussion. Patient will benefit from skilled intervention to address the above impairments.      PLAN :  Recommendations and Planned Interventions:  Diet: NPO except ice chips if fully alert and participatory  -- Would benefit from alternative means of nutrition/hydration as congruent with goals of care  -- Continue non-oral medications   -- Oral care 3-4x/daily via suction
Speech LAnguage Pathology TREATMENT    Patient: Mamta Wood (59 y.o. male)  Date: 8/15/2023  Primary Diagnosis: CVA (cerebrovascular accident due to intracerebral hemorrhage) ([address]) [I61.9]  NSTEMI (non-ST elevated myocardial infarction) ([address]) [I21.4]  Acute cystitis without hematuria [N30.00]  Cerebrovascular accident (CVA), unspecified mechanism ([address]) [I63.9]       Precautions: Aspiration, Aphasia, NPO, Bed Alarm, Fall Risk     ASSESSMENT :  Therapy targeted swallowing and speech/language. Patient appeared agreeable to therapy and participated throughout session. Swallowing: PO trials of thin liquids and purees given. Patient given verbal and visual cues for bolus acceptance/awareness and manipulation/mastication of ice chip at outset of session. For subsequent trials of ice chips as with all trials of water and purees, patient accepted bolus and demonstrated slowed but efficient bolus manipulation/formation with complete oral cavity clearance. Consistent cough response seen to purees which raises concern for pharyngeal dysphagia. No overt s/s of aspiration appreciated following thin liquids. Recommend MBS to further evaluate swallow safety/efficiency and to determine least restrictive PO diet. In interim, recommend patient remain NPO but be allowed ice chips and water. He will benefit from ongoing SLP services for dysphagia management. Speech/Language: Patient presents with ongoing severe mixed fluent aphasia but interval improvement as compared to initial evaluation yesterday. He did not follow structured 1-step commands, reliably answer paired y/n questions, or generate appropriate names for confrontation or responsive naming tasks. Patient did have spontaneous verbal output appropriate to context intermittently during session. Patient with frequent perseveration and use of jargon. Oral and verbal apraxia appears less significant as compared to prior session.  He will benefit from ongoing SLP services
approx 2001    Chronic atrial fibrillation [facility])     ED (erectile dysfunction)     Hypertension     Kidney stone on right side 10/30/2011    JUAN DANIEL (obstructive sleep apnea) 4/25/2012    Skin cancer     BCC, s/p Mohs on L flank    Sun-damaged skin     TIA (transient ischemic attack)     6/12 - seen at [facility]     Past Surgical History:   Procedure Laterality Date    APPENDECTOMY  age 15    [address]  11/15/13    + PM    CATARACT REMOVAL Bilateral     COLONOSCOPY  05/10/2012    hyperplastic    CYSTOSTOMY  1/23/14    diffuse erythema    HERNIA REPAIR  09/12/2017    L inguinal hernia repair    HERNIA REPAIR Bilateral     inguinal hernia    INS PPM/ICD LED SING ONLY  11/15/2013         LUMBAR DISCECTOMY  1970's    MOHS SURGERY  01/05/2015    [facility]    MOHS SURGERY  02/21/2017    BCC left lateral cheek by Dr. Martinez Bergheim  11/15/2013    AICD    [place] UNLISTED PROCEDURE CARDIAC SURGERY  2006    stents x 3     TOTAL HIP ARTHROPLASTY Bilateral     TOTAL HIP ARTHROPLASTY Right 12/4/13    REVISION     TOTAL KNEE ARTHROPLASTY Right 7/2011     Prior Level of Function/Home Situation:   Social/Functional History  Lives With: Alone  Type of Home: House  Home Layout: One level  Home Access: Level entry  Entrance Stairs - Number of Steps: unsure. pt aphasic, please confirm with family  Has the patient had two or more falls in the past year or any fall with injury in the past year?: Yes    Cognitive and Communication Status:  Neurologic State: Lethargic  Orientation Level: Disoriented x4  Cognition: Decreased attention/concentration and No command following    Dysphagia:  P.O. Trials:  PO Trials  Assessment Method(s): Observation  Patient Position: Upright in bed  Vocal Quality: No Impairment  Consistency Presented:  Ice Chips  How Presented: SLP-fed/Presented;Spoon  Bolus Acceptance: Impaired  Bolus Formation/Control: Impaired  Type of Impairment:
bedside. Attempted to have pt transfer to a chair, but pt unable to advance either LE and this writer unable to effectively assist pt with adequate weight shifts to assist with advancement of either LE, with R knee buckling with R lateral weight shift attempt. Pt did verbalize 2 sentences, with the 1st sentence being very clear. He was very eager to sit in the chair, but not safe for pt to do so at this time. Feel pt is progressing and the global aphasia is improving as well. IPR remains appropriate at discharge, as pt was Independent at baseline and eager to progress. PLAN:  Patient continues to benefit from skilled intervention to address the above impairments. Continue treatment per established plan of care. Recommendation for discharge: (in order for the patient to meet his/her long term goals): Therapy 3 hours/day 5-7 days/week    Other factors to consider for discharge: patient's current support system is unable to meet their requirements for physical assistance, poor safety awareness, impaired cognition, high risk for falls, not safe to be alone, and concern for safely navigating or managing the home environment    IF patient discharges home will need the following DME: continuing to assess with progress       SUBJECTIVE:   Patient stated, \"I don't think I can do it. \"    OBJECTIVE DATA SUMMARY:   Critical Behavior:  Orientation  Overall Orientation Status: Impaired  Orientation Level: Oriented to person  Cognition  Overall Cognitive Status: Exceptions  Arousal/Alertness: Delayed responses to stimuli  Following Commands: Follows one step commands with increased time  Attention Span: Attends with cues to redirect; Difficulty attending to directions  Safety Judgement: Decreased awareness of need for safety  Initiation: Requires cues for some  Sequencing: Requires cues for some    Functional Mobility Training:  Bed Mobility:  Bed Mobility Training  Bed Mobility Training: Yes  Interventions:
maintained or improved  Outcome: Progressing     Problem: Safety - Adult  Goal: Free from fall injury  Outcome: Progressing     Problem: Safety - Medical Restraint  Goal: Remains free of injury from restraints (Restraint for Interference with Medical Device)  Description: INTERVENTIONS:  1. Determine that other, less restrictive measures have been tried or would not be effective before applying the restraint  2. Evaluate the patient's condition at the time of restraint application  3. Inform patient/family regarding the reason for restraint  4. Q2H: Monitor safety, psychosocial status, comfort, nutrition and hydration  Outcome: Progressing     Problem: ABCDS Injury Assessment  Goal: Absence of physical injury  Outcome: Progressing     Problem: Confusion  Goal: Confusion, delirium, dementia, or psychosis is improved or at baseline  Description: INTERVENTIONS:  1. Assess for possible contributors to thought disturbance, including medications, impaired vision or hearing, underlying metabolic abnormalities, dehydration, psychiatric diagnoses, and notify attending LIP  2. [place] high risk fall precautions, as indicated  3. Provide frequent short contacts to provide reality reorientation, refocusing and direction  4. Decrease environmental stimuli, including noise as appropriate  5. Monitor and intervene to maintain adequate nutrition, hydration, elimination, sleep and activity  6. If unable to ensure safety without constant attention obtain sitter and review sitter guidelines with assigned personnel  7.  Initiate Psychosocial CNS and Spiritual Care consult, as indicated  Outcome: Progressing
tracked throughout session. While supine in bed, pt's HR was consistent around 102. Upon transferring to sitting EOB, pt's HR elevated to 150. Pt was safely transferred back to supine in bed and with a rest break, HR dropped back to 87. While supine, pt's BP was 188/100 (126). Due to pt's elevated HR and BP, activity was limited on this date. Pt's gown was wet upon transferring back to bed (expect pt's gown was wet at beginning of session) pt required total assistance to don new hospital gown. Anticipate that pt's mobility and activity tolerance will increase with increased medical stability. Pt was left supine in bed with new heber pads placed underneath and all needs met. Will continue to follow pt. PLAN :  Patient continues to benefit from skilled intervention to address the above impairments. Continue treatment per established plan of care to address goals. Recommendation for discharge: (in order for the patient to meet his/her long term goals): IPR    Other factors to consider for discharge: poor safety awareness, impaired cognition, high risk for falls, not safe to be alone, and concern for safely navigating or managing the home environment    IF patient discharges home will need the following DME: continuing to assess with progress       SUBJECTIVE:   Pt kept trying to speak with therapy team, however, pt was unable to produce intelligible speech. OBJECTIVE DATA SUMMARY:   Cognitive/Behavioral Status:  Orientation  Overall Orientation Status: Impaired  Orientation Level: Oriented to person  Cognition  Overall Cognitive Status: Exceptions  Arousal/Alertness: Delayed responses to stimuli  Following Commands: Follows one step commands with increased time; Follows one step commands with repetition  Attention Span: Difficulty attending to directions; Attends with cues to redirect  Initiation: Requires cues for some  Sequencing: Requires cues for some    Functional Mobility and Transfers for
Change oxygen saturation probe site  4. Every 4-6 hours:  If on nasal continuous positive airway pressure, respiratory therapy assess nares and determine need for appliance change or resting period. Outcome: Progressing     Problem: Chronic Conditions and Co-morbidities  Goal: Patient's chronic conditions and co-morbidity symptoms are monitored and maintained or improved  Outcome: Progressing     Problem: Safety - Adult  Goal: Free from fall injury  Outcome: Progressing     Problem: Safety - Medical Restraint  Goal: Remains free of injury from restraints (Restraint for Interference with Medical Device)  Description: INTERVENTIONS:  1. Determine that other, less restrictive measures have been tried or would not be effective before applying the restraint  2. Evaluate the patient's condition at the time of restraint application  3. Inform patient/family regarding the reason for restraint  4. Q2H: Monitor safety, psychosocial status, comfort, nutrition and hydration  Outcome: Progressing  Flowsheets (Taken 8/22/2023 1053 by Carmen Witt RN)  Remains free of injury from restraints (restraint for interference with medical device): Determine that other, less restrictive measures have been tried or would not be effective before applying the restraint     Problem: ABCDS Injury Assessment  Goal: Absence of physical injury  Outcome: Progressing     Problem: Confusion  Goal: Confusion, delirium, dementia, or psychosis is improved or at baseline  Description: INTERVENTIONS:  1. Assess for possible contributors to thought disturbance, including medications, impaired vision or hearing, underlying metabolic abnormalities, dehydration, psychiatric diagnoses, and notify attending LIP  2. [place] high risk fall precautions, as indicated  3. Provide frequent short contacts to provide reality reorientation, refocusing and direction  4. Decrease environmental stimuli, including noise as appropriate  5.  Monitor and intervene to
Supine after activity   HR  bpm in supine before activity - primarily ~102 bpm  Sitting edge of bed, HR immdediately noted to elevate to 146-150 bpm. RN beside and aware. Patient returned to supine and noted significant hypertension as well.    After treatment:   Patient left in no apparent distress in bed, Call bell within reach, Bed/ chair alarm activated, and Side rails x3    COMMUNICATION/EDUCATION:   The patient's plan of care was discussed with: occupational therapist and registered nurse    Patient Education  Education Given To: Patient  Education Provided: Role of Therapy;Plan of Care  Education Method: Verbal  Barriers to Learning: Cognition  Education Outcome: Unable to verbalize;Continued education needed      Donnamae Care, PT  Minutes: 23
artery disease)     stent x3 approx 2001    Chronic atrial fibrillation [facility])     ED (erectile dysfunction)     Hypertension     Kidney stone on right side 10/30/2011    JUAN DANIEL (obstructive sleep apnea) 4/25/2012    Skin cancer     BCC, s/p Mohs on L flank    Sun-damaged skin     TIA (transient ischemic attack)     6/12 - seen at [facility]     Past Surgical History:   Procedure Laterality Date    APPENDECTOMY  age 15    [address]  11/15/13    + PM    CATARACT REMOVAL Bilateral     COLONOSCOPY  05/10/2012    hyperplastic    CYSTOSTOMY  1/23/14    diffuse erythema    HERNIA REPAIR  09/12/2017    L inguinal hernia repair    HERNIA REPAIR Bilateral     inguinal hernia    INS PPM/ICD LED SING ONLY  11/15/2013         LUMBAR DISCECTOMY  1970's    MOHS SURGERY  01/05/2015    L temple, [facility]    MOHS SURGERY  02/21/2017    BCC left lateral cheek by Dr. Valencia Parkinson  11/15/2013    AICD    DE UNLISTED PROCEDURE CARDIAC SURGERY  2006    stents x 3     TOTAL HIP ARTHROPLASTY Bilateral     TOTAL HIP ARTHROPLASTY Right 12/4/13    REVISION     TOTAL KNEE ARTHROPLASTY Right 7/2011       Home Situation:  Social/Functional History  Lives With: Alone  Type of Home: House  Home Layout: One level  Home Access: Stairs to enter with rails  Entrance Stairs - Number of Steps: unsure. pt aphasic, please confirm with family  Has the patient had two or more falls in the past year or any fall with injury in the past year?: Yes    Cognitive/Behavioral Status:  Orientation  Overall Orientation Status: Impaired  Orientation Level: Oriented to person  Cognition  Overall Cognitive Status: Exceptions  Arousal/Alertness: Delayed responses to stimuli  Following Commands: Follows one step commands with increased time; Follows one step commands with repetition  Attention Span: Difficulty attending to directions; Attends with cues to redirect  Initiation: Requires cues for some    Skin: skin
of Home: House  Home Layout: One level  Home Access: Stairs to enter with rails  Entrance Stairs - Number of Steps: unsure. pt aphasic, please confirm with family  Has the patient had two or more falls in the past year or any fall with injury in the past year?: Yes    Diet prior to admission: Regular/Thin Liquid  Current Diet: NPO     Type of Study: Modified barium swallow  Film Views: Lateral;Fluoro  Radiologist: Tamiko Grace NP  Patient Position: Upright in chair    Trial 1: Trial 2:   Consistencies Administered: Thin - cup Consistencies Administered: Pureed   How Presented: SLP-fed/Presented;Spoon;Cup/sip How Presented: SLP-fed/Presented;Spoon   Bolus Acceptance: Impaired Bolus Acceptance : No impairment   Bolus Formation/Control: Impaired Bolus Formation/Control: Impaired     Propulsion: Delayed (# of seconds) Type of Impairment: Delayed Propulsion: Tongue pumping;Delayed (# of seconds)   Oral Residue: 10-50% of bolus (Sublingual) Oral Residue: Palatal;Lingual;10-50% of bolus     Initiation of Swallow: Triggered at vallecula   Timing: Pooling 1-5 sec;Pyriform sinus Timing: Pooling 1-5 sec;Vallecular   Penetration: None Penetration: None   Aspiration/Timing: During;From initial swallow Aspiration/Timing: No evidence of aspiration   Pharyngeal Clearance: 10-50%; Vallecular residue;Pyriform residue Pharyngeal Clearance: 10-50%; Vallecular residue;Pyriform residue                 Swallowing Physiology  Decreased Tongue Base Retraction?: Yes  Laryngeal Elevation: WFL (within functional limits)  Penetration-Aspiration Scale (PAS): 6 - Material enters the airway, passes below the vocal folds, and is ejected into the larynx or out of the airway  Pharyngeal Symmetry: Not assessed  Pharyngeal-Esophageal Segment: No impairment  Pharyngeal Dysfunction: Decreased tongue base retraction  Findings  Oral Phase Severity: Moderate to Moderately-Severe  Pharyngeal Phase Severity: Mild    NOMS:   The NOMS functional outcome measure
Severe  Basic Questions: Severe  One Step Commands: Severe  Common Objects: Severe  Conversation: Severe     Expression  Primary Mode of Expression: Verbal  Verbal Expression  Verbal Expression: Exceptions to functional limits  Initiation: Severe  Repetition: Severe  Automatic Speech: Severe  Confrontation: Severe  Conversation: Severe       Neuro-Linguistics:                      Voice:       Respiratory Status/Airway:  Room air                           The NOMS functional outcome measure was used to quantify this patient's level of swallowing impairment. Based on the NOMS, the patient was determined to be at level 1 for swallow function         NOMS Swallowing Levels:  Level 1 (CN): NPO  Level 2 (CM): NPO but takes consistency in therapy  Level 3 (CL): Takes less than 50% of nutrition p.o. and continues with nonoral feedings; and/or safe with mod cues; and/or max diet restriction  Level 4 (CK): Safe swallow but needs mod cues; and/or mod diet restriction; and/or still requires some nonoral feeding/supplements  Level 5 (CJ): Safe swallow with min diet restriction; and/or needs min cues  Level 6 (CI): Independent with p.o.; rare cues; usually self cues; may need to avoid some foods or needs extra time  Level 7 ([address] [place] [address]): Independent for all p.o.  PRINCE. (2003). National Outcomes Measurement System (NOMS): Adult Speech-Language Pathology User's Guide.   , The NOMS functional outcome measure was used to quantify this patient's level of expressive language impairment. Based on the NOMS, the patient was determined to be at level 2 for spoken language expression. NOMS Spoken Language Expression:  Level 1 (CN): Verbalizations not meaningful to anyone. Level 2 (CM): Few attempts accurate/appropriate. Max cues to elicit automatic/imitative words/phrases. Level 3 (CL): Communication partner responsible for communication;  Mod cues for words/phrases meaningful in context  Level 4 (CK): Initiate during simple, routine
level sentence completion tasks with 80% accuracy given no cues within 7 days. Goal continued 8/21/2023.   2. Patient will improve auditory comprehension for simple yes/no questions and command following tasks with 80% accuracy given no cues within 7 days. Goal continued 8/21/2023.    Outcome: Progressing
Factors: while seated at EOB or long sitting in bed    UE Bathing: Maximum assistance       LE Bathing: Dependent/Total       UE Dressing: Maximum assistance       LE Dressing: Dependent/Total       Toileting: Dependent/Total                                                                                                                                                                                                                                              Barthel Index:    Barthel Index Scale  Feeding: Cannot perform activity  Bathing: Cannot perform activity  Grooming: Cannot perform activity  Dressing: Cannot perform activity  Bowel Control: Occasional accidents or needs help with device  Bladder Control: Occasional accidents or needs help with device  Toilet Transfers: Cannot perform activity  Chair/Bed Trannsfers: Able to sit, but needs maximum assistance to transfer  Ambulation: Cannot perform activity  Stairs: Cannot perform activity  Total Barthel Index Score: 15       The Barthel ADL Index: Guidelines  1. The index should be used as a record of what a patient does, not as a record of what a patient could do. 2. The main aim is to establish degree of independence from any help, physical or verbal, however minor and for whatever reason. 3. The need for supervision renders the patient not independent. 4. A patient's performance should be established using the best available evidence. Asking the patient, friends/relatives and nurses are the usual sources, but direct observation and common sense are also important. However direct testing is not needed. 5. Usually the patient's performance over the preceding 24-48 hours is important, but occasionally longer periods will be relevant. 6. Middle categories imply that the patient supplies over 50 per cent of the effort. 7. Use of aids to be independent is allowed.     Score Interpretation (from [address] [place] [address])    Independent   60-79 Minimally independent

## 2023-08-26 ENCOUNTER — HOME CARE VISIT (OUTPATIENT)
Age: 83
End: 2023-08-26
Payer: MEDICARE

## 2023-08-26 NOTE — DISCHARGE SUMMARY
Discharge Summary    Name: Daron Olszewski  [id number]  YOB: 1940 (Age: 79 y.o.)   Date of Admission: 8/13/2023  Date of Discharge: 8/25/2023  Attending Physician: No att. providers found    Discharge Diagnosis:     CVA: Left parietal CVA   Dysphagia   UTI   Agitation   Hypernatremia-resolved  H/o Afib  Abnormal UA  Urinary retention  Rhabdomyolysis    Consultations:  IP CONSULT TO TELE-NEUROLOGY  IP CONSULT TO NEUROLOGY  IP CONSULT TO PALLIATIVE CARE  IP WOUND CARE NURSE CONSULT TO EVAL  IP WOUND CARE NURSE CONSULT TO EVAL  IP CONSULT TO DIETITIAN  IP CONSULT TO CASE MANAGEMENT  IP CONSULT TO CASE MANAGEMENT  IP CONSULT TO HOSPICE      Brief Admission History/Reason for Admission Per Marybel Moeller MD:     Peace Zamora is a 79 y.o.  male with PMHx significant for CAD status post stents, hypertension, atrial fibrillation status post ICD/pacemaker who presents to the ED after being found down by his family. Patient was last well-known this past Friday. Patient was not answering his phone or responding to family called EMS. EMS broke the door down and found the patient down and covered in feces. Patient following some commands such as squeezing hand on the left. Patient mumbles and groans when asked for verbal responses. Brief Hospital Course by Main Problems:     Unable to meaningfully participate in PT/OT/SLP. Left parietal CVA with continued confusion post admission. Hospice consulted and took patient. Discharge Exam:  Patient seen and examined by me on discharge day. Pertinent Findings:  No data found. Gen:    Not in distress  Chest: Clear lungs  CVS:   Regular rhythm. No edema  Abd:  Soft, not distended, not tender  Neuro: awake, moving all exts    Discharge/Recent Laboratory Results:  No results for input(s): NA, K, CL, CO2, BUN, CREATININE, GLUCOSE, CALCIUM, PHOS, MG in the last 72 hours.   No results for input(s): HGB, HCT, WBC, PLT in the last

## 2024-02-06 NOTE — TELEPHONE ENCOUNTER
Pt calling the office stating [place] and went to the ER for a hernia and was told by the ER that the pt needed to have hernia surgery within 24 hrs. Pt states he has paperwork stating this. Pt is asking for a referral for hernia surgery. Informed pt he would need to go directly to the ED if this is surgery that needed to be done. The pt then asked if he could have a phone # to call a hernia specialist. Pt was given the # for [facility].
urinary catheter complications

## 2025-06-09 NOTE — PROGRESS NOTES
INR 2.4 today. Patient instructions Sumanth Jennings:   Kartik Done Coumadin today due to IR procedure tomorrow, then resume NEW dose post procedure  NEW dose: Coumadin 2mg daily except 4mg on M/W/F (14% decrease due to INR of 4.4 last week). Old dose: Coumadin 4mg daily except 2mg on Tues and Thurs   Recheck in 1 week    A full discussion of the nature of anticoagulants has been carried out. A benefit risk analysis has been presented to the patient, so that they understand the justification for choosing anticoagulation at this time. The need for frequent and regular monitoring, precise dosage adjustment and compliance is stressed. Side effects of potential bleeding are discussed. The patient should avoid any OTC items containing aspirin or ibuprofen, and should avoid great swings in general diet. Avoid alcohol consumption. Call if any signs of abnormal bleeding. Patient verbalized understanding. L/m for Maynor to c/b and schedule mohs/left mohs c.b number.

## (undated) DEVICE — SURGICAL PROCEDURE KIT GEN LAPAROSCOPY LF

## (undated) DEVICE — BLADELESS OPTICAL TROCAR WITH FIXATION CANNULA: Brand: VERSAPORT

## (undated) DEVICE — TUBING INSUFLTN 10FT LUER -- CONVERT TO ITEM 368568

## (undated) DEVICE — DEVON™ KNEE AND BODY STRAP 60" X 3" (1.5 M X 7.6 CM): Brand: DEVON

## (undated) DEVICE — SUTURE SZ 0 27IN 5/8 CIR UR-6  TAPER PT VIOLET ABSRB VICRYL J603H

## (undated) DEVICE — KENDALL SCD EXPRESS SLEEVES, KNEE LENGTH, MEDIUM: Brand: KENDALL SCD

## (undated) DEVICE — DERMABOND SKIN ADH 0.7ML -- DERMABOND ADVANCED 12/BX

## (undated) DEVICE — (D)PREP SKN CHLRAPRP APPL 26ML -- CONVERT TO ITEM 371833

## (undated) DEVICE — SOLUTION IV 1000ML 0.9% SOD CHL

## (undated) DEVICE — 3000CC GUARDIAN II: Brand: GUARDIAN

## (undated) DEVICE — CLICKLINE SCISSORS INSERT: Brand: CLICKLINE

## (undated) DEVICE — STAPLER INT DIA5MM 25 ABSRB STRP FIX DISP FOR HERN MESH

## (undated) DEVICE — INFECTION CONTROL KIT SYS

## (undated) DEVICE — REM POLYHESIVE ADULT PATIENT RETURN ELECTRODE: Brand: VALLEYLAB

## (undated) DEVICE — TRAY CATH SIL 16FR 10 CA STATLOK

## (undated) DEVICE — NEEDLE HYPO 22GA L1.5IN BLK S STL HUB POLYPR SHLD REG BVL

## (undated) DEVICE — DISSECTOR ENDOSCP PREPERI KID SHP DISTENSION BLLN SPCMKR

## (undated) DEVICE — STERILE POLYISOPRENE POWDER-FREE SURGICAL GLOVES WITH EMOLLIENT COATING: Brand: PROTEXIS

## (undated) DEVICE — SUTURE MCRYL SZ 4-0 L18IN ABSRB UD L19MM PS-2 3/8 CIR PRIM Y496G